# Patient Record
Sex: FEMALE | Race: WHITE | Employment: PART TIME | URBAN - METROPOLITAN AREA
[De-identification: names, ages, dates, MRNs, and addresses within clinical notes are randomized per-mention and may not be internally consistent; named-entity substitution may affect disease eponyms.]

---

## 2017-09-25 ENCOUNTER — OFFICE VISIT (OUTPATIENT)
Dept: SURGERY | Age: 27
End: 2017-09-25

## 2017-09-25 DIAGNOSIS — E66.01 OBESITY, MORBID, BMI 40.0-49.9 (HCC): Primary | ICD-10-CM

## 2017-09-27 VITALS — BODY MASS INDEX: 41.83 KG/M2 | HEIGHT: 68 IN | WEIGHT: 276 LBS

## 2017-09-27 NOTE — PROGRESS NOTES
Nicole Falk participated in an educational session on the importance of starting to make healthy choices prior to weight loss surgery. General healthy foods were reviewed. Diet history was reviewed. Patient set a dietary, exercise, and behavioral goal in order to start making healthy changes now.      Visit Vitals    Ht 5' 7.75\" (1.721 m)    Wt 125.2 kg (276 lb)    BMI 42.28 kg/m2     Kell Kaminski RD

## 2017-10-27 ENCOUNTER — CLINICAL SUPPORT (OUTPATIENT)
Dept: SURGERY | Age: 27
End: 2017-10-27

## 2017-10-27 VITALS — BODY MASS INDEX: 41.83 KG/M2 | WEIGHT: 276 LBS | HEIGHT: 68 IN

## 2017-10-27 DIAGNOSIS — E66.01 OBESITY, MORBID, BMI 40.0-49.9 (HCC): Primary | ICD-10-CM

## 2017-10-27 NOTE — PROGRESS NOTES
Medical Weight Loss Multi-Disciplinary Program    Name: Lamberto Price   : 1990    Session# 2  Date: 10/27/2017     Height: 5' 7.75\" (172.1 cm)    Weight: 125.2 kg (276 lb) lbs. Body mass index is 42.28 kg/(m^2). Pounds Lost: 0 Pounds Gained: 0    Dietary Instructions    Reviewed intake  Understanding label reading  Understanding low carbohydrates, low sugar, higher protein meals  Understanding proper portions  Dining outside home  Instruction given for personal dietary changes  Discussed perceived compliance  Comments: Pt given brief pre/post-op diet ed and diet hx reviewed. Physical Activity/Exercise    Discussed Perceived Compliance  Reasonable Goals Set  Motivation  Comments: Pt goes to the gym 4-5 days per week for 60-90 minutes; also walks daily     Behavior Modification    Positive attitude  Comments: Pt is working on the following goals:  Goals:   1. Continue current exercise routine; increasing as able  2. Work on eating three meals per day; no skipping meals - can use protein shake as a meal replacement or snack (so you can drink up to 2 per day)  3. Start a food journal either in a notebook or an marsha on your phone where you can star or highlight foods that give you trouble - also can track grams of protein and grams of carbs    Candidate for surgery (per RD): Pending     Dietitian: Marcie Sands is a 32 y.o. female who present for a pre-op evaluation. Visit Vitals    Ht 5' 7.75\" (1.721 m)    Wt 125.2 kg (276 lb)    BMI 42.28 kg/m2     Past Medical History:   Diagnosis Date    Asthma      delivery            Procedure:  laparoscopic sleeve gastrectomy     Reasons for Surgery:  BMI > 40 with one or more medically significant comorbidities    Summary:  Pt given brief pre/post-op diet ed and diet hx reviewed. Pt set several goals. See below. Current Vitamins: Vitamin D, Biotin     What have you done in the past to try to lose weight?  Pt has done Dem[employer], [employer], [employer], [employer]     Why didn't you lose weight or keep the weight off?: Pt was able to lose about 50 pounds about 1-2 years ago and was put on a lot of medication which caused rapid weight gain     Why do you think having weight loss surgery will make it possible for you to lose weight and keep it off? Pt feels it will be a tool/stepping stone and she is a diabetic and the medication is what makes it hard for her to lose weight and keep it off     Patient Education and Materials Provided:  Supplement Resource Guide, B Vitamin Information, MVI Recommendations, Calcium Citrate Information, Bariatric Supplement Companies, Protein Supplement Information, Fluid Requirements, No Caffeine or Carbonation, No Alcohol for One Year Post Op, 3 Balanced Meals a Day, Food Group Guide, Good Choices Dining Out, No Snacks, No Concentrated Sweets, Support System at Home, Exercising, Support Group Information and Addressed Current Habits / Changes to make    Nutritional Hx: What is the number of meals you eat per day? 1-2   Comment: typically skips breakfast or lunch     Do you eat between meals / snack? no  Typical snack: None    How fast do you eat your meals? Average depending on what the meal is     How many sodas/sugared beverages do you drink per day? None     How many caffeinated drinks do you have per day? None     How much water do you drink per day? 5+ 16 ounce bottles per day  (8oz glasses)    How often do you eat fast food?  2-3 times per month - has a hard time digesting food sometimes    How often do you consume alcohol? never;     Diet History:  Breakfast  What are you eating and how much? i   When? ii   Where? iii   Snacks  What are you eating and how much? i   When? ii   Where? iii   Hydration  What are you eating and how much? i   When? ii   Where? ii   Lunch  What are you eating and how much? i   When? ii   Where? iii   Snacks  What are you eating and how much? i   When? ii Where? iii   Hydration  What are you eating and how much? i   When? ii   Where? iii   Dinner  What are you eating and how much? i   When? ii   Where? iii   Snacks  What are you eating and how much? i   When? ii   Where? iii   Hydration  What are you eating and how much? i   When? ii   Where? iii     Exercise:  Do you currently have an exercise routine? yes  What kind of exercise do you do? goes to [employer] . For how long? 60-90 minutes For how often? 4-5 days per week    Goals:   1. Continue current exercise routine; increasing as able  2. Work on eating three meals per day; no skipping meals - can use protein shake as a meal replacement or snack (so you can drink up to 2 per day)  3.  Start a food journal either in a notebook or an marsha on your phone where you can star or highlight foods that give you trouble - also can track grams of protein and grams of carbs

## 2017-11-20 ENCOUNTER — OFFICE VISIT (OUTPATIENT)
Dept: SURGERY | Age: 27
End: 2017-11-20

## 2017-11-20 ENCOUNTER — CLINICAL SUPPORT (OUTPATIENT)
Dept: SURGERY | Age: 27
End: 2017-11-20

## 2017-11-20 VITALS — BODY MASS INDEX: 41.83 KG/M2 | WEIGHT: 276 LBS | HEIGHT: 68 IN

## 2017-11-20 DIAGNOSIS — E66.01 MORBID OBESITY WITH BMI OF 40.0-44.9, ADULT (HCC): ICD-10-CM

## 2017-11-20 DIAGNOSIS — E11.9 TYPE 2 DIABETES MELLITUS WITHOUT COMPLICATION, WITHOUT LONG-TERM CURRENT USE OF INSULIN (HCC): ICD-10-CM

## 2017-11-20 DIAGNOSIS — E66.01 MORBID OBESITY WITH BMI OF 40.0-44.9, ADULT (HCC): Primary | ICD-10-CM

## 2017-11-20 DIAGNOSIS — E11.43 DM GASTROPARESIS (HCC): ICD-10-CM

## 2017-11-20 DIAGNOSIS — K31.84 DM GASTROPARESIS (HCC): ICD-10-CM

## 2017-11-20 DIAGNOSIS — J45.901 MILD ASTHMA WITH ACUTE EXACERBATION, UNSPECIFIED WHETHER PERSISTENT: ICD-10-CM

## 2017-11-20 DIAGNOSIS — E66.01 MORBID OBESITY (HCC): Primary | ICD-10-CM

## 2017-11-20 DIAGNOSIS — E78.5 HYPERLIPIDEMIA, UNSPECIFIED HYPERLIPIDEMIA TYPE: ICD-10-CM

## 2017-11-20 RX ORDER — ERGOCALCIFEROL 1.25 MG/1
CAPSULE ORAL
Refills: 12 | COMMUNITY
Start: 2017-11-06 | End: 2018-03-21

## 2017-11-20 RX ORDER — ALBUTEROL SULFATE 90 UG/1
2 AEROSOL, METERED RESPIRATORY (INHALATION)
COMMUNITY

## 2017-11-20 RX ORDER — ATORVASTATIN CALCIUM 40 MG/1
40 TABLET, FILM COATED ORAL
COMMUNITY
End: 2021-01-13 | Stop reason: SDUPTHER

## 2017-11-20 RX ORDER — METFORMIN HYDROCHLORIDE 500 MG/1
TABLET, EXTENDED RELEASE ORAL
Refills: 1 | COMMUNITY
Start: 2017-10-22 | End: 2018-03-21

## 2017-11-20 RX ORDER — DULAGLUTIDE 1.5 MG/.5ML
1.5 INJECTION, SOLUTION SUBCUTANEOUS
Refills: 3 | COMMUNITY
Start: 2017-10-26 | End: 2018-03-21

## 2017-11-20 RX ORDER — OMEPRAZOLE 40 MG/1
CAPSULE, DELAYED RELEASE ORAL
Refills: 2 | COMMUNITY
Start: 2017-10-22 | End: 2021-01-13 | Stop reason: SDUPTHER

## 2017-11-20 RX ORDER — BUTALBITAL, ACETAMINOPHEN AND CAFFEINE 50; 325; 40 MG/1; MG/1; MG/1
1 TABLET ORAL
Refills: 3 | COMMUNITY
Start: 2017-10-26 | End: 2021-01-13 | Stop reason: SDUPTHER

## 2017-11-20 RX ORDER — GLIPIZIDE 5 MG/1
TABLET, FILM COATED, EXTENDED RELEASE ORAL
Refills: 1 | COMMUNITY
Start: 2017-10-22 | End: 2018-03-21

## 2017-11-20 RX ORDER — AMITRIPTYLINE HYDROCHLORIDE 10 MG/1
50 TABLET, FILM COATED ORAL
Refills: 3 | COMMUNITY
Start: 2017-10-28 | End: 2018-07-31

## 2017-11-20 NOTE — PROGRESS NOTES
Medical Weight Loss Multi-Disciplinary Program    Name: Francisco Nava   : 1990    Session# 3  Date: 2017     Height: 5' 7.75\" (172.1 cm)    Weight: 125.2 kg (276 lb) lbs. Body mass index is 42.28 kg/(m^2). Same weight     Dietary Instructions    Reviewed intake  Understanding low carbohydrates, low sugar, higher protein meals  Understanding proper portions  Instruction given for personal dietary changes  Discussed perceived compliance  Comments: Diet hx reviewed and personal dietary changes discussed. Pt kept daily diet log. She eats primarily carbohydrate foods as these are all the stay down. Many liquids also stay down. She is being tested for gastroparesis. Physical Activity/Exercise    Discussed Perceived Compliance  Reasonable Goals Set  Motivation  Comments: Did some walking this month. Has a hard time being active on sick days. Goal to get back to the gym- on good days. Behavior Modification    Achieving/Rewarding goals met  Positive attitude  Discussed perceived compliance  Comments: Pt is doing some exercise and plans to get back to routine. She has tracked her food this month using a log, though she has not yet tried the protein drink. Goals:  1. Try a protein drink for breakfast- use Top 10 list from last time. Try protein drink for breakfast within 2 hours of waking. Don't skip breakfast.  2. Eat 3 meals as tolerated. 3. Choose lactose-free protein drinks: HUMZA Medina at [facility] or [facility]. To help minimize potential stomach pain. 4. Get back to the gym- on good days.      Candidate for surgery (per RD): pending    Dietitian: Raymundo Negrete RD

## 2017-11-20 NOTE — MR AVS SNAPSHOT
Visit Information Date & Time Provider Department Dept. Phone Encounter #  
 11/20/2017  4:30 PM [id number] [facility] Tali Salinas [phone] [id number] Your Appointments 2/8/2018 11:10 AM  
[facility] [facility]) [address] [place] [address] Jose Navarro [address] [place] [id number] Upcoming Health Maintenance Date Due Pneumococcal 19-64 Medium Risk (1 of 1 - PPSV23) 3/7/2007 DTaP/Tdap/Td series (1 - Tdap) 3/7/2009 PAP AKA CERVICAL CYTOLOGY 3/7/2009 Influenza Age 5 to Adult 8/1/2017 Allergies as of 11/20/2017  Review Complete On: 11/20/2017 By: Vincent Jacobsen MD  
  
 Severity Noted Reaction Type Reactions Latex  05/24/2012    Rash Mushroom High 11/20/2017   Intolerance Anaphylaxis Doxycycline  05/02/2013    Swelling Penicillins  05/24/2012    Rash  
 Sulfa (Sulfonamide Antibiotics)  05/24/2012    Swelling Current Immunizations  Never Reviewed No immunizations on file. Not reviewed this visit Vitals Height(growth percentile) Weight(growth percentile) BMI OB Status Smoking Status 5' 7.75\" (1.721 m) 276 lb (125.2 kg) 42.28 kg/m2 IUD Never Smoker BMI and BSA Data Body Mass Index Body Surface Area  
 42.28 kg/m 2 2.45 m 2 Your Updated Medication List  
  
   
This list is accurate as of: 11/20/17  5:01 PM.  Always use your most recent med list.  
  
  
  
  
 ADDERALL XR 10 mg XR capsule Generic drug:  amphetamine-dextroamphetamine XR Take 10 mg by mouth every morning. albuterol 90 mcg/actuation inhaler Commonly known as:  PROVENTIL HFA, VENTOLIN HFA, PROAIR HFA Take  by inhalation. amitriptyline 10 mg tablet Commonly known as:  ELAVIL 50 mg nightly. atorvastatin 40 mg tablet Commonly known as:  LIPITOR Take  by mouth daily. butalbital-acetaminophen-caffeine -40 mg per tablet Commonly known as:  FIORICET, ESGIC  
  
 ergocalciferol 50,000 unit capsule Commonly known as:  ERGOCALCIFEROL TK 1 C PO Q 7 DAYS  
  
 glipiZIDE SR 5 mg CR tablet Commonly known as:  GLUCOTROL XL  
TK 1 T PO QD  
  
 metFORMIN  mg tablet Commonly known as:  GLUCOPHAGE XR  
TK 1 T PO QD  
  
 MIRENA 20 mcg/24 hr (5 years) Iud  
Generic drug:  levonorgestrel 1 Device by IntraUTERine route once. omeprazole 40 mg capsule Commonly known as:  PRILOSEC TK 1 C PO 30 MIN B LOIDA  
  
 TRULICITY 1.5 CZ/3.6 mL sub-q pen Generic drug:  dulaglutide Patient Instructions Goals: 1. Try a protein drink for breakfast- use Top 10 list from last time. Try protein drink for breakfast within 2 hours of waking. 2. Eat 3 meals as tolerated. 3. Choose lactose-free protein drinks: HUMZA Martinez at [facility] or [facility]. To help minimize potential stomach pain. 4. Get back to the gym- on good days. 5. Try lactose-free [employer] milk to mix Unjury or just for use. 6. Get tested soon for gastroparesis. Introducing [facility]! Carlton Coyle introduces [employer] patient portal. Now you can access parts of your medical record, email your doctor's office, and request medication refills online. 1. In your internet browser, go to [email or web address] 2. Click on the First Time User? Click Here link in the Sign In box. You will see the New Member Sign Up page. 3. Enter your [employer] Access Code exactly as it appears below. You will not need to use this code after youve completed the sign-up process. If you do not sign up before the expiration date, you must request a new code. · [employer] Access Code: [email or web address] Expires: 2/18/2018  4:34 PM 
 
4.  Enter the last four digits of your Social Security Number (xxxx) and Date of Birth (mm/dd/yyyy) as indicated and click Submit. You will be taken to the next sign-up page. 5. Create a [employer] ID. This will be your [employer] login ID and cannot be changed, so think of one that is secure and easy to remember. 6. Create a [employer] password. You can change your password at any time. 7. Enter your Password Reset Question and Answer. This can be used at a later time if you forget your password. 8. Enter your e-mail address. You will receive e-mail notification when new information is available in [address] 9. Click Sign Up. You can now view and download portions of your medical record. 10. Click the Download Summary menu link to download a portable copy of your medical information. If you have questions, please visit the Frequently Asked Questions section of the [employer] website. Remember, [employer] is NOT to be used for urgent needs. For medical emergencies, dial 911. Now available from your iPhone and Android! Please provide this summary of care documentation to your next provider. Your primary care clinician is listed as Phys Other. If you have any questions after today's visit, please call [phone].

## 2017-11-20 NOTE — PATIENT INSTRUCTIONS
Goals: 1. Try a protein drink for breakfast- use Top 10 list from last time. Try protein drink for breakfast within 2 hours of waking. 2. Eat 3 meals as tolerated. 3. Choose lactose-free protein drinks: HUMZA Smith at [facility] or [facility]. To help minimize potential stomach pain. 4. Get back to the gym- on good days. 5. Try lactose-free Fairlife milk to mix Unjury or just for use. 6. Get tested soon for gastroparesis.

## 2017-11-22 VITALS
WEIGHT: 276 LBS | RESPIRATION RATE: 16 BRPM | HEIGHT: 68 IN | DIASTOLIC BLOOD PRESSURE: 70 MMHG | HEART RATE: 106 BPM | SYSTOLIC BLOOD PRESSURE: 118 MMHG | BODY MASS INDEX: 41.83 KG/M2 | OXYGEN SATURATION: 100 %

## 2017-11-29 ENCOUNTER — HOSPITAL ENCOUNTER (OUTPATIENT)
Dept: NUCLEAR MEDICINE | Age: 27
Discharge: HOME | End: 2017-11-29
Attending: SPECIALIST
Payer: MEDICAID

## 2017-11-29 DIAGNOSIS — E11.43 DM GASTROPARESIS (HCC): ICD-10-CM

## 2017-11-29 DIAGNOSIS — K31.84 DM GASTROPARESIS (HCC): ICD-10-CM

## 2017-11-29 PROCEDURE — 78264 GASTRIC EMPTYING IMG STUDY: CPT

## 2017-12-05 NOTE — PROGRESS NOTES
Bariatric Surgery Consultation    Subjective: The patient is a 32 y.o. obese female with a Body mass index is 42.28 kg/(m^2). Shiela Esqueda The patient is at her heaviest weight for the past 6 years. she has been overweight since age 14.   she has been considering surgery since last year. she desires surgery at this time because of multiple health concerns and their lifestyle issues which are hindered by their weight. she has been referred by his family physician for evaluation and treatment of their obesity via surgical intervention. Ayad Bone has tried multiple diets in her lifetime most recently tried behavior modification, unsupervised diets, Weight Watchers and Atkins    Bariatric comorbidities present are   Patient Active Problem List   Diagnosis Code    Acute bronchitis J20.9    Asthma with exacerbation J45. 0    Pleurisy R09.1    Morbid obesity (HCC) E66.01    Morbid obesity with BMI of 40.0-44.9, adult (HCC) E66.01, Z68.41    Diabetes ([facility] [address] E11.9    Hyperlipemia E78.5    Gastroparesis K31.84       The patient is considering laparoscopic sleeve gastrectomy for surgical weight loss due to their ineffective progress with medical forms of weight loss and the urging of their physician who cares for their primary medical issues. The patient  now presents  for consideration for weight loss surgery understanding the benefits of this over a medical approach of weight loss as was discussed in our presentation on weight loss surgery. They have discussed their plans both with their family and primary care physician who is in support of their pursuit of such. The patient has not had health issues as of late and denies and gastrointestinal disturbances other than what is outlined below in their review of symptoms. All of their prior evaluations available by both their PCP's and specialists physicians have been reviewed today either in the Care Everywhere portal or scanned under the media tab.     I have spent a large portion of my initial consultation today reviewing the patients current dietary habits which have contributed to their health issues and obesity. I have suggested to them personally a dietary regimen that they can initiate now to help with their status as it pertains to their weight. They understand that the most important aspect of their journey through their weight loss endeavor will be their adherence to a new lifestyle of healthy eating behavior. They also understand that an adherence to an exercise program will not only help with weight loss but is ultimately important in weight maintenance. The patients goal weight is 175lb. These goals are consistent with expected outcomes of their desired operation. her Medical goals are resolution of these health issues. Patient Active Problem List    Diagnosis Date Noted    Morbid obesity ([facility] [address]     Morbid obesity with BMI of 40.0-44.9, adult ([facility] [address]     Diabetes ([facility] [address]     Hyperlipemia     Gastroparesis     Acute bronchitis 05/02/2013    Asthma with exacerbation 05/02/2013    Pleurisy 05/02/2013     Past Surgical History:   Procedure Laterality Date    HX GYN        Social History   Substance Use Topics    Smoking status: Never Smoker    Smokeless tobacco: Never Used    Alcohol use No      History reviewed. No pertinent family history. Current Outpatient Prescriptions   Medication Sig Dispense Refill    TRULICITY 1.5 GX/4.0 mL sub-q pen   3    ergocalciferol (ERGOCALCIFEROL) 50,000 unit capsule TK 1 C PO Q 7 DAYS  12    glipiZIDE SR (GLUCOTROL XL) 5 mg CR tablet TK 1 T PO QD  1    metFORMIN ER (GLUCOPHAGE XR) 500 mg tablet TK 1 T PO QD  1    omeprazole (PRILOSEC) 40 mg capsule TK 1 C PO 30 MIN B LOIDA  2    amitriptyline (ELAVIL) 10 mg tablet 50 mg nightly.   3    butalbital-acetaminophen-caffeine (FIORICET, ESGIC) -40 mg per tablet   3    albuterol (PROVENTIL HFA, VENTOLIN HFA, PROAIR HFA) 90 mcg/actuation inhaler Take  by inhalation.  atorvastatin (LIPITOR) 40 mg tablet Take  by mouth daily.  levonorgestrel (MIRENA) 20 mcg/24 hr (5 years) IUD 1 Device by IntraUTERine route once.  amphetamine-dextroamphetamine XR (ADDERALL XR) 10 mg XR capsule Take 10 mg by mouth every morning.        Allergies   Allergen Reactions    Latex Rash    Mushroom Anaphylaxis    Doxycycline Swelling    Lactose Nausea and Vomiting     intolerance    Penicillins Rash    Sulfa (Sulfonamide Antibiotics) Swelling          Review of Systems:       General - No history or complaints of unexpected fever, chills, or weight loss  Head/Neck - No history or complaints of headache, diplopia, dysphagia, hearing loss  Cardiac - No history or complaints of chest pain, palpitations, murmur, or shortness of breath  Pulmonary - No history or complaints of shortness of breath, productive cough, hemoptysis  Gastrointestinal - minimal reflux,no  abdominal pain, obstipation/constipation or blood per rectum  Genitourinary - No history or complaints of hematuria/dysuria, stress urinary incontinence symptoms, or renal lithiasis  Musculoskeletal - minimal joint pain ,  no muscular weakness  Hematologic - No history or complaints of bleeding disorders,  No blood transfusions  Neurologic - No history or complaints of  migraine headaches, seizure activity, syncopal episodes, TIA or stroke  Integumentary - No history or complaints of rashes, abnormal nevi, skin cancer  Gynecological - IUD in place               Objective:     Visit Vitals    /70 (BP 1 Location: Left arm, BP Patient Position: Sitting)    Pulse (!) 106    Resp 16    Ht 5' 7.75\" (1.721 m)    Wt 125.2 kg (276 lb)    SpO2 100%    BMI 42.28 kg/m2       Physical Examination: General appearance - alert, well appearing, and in no distress and oriented to person, place, and time  Mental status - alert, oriented to person, place, and time, normal mood, behavior, speech, dress, motor activity, and thought processes  Eyes - pupils equal and reactive, extraocular eye movements intact, sclera anicteric, left eye normal, right eye normal  Ears - bilateral TM's and external ear canals normal, right ear normal, left ear normal  Nose - normal and patent, no erythema, discharge or polyps and normal nontender sinuses  Mouth - mucous membranes moist, pharynx normal without lesions  Neck - supple, no significant adenopathy  Lymphatics - no palpable lymphadenopathy, no hepatosplenomegaly  Chest - clear to auscultation, no wheezes, rales or rhonchi, symmetric air entry  Heart - normal rate, regular rhythm, normal S1, S2, no murmurs, rubs, clicks or gallops  Abdomen - soft, nontender, nondistended, no masses or organomegaly  Back exam - full range of motion, no tenderness, palpable spasm or pain on motion  Neurological - alert, oriented, normal speech, no focal findings or movement disorder noted  Musculoskeletal - no joint tenderness, deformity or swelling  Extremities - peripheral pulses normal, no pedal edema, no clubbing or cyanosis    Labs:       No results found for this or any previous visit (from the past 1440 hour(s)). Assessment:     Morbid obesity with comorbidity    Plan:     laparoscopic sleeve gastrectomy    This is a 32 y.o. female with a BMI of Body mass index is 42.28 kg/(m^2). and the weight-related co-morbidties as noted below. Marissa Feng meets the NIH criteria for bariatric surgery based upon the BMI of Body mass index is 42.28 kg/(m^2). and multiple weight-related co-morbidties. Marissa Feng has elected laparoscopic sleeve gastrectomy as her intervention of choice for treatment of morbid obestiy through surgical means secondary to its safety profile, rapid return to work  and decreases in operative risks over gastric bypass.     In the office today, following Jonelle's history and physical examination, a 30 minute discussion regarding the anatomic alterations for the laparoscopic sleeve gastrectomy was undertaken. The dietary expectations and the patient and physician dependent factors for success were thoroughly discussed, to include the need for interval follow-up and long-term dietary changes associated with success. The possible complications of the sleeve gastrectomy  were also discussed, to include;death, DVT/PE, staple line leak, bleeding, stricture formation, infection, nutritional deficiencies and sleeve dilation. Specific weight related outcomes for success were also discussed with an emphasis on careful and close follow-up with the first year and eating behavior modification as the baseline and cyclical hunger return. The patient expressed an understanding of the above factors, and her questions were answered in their entirety. In addition, the patient attended a 1.5 hour power point seminar regarding obesity, surgical weight loss including, adjustable gastric band, gastric bypass, and sleeve gastrectomy. This discussion contrasted the different surgical techniques, mechanisms of actions and expected outcomes, and surgical and medical risks associated with each procedure. During this seminar, there was a long question and answer session where each questions was answered until there were no additional questions. Today, the patient had all of her questions answered and desires to proceed with  bariatric surgery initially choosing sleeve gastrectomy as her surgical option. Secondary Diagnoses:       Dietary Intervention  - The patient is currently scheduled to see or has been followed by a bariatric nutritionist for an attempt at preoperative weight loss as has been dictated by their insurance carrier. They will be assessed at various times during their follow up to evaluate their progress depending on the length of time that is required once again by their carrier.   I have explained the importance of preoperative weight loss and the benefits regarding lower surgical risk and also assisting the patient in reaching their weight loss goal.  Finally they understand there is a physiologic benefit from the standpoint of hepatic volume reduction and reduction of central visceral adiposity preoperatively. I have reiterated the importance of a low carbohydrate and high protein regimen to achieve their stated goal. I have reviewed their current eating behavior prior to this encounter and explained to them in an exhaustive fashion the appropriate diet that they should adhere to. They have been encouraged to loose weight pre operatively and understand it is our prerogative to cancel surgery or postpone their procedure in the event of significant weight gain. The patients weight loss goal pre operatively is 5-10 pounds. Adult Onset Diabetes - The patient has paul given a very low carbohydrate diet preoperatively along with instructions to monitor their blood sugars on a regular daily basis. When  their surgery is performed  we will be monitoring the patient with sliding scale insulin and accuchecks.  Based on those values we will determine whether the patient needs a reduction of those medications postoperatively or total removal of those medications on discharge.  We will have the patient continue accuchecks postoperatively while at home also and report to me or their family physician for appropriate adjustments as needed.  The patient also understands that in the event of uncontrolled blood sugar preoperatively that we may choose to postpone their surgery. Hyperlipidemia - The patient understands that studies show that almost all patient will realize an improvement in their lipid profile with weight loss that occurs with these procedures. They however also understand that hyperlipidemia is a multifactorial disease particularly as it pertains to their genetic background and that there is no guarantee toward cure  of this issue.  We will resume their medications both pre operatively and immediately postoperatively as this tends to decrease any post operative cardiac events.  The patient will follow up with their family physician in the postoperative period with plans to repeat their lipid panel 2-3 month postoperative for potential adjustment or removal of these medications. Restrictive Airway Disease - We will continue all of their pulmonary medications in the form of oral pills and inhalers in both the perioperative and postoperative period. They understand that their symptoms should improve with weight loss. Any further testing related to this will be turned over to their family physician or pulmonologist. The patient  understands that if they require oral or IV steroids in the future that they will notify us. This is particularly important for gastric bypass patients at all times and both sleeve  gastrectomy and gastric bypass patients in the 1 month pre op and 1 month post operative period. They understand that inhaled steroids are exempt from this.           Signed By: Arabella Covington MD     December 5, 2017

## 2017-12-05 NOTE — PATIENT INSTRUCTIONS
New patient Instructions      1. Ensure all pre-operative insurances requirements are complete (ie; dietary visits, psychology consults, primary care documentation, etc)    2. Adhere to pre-operative weight loss / weight maintenance plan discussed in the office today. 3. Contact the office with any questions on pre-operative clearance issues (ie; cardiology work-up, pulmonary work-up, upper GI study, etc). 4. If a barium upper GI study has been ordered for your evaluation, make sure you are on liquids only the morning of the procedure.

## 2017-12-06 ENCOUNTER — APPOINTMENT (OUTPATIENT)
Dept: GENERAL RADIOLOGY | Age: 27
End: 2017-12-06
Attending: SPECIALIST
Payer: MEDICAID

## 2017-12-06 ENCOUNTER — HOSPITAL ENCOUNTER (OUTPATIENT)
Age: 27
Setting detail: OUTPATIENT SURGERY
Discharge: HOME | End: 2017-12-06
Attending: SPECIALIST | Admitting: SPECIALIST
Payer: MEDICAID

## 2017-12-06 VITALS
TEMPERATURE: 98.6 F | DIASTOLIC BLOOD PRESSURE: 79 MMHG | WEIGHT: 273.7 LBS | OXYGEN SATURATION: 100 % | HEIGHT: 68 IN | RESPIRATION RATE: 20 BRPM | BODY MASS INDEX: 41.48 KG/M2 | SYSTOLIC BLOOD PRESSURE: 125 MMHG | HEART RATE: 103 BPM

## 2017-12-06 DIAGNOSIS — E66.01 MORBID OBESITY (HCC): ICD-10-CM

## 2017-12-06 PROCEDURE — 76040000019: Performed by: SPECIALIST

## 2017-12-06 PROCEDURE — 74011000255 HC RX REV CODE- 255: Performed by: SPECIALIST

## 2017-12-06 PROCEDURE — 74240 X-RAY XM UPR GI TRC 1CNTRST: CPT

## 2017-12-06 NOTE — IP AVS SNAPSHOT
Carolyneliel Salmon 
 
 
 [address] [place] [id number] 
[phone] Patient: Joel Cagle MRN: [id number] DXY:[id number] About your hospitalization You were admitted on:  December 6, 2017 You last received care in the:  [facility] ENDOSCOPY You were discharged on:  December 6, 2017 Why you were hospitalized Your primary diagnosis was:  Not on File Things You Need To Do (next 8 weeks) Monday Dec 11, 2017 NUTRITION COUNSELING with ROSIE NUTRI VISIT PEN at 12:30 PM  
[facility]: [facility]) New Patient with Karis Chery NP at  1:00 PM  
Where: [facility] ([facility]) Discharge Orders None A check rafita indicates which time of day the medication should be taken. My Medications ASK your physician about these medications Instructions Each Dose to Equal  
 Morning Noon Evening Bedtime ADDERALL XR 10 mg XR capsule Generic drug:  amphetamine-dextroamphetamine XR Your last dose was: Your next dose is: Take 10 mg by mouth every morning. 10 mg  
    
   
   
   
  
 albuterol 90 mcg/actuation inhaler Commonly known as:  PROVENTIL HFA, VENTOLIN HFA, PROAIR HFA Your last dose was: Your next dose is: Take  by inhalation. amitriptyline 10 mg tablet Commonly known as:  ELAVIL Your last dose was: Your next dose is:    
   
   
 50 mg nightly. 50 mg  
    
   
   
   
  
 atorvastatin 40 mg tablet Commonly known as:  LIPITOR Your last dose was: Your next dose is: Take  by mouth daily. butalbital-acetaminophen-caffeine -40 mg per tablet Commonly known as:  Lawerence Lied Your last dose was: Your next dose is: ergocalciferol 50,000 unit capsule Commonly known as:  ERGOCALCIFEROL Your last dose was: Your next dose is:    
   
   
 TK 1 C PO Q 7 DAYS  
     
   
   
   
  
 glipiZIDE SR 5 mg CR tablet Commonly known as:  GLUCOTROL XL Your last dose was: Your next dose is:    
   
   
 TK 1 T PO QD  
     
   
   
   
  
 metFORMIN  mg tablet Commonly known as:  GLUCOPHAGE XR Your last dose was: Your next dose is:    
   
   
 TK 1 T PO QD  
     
   
   
   
  
 MIRENA 20 mcg/24 hr (5 years) Iud  
Generic drug:  levonorgestrel Your last dose was: Your next dose is:    
   
   
 1 Device by IntraUTERine route once. 1 Device  
    
   
   
   
  
 omeprazole 40 mg capsule Commonly known as:  PRILOSEC Your last dose was: Your next dose is:    
   
   
 TK 1 C PO 30 MIN B LOIDA  
     
   
   
   
  
 TRULICITY 1.5 TT/9.9 mL sub-q pen Generic drug:  dulaglutide Your last dose was: Your next dose is:    
   
   
      
   
   
   
  
  
  
  
Discharge Instructions None Introducing [facility]! Debora Azul introduces [employer] patient portal. Now you can access parts of your medical record, email your doctor's office, and request medication refills online. 1. In your internet browser, go to [email or web address] 2. Click on the First Time User? Click Here link in the Sign In box. You will see the New Member Sign Up page. 3. Enter your [employer] Access Code exactly as it appears below. You will not need to use this code after youve completed the sign-up process. If you do not sign up before the expiration date, you must request a new code. · [employer] Access Code: [email or web address] Expires: 2/18/2018  4:34 PM 
 
4. Enter the last four digits of your Social Security Number (xxxx) and Date of Birth (mm/dd/yyyy) as indicated and click Submit. You will be taken to the next sign-up page. 5. Create a [employer] ID. This will be your [employer] login ID and cannot be changed, so think of one that is secure and easy to remember. 6. Create a [employer] password. You can change your password at any time. 7. Enter your Password Reset Question and Answer. This can be used at a later time if you forget your password. 8. Enter your e-mail address. You will receive e-mail notification when new information is available in [address] 9. Click Sign Up. You can now view and download portions of your medical record. 10. Click the Download Summary menu link to download a portable copy of your medical information. If you have questions, please visit the Frequently Asked Questions section of the [employer] website. Remember, [employer] is NOT to be used for urgent needs. For medical emergencies, dial 911. Now available from your iPhone and Android! Unresulted Labs-Please follow up with your PCP about these lab tests Order Current Status XR GASTROGRAFFIN UPPER GI In process Providers Seen During Your Hospitalization Provider Specialty Primary office phone Almedia Gosselin, MD General Surgery [phone] Your Primary Care Physician (PCP) Primary Care Physician Office Phone Office Fax OTHER, PHYS ** None ** ** None ** You are allergic to the following Allergen Reactions Latex Rash Mushroom Anaphylaxis Doxycycline Swelling Erythromycin Rash Lactose Nausea and Vomiting  
 intolerance Penicillins Rash  
    
 Sulfa (Sulfonamide Antibiotics) Swelling Recent Documentation Height Weight BMI OB Status Smoking Status 1.721 m 124.1 kg 41.92 kg/m2 IUD Never Smoker Emergency Contacts Name Discharge Info Relation Home Work Mobile [address] CAREGIVER [3] Parent [1] [phone] Patient Belongings The following personal items are in your possession at time of discharge: Please provide this summary of care documentation to your next provider. Signatures-by signing, you are acknowledging that this After Visit Summary has been reviewed with you and you have received a copy. Patient Signature:  ____________________________________________________________ Date:  ____________________________________________________________  
  
[facility] Provider Signature:  ____________________________________________________________ Date:  ____________________________________________________________

## 2017-12-06 NOTE — PROCEDURES
Brijesh Mayo   : 1990  Medical Record [id number]            PREPROCEDURE DIAGNOSIS: This patient is preoperative for laparoscopic sleeve gastrectomyprocedure with a history of  reflux disease. POSTPROCEDURE DIAGNOSIS: This patient is preoperative for laparoscopic sleeve gastrectomyprocedure with a history of  reflux disease. PROCEDURES PERFORMED: Upper GI study with barium. ESTIMATED BLOOD LOSS: None. SPECIMENS: None. STATEMENT OF MEDICAL NECESSITY: The patient is a patient with a  longstanding history of obesity. They are now considering the laparoscopic sleeve gastrectomyprocedure as a means of surgical weight control and due to their history of reflux disease and are being assessed preoperatively for such. DESCRIPTION OF PROCEDURE: The patient was brought to the fluoroscopy unit and  was given thin barium. On swallowing of barium, they were noted to have  normal peristalsis of their esophagus. They had prompt filling of distal  esophagus with tapering into the gastroesophageal junction. There was no evidence of a hiatal hernia present. Contrast then filled the gastric cardia, fundus,body and pre pyloric region with no abnormalities noted. Contrast then exited the pylorus in normal fashion. No obstruction was noted. There was no evidence of reflux noted.     (normal anatomy)    Lorna Correia MD

## 2017-12-06 NOTE — IP AVS SNAPSHOT
Summary of Care Report The Summary of Care report has been created to help improve care coordination. Users with access to [employer] or [address] (Web-based application) may access additional patient information including the Discharge Summary. If you are not currently a [address] user and need more information, please call the number listed below in the [place] [address] section and ask to be connected with Medical Records. Facility Information Name Address Phone [address] [address] [phone] Patient Information Patient Name Sex MARAH Barrios ([id number]) Female 1990 Discharge Information Admitting Provider Service Area Unit Vincent Jacobsen MD /  [facility] [phone] Discharge Provider Discharge Date/Time Discharge Disposition Destination (none) (none) (none) (none) Patient Language Language ENGLISH [13] Hospital Problems as of 2017  Reviewed: 2017  8:11 AM by Vincent Jacobsen MD  
 None Non-Hospital Problems as of 2017  Reviewed: 2017  8:11 AM by Vincent Jacobsen MD  
  
  
  
 Class Noted - Resolved Last Modified Active Problems Acute bronchitis  2013 - Present 2013 Entered by Gerald Ashton Asthma with exacerbation  2013 - Present 2013 Entered by Gerald Ashton Pleurisy  2013 - Present 2013 Entered by Gerald Ashton   Morbid obesity ([address]  Unknown - Present 2017 by Vincent Jacobsen MD  
  Entered by Vincent Jacobsen MD  
  Morbid obesity with BMI of 40.0-44.9, adult [facility])  Unknown - Present 2017 by Vincent Jacobsen MD  
  Entered by Vincent Jacobsen MD  
  Diabetes [facility])  Unknown - Present 2017 by Vincent Jacobsen MD  
 Entered by Scott Astudillo MD  
  Hyperlipemia  Unknown - Present 11/20/2017 by Scott Astudillo MD  
  Entered by Scott Astudillo MD  
  Gastroparesis  Unknown - Present 11/20/2017 by Scott Astudillo MD  
  Entered by Scott Astudillo MD  
  Asthma  Unknown - Present 12/5/2017 by Scott Astudillo MD  
  Entered by Scott Astudillo MD  
  
You are allergic to the following Allergen Reactions Latex Rash Mushroom Anaphylaxis Doxycycline Swelling Erythromycin Rash Lactose Nausea and Vomiting  
 intolerance Penicillins Rash  
    
 Sulfa (Sulfonamide Antibiotics) Swelling Current Discharge Medication List  
  
ASK your doctor about these medications Dose & Instructions Dispensing Information Comments ADDERALL XR 10 mg XR capsule Generic drug:  amphetamine-dextroamphetamine XR Dose:  10 mg Take 10 mg by mouth every morning. Refills:  0  
   
 albuterol 90 mcg/actuation inhaler Commonly known as:  PROVENTIL HFA, VENTOLIN HFA, PROAIR HFA Take  by inhalation. Refills:  0  
   
 amitriptyline 10 mg tablet Commonly known as:  ELAVIL Dose:  50 mg  
50 mg nightly. Refills:  3  
   
 atorvastatin 40 mg tablet Commonly known as:  LIPITOR Take  by mouth daily. Refills:  0  
   
 butalbital-acetaminophen-caffeine -40 mg per tablet Commonly known as:  Awa De Anda Refills:  3  
   
 ergocalciferol 50,000 unit capsule Commonly known as:  ERGOCALCIFEROL TK 1 C PO Q 7 DAYS Refills:  12  
   
 glipiZIDE SR 5 mg CR tablet Commonly known as:  GLUCOTROL XL  
 TK 1 T PO QD Refills:  1  
   
 metFORMIN  mg tablet Commonly known as:  GLUCOPHAGE XR  
 TK 1 T PO QD Refills:  1 MIRENA 20 mcg/24 hr (5 years) Iud  
Generic drug:  levonorgestrel Dose:  1 Device 1 Device by IntraUTERine route once. Refills:  0  
   
 omeprazole 40 mg capsule Commonly known as:  PRILOSEC TK 1 C PO 30 MIN B LOIDA Refills:  2 TRULICITY 1.5 EV/4.1 mL sub-q pen Generic drug:  dulaglutide Refills:  3 Surgery Information ID Date/Time Status Primary Surgeon All Procedures Location [id number] 12/6/2017 1815 Unposted Jyoti Solis MD UGI ONLY WILL DO IN FILL CLINIC  THE [facility] ENDOSCOPY Follow-up Information None Discharge Instructions None Chart Review Routing History No Routing History on File

## 2017-12-11 ENCOUNTER — OFFICE VISIT (OUTPATIENT)
Dept: SURGERY | Age: 27
End: 2017-12-11

## 2017-12-11 ENCOUNTER — HOSPITAL ENCOUNTER (OUTPATIENT)
Dept: LAB | Age: 27
Discharge: HOME | End: 2017-12-11

## 2017-12-11 ENCOUNTER — CLINICAL SUPPORT (OUTPATIENT)
Dept: SURGERY | Age: 27
End: 2017-12-11

## 2017-12-11 VITALS — WEIGHT: 274 LBS | HEIGHT: 68 IN | BODY MASS INDEX: 41.52 KG/M2

## 2017-12-11 VITALS
WEIGHT: 274 LBS | BODY MASS INDEX: 41.52 KG/M2 | SYSTOLIC BLOOD PRESSURE: 126 MMHG | HEIGHT: 68 IN | DIASTOLIC BLOOD PRESSURE: 76 MMHG | HEART RATE: 103 BPM | OXYGEN SATURATION: 100 %

## 2017-12-11 DIAGNOSIS — K30 FUNCTIONAL DYSPEPSIA: ICD-10-CM

## 2017-12-11 DIAGNOSIS — E66.01 MORBID OBESITY WITH BMI OF 40.0-44.9, ADULT (HCC): ICD-10-CM

## 2017-12-11 DIAGNOSIS — J45.909 UNCOMPLICATED ASTHMA, UNSPECIFIED ASTHMA SEVERITY, UNSPECIFIED WHETHER PERSISTENT: ICD-10-CM

## 2017-12-11 DIAGNOSIS — E11.9 TYPE 2 DIABETES MELLITUS WITHOUT COMPLICATION, WITHOUT LONG-TERM CURRENT USE OF INSULIN (HCC): ICD-10-CM

## 2017-12-11 DIAGNOSIS — E66.01 MORBID OBESITY (HCC): ICD-10-CM

## 2017-12-11 DIAGNOSIS — E66.01 MORBID OBESITY WITH BMI OF 40.0-44.9, ADULT (HCC): Primary | ICD-10-CM

## 2017-12-11 DIAGNOSIS — E66.01 MORBID OBESITY (HCC): Primary | ICD-10-CM

## 2017-12-11 DIAGNOSIS — E78.5 HYPERLIPIDEMIA, UNSPECIFIED HYPERLIPIDEMIA TYPE: ICD-10-CM

## 2017-12-11 LAB — SENTARA SPECIMEN COL,SENBCF: NORMAL

## 2017-12-11 PROCEDURE — 99001 SPECIMEN HANDLING PT-LAB: CPT | Performed by: NURSE PRACTITIONER

## 2017-12-11 NOTE — MR AVS SNAPSHOT
Visit Information Date & Time Provider Department Dept. Phone Encounter #  
 12/11/2017 12:30 PM [facility] [phone] [id number] Your Appointments 12/11/2017  1:00 PM  
New Patient with Sulma Davenport, NP [facility] ([facility]) Appt Note: f/u 4 of 6 and 1 of 3 physician  
 [facility] [address] [facility] [address]  
[phone]  
  
   
 [address] [place] [address]  
  
    
 1/16/2018 12:00 PM  
EDUCATION CLASS with TSS NUTRI VISIT PEN [facility] ([facility]) Appt Note: f/u 5 of 6, and 2 of 3  
 [id number] [address] [facility] [address] [facility] [address]  
[phone]  
  
   
 [address] [address]  
  
    
 1/16/2018  1:00 PM  
New Patient with Sulma Davenport, NP [facility] ([facility]) Appt Note: f/u 5 of 6, and 2 of 3  
 [id number] [address] [facility] [address] [facility] [address]  
[phone]  
  
    
 2/8/2018 11:10 AM  
[facility] with [facility] ([address] [facility]) [address] [place] [address] [address] [address] Upcoming Health Maintenance Date Due HEMOGLOBIN A1C Q6M 1990 LIPID PANEL Q1 1990 FOOT EXAM Q1 3/7/1998 MICROALBUMIN Q1 3/7/1998 EYE EXAM RETINAL OR DILATED Q1 3/7/1998 Pneumococcal 19-64 Medium Risk (1 of 1 - PPSV23) 3/7/2007 DTaP/Tdap/Td series (1 - Tdap) 3/7/2009 PAP AKA CERVICAL CYTOLOGY 3/7/2009 Influenza Age 5 to Adult 8/1/2017 Allergies as of 12/11/2017  Review Complete On: 12/6/2017 By: Kaye Gonzalez Severity Noted Reaction Type Reactions Latex  05/24/2012    Rash Mushroom High 11/20/2017   Intolerance Anaphylaxis Doxycycline  05/02/2013    Swelling Erythromycin  12/06/2017    Rash Lactose  11/20/2017    Nausea and Vomiting  
 intolerance Penicillins  05/24/2012    Rash  
 Sulfa (Sulfonamide Antibiotics)  05/24/2012    Swelling Current Immunizations  Never Reviewed No immunizations on file. Not reviewed this visit Vitals Height(growth percentile) Weight(growth percentile) BMI OB Status Smoking Status 5' 7.75\" (1.721 m) 274 lb (124.3 kg) 41.97 kg/m2 IUD Never Smoker BMI and BSA Data Body Mass Index Body Surface Area 41.97 kg/m 2 2.44 m 2 Your Updated Medication List  
  
   
This list is accurate as of: 12/11/17 12:52 PM.  Always use your most recent med list.  
  
  
  
  
 ADDERALL XR 10 mg XR capsule Generic drug:  amphetamine-dextroamphetamine XR Take 10 mg by mouth every morning. albuterol 90 mcg/actuation inhaler Commonly known as:  PROVENTIL HFA, VENTOLIN HFA, PROAIR HFA Take  by inhalation. amitriptyline 10 mg tablet Commonly known as:  ELAVIL 50 mg nightly. atorvastatin 40 mg tablet Commonly known as:  LIPITOR Take  by mouth daily. butalbital-acetaminophen-caffeine -40 mg per tablet Commonly known as:  FIORICET, ESGIC  
  
 ergocalciferol 50,000 unit capsule Commonly known as:  ERGOCALCIFEROL TK 1 C PO Q 7 DAYS  
  
 glipiZIDE SR 5 mg CR tablet Commonly known as:  GLUCOTROL XL  
TK 1 T PO QD  
  
 metFORMIN  mg tablet Commonly known as:  GLUCOPHAGE XR  
TK 1 T PO QD  
  
 MIRENA 20 mcg/24 hr (5 years) Iud  
Generic drug:  levonorgestrel 1 Device by IntraUTERine route once. omeprazole 40 mg capsule Commonly known as:  PRILOSEC TK 1 C PO 30 MIN B LOIDA  
  
 TRULICITY 1.5 TS/4.1 mL sub-q pen Generic drug:  dulaglutide Patient Instructions 1. Work to get back to a good sleep schedule- 7-9 hours of sleep per day. 2. Try protein drinks from the Top 10 list- lactose free are [employer], Unjury, Nectar. 3. Continue to work to find what do not tolerate well. 4. Cut out gluten this month and see if helps. Also cut out fried foods. Introducing [facility]! Romayne Duster introduces [employer] patient portal. Now you can access parts of your medical record, email your doctor's office, and request medication refills online. 1. In your internet browser, go to [email or web address] 2. Click on the First Time User? Click Here link in the Sign In box. You will see the New Member Sign Up page. 3. Enter your [employer] Access Code exactly as it appears below. You will not need to use this code after youve completed the sign-up process. If you do not sign up before the expiration date, you must request a new code. · [employer] Access Code: [email or web address] Expires: 2/18/2018  4:34 PM 
 
4. Enter the last four digits of your Social Security Number (xxxx) and Date of Birth (mm/dd/yyyy) as indicated and click Submit. You will be taken to the next sign-up page. 5. Create a [employer] ID. This will be your [employer] login ID and cannot be changed, so think of one that is secure and easy to remember. 6. Create a [employer] password. You can change your password at any time. 7. Enter your Password Reset Question and Answer. This can be used at a later time if you forget your password. 8. Enter your e-mail address. You will receive e-mail notification when new information is available in [address] 9. Click Sign Up. You can now view and download portions of your medical record. 10. Click the Download Summary menu link to download a portable copy of your medical information. If you have questions, please visit the Frequently Asked Questions section of the [employer] website. Remember, [employer] is NOT to be used for urgent needs. For medical emergencies, dial 911. Now available from your iPhone and Android! Please provide this summary of care documentation to your next provider. Your primary care clinician is listed as Phys Other. If you have any questions after today's visit, please call [phone].

## 2017-12-11 NOTE — PATIENT INSTRUCTIONS
Body Mass Index: Care Instructions  Your Care Instructions    Body mass index (BMI) can help you see if your weight is raising your risk for health problems. It uses a formula to compare how much you weigh with how tall you are. · A BMI lower than 18.5 is considered underweight. · A BMI between 18.5 and 24.9 is considered healthy. · A BMI between 25 and 29.9 is considered overweight. A BMI of 30 or higher is considered obese. If your BMI is in the normal range, it means that you have a lower risk for weight-related health problems. If your BMI is in the overweight or obese range, you may be at increased risk for weight-related health problems, such as high blood pressure, heart disease, stroke, arthritis or joint pain, and diabetes. If your BMI is in the underweight range, you may be at increased risk for health problems such as fatigue, lower protection (immunity) against illness, muscle loss, bone loss, hair loss, and hormone problems. BMI is just one measure of your risk for weight-related health problems. You may be at higher risk for health problems if you are not active, you eat an unhealthy diet, or you drink too much alcohol or use tobacco products. Follow-up care is a key part of your treatment and safety. Be sure to make and go to all appointments, and call your doctor if you are having problems. It's also a good idea to know your test results and keep a list of the medicines you take. How can you care for yourself at home? · Practice healthy eating habits. This includes eating plenty of fruits, vegetables, whole grains, lean protein, and low-fat dairy. · If your doctor recommends it, get more exercise. Walking is a good choice. Bit by bit, increase the amount you walk every day. Try for at least 30 minutes on most days of the week. · Do not smoke. Smoking can increase your risk for health problems. If you need help quitting, talk to your doctor about stop-smoking programs and medicines. These can increase your chances of quitting for good. · Limit alcohol to 2 drinks a day for men and 1 drink a day for women. Too much alcohol can cause health problems. If you have a BMI higher than 25  · Your doctor may do other tests to check your risk for weight-related health problems. This may include measuring the distance around your waist. A waist measurement of more than 40 inches in men or 35 inches in women can increase the risk of weight-related health problems. · Talk with your doctor about steps you can take to stay healthy or improve your health. You may need to make lifestyle changes to lose weight and stay healthy, such as changing your diet and getting regular exercise. If you have a BMI lower than 18.5  · Your doctor may do other tests to check your risk for health problems. · Talk with your doctor about steps you can take to stay healthy or improve your health. You may need to make lifestyle changes to gain or maintain weight and stay healthy, such as getting more healthy foods in your diet and doing exercises to build muscle. Where can you learn more? Go to http://reinier-ritika.info/. Enter S176 in the search box to learn more about \"Body Mass Index: Care Instructions. \"  Current as of: October 13, 2016  Content Version: 11.4  © 8181-0239 Healthwise, Inc[employer]. Care instructions adapted under license by [employer] (which disclaims liability or warranty for this information). If you have questions about a medical condition or this instruction, always ask your healthcare professional. [address] any warranty or liability for your use of this information.

## 2017-12-11 NOTE — PATIENT INSTRUCTIONS
1. Work to get back to a good sleep schedule- 7-9 hours of sleep per day. 2. Try protein drinks from the Top 10 list- lactose free are [employer], Unjury, Nectar. 3. Continue to work to find what do not tolerate well. 4. Cut out gluten this month and see if helps. Also cut out fried foods.

## 2017-12-11 NOTE — PROGRESS NOTES
Bariatric Surgery Consultation    Subjective:     Sheyla Miller is a 32 y.o. obese female with a Body mass index is 41.97 kg/(m^2). .  she desires surgery at this time because of health issues and quality of life issues. Sheyla Miller has been seen by a bariatric nutritionist and has been placed on an appropriate low carbohydrate diet. The patient desires laparoscopic sleeve gastrectomy for surgical weight loss, however she is here today to review their workup to date. Sheyla Miller is here also today to check progress with weight loss / evaluate nutritional status and review all subspecialty clearances in hopes of proceeding to the operating room. Patient Active Problem List    Diagnosis Date Noted    Asthma     Morbid [facility] ([facility]     Morbid obesity with BMI of 40.0-44.9, adult ([facility]     Diabetes ([facility]     Hyperlipemia     Gastroparesis     Acute bronchitis 05/02/2013    Asthma with exacerbation 05/02/2013    Pleurisy 05/02/2013      Past Surgical History:   Procedure Laterality Date    HX GYN        Social History   Substance Use Topics    Smoking status: Never Smoker    Smokeless tobacco: Never Used    Alcohol use No      No family history on file. Current Outpatient Prescriptions   Medication Sig Dispense Refill    TRULICITY 1.5 GD/1.0 mL sub-q pen   3    ergocalciferol (ERGOCALCIFEROL) 50,000 unit capsule TK 1 C PO Q 7 DAYS  12    glipiZIDE SR (GLUCOTROL XL) 5 mg CR tablet TK 1 T PO QD  1    metFORMIN ER (GLUCOPHAGE XR) 500 mg tablet TK 1 T PO QD  1    omeprazole (PRILOSEC) 40 mg capsule TK 1 C PO 30 MIN B LOIDA  2    amitriptyline (ELAVIL) 10 mg tablet 50 mg nightly. 3    butalbital-acetaminophen-caffeine (FIORICET, ESGIC) -40 mg per tablet   3    albuterol (PROVENTIL HFA, VENTOLIN HFA, PROAIR HFA) 90 mcg/actuation inhaler Take  by inhalation.  atorvastatin (LIPITOR) 40 mg tablet Take  by mouth daily.       levonorgestrel (MIRENA) 20 mcg/24 hr (5 years) IUD 1 Device by IntraUTERine route once.  amphetamine-dextroamphetamine XR (ADDERALL XR) 10 mg XR capsule Take 10 mg by mouth every morning.        Allergies   Allergen Reactions    Latex Rash    Mushroom Anaphylaxis    Doxycycline Swelling    Erythromycin Rash    Lactose Nausea and Vomiting     intolerance    Penicillins Rash    Sulfa (Sulfonamide Antibiotics) Swelling          Review of Systems:            General - No history or complaints of unexpected fever, chills, or weight loss  Head/Neck - No history or complaints of headache, diplopia, dysphagia, hearing loss  Cardiac - No history or complaints of chest pain, palpitations, murmur, or shortness of breath  Pulmonary - No history or complaints of shortness of breath, productive cough, hemoptysis  Gastrointestinal - No history or complaints of reflux,  abdominal pain, obstipation/constipation, blood per rectum  Genitourinary - No history or complaints of hematuria/dysuria, stress urinary incontinence symptoms, or renal lithiasis  Musculoskeletal - No history or complaints of joint pain or muscular weakness  Hematologic - No history or complaints of bleeding disorders, blood transfusions, sickle cell anemia  Neurologic - No history or complaints of  migraine headaches, seizure activity, syncopal episodes, TIA or stroke  Integumentary - No history or complaints of rashes, abnormal nevi, skin cancer  Gynecological - no dysuria or abnormal bleeding           Objective:     Visit Vitals    /76 (BP 1 Location: Right arm, BP Patient Position: Sitting)    Pulse (!) 103    Ht 5' 7.75\" (1.721 m)    Wt 124.3 kg (274 lb)    SpO2 100%    BMI 41.97 kg/m2       Physical Examination: General appearance - alert, well appearing, and in no distress  Mental status - alert, oriented to person, place, and time  Eyes - pupils equal and reactive, extraocular eye movements intact  Neck - supple, no significant adenopathy  Lymphatics - no palpable lymphadenopathy, no hepatosplenomegaly  Chest - clear to auscultation, no wheezes, rales or rhonchi, symmetric air entry  Heart - normal rate, regular rhythm, normal S1, S2, no murmurs, rubs, clicks or gallops  Abdomen - soft, nontender, nondistended, no masses or organomegaly  Back exam - full range of motion, no tenderness, palpable spasm or pain on motion  Neurological - alert, oriented, normal speech, no focal findings or movement disorder noted  Musculoskeletal - no joint tenderness, deformity or swelling  Extremities - peripheral pulses normal, no pedal edema, no clubbing or cyanosis    Labs:     No results found for this or any previous visit (from the past 2016 hour(s)). Assessment:     Morbid obesity with associated comorbidity & need for additional insurance criteria    Plan:     Continuation of Pre-Operative evaluation / clearance. India Streeter has returned to the office today to discuss her status as a surgical candidate.  her progress has been noted and reviewed. We will continue the pre-operative process and work towards goals as outlined. she has 8 more pounds to lose before proceeding to the OR. (2 pounds lost since last visit)  she has 2 more nutritional visits to complete before proceeding to the OR  she has an no additional clearance to review before proceeding to the OR. (waiting for written psychological evaluation to be received). India Streeter understand the rationales for all the above. It has been discussed that given her morbidly obese condition that the best surgical option for this patient would be the laparoscopic sleeve gastrectomy. India Streeter agrees with the surgical choice and has been educated in it's; risks, benefits, and alternatives. We will continue with the pre-operative evaluation as needed to check progress.     Secondary Diagnoses:     Dietary Intervention  - The patient is currently scheduled to see or has been followed by a bariatric nutritionist for an attempt at preoperative weight loss as has been dictated by their insurance carrier. They will be assessed at various times during their follow up to evaluate their progress depending on the length of time that is required once again by their carrier. I have explained the importance of preoperative weight loss and the benefits regarding lower surgical risk and also assisting the patient in reaching their weight loss goal.  Finally they understand there is a physiologic benefit from the standpoint of hepatic volume reduction and reduction of central visceral adiposity preoperatively. I have reiterated the importance of a low carbohydrate and high protein regimen to achieve their stated goal. I have reviewed their current eating behavior prior to this encounter and explained to them in an exhaustive fashion the appropriate diet that they should adhere to. They have been encouraged to loose weight pre operatively and understand it is our prerogative to cancel surgery or postpone their procedure in the event of significant weight gain. The patients weight loss goal pre operatively is 5-10 pounds. Adult Onset Diabetes - The patient has apul given a very low carbohydrate diet preoperatively along with instructions to monitor their blood sugars on a regular daily basis. When  their surgery is performed  we will be monitoring the patient with sliding scale insulin and accuchecks.  Based on those values we will determine whether the patient needs a reduction of those medications postoperatively or total removal of those medications on discharge.  We will have the patient continue accuchecks postoperatively while at home also and report to me or their family physician for appropriate adjustments as needed.  The patient also understands that in the event of uncontrolled blood sugar preoperatively that we may choose to postpone their surgery.     Restrictive Airway Disease - We will continue all of their pulmonary medications in the form of oral pills and inhalers in both the perioperative and postoperative period. They understand that their symptoms should improve with weight loss. Any further testing related to this will be turned over to their family physician or pulmonologist. The patient understands that if they require oral or IV steroids in the future that they will notify us. This is particularly important for gastric bypass patients at all times and both sleeve gastrectomy and gastric bypass patients in the 1 month pre op and 1 month post operative period. They understand that inhaled steroids are exempt from this. Hyperlipidemia - The patient understands that studies show that almost all patient will realize an improvement in their lipid profile with weight loss that occurs with these procedures. They however also understand that hyperlipidemia is a multifactorial disease particularly as it pertains to their genetic background and that there is no guarantee toward cure  of this issue. We will resume their medications both pre operatively and immediately postoperatively as this tends to decrease any post operative cardiac events.  The patient will follow up with their family physician in the postoperative period with plans to repeat their lipid panel 2-3 month postoperative for potential adjustment or removal of these medications.         Signed By: Jennifer Grijalva NP     December 11, 2017

## 2017-12-11 NOTE — PROGRESS NOTES
Medical Weight Loss Multi-Disciplinary Program    Name: Romy Avalos   : 1990    Session# 4  Date: 2017     Height: 5' 7.75\" (172.1 cm)    Weight: 124.3 kg (274 lb) lbs. Body mass index is 41.97 kg/(m^2). Pounds Lost: 2   Dietary Instructions    Reviewed intake  Understanding low carbohydrates, low sugar, higher protein meals  Understanding proper portions  Instruction given for personal dietary changes  Discussed perceived compliance   Comments: Diet hx reviewed and personal dietary changes discussed. B- skipped, L- caesar salad and chicken nuggets from [employer], water, S- soft pretzel, water, D-chicken wings and salad and cals[place], water. Physical Activity/Exercise    Discussed Perceived Compliance  Reasonable Goals Set    Comments: Lu and yoga at home- every night for yoga to help with sleep, Lu is 3-4 times per week- game for at least 45 minutes. Goal to get to gym 4-5 days per week- in the middle of the night for now with current schedule, for 2 hours. Does treadmill for an hour and then weight machines. Behavior Modification    Achieving/Rewarding goals met  Positive attitude  Discussed perceived compliance  Comments: Pt is exercising and plans to increase. Tried a protein drink- not sure what one- did not like it. Was tested for gastroparesis but does not have results yet. She reports some nausea and vomiting still, but not as often since she has been avoiding offending foods like bread and fried foods. She is focusing on the following this month:     Goals:  1. Work to get back to a good sleep schedule- 7-9 hours of sleep per day. 2. Try protein drinks from the Top 10 list- lactose free are [employer], [employer], Nectar. 3. Continue to work to find what do not tolerate well. 4. Cut out gluten this month and see if helps. Also cut out fried foods.      Candidate for surgery (per RD): pending    Dietitian: Shahnaz Dale RD

## 2018-01-16 ENCOUNTER — OFFICE VISIT (OUTPATIENT)
Dept: SURGERY | Age: 28
End: 2018-01-16

## 2018-01-16 VITALS
WEIGHT: 272 LBS | BODY MASS INDEX: 41.22 KG/M2 | HEIGHT: 68 IN | SYSTOLIC BLOOD PRESSURE: 124 MMHG | HEART RATE: 99 BPM | DIASTOLIC BLOOD PRESSURE: 58 MMHG | OXYGEN SATURATION: 100 %

## 2018-01-16 VITALS — WEIGHT: 274 LBS | BODY MASS INDEX: 41.52 KG/M2 | HEIGHT: 68 IN

## 2018-01-16 DIAGNOSIS — E66.01 MORBID OBESITY (HCC): Primary | ICD-10-CM

## 2018-01-16 DIAGNOSIS — E66.01 MORBID OBESITY WITH BMI OF 40.0-44.9, ADULT (HCC): ICD-10-CM

## 2018-01-16 DIAGNOSIS — E11.9 TYPE 2 DIABETES MELLITUS WITHOUT COMPLICATION, WITHOUT LONG-TERM CURRENT USE OF INSULIN (HCC): ICD-10-CM

## 2018-01-16 DIAGNOSIS — E66.01 MORBID OBESITY WITH BMI OF 40.0-44.9, ADULT (HCC): Primary | ICD-10-CM

## 2018-01-16 DIAGNOSIS — E78.5 HYPERLIPIDEMIA, UNSPECIFIED HYPERLIPIDEMIA TYPE: ICD-10-CM

## 2018-01-16 DIAGNOSIS — J45.909 UNCOMPLICATED ASTHMA, UNSPECIFIED ASTHMA SEVERITY, UNSPECIFIED WHETHER PERSISTENT: ICD-10-CM

## 2018-01-16 RX ORDER — GLUCOSAMINE/CHONDR SU A SOD 750-600 MG
2500 TABLET ORAL DAILY
COMMUNITY
End: 2018-03-21

## 2018-01-16 NOTE — PATIENT INSTRUCTIONS
Walking for Exercise: Care Instructions  Your Care Instructions    Walking is one of the easiest ways to get the exercise you need for good health. A brisk, 30-minute walk each day can help you feel better and have more energy. It can help you lower your risk of disease. Walking can help you keep your bones strong and your heart healthy. Check with your doctor before you start a walking plan if you have heart problems, other health issues, or you have not been active in a long time. Follow your doctor's instructions for safe levels of exercise. Follow-up care is a key part of your treatment and safety. Be sure to make and go to all appointments, and call your doctor if you are having problems. It's also a good idea to know your test results and keep a list of the medicines you take. How can you care for yourself at home? Getting started  · Start slowly and set a short-term goal. For example, walk for 5 or 10 minutes every day. · Bit by bit, increase the amount you walk every day. Try for at least 30 minutes on most days of the week. You also may want to swim, bike, or do other activities. · If finding enough time is a problem, it is fine to be active in blocks of 10 minutes or more throughout your day and week. · To get the heart-healthy benefits of walking, you need to walk briskly enough to increase your heart rate and breathing, but not so fast that you cannot talk comfortably. · Wear comfortable shoes that fit well and provide good support for your feet and ankles. Staying with your plan  · After you've made walking a habit, set a longer-term goal. You may want to set a goal of walking briskly for longer or walking farther. Experts say to do 2½ hours of moderate activity a week. A faster heartbeat is what defines moderate-level activity. · To stay motivated, walk with friends, coworkers, or pets. · Use a phone marsha or pedometer to track your steps each day. Set a goal to increase your steps.  Once you get there, set a higher goal. Aim for 10,000 steps a day. · If the weather keeps you from walking outside, go for walks at the mall with a friend. Local schools and churches may have indoor gyms where you can walk. Fitting a walk into your workday  · Park several blocks away from work, or get off the bus a few stops early. · Use the stairs instead of the elevator, at least for a few floors. · Suggest holding meetings with colleagues during a walk inside or outside the building. · Use the restroom that is the farthest from your desk or workstation. · Use your morning and afternoon breaks to take quick 15-minute walks. Staying safe  · Know your surroundings. Walk in a well-lighted, safe place. If it is dark, walk with a partner. Wear light-colored clothing. If you can, buy a vest or jacket that reflects light. · Carry a cell phone for emergencies. · Drink plenty of water. Take a water bottle with you when you walk. This is very important if it is hot out. · Be careful not to slip on wet or icy ground. You can buy \"grippers\" for your shoes to help keep you from slipping. · Pay attention to your walking surface. Use sidewalks and paths. · If you have breathing problems like asthma or COPD, ask your doctor when it is safe for you to walk outdoors. Cold, dry air, smog, pollen, or other things in the air could cause breathing problems. Where can you learn more? Go to http://reinier-ritika.info/. Enter R159 in the search box to learn more about \"Walking for Exercise: Care Instructions. \"  Current as of: March 13, 2017  Content Version: 11.4  © 4400-7333 [employer]. Care instructions adapted under license by [employer] (which disclaims liability or warranty for this information).  If you have questions about a medical condition or this instruction, always ask your healthcare professional. [address]  any warranty or liability for your use of this information. Body Mass Index: Care Instructions  Your Care Instructions    Body mass index (BMI) can help you see if your weight is raising your risk for health problems. It uses a formula to compare how much you weigh with how tall you are. · A BMI lower than 18.5 is considered underweight. · A BMI between 18.5 and 24.9 is considered healthy. · A BMI between 25 and 29.9 is considered overweight. A BMI of 30 or higher is considered obese. If your BMI is in the normal range, it means that you have a lower risk for weight-related health problems. If your BMI is in the overweight or obese range, you may be at increased risk for weight-related health problems, such as high blood pressure, heart disease, stroke, arthritis or joint pain, and diabetes. If your BMI is in the underweight range, you may be at increased risk for health problems such as fatigue, lower protection (immunity) against illness, muscle loss, bone loss, hair loss, and hormone problems. BMI is just one measure of your risk for weight-related health problems. You may be at higher risk for health problems if you are not active, you eat an unhealthy diet, or you drink too much alcohol or use tobacco products. Follow-up care is a key part of your treatment and safety. Be sure to make and go to all appointments, and call your doctor if you are having problems. It's also a good idea to know your test results and keep a list of the medicines you take. How can you care for yourself at home? · Practice healthy eating habits. This includes eating plenty of fruits, vegetables, whole grains, lean protein, and low-fat dairy. · If your doctor recommends it, get more exercise. Walking is a good choice. Bit by bit, increase the amount you walk every day. Try for at least 30 minutes on most days of the week. · Do not smoke. Smoking can increase your risk for health problems.  If you need help quitting, talk to your doctor about stop-smoking programs and medicines. These can increase your chances of quitting for good. · Limit alcohol to 2 drinks a day for men and 1 drink a day for women. Too much alcohol can cause health problems. If you have a BMI higher than 25  · Your doctor may do other tests to check your risk for weight-related health problems. This may include measuring the distance around your waist. A waist measurement of more than 40 inches in men or 35 inches in women can increase the risk of weight-related health problems. · Talk with your doctor about steps you can take to stay healthy or improve your health. You may need to make lifestyle changes to lose weight and stay healthy, such as changing your diet and getting regular exercise. If you have a BMI lower than 18.5  · Your doctor may do other tests to check your risk for health problems. · Talk with your doctor about steps you can take to stay healthy or improve your health. You may need to make lifestyle changes to gain or maintain weight and stay healthy, such as getting more healthy foods in your diet and doing exercises to build muscle. Where can you learn more? Go to http://reinier-ritika.info/. Enter S176 in the search box to learn more about \"Body Mass Index: Care Instructions. \"  Current as of: October 13, 2016  Content Version: 11.4  © 1390-7173 [employer]. Care instructions adapted under license by [employer] (which disclaims liability or warranty for this information). If you have questions about a medical condition or this instruction, always ask your healthcare professional. [address] any warranty or liability for your use of this information.

## 2018-01-16 NOTE — PROGRESS NOTES
Medical Weight Loss Multi-Disciplinary Program    Name: Madelyn Thorpe   : 1990    Session# 5  Date: 2018     Height: 5' 7.75\" (172.1 cm)    Weight: 124.3 kg (274 lb) lbs. Body mass index is 41.97 kg/(m^2). Same weight    Dietary Instructions    Reviewed intake  Understanding low carbohydrates, low sugar, higher protein meals  Understanding proper portions  Instruction given for personal dietary changes  Discussed perceived compliance  Comments: Diet hx reviewed and personal dietary changes discussed. Physical Activity/Exercise    Discussed Perceived Compliance  Reasonable Goals Set  Motivation  Comments: Pt has done well doing cardiovascular or wt lifting exercise for 45 minutes, 4 days per week. She also does yoga nightly with her son. She plans to increase as tolerated. Behavior Modification    Achieving/Rewarding goals met  Positive attitude  Discussed perceived compliance  Comments: Pt has done well exercising and plans to increase. She has increased her protein intake, has purchased Howard samples to try, and has been sleeping better. She is trying protein shakes to find one she likes and plans to try celery with PB2. Goal to have protein and every meal and snack to help with hunger control. Can also incorporate more vegetables.      Candidate for surgery (per RD): pending    Dietitian: Oscar Minor RD

## 2018-01-16 NOTE — PROGRESS NOTES
Pt had negative H. Pylori breathtek sent from [facility] lab. Awaiting CBC and TSH.

## 2018-01-16 NOTE — PROGRESS NOTES
Bariatric Surgery Consultation    Subjective:     Waqar Blandon is a 32 y.o. obese female with a Body mass index is 41.66 kg/(m^2). .  she desires surgery at this time because of health issues and quality of life issues. Waqar Blandon has been seen by a bariatric nutritionist and has been placed on an appropriate low carbohydrate diet. The patient desires laparoscopic sleeve gastrectomy for surgical weight loss, however she is here today to review their workup to date. Waqar Blandon is here also today to check progress with weight loss / evaluate nutritional status and review all subspecialty clearances in hopes of proceeding to the operating room. Had flu before holidays that turned into respiratory infection that required steroid and antibiotic. All symptoms resolved. Patient Active Problem List    Diagnosis Date Noted    Asthma     [facility] [address]     Morbid obesity with BMI of 40.0-44.9, adult ([facility] [address]     Diabetes ([facility] [address]     Hyperlipemia     Gastroparesis     Acute bronchitis 05/02/2013    Asthma with exacerbation 05/02/2013    Pleurisy 05/02/2013      Past Surgical History:   Procedure Laterality Date    HX GYN        Social History   Substance Use Topics    Smoking status: Never Smoker    Smokeless tobacco: Never Used    Alcohol use No      No family history on file. Current Outpatient Prescriptions   Medication Sig Dispense Refill    Biotin 2,500 mcg cap Take  by mouth.  TRULICITY 1.5 KM/0.1 mL sub-q pen   3    ergocalciferol (ERGOCALCIFEROL) 50,000 unit capsule TK 1 C PO Q 7 DAYS  12    glipiZIDE SR (GLUCOTROL XL) 5 mg CR tablet TK 1 T PO QD  1    metFORMIN ER (GLUCOPHAGE XR) 500 mg tablet TK 1 T PO QD  1    omeprazole (PRILOSEC) 40 mg capsule TK 1 C PO 30 MIN B LOIDA  2    amitriptyline (ELAVIL) 10 mg tablet 50 mg nightly.   3    butalbital-acetaminophen-caffeine (FIORICET, ESGIC) -40 mg per tablet   3    albuterol (PROVENTIL HFA, VENTOLIN HFA, PROAIR HFA) 90 mcg/actuation inhaler Take  by inhalation.  atorvastatin (LIPITOR) 40 mg tablet Take  by mouth daily.  levonorgestrel (MIRENA) 20 mcg/24 hr (5 years) IUD 1 Device by IntraUTERine route once.        Allergies   Allergen Reactions    Latex Rash    Mushroom Anaphylaxis    Doxycycline Swelling    Erythromycin Rash    Lactose Nausea and Vomiting     intolerance    Penicillins Rash    Sulfa (Sulfonamide Antibiotics) Swelling          Review of Systems:            General - No history or complaints of unexpected fever, chills, or weight loss  Head/Neck - No history or complaints of headache, diplopia, dysphagia, hearing loss  Cardiac - No history or complaints of chest pain, palpitations, murmur, or shortness of breath  Pulmonary - No history or complaints of shortness of breath, productive cough, hemoptysis  Gastrointestinal - No history or complaints of reflux,  abdominal pain, obstipation/constipation, blood per rectum  Genitourinary - No history or complaints of hematuria/dysuria, stress urinary incontinence symptoms, or renal lithiasis  Musculoskeletal - No history or complaints of joint pain or muscular weakness  Hematologic - No history or complaints of bleeding disorders, blood transfusions, sickle cell anemia  Neurologic - No history or complaints of  migraine headaches, seizure activity, syncopal episodes, TIA or stroke  Integumentary - No history or complaints of rashes, abnormal nevi, skin cancer  Gynecological - No dysuria or abnormal bleeding           Objective:     Visit Vitals    /58 (BP 1 Location: Right arm, BP Patient Position: Sitting)    Pulse 99    Ht 5' 7.75\" (1.721 m)    Wt 123.4 kg (272 lb)    SpO2 100%    BMI 41.66 kg/m2       Physical Examination: General appearance - alert, well appearing, and in no distress  Mental status - alert, oriented to person, place, and time  Eyes - left eye normal, right eye normal  Neck - supple, no significant adenopathy  Lymphatics - no palpable lymphadenopathy, no hepatosplenomegaly  Chest - clear to auscultation, no wheezes, rales or rhonchi, symmetric air entry  Heart - normal rate, regular rhythm, normal S1, S2, no murmurs, rubs, clicks or gallops  Abdomen - soft, nontender, nondistended, no masses or organomegaly  Back exam - full range of motion, no tenderness, palpable spasm or pain on motion  Neurological - alert, oriented, normal speech, no focal findings or movement disorder noted  Musculoskeletal - no joint tenderness, deformity or swelling  Extremities - peripheral pulses normal, no pedal edema, no clubbing or cyanosis  Skin - normal coloration and turgor, no rashes, no suspicious skin lesions noted    Labs:     Recent Results (from the past 2016 hour(s))   [facility]. Collection Time: 12/11/17  1:24 PM   Result Value Ref Range    [facility] Specimens collected/[facility] to [facility]             Assessment:     Morbid obesity with associated comorbidity & additional clearance crtieria    Plan:     Continuation of Pre-Operative evaluation / clearance. Jose Cruz Sarabia has returned to the office today to discuss her status as a surgical candidate.  her progress has been noted and reviewed. We will continue the pre-operative process and work towards goals as outlined. she has 6 more pounds to lose before proceeding to the OR. (2 pounds lost since last visit)  she has 1 more nutritional visits to complete before proceeding to the OR  she has no additional outstanding clearance to review before proceeding to the OR. UGI 12/6 completed with normal anatomy. Psych eval received and cleared as long as she continues to receive counseling during process. Jose Cruz Sarabai understand the rationales for all the above. It has been discussed that given her morbidly obese condition that the best surgical option for this patient would be the laparoscopic sleeve gastrectomy.   Jose Cruz Sarabia agrees with the surgical choice and has been educated in it's; risks, benefits, and alternatives. We will continue with the pre-operative evaluation as needed to check progress. Secondary Diagnoses:     Dietary Intervention  - The patient is currently scheduled to see or has been followed by a bariatric nutritionist for an attempt at preoperative weight loss as has been dictated by their insurance carrier. They will be assessed at various times during their follow up to evaluate their progress depending on the length of time that is required once again by their carrier. I have explained the importance of preoperative weight loss and the benefits regarding lower surgical risk and also assisting the patient in reaching their weight loss goal.  Finally they understand there is a physiologic benefit from the standpoint of hepatic volume reduction and reduction of central visceral adiposity preoperatively. I have reiterated the importance of a low carbohydrate and high protein regimen to achieve their stated goal. I have reviewed their current eating behavior prior to this encounter and explained to them in an exhaustive fashion the appropriate diet that they should adhere to. They have been encouraged to loose weight pre operatively and understand it is our prerogative to cancel surgery or postpone their procedure in the event of significant weight gain. The patients weight loss goal pre operatively is 5-10 pounds.     Adult Onset Diabetes - The patient has paul given a very low carbohydrate diet preoperatively along with instructions to monitor their blood sugars on a regular daily basis.  When  their surgery is performed  we will be monitoring the patient with sliding scale insulin and accuchecks.  Based on those values we will determine whether the patient needs a reduction of those medications postoperatively or total removal of those medications on discharge.  We will have the patient continue accuchecks postoperatively while at home also and report to me or their family physician for appropriate adjustments as needed.  The patient also understands that in the event of uncontrolled blood sugar preoperatively that we may choose to postpone their surgery.     Restrictive Airway Disease - We will continue all of their pulmonary medications in the form of oral pills and inhalers in both the perioperative and postoperative period. They understand that their symptoms should improve with weight loss. Any further testing related to this will be turned over to their family physician or pulmonologist. The patient understands that if they require oral or IV steroids in the future that they will notify us. This is particularly important for gastric bypass patients at all times and both sleeve gastrectomy and gastric bypass patients in the 1 month pre op and 1 month post operative period. They understand that inhaled steroids are exempt from this.     Hyperlipidemia - The patient understands that studies show that almost all patient will realize an improvement in their lipid profile with weight loss that occurs with these procedures. They however also understand that hyperlipidemia is a multifactorial disease particularly as it pertains to their genetic background and that there is no guarantee toward cure  of this issue. We will resume their medications both pre operatively and immediately postoperatively as this tends to decrease any post operative cardiac events.  The patient will follow up with their family physician in the postoperative period with plans to repeat their lipid panel 2-3 month postoperative for potential adjustment or removal of these medications.     Signed By: Cinda Koyanagi, NP     January 16, 2018

## 2018-02-08 PROBLEM — Z97.5 IUD (INTRAUTERINE DEVICE) IN PLACE: Status: ACTIVE | Noted: 2018-02-08

## 2018-02-08 PROBLEM — Z80.41 FH: OVARIAN CANCER: Status: ACTIVE | Noted: 2018-02-08

## 2018-02-09 ENCOUNTER — CLINICAL SUPPORT (OUTPATIENT)
Dept: SURGERY | Age: 28
End: 2018-02-09

## 2018-02-09 ENCOUNTER — OFFICE VISIT (OUTPATIENT)
Dept: SURGERY | Age: 28
End: 2018-02-09

## 2018-02-09 ENCOUNTER — HOSPITAL ENCOUNTER (OUTPATIENT)
Dept: LAB | Age: 28
Discharge: HOME | End: 2018-02-09

## 2018-02-09 VITALS
HEART RATE: 101 BPM | HEIGHT: 67 IN | WEIGHT: 274 LBS | DIASTOLIC BLOOD PRESSURE: 73 MMHG | OXYGEN SATURATION: 93 % | SYSTOLIC BLOOD PRESSURE: 129 MMHG | BODY MASS INDEX: 43 KG/M2

## 2018-02-09 VITALS — BODY MASS INDEX: 41.52 KG/M2 | WEIGHT: 274 LBS | HEIGHT: 68 IN

## 2018-02-09 DIAGNOSIS — E66.01 MORBID OBESITY (HCC): Primary | ICD-10-CM

## 2018-02-09 DIAGNOSIS — E66.01 MORBID OBESITY WITH BMI OF 40.0-44.9, ADULT (HCC): ICD-10-CM

## 2018-02-09 DIAGNOSIS — E66.01 MORBID OBESITY WITH BMI OF 40.0-44.9, ADULT (HCC): Primary | ICD-10-CM

## 2018-02-09 DIAGNOSIS — E66.01 MORBID OBESITY (HCC): ICD-10-CM

## 2018-02-09 RX ORDER — BISMUTH SUBSALICYLATE 262 MG
1 TABLET,CHEWABLE ORAL DAILY
COMMUNITY
End: 2018-04-03 | Stop reason: ALTCHOICE

## 2018-02-09 NOTE — PROGRESS NOTES
Sleeve Gastrectomy - History and Physical    Subjective: The patient is a 32 y.o. obese female with a Body mass index is 42.91 kg/(m^2). .   she presents now to review their work up to date to see if they are a candidate for surgery and whether or not to proceed with the previously requested procedure. Bariatric comorbidities continue to include:   Patient Active Problem List   Diagnosis Code    Acute bronchitis J20.9    Asthma with exacerbation J45. Viru 65    Pleurisy R09.1    Morbid obesity ([facility]) E66.01    Morbid obesity with BMI of 40.0-44.9, adult (HCC) E66.01, Z68.41    Diabetes ([facility] E11.9    Hyperlipemia E78.5    Gastroparesis K31.84    Asthma J45.909    IUD (intrauterine device) in place Z80.11    FH: ovarian cancer Z80.41       They have been generally well prior to this visit and have had no recent significant illnesses. The patient has had no gastrointestinal issues that would preclude them from proceeding with the surgery they have chosen. Miloleonardo Boss has recently tried a preoperative weight loss program  in addition to seeing a bariatric nutritionist preoperatively. We have discussed on at least one other occasion about the various types of surgical weight loss procedures and they have considered these options after our initial consultation. We have once again discussed these procedures in detail and they have now decided on a surgical procedure. They present today to discuss this and confirm that their evaluation pre operatively is acceptable to continue with surgery. The patient desires laparoscopic sleeve gastrectomy for surgical weight loss. The patients goal weight is 170lb. These goals are consistent with expected outcomes of their desired operation. her Medical goals are resolution of these health issues.     Patient Active Problem List    Diagnosis Date Noted    IUD (intrauterine device) in place 02/08/2018    FH: ovarian cancer 02/08/2018    Asthma     Morbid obesity ([facility]     Morbid obesity with BMI of 40.0-44.9, [facility]     Diabetes ([facility]     Hyperlipemia     Gastroparesis     Acute bronchitis 2013    Asthma with exacerbation 2013    Pleurisy 2013     Past Surgical History:   Procedure Laterality Date    HX  SECTION      HX GYN        Social History   Substance Use Topics    Smoking status: Never Smoker    Smokeless tobacco: Never Used    Alcohol use No      Family History   Problem Relation Age of Onset    Migraines Mother     Other Father      graves ds,     Sickle Cell Trait Brother     Other Brother     Asthma Sister     Other Sister      autism    Other Daughter      cp, epilepsy    Asthma Daughter       Current Outpatient Prescriptions   Medication Sig Dispense Refill    multivitamin (ONE A DAY) tablet Take 1 Tab by mouth daily.  B.infantis-B.ani-B.long-B.bifi (PROBIOTIC 4X) 10-15 mg TbEC Take  by mouth.  Biotin 2,500 mcg cap Take  by mouth.  TRULICITY 1.5 CN/4.3 mL sub-q pen   3    ergocalciferol (ERGOCALCIFEROL) 50,000 unit capsule TK 1 C PO Q 7 DAYS  12    glipiZIDE SR (GLUCOTROL XL) 5 mg CR tablet TK 1 T PO QD  1    metFORMIN ER (GLUCOPHAGE XR) 500 mg tablet TK 1 T PO QD  1    omeprazole (PRILOSEC) 40 mg capsule TK 1 C PO 30 MIN B LOIDA  2    amitriptyline (ELAVIL) 10 mg tablet 50 mg nightly. 3    butalbital-acetaminophen-caffeine (FIORICET, ESGIC) -40 mg per tablet   3    albuterol (PROVENTIL HFA, VENTOLIN HFA, PROAIR HFA) 90 mcg/actuation inhaler Take  by inhalation.  atorvastatin (LIPITOR) 40 mg tablet Take  by mouth daily.  levonorgestrel (MIRENA) 20 mcg/24 hr (5 years) IUD 1 Device by IntraUTERine route once.        Allergies   Allergen Reactions    Latex Rash    Mushroom Anaphylaxis    Doxycycline Swelling    Erythromycin Rash    Lactose Nausea and Vomiting     intolerance    Penicillins Rash    Sulfa (Sulfonamide Antibiotics) Swelling          Review of Systems: General - No history or complaints of unexpected fever, chills, or weight loss  Head/Neck - No history or complaints of headache, diplopia, dysphagia, hearing loss  Cardiac - No history or complaints of chest pain, palpitations, murmur, or shortness of breath  Pulmonary - No history or complaints of shortness of breath, productive cough, hemoptysis  Gastrointestinal - history of reflux and constipation no abdominal pain or blood per rectum  Genitourinary - History of stress urinary incontinence. No history or complaints of hematuria/dysuria, or renal lithiasis  Musculoskeletal -  joint pain in their knees and hips from playing sports when young,  no muscular weakness  Hematologic - No history or complaints of bleeding disorders,  No blood transfusions  Neurologic - History of migraines headaches.   No history or complaints of seizure activity, syncopal episodes, TIA or stroke  Integumentary - No history or complaints of rashes, abnormal nevi, skin cancer  Gynecological - history of ovarian cyst.  No history of dysmenorrhea               Objective:     Visit Vitals    /73 (BP 1 Location: Right arm, BP Patient Position: Sitting)    Pulse (!) 101    Ht 5' 7\" (1.702 m)    Wt 124.3 kg (274 lb)    SpO2 93%    BMI 42.91 kg/m2       Physical Examination: General appearance - alert, well appearing, and in no distress  Mental status - alert, oriented to person, place, and time  Mouth - mucous membranes moist, pharynx normal without lesions, Mallampati I  Neck - supple, no significant adenopathy  Lymphatics - no palpable lymphadenopathy, no hepatosplenomegaly  Chest - clear to auscultation, no wheezes, rales or rhonchi, symmetric air entry  Heart - normal rate, regular rhythm, normal S1, S2, no murmurs, rubs, clicks or gallops  Abdomen - soft, nontender, nondistended, no masses or organomegaly  Neurological - alert, oriented, normal speech, no focal findings or movement disorder noted  Musculoskeletal - no joint tenderness, deformity or swelling  Extremities - peripheral pulses normal, no pedal edema, no clubbing or cyanosis  Skin - normal coloration and turgor, no rashes, no suspicious skin lesions noted    Labs :     Lab Results   Component Value Date/Time    WBC 9.7 05/24/2012 12:05 PM    HGB 14.0 05/24/2012 12:05 PM    HCT 39.8 05/24/2012 12:05 PM    PLATELET 967 04/35/0955 12:05 PM    MCV 89.4 05/24/2012 12:05 PM     Lab Results   Component Value Date/Time    Sodium 137 05/24/2012 12:05 PM    Potassium 3.7 05/24/2012 12:05 PM    Chloride 102 05/24/2012 12:05 PM    CO2 25 05/24/2012 12:05 PM    Anion gap 10 05/24/2012 12:05 PM    Glucose 110 (H) 05/24/2012 12:05 PM    BUN 10 05/24/2012 12:05 PM    Creatinine 0.9 05/24/2012 12:05 PM    BUN/Creatinine ratio 11 (L) 05/24/2012 12:05 PM    GFR est AA >60 05/24/2012 12:05 PM    GFR est non-AA >60 05/24/2012 12:05 PM    Calcium 8.8 05/24/2012 12:05 PM    Bilirubin, total 0.3 05/24/2012 12:05 PM    AST (SGOT) 15 05/24/2012 12:05 PM    Alk. phosphatase 125 05/24/2012 12:05 PM    Protein, total 8.0 05/24/2012 12:05 PM    Albumin 3.5 05/24/2012 12:05 PM    Globulin 4.5 (H) 05/24/2012 12:05 PM    A-G Ratio 0.8 05/24/2012 12:05 PM    ALT (SGPT) 36 05/24/2012 12:05 PM     No results found for: IRON, FE, TIBC, IBCT, PSAT, FERR  No results found for: FOL, RBCF  No results found for: VITD3, Davee Beery, VD3RIA            Cardiac / Pulmonary Evaluation:     n/a      UGI Results:     Normal anatomy      Assessment:     Morbid obesity with comorbidity    Plan:     laparoscopic sleeve gastrectomy    This is a 32 y.o. female with a BMI of Body mass index is 42.91 kg/(m^2). and the weight-related co-morbidties as noted above. Jamil Pringle meets the NIH criteria for bariatric surgery based upon the BMI of Body mass index is 42.91 kg/(m^2). and multiple weight-related co-morbidties.  Jamil Pringle has elected laparoscopic sleeve gastrectomy as her intervention of choice for treatment of morbid obestiy through surgical means secondary to its safety profile, rapid return to work  and decreases in operative risks over gastric bypass. In the office today, following Jonelle's history and physical examination, a 40 minute discussion regarding the anatomic alterations for the laparoscopic sleeve gastrectomy was undertaken. The dietary expectations and the patient  dependent factors for success were thoroughly discussed, to include the need for interval follow-up and long-term dietary changes associated with success. The possible complications of the sleeve gastrectomy  were also discussed, to include;death, DVT/PE, staple line leak, bleeding, stricture formation, infection, nutritional deficiencies and sleeve dilation. Specific weight related outcomes for success were also discussed with an emphasis on careful and close follow-up with the first year and eating behavior modification as the baseline and cyclical hunger return. The patient expressed an understanding of the above factors, and her questions were answered in their entirety. In addition, the patient attended a 1.5 hour power point seminar regarding obesity, surgical weight loss including, adjustable gastric band, gastric bypass, and sleeve gastrectomy. This discussion contrasted the different surgical techniques, mechanisms of actions and expected outcomes, and surgical and medical risks associated with each procedure. During this seminar, there was a long question and answer session where each questions was answered until there were no additional questions. Today, the patient had all of her questions answered and the decision was made today that the patient's preoperative evaluation is acceptable for them  to proceed with bariatric surgery  choosing the sleeve gastrectomy as her surgical option. Patient must continue consistent care with Psychologist both before and after surgery.   Pt states that she goes to therapy on a weekly basis and that she will continue. Patient needs to lose 4 pounds before surgery. Lab slip given today for TSH, CBC and BMP.      Secondary Diagnoses:         Signed By: BHUMIKA Ruiz     February 9, 2018

## 2018-02-09 NOTE — PROGRESS NOTES
Medical Weight Loss Multi-Disciplinary Program    Name: Shana Briggs   : 1990    Session# 6  Date: 2018     Height: 5' 7.75\" (172.1 cm)    Weight: 124.3 kg (274 lb) lbs. Body mass index is 41.97 kg/(m^2). Pounds Gained: 2    Dietary Instructions    Reviewed intake  Instruction given for personal dietary changes  Discussed perceived compliance  Comments: Reviewed patient's past monthly diet hx. Patient has been working on increasing her protein - purchased the [employer]uplanet [employer] protein shakes and has been using those as a meal replacement or snack (whichever works for her schedule). Patient has also been working incorporating more vegetables into her daily diet. Physical Activity/Exercise    Reviewed Activity Log  Discussed Perceived Compliance  Reasonable Goals Set  Motivation  Comments: patient has been doing cardio and weights at least 4 days a week and yoga nightly with her son     Behavior Modification    Reviewed behavior modification log  Identify obstacles to trigger change  Achieving/Rewarding goals met  Positive attitude  Discussed perceived compliance  Comments:     Goals:  1. Continue current exercise routine, increasing as able  2. Continue increased protein (having a source at all meals and snacks)  3.  Continue using protein shake as meal replacement or snack     Candidate for surgery (per RD): Yes    Dietitian: Lenny Ramirez

## 2018-03-01 ENCOUNTER — HOSPITAL ENCOUNTER (OUTPATIENT)
Dept: PHYSICAL THERAPY | Age: 28
Discharge: HOME | End: 2018-03-01
Payer: MEDICAID

## 2018-03-01 PROCEDURE — 97162 PT EVAL MOD COMPLEX 30 MIN: CPT

## 2018-03-01 PROCEDURE — 97530 THERAPEUTIC ACTIVITIES: CPT

## 2018-03-01 NOTE — PROGRESS NOTES
[facility] at [address] [place] [address]  Phone: [phone]   Fax: [phone]      Plan of Care/ Statement of Necessity for Physical Therapy Services    Patient name: Rishi Leong Start of Care: 3/1/2018   Referral source: Lawrence Marino : 1990    Medical Diagnosis: Stress incontinence [N39.3]   Onset Date:Summer 2017    Treatment Diagnosis: Mixed urinary incontinence   Prior Hospitalization: see medical history Provider#: [id number]   Medications: Verified on Patient summary List    Comorbidities: DM, latex allergy, asthma, c-sections 06' and 10'   Prior Level of Function: patient was independent with all ADLs and activities         The Plan of Care and following information is based on the information from the initial evaluation. Assessment/ key information: Patient is 31 yo female who presents with symptoms of mixed urinary incontinence.  Symptoms occur with urgency and sress activities such as laughing, sneezing, and coughing. Pt reports void frequency of every hour during the daytime, and  3-4 Xnocturnally.   Reports  using pad protection at 3-4 panty liners per day. Daily leakage noted.   Patient reports constipation which is a chronic condition. She reports intervals can be as long as a few weeks unless she relies on her miralax and probiotics. Patient reports consistency ranges from type II to type VI on [place] Stool Scale. Pain is not present. Unable to perform internal assessment secondary to patient currently having active yeast infection. Pt may benefit from pelvic floor physical therapy to address pelvic floor dysfunction with symptoms of mixed urinary incontinence.     Evaluation Complexity History HIGH Complexity :3+ comorbidities / personal factors will impact the outcome/ POC ; Examination MEDIUM Complexity : 3 Standardized tests and measures addressing body structure, function, activity limitation and / or participation in recreation ;Presentation MEDIUM Complexity : Evolving with changing characteristics  ; Clinical Decision Making MEDIUM Complexity : FOTO score of 26-74  Overall Complexity Rating: MEDIUM    Problem List: Pelvic pain/dysfunction, Decreased pelvic floor mm awareness, Decreased pelvic floor mm strength, Use of accessory muscles, Improper voiding habits, Hypertonus of pelvic floor, Urinary urgency and Othercolorectal issues    Treatment Plan may include any combination of the following:   Therapeutic exercise, Urge suppression techniques, Neuromuscular re-education, Manual therapy, Physical agent/modality, Patient education and Othertherapeutic activities, SEMG/biofeedback, and electrical stimulation   Patient / Family readiness to learn indicated by: asking questions, trying to perform skills and interest    Persons(s) to be included in education: patient (P)    Barriers to Learning/Limitations: None    Patient Goal (s): want to stop leaking    Patient Self Reported Health Status: good    Rehabilitation Potential: good    STG: to be met in 4 weeks   1. Patient will demonstrate accurate performance of home exercise program/pelvic floor contractions as adjunct to physical therapy clinic visits to promote healthy lifestyle and increased quality of life. Status @ Eval: initiate 2nd visit   2. Patient will Complete Bladder Diary for use in patient education and goal setting as indicated. Status @ Eval: provided to review at next visit    3. Patient will have decreased leakage episodes from daily to </= 3-5/week for increased quality of life.    Status @ Eval: daily leakage      LTG: to be met in 8 weeks   1. Patient will demonstrate independence in home exercise program for maintenance of pelvic floor program and increased quality of life.    Status @ Eval: initiate 2nd visit   2. Patient will have increased pelvic floor muscle motor performance by 1/2 to 1 grade for ability to have more normal function/increased quality of life.    Status @ Eval: unable to assess secondary to yeast infection   3. Patient will be able to laugh/cough/sneeze/exercise with no urinary leakage episodes for increased patient comfort/quality of life.    Status @ Eval: occurs with stress activities 100% of time  4. Patient will have FOTO score PFDI Urinary improved by 7 points indicating improvement in function. Status @ Eval: 10   5. Patient will have overall decreased leakage episodes from urgency from daily to <=2X/week to decrease dependency of pads. Status @ Eval: daily leakage with 3-4 pads/day  6. Patient will report decreased pads from 3-4/day to <=1pad/day to increase quality of life. Status @ Eval: 3-4 pads/day       Frequency / Duration: Patient to be seen 1 times per week for 8 weeks. Patient/ Caregiver education and instruction: Diagnosis, prognosis, Other POC and bladder diary    [x]  Plan of care has been reviewed with AME Sarabia 3/1/2018 11:44 AM    ________________________________________________________________________    I certify that the above Therapy Services are being furnished while the patient is under my care. I agree with the treatment plan and certify that this therapy is necessary.     Physician's Signature:___________________  Date:____________Time: _________    Please sign and return to [facility] at [address] [place] [address]     Phone: [phone]   Fax: [phone]

## 2018-03-01 NOTE — PROGRESS NOTES
PF EVALUATION/TREATMENT NOTE     Patient Name: Kenyetta Luna  Date:3/1/2018  : 1990  [x]  Patient  Verified  Payor: Linda Oh / Plan: [id number] / Product Type: Managed Care Medicaid /    In time:11:30  Out time:12:20  Total Treatment Time (min): 50  Visit #: 1 of     Treatment Area: [x] Pelvic Floor     [] Other:    SUBJECTIVE  Any medication changes, allergies to medications, adverse drug reactions, diagnosis change, or new procedure performed?: [x] No    [] Yes (see summary sheet for update)  SEE POC    Pain Level : 0/10  OBJECTIVE     Pelvic Floor Dysfunction Evaluation-Will assess at second visit as patient currently has yeast infection and contraindicated     Musculoskeletal Screen:N/A    Skin Integrity:  [] Healthy [] Red  [] Labia Atrophy [] Fragile    Sensation: [] Intact [] Diminished:    Muscle Bulk: [] Symmetrical  [] Well-developed [] Atrophied:  []L   []R   []B    Prolapse: [] Cystocele:   [] Rectocele:    PERF Score (Performance/Endurance/Repetitions/Flicks)   P:  E: R:  F:  Total:    Accessory Muscle Use:     Patient has failed previous pelvic floor muscle training? [] Yes    [] No      40 min [x]Eval                  []Re-Eval     10 min Therapeutic Activity:  []  See flow sheet :    []  Increase Tissue extensibility        []  Assess fiber intake    []  Assess voiding habits  []  Assess bowel habits  []  Other:bladder diary    Rationale: increase awareness   to improve the patients ability to have decreased leakage     Other Objective/Functional Measures:     Pain Level (0-10 scale) post treatment: 0/10    ASSESSMENT:     Patient is 33 yo female who presents with symptoms of mixed urinary incontinence.  Symptoms occur with urgency and sress activities such as laughing, sneezing, and coughing. Pt reports void frequency of every hour during the daytime, and  3-4 Xnocturnally.   Reports  using pad protection at 3-4 panty liners per day.  Daily leakage noted.   Patient reports constipation which is a chronic condition. She reports intervals can be as long as a few weeks unless she relies on her miralax and probiotics. Patient reports consistency ranges from type II to type VI on [place] Stool Scale. Pain is not present. Unable to perform internal assessment secondary to patient currently having active yeast infection. Pt may benefit from pelvic floor physical therapy to address pelvic floor dysfunction with symptoms of mixed urinary incontinence.  XSee in of Care  []  See progress note/recertification  []  See Discharge Summary         Progress towards goals / Updated goals:  STG: to be met in 4 weeks   1. Patient will demonstrate accurate performance of home exercise program/pelvic floor contractions as adjunct to physical therapy clinic visits to promote healthy lifestyle and increased quality of life. Status @ Eval: initiate 2nd visit   2. Patient will Complete Bladder Diary for use in patient education and goal setting as indicated. Status @ Eval: provided to review at next visit    3. Patient will have decreased leakage episodes from daily to </= 3-5/week for increased quality of life.    Status @ Eval: daily leakage        LTG: to be met in 8 weeks   1. Patient will demonstrate independence in home exercise program for maintenance of pelvic floor program and increased quality of life.    Status @ Eval: initiate 2nd visit   2. Patient will have increased pelvic floor muscle motor performance by 1/2 to 1 grade for ability to have more normal function/increased quality of life.    Status @ Eval: unable to assess secondary to yeast infection   3. Patient will be able to laugh/cough/sneeze/exercise with no urinary leakage episodes for increased patient comfort/quality of life.    Status @ Eval: occurs with stress activities 100% of time  4. Patient will have FOTO score PFDI Urinary improved by 7 points indicating improvement in function. Status @ Eval: 10   5.  Patient will have overall decreased leakage episodes from urgency from daily to <=2X/week to decrease dependency of pads. Status @ Eval: daily leakage with 3-4 pads/day  6. Patient will report decreased pads from 3-4/day to <=1pad/day to increase quality of life.   Status @ Eval: 3-4 pads/day       PLAN  []  Upgrade activities as tolerated     [x]  Continue plan of care  []  Update interventions per flow sheet       []  Discharge due to:_  []  Other:_      Jose L Sarabia 3/1/2018  11:43 AM

## 2018-03-02 ENCOUNTER — HOSPITAL ENCOUNTER (OUTPATIENT)
Dept: LAB | Age: 28
Discharge: HOME | End: 2018-03-02

## 2018-03-02 LAB — SENTARA SPECIMEN COL,SENBCF: NORMAL

## 2018-03-02 PROCEDURE — 99001 SPECIMEN HANDLING PT-LAB: CPT | Performed by: PHYSICIAN ASSISTANT

## 2018-03-05 ENCOUNTER — DOCUMENTATION ONLY (OUTPATIENT)
Dept: SURGERY | Age: 28
End: 2018-03-05

## 2018-03-05 NOTE — PROGRESS NOTES
Pt called from office phone at 1300    Pt is 15 days pre-op sleeve    Recent glucose levels 300    Understands need for optimal glucose control leading into surgery    Will focus on lean protein and green vegetables over the next 15 days    Pt states that her diet had been less than optimal as a result of celebrating a birthday and her labs were drawn \"non-fasting\"

## 2018-03-06 DIAGNOSIS — Z01.812 BLOOD TESTS PRIOR TO TREATMENT OR PROCEDURE: ICD-10-CM

## 2018-03-06 DIAGNOSIS — E11.9 TYPE 2 DIABETES MELLITUS WITHOUT COMPLICATION, WITHOUT LONG-TERM CURRENT USE OF INSULIN (HCC): Primary | ICD-10-CM

## 2018-03-06 DIAGNOSIS — E66.01 MORBID OBESITY WITH BMI OF 40.0-44.9, ADULT (HCC): ICD-10-CM

## 2018-03-09 RX ORDER — OXYCODONE AND ACETAMINOPHEN 5; 325 MG/1; MG/1
1 TABLET ORAL
Qty: 30 TAB | Refills: 0 | Status: SHIPPED | OUTPATIENT
Start: 2018-03-09 | End: 2018-04-03 | Stop reason: ALTCHOICE

## 2018-03-12 ENCOUNTER — OFFICE VISIT (OUTPATIENT)
Dept: SURGERY | Age: 28
End: 2018-03-12

## 2018-03-12 ENCOUNTER — HOSPITAL ENCOUNTER (OUTPATIENT)
Dept: PREADMISSION TESTING | Age: 28
Discharge: HOME | End: 2018-03-12
Payer: MEDICAID

## 2018-03-12 VITALS
BODY MASS INDEX: 41.52 KG/M2 | DIASTOLIC BLOOD PRESSURE: 75 MMHG | HEIGHT: 68 IN | SYSTOLIC BLOOD PRESSURE: 134 MMHG | RESPIRATION RATE: 16 BRPM | HEART RATE: 104 BPM | WEIGHT: 274 LBS | OXYGEN SATURATION: 100 %

## 2018-03-12 DIAGNOSIS — Z01.818 PRE-OP TESTING: ICD-10-CM

## 2018-03-12 DIAGNOSIS — E78.5 HYPERLIPIDEMIA, UNSPECIFIED HYPERLIPIDEMIA TYPE: ICD-10-CM

## 2018-03-12 DIAGNOSIS — E11.43 DM GASTROPARESIS (HCC): ICD-10-CM

## 2018-03-12 DIAGNOSIS — G89.18 POST-OP PAIN: Primary | ICD-10-CM

## 2018-03-12 DIAGNOSIS — E66.01 MORBID OBESITY WITH BMI OF 40.0-44.9, ADULT (HCC): Primary | ICD-10-CM

## 2018-03-12 DIAGNOSIS — J45.901 MILD ASTHMA WITH ACUTE EXACERBATION, UNSPECIFIED WHETHER PERSISTENT: ICD-10-CM

## 2018-03-12 DIAGNOSIS — E66.01 MORBID OBESITY (HCC): ICD-10-CM

## 2018-03-12 DIAGNOSIS — K31.84 DM GASTROPARESIS (HCC): ICD-10-CM

## 2018-03-12 DIAGNOSIS — E11.9 TYPE 2 DIABETES MELLITUS WITHOUT COMPLICATION, WITHOUT LONG-TERM CURRENT USE OF INSULIN (HCC): ICD-10-CM

## 2018-03-12 DIAGNOSIS — J45.909 UNCOMPLICATED ASTHMA, UNSPECIFIED ASTHMA SEVERITY, UNSPECIFIED WHETHER PERSISTENT: ICD-10-CM

## 2018-03-12 DIAGNOSIS — K30 FUNCTIONAL DYSPEPSIA: ICD-10-CM

## 2018-03-12 LAB
ABO + RH BLD: NORMAL
ALBUMIN SERPL-MCNC: 3.9 G/DL (ref 3.4–5)
ALBUMIN/GLOB SERPL: 0.9 {RATIO} (ref 0.8–1.7)
ALP SERPL-CCNC: 103 U/L (ref 45–117)
ALT SERPL-CCNC: 31 U/L (ref 13–56)
ANION GAP SERPL CALC-SCNC: 12 MMOL/L (ref 3–18)
AST SERPL-CCNC: 19 U/L (ref 15–37)
ATRIAL RATE: 93 BPM
BILIRUB SERPL-MCNC: 0.3 MG/DL (ref 0.2–1)
BLOOD GROUP ANTIBODIES SERPL: NORMAL
BUN SERPL-MCNC: 10 MG/DL (ref 7–18)
BUN/CREAT SERPL: 13 (ref 12–20)
CALCIUM SERPL-MCNC: 9.8 MG/DL (ref 8.5–10.1)
CALCULATED P AXIS, ECG09: 60 DEGREES
CALCULATED R AXIS, ECG10: 56 DEGREES
CALCULATED T AXIS, ECG11: 16 DEGREES
CHLORIDE SERPL-SCNC: 102 MMOL/L (ref 100–108)
CO2 SERPL-SCNC: 25 MMOL/L (ref 21–32)
CREAT SERPL-MCNC: 0.78 MG/DL (ref 0.6–1.3)
DIAGNOSIS, 93000: NORMAL
GLOBULIN SER CALC-MCNC: 4.3 G/DL (ref 2–4)
GLUCOSE SERPL-MCNC: 96 MG/DL (ref 74–99)
P-R INTERVAL, ECG05: 160 MS
POTASSIUM SERPL-SCNC: 3.8 MMOL/L (ref 3.5–5.5)
PROT SERPL-MCNC: 8.2 G/DL (ref 6.4–8.2)
Q-T INTERVAL, ECG07: 376 MS
QRS DURATION, ECG06: 96 MS
QTC CALCULATION (BEZET), ECG08: 467 MS
SODIUM SERPL-SCNC: 139 MMOL/L (ref 136–145)
SPECIMEN EXP DATE BLD: NORMAL
VENTRICULAR RATE, ECG03: 93 BPM

## 2018-03-12 PROCEDURE — 93005 ELECTROCARDIOGRAM TRACING: CPT

## 2018-03-12 PROCEDURE — 80053 COMPREHEN METABOLIC PANEL: CPT | Performed by: SPECIALIST

## 2018-03-12 PROCEDURE — 36415 COLL VENOUS BLD VENIPUNCTURE: CPT | Performed by: SPECIALIST

## 2018-03-12 PROCEDURE — 86900 BLOOD TYPING SEROLOGIC ABO: CPT | Performed by: SPECIALIST

## 2018-03-12 NOTE — PROGRESS NOTES
Appears to have a good understanding of the diet progression, food choices, and dietary/exercise habits for successful weight loss and nourishment after surgery. The class material included: post-op diet progression, including liquid, pureed, and low fat, low sugar food recommendations; proper food group choices, and encouraging dietary and exercise habits that lead to weight loss success.      Latosha Vizcaino RD

## 2018-03-12 NOTE — PROGRESS NOTES
Sleeve Gastrectomy - History and Physical    Subjective: The patient is a 25 y.o. obese female with a Body mass index is 41.97 kg/(m^2). .   she presents now to review their work up to date to see if they are a candidate for surgery and whether or not to proceed with the previously requested procedure. Bariatric comorbidities continue to include:   Patient Active Problem List   Diagnosis Code    Acute bronchitis J20.9    Asthma with exacerbation J45. 0    Pleurisy R09.1    Morbid obesity (HCC) E66.01    Morbid obesity with BMI of 40.0-44.9, adult (HCC) E66.01, Z68.41    Diabetes ([facility] E11.9    Hyperlipemia E78.5    Gastroparesis K31.84    Asthma J45.909    IUD (intrauterine device) in place Z80.11    FH: ovarian cancer Z80.41       They have been generally well prior to this visit and have had no recent significant illnesses. The patient has had no gastrointestinal issues that would preclude them from proceeding with the surgery they have chosen. Sarina Ho has recently tried a preoperative weight loss program  in addition to seeing a bariatric nutritionist preoperatively. We have discussed on at least one other occasion about the various types of surgical weight loss procedures and they have considered these options after our initial consultation. We have once again discussed these procedures in detail and they have now decided on a surgical procedure. They present today to discuss this and confirm that their evaluation pre operatively is acceptable to continue with surgery. The patient desires laparoscopic sleeve gastrectomy for surgical weight loss. The patients goal weight is 175 lb. (this represents a BMI of 27)    These goals are consistent with expected outcomes of their desired operation. her Medical goals are resolution of these health issues.     Patient Active Problem List    Diagnosis Date Noted    IUD (intrauterine device) in place 02/08/2018    FH: ovarian cancer 2018    Asthma     Morbid obesity ([facility]     Morbid obesity with BMI of 40.0-44.9, adult ([facility]     Diabetes ([facility]     Hyperlipemia     Gastroparesis     Acute bronchitis 2013    Asthma with exacerbation 2013    Pleurisy 2013     Past Surgical History:   Procedure Laterality Date    HX  SECTION   &       Social History   Substance Use Topics    Smoking status: Never Smoker    Smokeless tobacco: Never Used    Alcohol use No      Family History   Problem Relation Age of Onset    Migraines Mother     Other Father      graves ds,     Sickle Cell Trait Brother     Other Brother     Asthma Sister     Other Sister      autism    Other Daughter      cp, epilepsy    Asthma Daughter       Current Outpatient Prescriptions   Medication Sig Dispense Refill    multivitamin (ONE A DAY) tablet Take 1 Tab by mouth daily.  B.infantis-B.ani-B.long-B.bifi (PROBIOTIC 4X) 10-15 mg TbEC Take 1 Cap by mouth daily.  Biotin 2,500 mcg cap Take 2,500 Caps by mouth daily.  TRULICITY 1.5 AT/5.5 mL sub-q pen 1.5 mg by SubCUTAneous route Every Thursday. 3    ergocalciferol (ERGOCALCIFEROL) 50,000 unit capsule TK 1 C PO Q 7 DAYS  12    glipiZIDE SR (GLUCOTROL XL) 5 mg CR tablet TK 1 T PO QD  1    metFORMIN ER (GLUCOPHAGE XR) 500 mg tablet TK 1 T PO  with dinner daily  1    omeprazole (PRILOSEC) 40 mg capsule one capsule oral route daily  2    amitriptyline (ELAVIL) 10 mg tablet Take 50 mg by mouth nightly. 3    butalbital-acetaminophen-caffeine (FIORICET, ESGIC) -40 mg per tablet Take 1 Tab by mouth daily as needed. Max 2 per day  3    albuterol (PROVENTIL HFA, VENTOLIN HFA, PROAIR HFA) 90 mcg/actuation inhaler Take 2 Puffs by inhalation every four (4) hours as needed.  atorvastatin (LIPITOR) 40 mg tablet Take 40 mg by mouth nightly.  levonorgestrel (MIRENA) 20 mcg/24 hr (5 years) IUD 1 Device by IntraUTERine route once.       oxyCODONE-acetaminophen (PERCOCET) 5-325 mg per tablet Take 1 Tab by mouth every four (4) hours as needed for Pain. Max Daily Amount: 6 Tabs.  30 Tab 0     Allergies   Allergen Reactions    Latex Rash    Mushroom Anaphylaxis    Amoxicillin Rash    Doxycycline Swelling    Erythromycin Rash    Lactose Nausea and Vomiting     intolerance    Penicillins Rash    Rocephin [Ceftriaxone] Rash    Sulfa (Sulfonamide Antibiotics) Swelling        Review of Systems:     General - No history or complaints of unexpected fever, chills, or weight loss  Head/Neck - No history or complaints of headache, diplopia, dysphagia, hearing loss  Cardiac - No history or complaints of chest pain, palpitations, murmur, or shortness of breath  Pulmonary - No history or complaints of shortness of breath, productive cough, hemoptysis  Gastrointestinal - minimal reflux,no  abdominal pain, obstipation/constipation or blood per rectum  Genitourinary - No history or complaints of hematuria/dysuria, stress urinary incontinence symptoms, or renal lithiasis  Musculoskeletal - minimal joint pain ,  no muscular weakness  Hematologic - No history or complaints of bleeding disorders,  No blood transfusions  Neurologic - No history or complaints of  migraine headaches, seizure activity, syncopal episodes, TIA or stroke  Integumentary - No history or complaints of rashes, abnormal nevi, skin cancer  Gynecological - IUD in place    Objective:     Visit Vitals    /75 (BP 1 Location: Left arm, BP Patient Position: Sitting)    Pulse (!) 104    Resp 16    Ht 5' 7.75\" (1.721 m)    Wt 124.3 kg (274 lb)    SpO2 100%    BMI 41.97 kg/m2       Physical Examination: General appearance - alert, well appearing, and in no distress  Mental status - alert, oriented to person, place, and time  Eyes - pupils equal and reactive, extraocular eye movements intact  Ears - bilateral TM's and external ear canals normal  Nose - normal and patent, no erythema, discharge or polyps  Mouth - mucous membranes moist, pharynx normal without lesions  Neck - supple, no significant adenopathy  Lymphatics - no palpable lymphadenopathy, no hepatosplenomegaly  Chest - clear to auscultation, no wheezes, rales or rhonchi, symmetric air entry  Heart - normal rate, regular rhythm, normal S1, S2, no murmurs, rubs, clicks or gallops  Abdomen - soft, nontender, nondistended, no masses or organomegaly  Back exam - full range of motion, no tenderness, palpable spasm or pain on motion  Neurological - alert, oriented, normal speech, no focal findings or movement disorder noted  Musculoskeletal - no joint tenderness, deformity or swelling  Extremities - peripheral pulses normal, no pedal edema, no clubbing or cyanosis  Skin - normal coloration and turgor, no rashes, no suspicious skin lesions noted    Labs / Preoperative Evaluation:        Recent Results (from the past 1008 hour(s))   AMB POC URINALYSIS DIP STICK MANUAL W/O MICRO    Collection Time: 02/08/18 11:28 AM   Result Value Ref Range    Color (UA POC) Yellow     Clarity (UA POC) Clear     Glucose (UA POC) 2+ Negative    Bilirubin (UA POC) Trace Negative    Ketones (UA POC) Trace Negative    Specific gravity (UA POC) 1.030 1.001 - 1.035    Blood (UA POC) Negative Negative    pH (UA POC) 5.0 4.6 - 8.0    Protein (UA POC) Trace Negative    Urobilinogen (UA POC) normal 0.2 - 1    Nitrites (UA POC) Negative Negative    Leukocyte esterase (UA POC) Negative Negative   [facility]     Collection Time: 03/02/18 11:53 AM   Result Value Ref Range    [facility] SPECIMEN COL Specimens collected/sent to [facility]     EKG, 12 LEAD, INITIAL    Collection Time: 03/12/18 12:11 PM   Result Value Ref Range    Ventricular Rate 93 BPM    Atrial Rate 93 BPM    P-R Interval 160 ms    QRS Duration 96 ms    Q-T Interval 376 ms    QTC Calculation (Bezet) 467 ms    Calculated P Axis 60 degrees    Calculated R Axis 56 degrees    Calculated T Axis 16 degrees    Diagnosis       Normal sinus rhythm  Normal ECG  No previous ECGs available     METABOLIC PANEL, COMPREHENSIVE    Collection Time: 18 12:16 PM   Result Value Ref Range    Sodium 139 136 - 145 mmol/L    Potassium 3.8 3.5 - 5.5 mmol/L    Chloride 102 100 - 108 mmol/L    CO2 25 21 - 32 mmol/L    Anion gap 12 3.0 - 18 mmol/L    Glucose 96 74 - 99 mg/dL    BUN 10 7.0 - 18 MG/DL    Creatinine 0.78 0.6 - 1.3 MG/DL    BUN/Creatinine ratio 13 12 - 20      GFR est AA >60 >60 ml/min/1.73m2    GFR est non-AA >60 >60 ml/min/1.73m2    Calcium 9.8 8.5 - 10.1 MG/DL    Bilirubin, total 0.3 0.2 - 1.0 MG/DL    ALT (SGPT) 31 13 - 56 U/L    AST (SGOT) 19 15 - 37 U/L    Alk. phosphatase 103 45 - 117 U/L    Protein, total 8.2 6.4 - 8.2 g/dL    Albumin 3.9 3.4 - 5.0 g/dL    Globulin 4.3 (H) 2.0 - 4.0 g/dL    A-G Ratio 0.9 0.8 - 1.7     TYPE & SCREEN    Collection Time: 18 12:16 PM   Result Value Ref Range    Crossmatch Expiration 2018     ABO/Rh(D) Verna More POSITIVE     Antibody screen NEG          Procedures  Date of Service: 17 [address] [place] [address] BHUMIKA Velázquez   Physician Assistant   Cosigned by: Nely Simon MD at 17 [id number]      []Hide copied text  []Anatolyver for attribution information  Steven Sims                  : 1990  Medical Record [id number]                 PREPROCEDURE DIAGNOSIS: This patient is preoperative for laparoscopic sleeve gastrectomyprocedure with a history of  reflux disease.     POSTPROCEDURE DIAGNOSIS: This patient is preoperative for laparoscopic sleeve gastrectomyprocedure with a history of  reflux disease.        PROCEDURES PERFORMED: Upper GI study with barium.     ESTIMATED BLOOD LOSS: None.     SPECIMENS: None.     STATEMENT OF MEDICAL NECESSITY: The patient is a patient with a  longstanding history of obesity.  They are now considering the laparoscopic sleeve gastrectomyprocedure as a means of surgical weight control and due to their history of reflux disease and are being assessed preoperatively for such.     DESCRIPTION OF PROCEDURE: The patient was brought to the fluoroscopy unit and  was given thin barium. On swallowing of barium, they were noted to have  normal peristalsis of their esophagus. They had prompt filling of distal  esophagus with tapering into the gastroesophageal junction. There was no evidence of a hiatal hernia present. Contrast then filled the gastric cardia, fundus,body and pre pyloric region with no abnormalities noted. Contrast then exited the pylorus in normal fashion. No obstruction was noted. There was no evidence of reflux noted.     (normal anatomy)     Jenni Pate MD      Electronically signed by BHUMIKA Ashley at 12/06/17 1551       Results   NM GASTRIC EMPTY STDY (Accession [id number]) (Order [id number])   Allergies      High: Mushroom   Unspecified: Latex; Amoxicillin;  Doxycycline;  Erythromycin; Lactose;  Penicillins;  Rocephin [Ceftriaxone];  Sulfa (Sulfonamide Antibiotics)   Result Information   Status Provider Status      Final result (Exam End: 11/29/2017 11:59 AM) Reviewed    Study Result   Exam: Nuclear medicine gastric emptying study     History: Diabetes with suspected gastroparesis     Comparison: None.     Technique: 1.1mCi of technetium 99 sulfur colloid were given with an egg  sandwich sandwich. Static images were performed over the abdomen for 4 hours     Findings:      At 60 minutes there is 28% gastric emptying. At 120 minutes there is 77% gastric  emptying and at 244 minutes there is 92% gastric emptying.     IMPRESSION  Impression:   1. Normal solid phase gastric emptying examination. Assessment:     Morbid obesity with comorbidity    Plan:     laparoscopic sleeve gastrectomy    This is a 25 y.o. female with a BMI of Body mass index is 41.97 kg/(m^2). and the weight-related co-morbidties as noted above. Nathan Evans meets the NIH criteria for bariatric surgery based upon the BMI of Body mass index is 41.97 kg/(m^2).  and multiple weight-related co-morbidties. Augustin Jesus has elected laparoscopic sleeve gastrectomy as her intervention of choice for treatment of morbid obestiy through surgical means secondary to its safety profile, rapid return to work  and decreases in operative risks over gastric bypass. In the office today, following Jonelle's history and physical examination, a 40 minute discussion regarding the anatomic alterations for the laparoscopic sleeve gastrectomy was undertaken. The dietary expectations and the patient  dependent factors for success were thoroughly discussed, to include the need for interval follow-up and long-term dietary changes associated with success. The possible complications of the sleeve gastrectomy  were also discussed, to include;death, DVT/PE, staple line leak, bleeding, stricture formation, infection, nutritional deficiencies and sleeve dilation. Specific weight related outcomes for success were also discussed with an emphasis on careful and close follow-up with the first year and eating behavior modification as the baseline and cyclical hunger return. The patient expressed an understanding of the above factors, and her questions were answered in their entirety. In addition, the patient attended a 1.5 hour power point seminar regarding obesity, surgical weight loss including, adjustable gastric band, gastric bypass, and sleeve gastrectomy. This discussion contrasted the different surgical techniques, mechanisms of actions and expected outcomes, and surgical and medical risks associated with each procedure. During this seminar, there was a long question and answer session where each questions was answered until there were no additional questions. Today, the patient had all of her questions answered and the decision was made today that the patient's preoperative evaluation is acceptable for them  to proceed with bariatric surgery  choosing the sleeve gastrectomy as her surgical option.     The patient understands the plan of action    Since the patient's original consult 3.5 months ago they have been seen by their OB/Gyn for her routine pap smear. There has been no change to their overall medical or surgical history and they have been on no steroids in any form. There was no change in her weight from her initial consult    Secondary Diagnoses:     DVT / Pulmonary Embolus Risk - The patient is at a higher risk for post operative DVT / pulmonary embolus secondary to their morbid obese status, relative sedentary lifestyle, and impending general anesthetic. We will plan to use anticoagulation therapy pre and post operative as well as  pneumatic compression devices and encourage ambulation once on the hospital nursing floor. The need for possible at home anticoagulation therapy has also been discussed and any decision on this matter will be made during post operative evaluations. The patient understands that their efforts at ambulation are of vital importance to reduce the risk of this complication thus placing significant burden on them as to the prevention of such issues. Signs and symptoms of DVT / PE have been discussed with the patient and they have been instructed to call the office if any these occur in the \"at home\" post op phase. Adult Onset Diabetes - The patient has paul given a very low carbohydrate diet preoperatively along with instructions to monitor their blood sugars on a regular daily basis.  When  their surgery is performed  we will be monitoring the patient with sliding scale insulin and accuchecks.  Based on those values we will determine whether the patient needs a reduction of those medications postoperatively or total removal of those medications on discharge.  We will have the patient continue accuchecks postoperatively while at home also and report to me or their family physician for appropriate adjustments as needed.  The patient also understands that in the event of uncontrolled blood sugar preoperatively that we may choose to postpone their surgery.     Hyperlipidemia - The patient understands that studies show that almost all patient will realize an improvement in their lipid profile with weight loss that occurs with these procedures. They however also understand that hyperlipidemia is a multifactorial disease particularly as it pertains to their genetic background and that there is no guarantee toward cure  of this issue. We will resume their medications both pre operatively and immediately postoperatively as this tends to decrease any post operative cardiac events.  The patient will follow up with their family physician in the postoperative period with plans to repeat their lipid panel 2-3 month postoperative for potential adjustment or removal of these medications.     Restrictive Airway Disease - We will continue all of their pulmonary medications in the form of oral pills and inhalers in both the perioperative and postoperative period. They understand that their symptoms should improve with weight loss. Any further testing related to this will be turned over to their family physician or pulmonologist. The patient  understands that if they require oral or IV steroids in the future that they will notify us. This is particularly important for gastric bypass patients at all times and both sleeve  gastrectomy and gastric bypass patients in the 1 month pre op and 1 month post operative period.  They understand that inhaled steroids are exempt from this.     Signed By: Ry Heard MD     March 12, 2018

## 2018-03-12 NOTE — PATIENT INSTRUCTIONS
Body Mass Index: Care Instructions  Your Care Instructions    Body mass index (BMI) can help you see if your weight is raising your risk for health problems. It uses a formula to compare how much you weigh with how tall you are. · A BMI lower than 18.5 is considered underweight. · A BMI between 18.5 and 24.9 is considered healthy. · A BMI between 25 and 29.9 is considered overweight. A BMI of 30 or higher is considered obese. If your BMI is in the normal range, it means that you have a lower risk for weight-related health problems. If your BMI is in the overweight or obese range, you may be at increased risk for weight-related health problems, such as high blood pressure, heart disease, stroke, arthritis or joint pain, and diabetes. If your BMI is in the underweight range, you may be at increased risk for health problems such as fatigue, lower protection (immunity) against illness, muscle loss, bone loss, hair loss, and hormone problems. BMI is just one measure of your risk for weight-related health problems. You may be at higher risk for health problems if you are not active, you eat an unhealthy diet, or you drink too much alcohol or use tobacco products. Follow-up care is a key part of your treatment and safety. Be sure to make and go to all appointments, and call your doctor if you are having problems. It's also a good idea to know your test results and keep a list of the medicines you take. How can you care for yourself at home? · Practice healthy eating habits. This includes eating plenty of fruits, vegetables, whole grains, lean protein, and low-fat dairy. · If your doctor recommends it, get more exercise. Walking is a good choice. Bit by bit, increase the amount you walk every day. Try for at least 30 minutes on most days of the week. · Do not smoke. Smoking can increase your risk for health problems. If you need help quitting, talk to your doctor about stop-smoking programs and medicines. These can increase your chances of quitting for good. · Limit alcohol to 2 drinks a day for men and 1 drink a day for women. Too much alcohol can cause health problems. If you have a BMI higher than 25  · Your doctor may do other tests to check your risk for weight-related health problems. This may include measuring the distance around your waist. A waist measurement of more than 40 inches in men or 35 inches in women can increase the risk of weight-related health problems. · Talk with your doctor about steps you can take to stay healthy or improve your health. You may need to make lifestyle changes to lose weight and stay healthy, such as changing your diet and getting regular exercise. If you have a BMI lower than 18.5  · Your doctor may do other tests to check your risk for health problems. · Talk with your doctor about steps you can take to stay healthy or improve your health. You may need to make lifestyle changes to gain or maintain weight and stay healthy, such as getting more healthy foods in your diet and doing exercises to build muscle. Where can you learn more? Go to http://reinier-ritika.info/. Enter S176 in the search box to learn more about \"Body Mass Index: Care Instructions. \"  Current as of: October 13, 2016  Content Version: 11.4  © 6230-9343 [employer]. Care instructions adapted under license by [employer] (which disclaims liability or warranty for this information). If you have questions about a medical condition or this instruction, always ask your healthcare professional. [place] [address] any warranty or liability for your use of this information. Preparation for Surgery  Refer to your book for specific instructions    1. Stop taking all aspirin products, ibuprofen products, non-steroidal medications, blood thinners,  and herbal supplements as outlined in your book. 2. Absolutely no smoking.      3. If diabetic, monitor blood sugars regularly and alert the office of blood sugars over 200.    4. Have a supply of protein product and liquid diet items for your first two weeks as outlined in your book. 5. The day before your surgery is scheduled:  6.    Gastric Bypass and Sleeve:  Clear liquids and Protein Shakes     Gastric Band:   Eat lightly. No snacking.  Drink lots of water         6. Get prepared to meet a new you!

## 2018-03-14 ENCOUNTER — APPOINTMENT (OUTPATIENT)
Dept: PHYSICAL THERAPY | Age: 28
End: 2018-03-14
Payer: MEDICAID

## 2018-03-14 ENCOUNTER — HOSPITAL ENCOUNTER (OUTPATIENT)
Dept: PREADMISSION TESTING | Age: 28
Discharge: HOME | End: 2018-03-14
Payer: MEDICAID

## 2018-03-14 LAB
BASOPHILS # BLD: 0 K/UL (ref 0–0.1)
BASOPHILS NFR BLD: 0 % (ref 0–3)
DIFFERENTIAL METHOD BLD: ABNORMAL
EOSINOPHIL # BLD: 0 K/UL (ref 0–0.4)
EOSINOPHIL NFR BLD: 0 % (ref 0–5)
ERYTHROCYTE [DISTWIDTH] IN BLOOD BY AUTOMATED COUNT: 13.1 % (ref 11.6–14.5)
EST. AVERAGE GLUCOSE BLD GHB EST-MCNC: 180 MG/DL
HBA1C MFR BLD: 7.9 % (ref 4.5–5.6)
HCT VFR BLD AUTO: 42.3 % (ref 35–45)
HGB BLD-MCNC: 14.3 G/DL (ref 12–16)
LYMPHOCYTES # BLD: 4.3 K/UL (ref 0.8–3.5)
LYMPHOCYTES NFR BLD: 40 % (ref 20–51)
MCH RBC QN AUTO: 29.9 PG (ref 24–34)
MCHC RBC AUTO-ENTMCNC: 33.8 G/DL (ref 31–37)
MCV RBC AUTO: 88.3 FL (ref 74–97)
MONOCYTES # BLD: 0.5 K/UL (ref 0–1)
MONOCYTES NFR BLD: 5 % (ref 2–9)
NEUTS SEG # BLD: 6 K/UL (ref 1.8–8)
NEUTS SEG NFR BLD: 55 % (ref 42–75)
PLATELET # BLD AUTO: 360 K/UL (ref 135–420)
PMV BLD AUTO: 9.5 FL (ref 9.2–11.8)
RBC # BLD AUTO: 4.79 M/UL (ref 4.2–5.3)
RBC MORPH BLD: ABNORMAL
WBC # BLD AUTO: 10.8 K/UL (ref 4.6–13.2)

## 2018-03-14 PROCEDURE — 83036 HEMOGLOBIN GLYCOSYLATED A1C: CPT | Performed by: SPECIALIST

## 2018-03-14 PROCEDURE — 85025 COMPLETE CBC W/AUTO DIFF WBC: CPT | Performed by: SPECIALIST

## 2018-03-14 PROCEDURE — 36415 COLL VENOUS BLD VENIPUNCTURE: CPT | Performed by: SPECIALIST

## 2018-03-19 ENCOUNTER — HOSPITAL ENCOUNTER (OUTPATIENT)
Dept: PHYSICAL THERAPY | Age: 28
End: 2018-03-19
Payer: MEDICAID

## 2018-03-20 ENCOUNTER — ANESTHESIA EVENT (OUTPATIENT)
Dept: SURGERY | Age: 28
DRG: 403 | End: 2018-03-20
Payer: MEDICAID

## 2018-03-20 ENCOUNTER — HOSPITAL ENCOUNTER (INPATIENT)
Age: 28
LOS: 1 days | Discharge: HOME | DRG: 403 | End: 2018-03-21
Attending: SPECIALIST | Admitting: SPECIALIST
Payer: MEDICAID

## 2018-03-20 ENCOUNTER — ANESTHESIA (OUTPATIENT)
Dept: SURGERY | Age: 28
DRG: 403 | End: 2018-03-20
Payer: MEDICAID

## 2018-03-20 LAB
GLUCOSE BLD STRIP.AUTO-MCNC: 141 MG/DL (ref 70–110)
GLUCOSE BLD STRIP.AUTO-MCNC: 161 MG/DL (ref 70–110)
GLUCOSE BLD STRIP.AUTO-MCNC: 173 MG/DL (ref 70–110)
GLUCOSE BLD STRIP.AUTO-MCNC: 193 MG/DL (ref 70–110)
HCG SERPL QL: NEGATIVE

## 2018-03-20 PROCEDURE — 88342 IMHCHEM/IMCYTCHM 1ST ANTB: CPT | Performed by: SPECIALIST

## 2018-03-20 PROCEDURE — 77030020255 HC SOL INJ LR 1000ML BG: Performed by: SPECIALIST

## 2018-03-20 PROCEDURE — 74011250637 HC RX REV CODE- 250/637: Performed by: SPECIALIST

## 2018-03-20 PROCEDURE — 74011000250 HC RX REV CODE- 250

## 2018-03-20 PROCEDURE — 88307 TISSUE EXAM BY PATHOLOGIST: CPT | Performed by: SPECIALIST

## 2018-03-20 PROCEDURE — 74011250636 HC RX REV CODE- 250/636

## 2018-03-20 PROCEDURE — 77030034154 HC SHR COAG HARM ACE J&J -F: Performed by: SPECIALIST

## 2018-03-20 PROCEDURE — 74011636637 HC RX REV CODE- 636/637: Performed by: ANESTHESIOLOGY

## 2018-03-20 PROCEDURE — 77030034029 HC SLV GASTRCTMY CAL SYS DISP BOEH -C: Performed by: SPECIALIST

## 2018-03-20 PROCEDURE — 74011000250 HC RX REV CODE- 250: Performed by: SPECIALIST

## 2018-03-20 PROCEDURE — 74011250636 HC RX REV CODE- 250/636: Performed by: SPECIALIST

## 2018-03-20 PROCEDURE — 77030036598 HC CARTDRG STPL RELD ECHELON FLX J&J -D: Performed by: SPECIALIST

## 2018-03-20 PROCEDURE — 77030002966 HC SUT PDS J&J -A: Performed by: SPECIALIST

## 2018-03-20 PROCEDURE — 76210000017 HC OR PH I REC 1.5 TO 2 HR: Performed by: SPECIALIST

## 2018-03-20 PROCEDURE — 0DJ08ZZ INSPECTION OF UPPER INTESTINAL TRACT, VIA NATURAL OR ARTIFICIAL OPENING ENDOSCOPIC: ICD-10-PCS | Performed by: SPECIALIST

## 2018-03-20 PROCEDURE — 77030003580 HC NDL INSUF VERES J&J -B: Performed by: SPECIALIST

## 2018-03-20 PROCEDURE — 77030022585 HC SEAL FBRN EVICEL J&J -F: Performed by: SPECIALIST

## 2018-03-20 PROCEDURE — 88305 TISSUE EXAM BY PATHOLOGIST: CPT | Performed by: SPECIALIST

## 2018-03-20 PROCEDURE — 77030032490 HC SLV COMPR SCD KNE COVD -B: Performed by: SPECIALIST

## 2018-03-20 PROCEDURE — 0FB24ZX EXCISION OF LEFT LOBE LIVER, PERCUTANEOUS ENDOSCOPIC APPROACH, DIAGNOSTIC: ICD-10-PCS | Performed by: SPECIALIST

## 2018-03-20 PROCEDURE — 77030013567 HC DRN WND RESERV BARD -A: Performed by: SPECIALIST

## 2018-03-20 PROCEDURE — 76010000149 HC OR TIME 1 TO 1.5 HR: Performed by: SPECIALIST

## 2018-03-20 PROCEDURE — 77030008477 HC STYL SATN SLP COVD -A: Performed by: ANESTHESIOLOGY

## 2018-03-20 PROCEDURE — 82962 GLUCOSE BLOOD TEST: CPT

## 2018-03-20 PROCEDURE — 74011636637 HC RX REV CODE- 636/637: Performed by: SPECIALIST

## 2018-03-20 PROCEDURE — 74011250636 HC RX REV CODE- 250/636: Performed by: ANESTHESIOLOGY

## 2018-03-20 PROCEDURE — 65270000029 HC RM PRIVATE

## 2018-03-20 PROCEDURE — 77030027876 HC STPLR ENDOSC FLX PWR J&J -G1: Performed by: SPECIALIST

## 2018-03-20 PROCEDURE — 0DB64Z3 EXCISION OF STOMACH, PERCUTANEOUS ENDOSCOPIC APPROACH, VERTICAL: ICD-10-PCS | Performed by: SPECIALIST

## 2018-03-20 PROCEDURE — 77030002912 HC SUT ETHBND J&J -A: Performed by: SPECIALIST

## 2018-03-20 PROCEDURE — 76060000033 HC ANESTHESIA 1 TO 1.5 HR: Performed by: SPECIALIST

## 2018-03-20 PROCEDURE — 77030008683 HC TU ET CUF COVD -A: Performed by: ANESTHESIOLOGY

## 2018-03-20 PROCEDURE — 88313 SPECIAL STAINS GROUP 2: CPT | Performed by: SPECIALIST

## 2018-03-20 PROCEDURE — 77030006643: Performed by: ANESTHESIOLOGY

## 2018-03-20 PROCEDURE — 77030008603 HC TRCR ENDOSC EPATH J&J -C: Performed by: SPECIALIST

## 2018-03-20 PROCEDURE — 77030002916 HC SUT ETHLN J&J -A: Performed by: SPECIALIST

## 2018-03-20 PROCEDURE — 77030020782 HC GWN BAIR PAWS FLX 3M -B: Performed by: SPECIALIST

## 2018-03-20 PROCEDURE — 77030018836 HC SOL IRR NACL ICUM -A: Performed by: SPECIALIST

## 2018-03-20 PROCEDURE — 77030012407 HC DRN WND BARD -B: Performed by: SPECIALIST

## 2018-03-20 PROCEDURE — 77030002933 HC SUT MCRYL J&J -A: Performed by: SPECIALIST

## 2018-03-20 PROCEDURE — 84703 CHORIONIC GONADOTROPIN ASSAY: CPT | Performed by: SPECIALIST

## 2018-03-20 PROCEDURE — 77030010515 HC APPL ENDOCLP LIG J&J -B: Performed by: SPECIALIST

## 2018-03-20 PROCEDURE — 77030033200 HC PRT CLSR CRTR THOMP COOP -C: Performed by: SPECIALIST

## 2018-03-20 PROCEDURE — 77030009426 HC FCPS BIOP ENDOSC BSC -B: Performed by: SPECIALIST

## 2018-03-20 PROCEDURE — 36415 COLL VENOUS BLD VENIPUNCTURE: CPT | Performed by: SPECIALIST

## 2018-03-20 RX ORDER — ACETAMINOPHEN 10 MG/ML
1000 INJECTION, SOLUTION INTRAVENOUS ONCE
Status: COMPLETED | OUTPATIENT
Start: 2018-03-20 | End: 2018-03-20

## 2018-03-20 RX ORDER — ONDANSETRON 2 MG/ML
INJECTION INTRAMUSCULAR; INTRAVENOUS AS NEEDED
Status: DISCONTINUED | OUTPATIENT
Start: 2018-03-20 | End: 2018-03-20 | Stop reason: HOSPADM

## 2018-03-20 RX ORDER — ONDANSETRON 2 MG/ML
4 INJECTION INTRAMUSCULAR; INTRAVENOUS ONCE
Status: DISCONTINUED | OUTPATIENT
Start: 2018-03-20 | End: 2018-03-20 | Stop reason: HOSPADM

## 2018-03-20 RX ORDER — CIPROFLOXACIN 2 MG/ML
400 INJECTION, SOLUTION INTRAVENOUS ONCE
Status: COMPLETED | OUTPATIENT
Start: 2018-03-20 | End: 2018-03-20

## 2018-03-20 RX ORDER — FENTANYL CITRATE 50 UG/ML
INJECTION, SOLUTION INTRAMUSCULAR; INTRAVENOUS AS NEEDED
Status: DISCONTINUED | OUTPATIENT
Start: 2018-03-20 | End: 2018-03-20 | Stop reason: HOSPADM

## 2018-03-20 RX ORDER — NALOXONE HYDROCHLORIDE 0.4 MG/ML
0.1 INJECTION, SOLUTION INTRAMUSCULAR; INTRAVENOUS; SUBCUTANEOUS AS NEEDED
Status: DISCONTINUED | OUTPATIENT
Start: 2018-03-20 | End: 2018-03-20 | Stop reason: HOSPADM

## 2018-03-20 RX ORDER — INSULIN LISPRO 100 [IU]/ML
INJECTION, SOLUTION INTRAVENOUS; SUBCUTANEOUS ONCE
Status: COMPLETED | OUTPATIENT
Start: 2018-03-20 | End: 2018-03-20

## 2018-03-20 RX ORDER — DIPHENHYDRAMINE HYDROCHLORIDE 50 MG/ML
12.5 INJECTION, SOLUTION INTRAMUSCULAR; INTRAVENOUS
Status: DISCONTINUED | OUTPATIENT
Start: 2018-03-20 | End: 2018-03-20 | Stop reason: HOSPADM

## 2018-03-20 RX ORDER — DEXTROSE 50 % IN WATER (D50W) INTRAVENOUS SYRINGE
25-50 AS NEEDED
Status: DISCONTINUED | OUTPATIENT
Start: 2018-03-20 | End: 2018-03-21 | Stop reason: HOSPADM

## 2018-03-20 RX ORDER — PROPOFOL 10 MG/ML
INJECTION, EMULSION INTRAVENOUS AS NEEDED
Status: DISCONTINUED | OUTPATIENT
Start: 2018-03-20 | End: 2018-03-20 | Stop reason: HOSPADM

## 2018-03-20 RX ORDER — ENOXAPARIN SODIUM 100 MG/ML
40 INJECTION SUBCUTANEOUS
Status: COMPLETED | OUTPATIENT
Start: 2018-03-20 | End: 2018-03-20

## 2018-03-20 RX ORDER — HYDROMORPHONE HYDROCHLORIDE 2 MG/ML
0.5 INJECTION, SOLUTION INTRAMUSCULAR; INTRAVENOUS; SUBCUTANEOUS
Status: DISCONTINUED | OUTPATIENT
Start: 2018-03-20 | End: 2018-03-20 | Stop reason: HOSPADM

## 2018-03-20 RX ORDER — ESMOLOL HYDROCHLORIDE 10 MG/ML
INJECTION INTRAVENOUS AS NEEDED
Status: DISCONTINUED | OUTPATIENT
Start: 2018-03-20 | End: 2018-03-20 | Stop reason: HOSPADM

## 2018-03-20 RX ORDER — LABETALOL HCL 20 MG/4 ML
SYRINGE (ML) INTRAVENOUS AS NEEDED
Status: DISCONTINUED | OUTPATIENT
Start: 2018-03-20 | End: 2018-03-20 | Stop reason: HOSPADM

## 2018-03-20 RX ORDER — SODIUM CHLORIDE 0.9 % (FLUSH) 0.9 %
5-10 SYRINGE (ML) INJECTION AS NEEDED
Status: DISCONTINUED | OUTPATIENT
Start: 2018-03-20 | End: 2018-03-20 | Stop reason: HOSPADM

## 2018-03-20 RX ORDER — ALBUTEROL SULFATE 0.83 MG/ML
2.5 SOLUTION RESPIRATORY (INHALATION) AS NEEDED
Status: DISCONTINUED | OUTPATIENT
Start: 2018-03-20 | End: 2018-03-20 | Stop reason: HOSPADM

## 2018-03-20 RX ORDER — ROCURONIUM BROMIDE 10 MG/ML
INJECTION, SOLUTION INTRAVENOUS AS NEEDED
Status: DISCONTINUED | OUTPATIENT
Start: 2018-03-20 | End: 2018-03-20 | Stop reason: HOSPADM

## 2018-03-20 RX ORDER — FAMOTIDINE 20 MG/50ML
20 INJECTION, SOLUTION INTRAVENOUS
Status: DISCONTINUED | OUTPATIENT
Start: 2018-03-20 | End: 2018-03-20 | Stop reason: ALTCHOICE

## 2018-03-20 RX ORDER — SODIUM CHLORIDE, SODIUM LACTATE, POTASSIUM CHLORIDE, CALCIUM CHLORIDE 600; 310; 30; 20 MG/100ML; MG/100ML; MG/100ML; MG/100ML
125 INJECTION, SOLUTION INTRAVENOUS CONTINUOUS
Status: DISCONTINUED | OUTPATIENT
Start: 2018-03-20 | End: 2018-03-20

## 2018-03-20 RX ORDER — HYDROMORPHONE HYDROCHLORIDE 2 MG/ML
1 INJECTION, SOLUTION INTRAMUSCULAR; INTRAVENOUS; SUBCUTANEOUS
Status: DISCONTINUED | OUTPATIENT
Start: 2018-03-20 | End: 2018-03-21

## 2018-03-20 RX ORDER — GLYCOPYRROLATE 0.2 MG/ML
INJECTION INTRAMUSCULAR; INTRAVENOUS AS NEEDED
Status: DISCONTINUED | OUTPATIENT
Start: 2018-03-20 | End: 2018-03-20 | Stop reason: HOSPADM

## 2018-03-20 RX ORDER — MIDAZOLAM HYDROCHLORIDE 1 MG/ML
INJECTION, SOLUTION INTRAMUSCULAR; INTRAVENOUS AS NEEDED
Status: DISCONTINUED | OUTPATIENT
Start: 2018-03-20 | End: 2018-03-20 | Stop reason: HOSPADM

## 2018-03-20 RX ORDER — ALBUTEROL SULFATE 90 UG/1
2 AEROSOL, METERED RESPIRATORY (INHALATION)
Status: DISCONTINUED | OUTPATIENT
Start: 2018-03-20 | End: 2018-03-21 | Stop reason: HOSPADM

## 2018-03-20 RX ORDER — INSULIN LISPRO 100 [IU]/ML
INJECTION, SOLUTION INTRAVENOUS; SUBCUTANEOUS EVERY 6 HOURS
Status: DISCONTINUED | OUTPATIENT
Start: 2018-03-20 | End: 2018-03-21 | Stop reason: HOSPADM

## 2018-03-20 RX ORDER — HYDROMORPHONE HYDROCHLORIDE 2 MG/ML
0.5 INJECTION, SOLUTION INTRAMUSCULAR; INTRAVENOUS; SUBCUTANEOUS
Status: DISCONTINUED | OUTPATIENT
Start: 2018-03-20 | End: 2018-03-21

## 2018-03-20 RX ORDER — ONDANSETRON 2 MG/ML
4 INJECTION INTRAMUSCULAR; INTRAVENOUS
Status: DISCONTINUED | OUTPATIENT
Start: 2018-03-20 | End: 2018-03-21 | Stop reason: HOSPADM

## 2018-03-20 RX ORDER — DEXTROSE 50 % IN WATER (D50W) INTRAVENOUS SYRINGE
25-50 AS NEEDED
Status: DISCONTINUED | OUTPATIENT
Start: 2018-03-20 | End: 2018-03-20 | Stop reason: HOSPADM

## 2018-03-20 RX ORDER — ENOXAPARIN SODIUM 100 MG/ML
40 INJECTION SUBCUTANEOUS EVERY 12 HOURS
Status: DISCONTINUED | OUTPATIENT
Start: 2018-03-20 | End: 2018-03-21 | Stop reason: HOSPADM

## 2018-03-20 RX ORDER — HYDROCODONE BITARTRATE AND ACETAMINOPHEN 5; 325 MG/1; MG/1
1 TABLET ORAL AS NEEDED
Status: DISCONTINUED | OUTPATIENT
Start: 2018-03-20 | End: 2018-03-20 | Stop reason: HOSPADM

## 2018-03-20 RX ORDER — LIDOCAINE HYDROCHLORIDE 20 MG/ML
INJECTION, SOLUTION EPIDURAL; INFILTRATION; INTRACAUDAL; PERINEURAL AS NEEDED
Status: DISCONTINUED | OUTPATIENT
Start: 2018-03-20 | End: 2018-03-20 | Stop reason: HOSPADM

## 2018-03-20 RX ORDER — SODIUM CHLORIDE, SODIUM LACTATE, POTASSIUM CHLORIDE, CALCIUM CHLORIDE 600; 310; 30; 20 MG/100ML; MG/100ML; MG/100ML; MG/100ML
150 INJECTION, SOLUTION INTRAVENOUS CONTINUOUS
Status: DISCONTINUED | OUTPATIENT
Start: 2018-03-20 | End: 2018-03-21 | Stop reason: HOSPADM

## 2018-03-20 RX ORDER — NYSTATIN 100000 [USP'U]/ML
500000 SUSPENSION ORAL
Status: COMPLETED | OUTPATIENT
Start: 2018-03-20 | End: 2018-03-20

## 2018-03-20 RX ORDER — DIPHENHYDRAMINE HYDROCHLORIDE 50 MG/ML
25 INJECTION, SOLUTION INTRAMUSCULAR; INTRAVENOUS
Status: DISCONTINUED | OUTPATIENT
Start: 2018-03-20 | End: 2018-03-21 | Stop reason: HOSPADM

## 2018-03-20 RX ORDER — NEOSTIGMINE METHYLSULFATE 1 MG/ML
INJECTION INTRAVENOUS AS NEEDED
Status: DISCONTINUED | OUTPATIENT
Start: 2018-03-20 | End: 2018-03-20 | Stop reason: HOSPADM

## 2018-03-20 RX ORDER — CIPROFLOXACIN 2 MG/ML
400 INJECTION, SOLUTION INTRAVENOUS EVERY 12 HOURS
Status: COMPLETED | OUTPATIENT
Start: 2018-03-20 | End: 2018-03-21

## 2018-03-20 RX ORDER — SODIUM CHLORIDE, SODIUM LACTATE, POTASSIUM CHLORIDE, CALCIUM CHLORIDE 600; 310; 30; 20 MG/100ML; MG/100ML; MG/100ML; MG/100ML
150 INJECTION, SOLUTION INTRAVENOUS CONTINUOUS
Status: DISCONTINUED | OUTPATIENT
Start: 2018-03-20 | End: 2018-03-20 | Stop reason: HOSPADM

## 2018-03-20 RX ORDER — ACETAMINOPHEN 10 MG/ML
1000 INJECTION, SOLUTION INTRAVENOUS EVERY 6 HOURS
Status: COMPLETED | OUTPATIENT
Start: 2018-03-20 | End: 2018-03-21

## 2018-03-20 RX ORDER — KETOROLAC TROMETHAMINE 30 MG/ML
30 INJECTION, SOLUTION INTRAMUSCULAR; INTRAVENOUS EVERY 6 HOURS
Status: DISCONTINUED | OUTPATIENT
Start: 2018-03-20 | End: 2018-03-21 | Stop reason: HOSPADM

## 2018-03-20 RX ORDER — MAGNESIUM SULFATE 100 %
4 CRYSTALS MISCELLANEOUS AS NEEDED
Status: DISCONTINUED | OUTPATIENT
Start: 2018-03-20 | End: 2018-03-20 | Stop reason: HOSPADM

## 2018-03-20 RX ORDER — BUPIVACAINE HYDROCHLORIDE 5 MG/ML
INJECTION, SOLUTION EPIDURAL; INTRACAUDAL AS NEEDED
Status: DISCONTINUED | OUTPATIENT
Start: 2018-03-20 | End: 2018-03-20 | Stop reason: HOSPADM

## 2018-03-20 RX ADMIN — ACETAMINOPHEN 1000 MG: 10 INJECTION, SOLUTION INTRAVENOUS at 16:22

## 2018-03-20 RX ADMIN — Medication 5 MG: at 10:42

## 2018-03-20 RX ADMIN — FENTANYL CITRATE 50 MCG: 50 INJECTION, SOLUTION INTRAMUSCULAR; INTRAVENOUS at 11:15

## 2018-03-20 RX ADMIN — HYDROMORPHONE HYDROCHLORIDE 0.5 MG: 2 INJECTION, SOLUTION INTRAMUSCULAR; INTRAVENOUS; SUBCUTANEOUS at 11:56

## 2018-03-20 RX ADMIN — CIPROFLOXACIN 400 MG: 2 INJECTION, SOLUTION INTRAVENOUS at 21:26

## 2018-03-20 RX ADMIN — ESMOLOL HYDROCHLORIDE 10 MG: 10 INJECTION INTRAVENOUS at 10:33

## 2018-03-20 RX ADMIN — FAMOTIDINE 20 MG: 10 INJECTION INTRAVENOUS at 07:58

## 2018-03-20 RX ADMIN — CIPROFLOXACIN 400 MG: 2 INJECTION INTRAVENOUS at 10:07

## 2018-03-20 RX ADMIN — LIDOCAINE HYDROCHLORIDE 80 MG: 20 INJECTION, SOLUTION EPIDURAL; INFILTRATION; INTRACAUDAL; PERINEURAL at 10:02

## 2018-03-20 RX ADMIN — FENTANYL CITRATE 50 MCG: 50 INJECTION, SOLUTION INTRAMUSCULAR; INTRAVENOUS at 10:22

## 2018-03-20 RX ADMIN — ACETAMINOPHEN 1000 MG: 10 INJECTION, SOLUTION INTRAVENOUS at 09:55

## 2018-03-20 RX ADMIN — GLYCOPYRROLATE 0.4 MG: 0.2 INJECTION INTRAMUSCULAR; INTRAVENOUS at 11:00

## 2018-03-20 RX ADMIN — NYSTATIN 500000 UNITS: 100000 SUSPENSION ORAL at 08:00

## 2018-03-20 RX ADMIN — SODIUM CHLORIDE, SODIUM LACTATE, POTASSIUM CHLORIDE, AND CALCIUM CHLORIDE: 600; 310; 30; 20 INJECTION, SOLUTION INTRAVENOUS at 10:35

## 2018-03-20 RX ADMIN — ENOXAPARIN SODIUM 40 MG: 40 INJECTION SUBCUTANEOUS at 21:26

## 2018-03-20 RX ADMIN — SODIUM CHLORIDE, SODIUM LACTATE, POTASSIUM CHLORIDE, AND CALCIUM CHLORIDE 125 ML/HR: 600; 310; 30; 20 INJECTION, SOLUTION INTRAVENOUS at 11:34

## 2018-03-20 RX ADMIN — GLYCOPYRROLATE 0.2 MG: 0.2 INJECTION INTRAMUSCULAR; INTRAVENOUS at 09:55

## 2018-03-20 RX ADMIN — HYDROMORPHONE HYDROCHLORIDE 0.5 MG: 2 INJECTION, SOLUTION INTRAMUSCULAR; INTRAVENOUS; SUBCUTANEOUS at 12:08

## 2018-03-20 RX ADMIN — KETOROLAC TROMETHAMINE 30 MG: 30 INJECTION, SOLUTION INTRAMUSCULAR at 14:10

## 2018-03-20 RX ADMIN — HYDROMORPHONE HYDROCHLORIDE 0.5 MG: 2 INJECTION, SOLUTION INTRAMUSCULAR; INTRAVENOUS; SUBCUTANEOUS at 14:08

## 2018-03-20 RX ADMIN — FENTANYL CITRATE 100 MCG: 50 INJECTION, SOLUTION INTRAMUSCULAR; INTRAVENOUS at 10:02

## 2018-03-20 RX ADMIN — INSULIN LISPRO 3 UNITS: 100 INJECTION, SOLUTION INTRAVENOUS; SUBCUTANEOUS at 19:07

## 2018-03-20 RX ADMIN — ENOXAPARIN SODIUM 40 MG: 40 INJECTION SUBCUTANEOUS at 07:59

## 2018-03-20 RX ADMIN — ACETAMINOPHEN 1000 MG: 10 INJECTION, SOLUTION INTRAVENOUS at 21:26

## 2018-03-20 RX ADMIN — SODIUM CHLORIDE, SODIUM LACTATE, POTASSIUM CHLORIDE, AND CALCIUM CHLORIDE 150 ML/HR: 600; 310; 30; 20 INJECTION, SOLUTION INTRAVENOUS at 16:25

## 2018-03-20 RX ADMIN — ROCURONIUM BROMIDE 50 MG: 10 INJECTION, SOLUTION INTRAVENOUS at 10:02

## 2018-03-20 RX ADMIN — MIDAZOLAM HYDROCHLORIDE 2 MG: 1 INJECTION, SOLUTION INTRAMUSCULAR; INTRAVENOUS at 09:55

## 2018-03-20 RX ADMIN — NEOSTIGMINE METHYLSULFATE 3 MG: 1 INJECTION INTRAVENOUS at 11:00

## 2018-03-20 RX ADMIN — PROPOFOL 200 MG: 10 INJECTION, EMULSION INTRAVENOUS at 10:02

## 2018-03-20 RX ADMIN — SODIUM CHLORIDE, SODIUM LACTATE, POTASSIUM CHLORIDE, AND CALCIUM CHLORIDE 125 ML/HR: 600; 310; 30; 20 INJECTION, SOLUTION INTRAVENOUS at 07:58

## 2018-03-20 RX ADMIN — KETOROLAC TROMETHAMINE 30 MG: 30 INJECTION, SOLUTION INTRAMUSCULAR at 21:27

## 2018-03-20 RX ADMIN — ONDANSETRON 4 MG: 2 INJECTION INTRAMUSCULAR; INTRAVENOUS at 14:09

## 2018-03-20 RX ADMIN — INSULIN LISPRO 3 UNITS: 100 INJECTION, SOLUTION INTRAVENOUS; SUBCUTANEOUS at 11:56

## 2018-03-20 RX ADMIN — FENTANYL CITRATE 25 MCG: 50 INJECTION, SOLUTION INTRAMUSCULAR; INTRAVENOUS at 10:50

## 2018-03-20 RX ADMIN — ONDANSETRON 4 MG: 2 INJECTION INTRAMUSCULAR; INTRAVENOUS at 10:17

## 2018-03-20 RX ADMIN — FENTANYL CITRATE 25 MCG: 50 INJECTION, SOLUTION INTRAMUSCULAR; INTRAVENOUS at 10:33

## 2018-03-20 RX ADMIN — HYDROMORPHONE HYDROCHLORIDE 1 MG: 2 INJECTION, SOLUTION INTRAMUSCULAR; INTRAVENOUS; SUBCUTANEOUS at 19:08

## 2018-03-20 NOTE — PERIOP NOTES
Verbal hand off at the bedside with Spike Nazario RN provided opportunity for questions. Dual skin assessment performed. Family in the waiting room.

## 2018-03-20 NOTE — OP NOTES
OPERATIVE REPORT         Patient:Jonelle Francisco   : 1990  Medical Record [id number]    Pre-operative Diagnosis:  DIABETES, MORBID OBESITY, BMI 42, FATTY LIVER  Post-operative Diagnosis: DIABETES, MORBID OBESITY, BMI 42, FATTY LIVER  Procedure: Procedure(s):  LAPAROSCOPIC GASTRIC SLEEVE WITH  LIVER BIOPSY , AND FATTY LIVER AND INTRAOPERATIVE ENDOSCOPY  Location: [facility]  Surgeon: J Luis Carrillo MD  Assistant:  [facility] - performed retraction of various structures,  assisted in creation of the gastric sleeve, fired stapling devices, obtained hemostasis along staple lines via hemoclips, applied Eviseal,  retrieved all specimens from the abdominal cavity, closed fascial defect, and sutured incisions      Anesthesia: General       Specimens: 1. Gastric Sleeve Resection                       2. Liver Wedge Biopsy    EBL: less than 2cc  Additional Findings: none             STATEMENT OF MEDICAL NECESSITY: The patient is a 25y.o.-year-old female who has   had a history of obesity. she has failed conservative weight loss measures,   such began to consider weight loss surgical options. she chose the   sleeve gastrectomy as a means of surgical weight control. she has undergone   nutritional and psychological teaching at this time period and does wish to proceed   with sleeve gastrectomy. OPERATIVE PROCEDURE: The patient was brought to the operating room, placed   on the table in supine position at which time general  anesthesia was administered   without any difficulty. The abdomen was then prepped and draped in the   usual sterile fashion. Using a 15 blade, a 1 cm incision was made just to the   left of the umbilicus. The veress needle approach was used to gain access to   the peritoneal cavity which was then insufflated.  The Visi-Port was then placed   at that site,then 4 additional trocars were placed in the usual U-shaped   configuration with a subxiphoid incision being made to accommodate the   [facility] retractor. On entering the abdomen, the patient was noted to have a   Moderately severe fatty liver with possible evidence of early steatohepatitis. I elevated   the liver and noted the patient had no diaphragmatic hernia present. I began the   operation by choosing an area 2-3 cm proximal to the pylorus and within the gastroepiploic   vessels I began to divide off these vessels individually. I moved cephalad toward short   gastric vessels, which were very difficult to take down due to the proximity to the splenic   hilum. I was able to do so, clearing the entire left crural area. I then placed a Visigi tube,   impacting at the distal antrum. I then began the resection with the powered [place]   stapler using the green loads for the first firing tangential along the   antral region. The second and subsequent firings were used with blue  reloads  reaching just past the incisura region. The remainder of the 4 vertical   firings completed the resection at the left crural region. I then tested   the pouch via the Visigi tube using dilute methylene blue,it was noted to be completely   Watertight. I then left the operative field and proceeded to the the head of the bed and   performed an intraoperative EGD. The scope was passed successfully into the gastric   sleeve to the level of the pylorus. Biopsies were taken of this region and submitted to   test both for H Pylori and for pathologic diagnosis. There was no bleeding noted and   no leak appreciated with air insufflation. I then returned to the surgical field. I then obtained hemostasis along the staple line using   Hemoclips and sutures where needed. I then used 3 separate 2-0 Ethibond sutures to   secure the lateral aspect of the newly created sleeve stomach to the resected edge   of the gastrocolic omentum in an effort to maintain the continuity of the sleeve and prevent   twisting.  I then used Eviseal along the entire staple line to obtain hemostasis and then place Surgicel   Snow over the staple line also. With all this having been completed, I then removed the   liver retractor. I performed a liver wedge biopsy of the left lobe of the liver due to its   abnormality and submitted to pathology for permanent section. I then placed a GREGORIO drain   in the left upper quadrant region along the staple line. I removed the specimen from   the operative field via the LLQ incision. I closed the left lower quadrant trocar site using   a transabdominal #0 PDS suture along the fascia, and all skin incisions were then   closed using 4-0 subcuticular Monocryl. Steri-Strips and sterile dressings were applied. The patient tolerated the procedure well.        Mckayla Mason M.D.

## 2018-03-20 NOTE — PROGRESS NOTES
TRANSFER - IN REPORT:    Verbal report received from BERTHA Huang RN on Sarina Dallam  being received from PACU(unit) for routine post - op      Report consisted of patients Situation, Background, Assessment and   Recommendations(SBAR). Information from the following report(s) SBAR, Kardex, Procedure Summary, Intake/Output, MAR, Recent Results and Cardiac Rhythm SINUS TACH was reviewed with the receiving nurse. Opportunity for questions and clarification was provided. Assessment  Will be completed upon patients arrival to unit and care assumed.

## 2018-03-20 NOTE — IP AVS SNAPSHOT
[address] [place] [id number] 
[phone] Patient: Crystal Jacobson MRN: [id number] RBF:[birth year] A check rafita indicates which time of day the medication should be taken. My Medications CONTINUE taking these medications Instructions Each Dose to Equal  
 Morning Noon Evening Bedtime  
 albuterol 90 mcg/actuation inhaler Commonly known as:  PROVENTIL HFA, VENTOLIN HFA, PROAIR HFA Your last dose was: Your next dose is: Take 2 Puffs by inhalation every four (4) hours as needed. 2 Puff  
    
   
   
   
  
 amitriptyline 10 mg tablet Commonly known as:  ELAVIL Your last dose was: Your next dose is: Take 50 mg by mouth nightly. 50 mg  
    
   
   
   
  
 atorvastatin 40 mg tablet Commonly known as:  LIPITOR Your last dose was: Your next dose is: Take 40 mg by mouth nightly. 40 mg  
    
   
   
   
  
 butalbital-acetaminophen-caffeine -40 mg per tablet Commonly known as:  Armaan Miller Your last dose was: Your next dose is: Take 1 Tab by mouth daily as needed. Max 2 per day 1 Tab MIRENA 20 mcg/24 hr (5 years) Iud  
Generic drug:  levonorgestrel Your last dose was: Your next dose is:    
   
   
 1 Device by IntraUTERine route once. 1 Device  
    
   
   
   
  
 multivitamin tablet Commonly known as:  ONE A DAY Your last dose was: Your next dose is: Take 1 Tab by mouth daily. 1 Tab  
    
   
   
   
  
 omeprazole 40 mg capsule Commonly known as:  PRILOSEC Your last dose was: Your next dose is:    
   
   
 one capsule oral route daily  
     
   
   
   
  
 oxyCODONE-acetaminophen 5-325 mg per tablet Commonly known as:  PERCOCET Your last dose was: Your next dose is: Take 1 Tab by mouth every four (4) hours as needed for Pain. Max Daily Amount: 6 Tabs. 1 Tab STOP taking these medications Biotin 2,500 mcg Cap  
   
  
 ergocalciferol 50,000 unit capsule Commonly known as:  ERGOCALCIFEROL  
   
  
 glipiZIDE SR 5 mg CR tablet Commonly known as:  GLUCOTROL XL  
   
  
 metFORMIN  mg tablet Commonly known as:  GLUCOPHAGE XR  
   
  
 PROBIOTIC 4X 10-15 mg Tbec Generic drug:  B.infantis-B.ani-B.long-B.bifi TRULICITY 1.5 QM/1.0 mL sub-q pen Generic drug:  dulaglutide ASK your doctor about these medications Instructions Each Dose to Equal  
 Morning Noon Evening Bedtime  
 ondansetron 4 mg disintegrating tablet Commonly known as:  ZOFRAN ODT Your last dose was: Your next dose is: Take 1 Tab by mouth every eight (8) hours as needed for Nausea. 4 mg Where to Get Your Medications These medications were sent to [facility] - [id number] [address] [place] [id number] Phone:  [phone]  
  ondansetron 4 mg disintegrating tablet

## 2018-03-20 NOTE — PROGRESS NOTES
PM check -      Pt awake and alert C/O expected discomfort     Mild nausea     Visit Vitals    /48    Pulse (!) 110    Temp 97.6 °F (36.4 °C)    Resp 16    Ht 5' 8\" (1.727 m)    Wt 121.7 kg (268 lb 4 oz)    SpO2 94%    Breastfeeding No    BMI 40.79 kg/m2       GREGORIO with serosang output       Plan:  -Continue medications  -Encourage ambulation  -SCD use when in bed  -IS 10 times an hour  -NPO  -UGI and start diet in AM

## 2018-03-20 NOTE — PROGRESS NOTES
Chart reviewed. Pt admitted for an elective surgical procedure. Pt is independent. Please encourage ambulation. No plan of care needs identified. Anticipate pt will be medically stable for discharge within the next 24-48 hours. CM available to assist as needed. Discharge Reassessment Plan:  Low Risk    RRAT Score:  1 - 12     Low Risk Care Transition Interventions:  1. Discharge transition plan:  Physician follow up  2. Involved patient/caregiver in assessment, planning, education and implement of intervention. 3. CM daily patient care huddles/interdisciplinary rounds were completed. 4. PCP/Specialist appointment within 5 days made prior to discharge. Date/Time  5. Facilitated transportation and logistics for follow-up appointments. 6. Handoff to [address] [place] [address] Nurse Navigator or PCP practice. Care Management Interventions  PCP Verified by CM: Yes  Mode of Transport at Discharge:  Other (see comment) (spouse)  Transition of Care Consult (CM Consult): Discharge Planning  Health Maintenance Reviewed: Yes  Current Support Network: Lives with Spouse  Confirm Follow Up Transport: Self  Discharge Location  Discharge Placement: Home with family assistance

## 2018-03-20 NOTE — INTERVAL H&P NOTE
H&P Update:  Crystal Jacobson was seen and examined. History and physical has been reviewed. The patient has been examined.  There have been no significant clinical changes since the completion of the originally dated History and Physical.    Signed By: Karla Holland MD     March 20, 2018 7:19 AM

## 2018-03-20 NOTE — H&P (VIEW-ONLY)
Sleeve Gastrectomy - History and Physical    Subjective: The patient is a 25 y.o. obese female with a Body mass index is 41.97 kg/(m^2). .   she presents now to review their work up to date to see if they are a candidate for surgery and whether or not to proceed with the previously requested procedure. Bariatric comorbidities continue to include:   Patient Active Problem List   Diagnosis Code    Acute bronchitis J20.9    Asthma with exacerbation J45. 0    Pleurisy R09.1    Morbid obesity (HCC) E66.01    Morbid obesity with BMI of 40.0-44.9, adult (HCC) E66.01, Z68.41    Diabetes ([facility] E11.9    Hyperlipemia E78.5    Gastroparesis K31.84    Asthma J45.909    IUD (intrauterine device) in place Z80.11    FH: ovarian cancer Z80.41       They have been generally well prior to this visit and have had no recent significant illnesses. The patient has had no gastrointestinal issues that would preclude them from proceeding with the surgery they have chosen. Michelle Briones has recently tried a preoperative weight loss program  in addition to seeing a bariatric nutritionist preoperatively. We have discussed on at least one other occasion about the various types of surgical weight loss procedures and they have considered these options after our initial consultation. We have once again discussed these procedures in detail and they have now decided on a surgical procedure. They present today to discuss this and confirm that their evaluation pre operatively is acceptable to continue with surgery. The patient desires laparoscopic sleeve gastrectomy for surgical weight loss. The patients goal weight is 175 lb. (this represents a BMI of 27)    These goals are consistent with expected outcomes of their desired operation. her Medical goals are resolution of these health issues.     Patient Active Problem List    Diagnosis Date Noted    IUD (intrauterine device) in place 02/08/2018    FH: ovarian cancer 2018    Asthma     Morbid obesity ([facility]     Morbid obesity with BMI of 40.0-44.9, adult ([facility]     Diabetes ([facility]     Hyperlipemia     Gastroparesis     Acute bronchitis 2013    Asthma with exacerbation 2013    Pleurisy 2013     Past Surgical History:   Procedure Laterality Date    HX  SECTION   &       Social History   Substance Use Topics    Smoking status: Never Smoker    Smokeless tobacco: Never Used    Alcohol use No      Family History   Problem Relation Age of Onset    Migraines Mother     Other Father      graves ds,     Sickle Cell Trait Brother     Other Brother     Asthma Sister     Other Sister      autism    Other Daughter      cp, epilepsy    Asthma Daughter       Current Outpatient Prescriptions   Medication Sig Dispense Refill    multivitamin (ONE A DAY) tablet Take 1 Tab by mouth daily.  B.infantis-B.ani-B.long-B.bifi (PROBIOTIC 4X) 10-15 mg TbEC Take 1 Cap by mouth daily.  Biotin 2,500 mcg cap Take 2,500 Caps by mouth daily.  TRULICITY 1.5 OF/6.8 mL sub-q pen 1.5 mg by SubCUTAneous route Every Thursday. 3    ergocalciferol (ERGOCALCIFEROL) 50,000 unit capsule TK 1 C PO Q 7 DAYS  12    glipiZIDE SR (GLUCOTROL XL) 5 mg CR tablet TK 1 T PO QD  1    metFORMIN ER (GLUCOPHAGE XR) 500 mg tablet TK 1 T PO  with dinner daily  1    omeprazole (PRILOSEC) 40 mg capsule one capsule oral route daily  2    amitriptyline (ELAVIL) 10 mg tablet Take 50 mg by mouth nightly. 3    butalbital-acetaminophen-caffeine (FIORICET, ESGIC) -40 mg per tablet Take 1 Tab by mouth daily as needed. Max 2 per day  3    albuterol (PROVENTIL HFA, VENTOLIN HFA, PROAIR HFA) 90 mcg/actuation inhaler Take 2 Puffs by inhalation every four (4) hours as needed.  atorvastatin (LIPITOR) 40 mg tablet Take 40 mg by mouth nightly.  levonorgestrel (MIRENA) 20 mcg/24 hr (5 years) IUD 1 Device by IntraUTERine route once.       oxyCODONE-acetaminophen (PERCOCET) 5-325 mg per tablet Take 1 Tab by mouth every four (4) hours as needed for Pain. Max Daily Amount: 6 Tabs.  30 Tab 0     Allergies   Allergen Reactions    Latex Rash    Mushroom Anaphylaxis    Amoxicillin Rash    Doxycycline Swelling    Erythromycin Rash    Lactose Nausea and Vomiting     intolerance    Penicillins Rash    Rocephin [Ceftriaxone] Rash    Sulfa (Sulfonamide Antibiotics) Swelling        Review of Systems:     General - No history or complaints of unexpected fever, chills, or weight loss  Head/Neck - No history or complaints of headache, diplopia, dysphagia, hearing loss  Cardiac - No history or complaints of chest pain, palpitations, murmur, or shortness of breath  Pulmonary - No history or complaints of shortness of breath, productive cough, hemoptysis  Gastrointestinal - minimal reflux,no  abdominal pain, obstipation/constipation or blood per rectum  Genitourinary - No history or complaints of hematuria/dysuria, stress urinary incontinence symptoms, or renal lithiasis  Musculoskeletal - minimal joint pain ,  no muscular weakness  Hematologic - No history or complaints of bleeding disorders,  No blood transfusions  Neurologic - No history or complaints of  migraine headaches, seizure activity, syncopal episodes, TIA or stroke  Integumentary - No history or complaints of rashes, abnormal nevi, skin cancer  Gynecological - IUD in place    Objective:     Visit Vitals    /75 (BP 1 Location: Left arm, BP Patient Position: Sitting)    Pulse (!) 104    Resp 16    Ht 5' 7.75\" (1.721 m)    Wt 124.3 kg (274 lb)    SpO2 100%    BMI 41.97 kg/m2       Physical Examination: General appearance - alert, well appearing, and in no distress  Mental status - alert, oriented to person, place, and time  Eyes - pupils equal and reactive, extraocular eye movements intact  Ears - bilateral TM's and external ear canals normal  Nose - normal and patent, no erythema, discharge or polyps  Mouth - mucous membranes moist, pharynx normal without lesions  Neck - supple, no significant adenopathy  Lymphatics - no palpable lymphadenopathy, no hepatosplenomegaly  Chest - clear to auscultation, no wheezes, rales or rhonchi, symmetric air entry  Heart - normal rate, regular rhythm, normal S1, S2, no murmurs, rubs, clicks or gallops  Abdomen - soft, nontender, nondistended, no masses or organomegaly  Back exam - full range of motion, no tenderness, palpable spasm or pain on motion  Neurological - alert, oriented, normal speech, no focal findings or movement disorder noted  Musculoskeletal - no joint tenderness, deformity or swelling  Extremities - peripheral pulses normal, no pedal edema, no clubbing or cyanosis  Skin - normal coloration and turgor, no rashes, no suspicious skin lesions noted    Labs / Preoperative Evaluation:        Recent Results (from the past 1008 hour(s))   AMB POC URINALYSIS DIP STICK MANUAL W/O MICRO    Collection Time: 02/08/18 11:28 AM   Result Value Ref Range    Color (UA POC) Yellow     Clarity (UA POC) Clear     Glucose (UA POC) 2+ Negative    Bilirubin (UA POC) Trace Negative    Ketones (UA POC) Trace Negative    Specific gravity (UA POC) 1.030 1.001 - 1.035    Blood (UA POC) Negative Negative    pH (UA POC) 5.0 4.6 - 8.0    Protein (UA POC) Trace Negative    Urobilinogen (UA POC) normal 0.2 - 1    Nitrites (UA POC) Negative Negative    Leukocyte esterase (UA POC) Negative Negative   [facility]     Collection Time: 03/02/18 11:53 AM   Result Value Ref Range    [facility] SPECIMEN COL Specimens collected/sent to [facility]     EKG, 12 LEAD, INITIAL    Collection Time: 03/12/18 12:11 PM   Result Value Ref Range    Ventricular Rate 93 BPM    Atrial Rate 93 BPM    P-R Interval 160 ms    QRS Duration 96 ms    Q-T Interval 376 ms    QTC Calculation (Bezet) 467 ms    Calculated P Axis 60 degrees    Calculated R Axis 56 degrees    Calculated T Axis 16 degrees    Diagnosis       Normal sinus rhythm  Normal ECG  No previous ECGs available     METABOLIC PANEL, COMPREHENSIVE    Collection Time: 18 12:16 PM   Result Value Ref Range    Sodium 139 136 - 145 mmol/L    Potassium 3.8 3.5 - 5.5 mmol/L    Chloride 102 100 - 108 mmol/L    CO2 25 21 - 32 mmol/L    Anion gap 12 3.0 - 18 mmol/L    Glucose 96 74 - 99 mg/dL    BUN 10 7.0 - 18 MG/DL    Creatinine 0.78 0.6 - 1.3 MG/DL    BUN/Creatinine ratio 13 12 - 20      GFR est AA >60 >60 ml/min/1.73m2    GFR est non-AA >60 >60 ml/min/1.73m2    Calcium 9.8 8.5 - 10.1 MG/DL    Bilirubin, total 0.3 0.2 - 1.0 MG/DL    ALT (SGPT) 31 13 - 56 U/L    AST (SGOT) 19 15 - 37 U/L    Alk. phosphatase 103 45 - 117 U/L    Protein, total 8.2 6.4 - 8.2 g/dL    Albumin 3.9 3.4 - 5.0 g/dL    Globulin 4.3 (H) 2.0 - 4.0 g/dL    A-G Ratio 0.9 0.8 - 1.7     TYPE & SCREEN    Collection Time: 18 12:16 PM   Result Value Ref Range    Crossmatch Expiration 2018     ABO/Rh(D) Yue Miller POSITIVE     Antibody screen NEG          Procedures  Date of Service: 17 [address] [place] [address] BHUMIKA Kang   Physician Assistant   Cosigned by: Winyd Trinidad MD at 17 [id number]      []Hide copied text  []Joaquin for attribution information  Bailey Mi                  : 1990  Medical Record [id number]                 PREPROCEDURE DIAGNOSIS: This patient is preoperative for laparoscopic sleeve gastrectomyprocedure with a history of  reflux disease.     POSTPROCEDURE DIAGNOSIS: This patient is preoperative for laparoscopic sleeve gastrectomyprocedure with a history of  reflux disease.        PROCEDURES PERFORMED: Upper GI study with barium.     ESTIMATED BLOOD LOSS: None.     SPECIMENS: None.     STATEMENT OF MEDICAL NECESSITY: The patient is a patient with a  longstanding history of obesity.  They are now considering the laparoscopic sleeve gastrectomyprocedure as a means of surgical weight control and due to their history of reflux disease and are being assessed preoperatively for such.     DESCRIPTION OF PROCEDURE: The patient was brought to the fluoroscopy unit and  was given thin barium. On swallowing of barium, they were noted to have  normal peristalsis of their esophagus. They had prompt filling of distal  esophagus with tapering into the gastroesophageal junction. There was no evidence of a hiatal hernia present. Contrast then filled the gastric cardia, fundus,body and pre pyloric region with no abnormalities noted. Contrast then exited the pylorus in normal fashion. No obstruction was noted. There was no evidence of reflux noted.     (normal anatomy)     Venecia Oconnor MD      Electronically signed by BHUMIKA Zapata at 12/06/17 1551       Results   NM GASTRIC EMPTY STDY (Accession [id number]) (Order [id number])   Allergies      High: Mushroom   Unspecified: Latex; Amoxicillin;  Doxycycline;  Erythromycin; Lactose;  Penicillins;  Rocephin [Ceftriaxone];  Sulfa (Sulfonamide Antibiotics)   Result Information   Status Provider Status      Final result (Exam End: 11/29/2017 11:59 AM) Reviewed    Study Result   Exam: Nuclear medicine gastric emptying study     History: Diabetes with suspected gastroparesis     Comparison: None.     Technique: 1.1mCi of technetium 99 sulfur colloid were given with an egg  sandwich sandwich. Static images were performed over the abdomen for 4 hours     Findings:      At 60 minutes there is 28% gastric emptying. At 120 minutes there is 77% gastric  emptying and at 244 minutes there is 92% gastric emptying.     IMPRESSION  Impression:   1. Normal solid phase gastric emptying examination. Assessment:     Morbid obesity with comorbidity    Plan:     laparoscopic sleeve gastrectomy    This is a 25 y.o. female with a BMI of Body mass index is 41.97 kg/(m^2). and the weight-related co-morbidties as noted above. Guero Brown meets the NIH criteria for bariatric surgery based upon the BMI of Body mass index is 41.97 kg/(m^2).  and multiple weight-related co-morbidties. Mary Gordon has elected laparoscopic sleeve gastrectomy as her intervention of choice for treatment of morbid obestiy through surgical means secondary to its safety profile, rapid return to work  and decreases in operative risks over gastric bypass. In the office today, following Jonelle's history and physical examination, a 40 minute discussion regarding the anatomic alterations for the laparoscopic sleeve gastrectomy was undertaken. The dietary expectations and the patient  dependent factors for success were thoroughly discussed, to include the need for interval follow-up and long-term dietary changes associated with success. The possible complications of the sleeve gastrectomy  were also discussed, to include;death, DVT/PE, staple line leak, bleeding, stricture formation, infection, nutritional deficiencies and sleeve dilation. Specific weight related outcomes for success were also discussed with an emphasis on careful and close follow-up with the first year and eating behavior modification as the baseline and cyclical hunger return. The patient expressed an understanding of the above factors, and her questions were answered in their entirety. In addition, the patient attended a 1.5 hour power point seminar regarding obesity, surgical weight loss including, adjustable gastric band, gastric bypass, and sleeve gastrectomy. This discussion contrasted the different surgical techniques, mechanisms of actions and expected outcomes, and surgical and medical risks associated with each procedure. During this seminar, there was a long question and answer session where each questions was answered until there were no additional questions. Today, the patient had all of her questions answered and the decision was made today that the patient's preoperative evaluation is acceptable for them  to proceed with bariatric surgery  choosing the sleeve gastrectomy as her surgical option.     The patient understands the plan of action    Since the patient's original consult 3.5 months ago they have been seen by their OB/Gyn for her routine pap smear. There has been no change to their overall medical or surgical history and they have been on no steroids in any form. There was no change in her weight from her initial consult    Secondary Diagnoses:     DVT / Pulmonary Embolus Risk - The patient is at a higher risk for post operative DVT / pulmonary embolus secondary to their morbid obese status, relative sedentary lifestyle, and impending general anesthetic. We will plan to use anticoagulation therapy pre and post operative as well as  pneumatic compression devices and encourage ambulation once on the hospital nursing floor. The need for possible at home anticoagulation therapy has also been discussed and any decision on this matter will be made during post operative evaluations. The patient understands that their efforts at ambulation are of vital importance to reduce the risk of this complication thus placing significant burden on them as to the prevention of such issues. Signs and symptoms of DVT / PE have been discussed with the patient and they have been instructed to call the office if any these occur in the \"at home\" post op phase. Adult Onset Diabetes - The patient has paul given a very low carbohydrate diet preoperatively along with instructions to monitor their blood sugars on a regular daily basis.  When  their surgery is performed  we will be monitoring the patient with sliding scale insulin and accuchecks.  Based on those values we will determine whether the patient needs a reduction of those medications postoperatively or total removal of those medications on discharge.  We will have the patient continue accuchecks postoperatively while at home also and report to me or their family physician for appropriate adjustments as needed.  The patient also understands that in the event of uncontrolled blood sugar preoperatively that we may choose to postpone their surgery.     Hyperlipidemia - The patient understands that studies show that almost all patient will realize an improvement in their lipid profile with weight loss that occurs with these procedures. They however also understand that hyperlipidemia is a multifactorial disease particularly as it pertains to their genetic background and that there is no guarantee toward cure  of this issue. We will resume their medications both pre operatively and immediately postoperatively as this tends to decrease any post operative cardiac events.  The patient will follow up with their family physician in the postoperative period with plans to repeat their lipid panel 2-3 month postoperative for potential adjustment or removal of these medications.     Restrictive Airway Disease - We will continue all of their pulmonary medications in the form of oral pills and inhalers in both the perioperative and postoperative period. They understand that their symptoms should improve with weight loss. Any further testing related to this will be turned over to their family physician or pulmonologist. The patient  understands that if they require oral or IV steroids in the future that they will notify us. This is particularly important for gastric bypass patients at all times and both sleeve  gastrectomy and gastric bypass patients in the 1 month pre op and 1 month post operative period.  They understand that inhaled steroids are exempt from this.     Signed By: Ben Chadwick MD     March 12, 2018

## 2018-03-20 NOTE — ANESTHESIA PREPROCEDURE EVALUATION
Anesthetic History   No history of anesthetic complications            Review of Systems / Medical History  Patient summary reviewed, nursing notes reviewed and pertinent labs reviewed    Pulmonary            Asthma        Neuro/Psych   Within defined limits           Cardiovascular                  Exercise tolerance: >4 METS     GI/Hepatic/Renal     GERD      PUD     Endo/Other    Diabetes    Morbid obesity     Other Findings              Physical Exam    Airway  Mallampati: III  TM Distance: 4 - 6 cm  Neck ROM: normal range of motion   Mouth opening: Normal     Cardiovascular  Regular rate and rhythm,  S1 and S2 normal,  no murmur, click, rub, or gallop             Dental  No notable dental hx       Pulmonary  Breath sounds clear to auscultation               Abdominal  GI exam deferred       Other Findings            Anesthetic Plan    ASA: 3  Anesthesia type: general          Induction: Intravenous  Anesthetic plan and risks discussed with: Patient

## 2018-03-20 NOTE — IP AVS SNAPSHOT
[facility] 
 
 
 [facility] [address] 
[phone] Patient: Bruce Carlin MRN: [id number] OUR:[birth year] About your hospitalization You were admitted on:  March 20, 2018 You last received care in the:  [address] [place] [address] You were discharged on:  March 21, 2018 Why you were hospitalized Your primary diagnosis was: Morbid Obesity With Bmi Of 40.0-44.9, Adult (Hcc) Follow-up Information Follow up With Details Comments Contact Info Windy Trinidad MD On 4/3/2018 Follow up appointment scheduled for April 3, 2018 at 11:00 a.m. [address] [place] [address] 
[phone] Estephania Goddard MD   [place] [address] 
[phone] Your Scheduled Appointments Tuesday April 03, 2018 11:00 AM EDT  
POST OP with ORIANA [facility] ([address] [place] [address])  
 [address] [place] [address]  
[phone] Tuesday April 17, 2018  2:00 PM EDT Office Visit with BHUMIKA Alejo [facility] ([address] [place] [address])  
 [address] [place] [address]  
[phone] Tuesday April 17, 2018  2:30 PM EDT Office Visit with ROSIE NUTRI VISIT PEN Leland Cardozo [facility] ([address] [place] [address])  
 [address] [place] [address]  
[phone] Discharge Orders None A check rafita indicates which time of day the medication should be taken. My Medications CONTINUE taking these medications Instructions Each Dose to Equal  
 Morning Noon Evening Bedtime  
 albuterol 90 mcg/actuation inhaler Commonly known as:  PROVENTIL HFA, VENTOLIN HFA, PROAIR HFA Your last dose was: Your next dose is: Take 2 Puffs by inhalation every four (4) hours as needed. 2 Puff  
    
   
   
   
  
 amitriptyline 10 mg tablet Commonly known as:  ELAVIL Your last dose was: Your next dose is: Take 50 mg by mouth nightly. 50 mg  
    
   
   
   
  
 atorvastatin 40 mg tablet Commonly known as:  LIPITOR Your last dose was: Your next dose is: Take 40 mg by mouth nightly. 40 mg  
    
   
   
   
  
 butalbital-acetaminophen-caffeine -40 mg per tablet Commonly known as:  Harper Muster Your last dose was: Your next dose is: Take 1 Tab by mouth daily as needed. Max 2 per day 1 Tab MIRENA 20 mcg/24 hr (5 years) Iud  
Generic drug:  levonorgestrel Your last dose was: Your next dose is:    
   
   
 1 Device by IntraUTERine route once. 1 Device  
    
   
   
   
  
 multivitamin tablet Commonly known as:  ONE A DAY Your last dose was: Your next dose is: Take 1 Tab by mouth daily. 1 Tab  
    
   
   
   
  
 omeprazole 40 mg capsule Commonly known as:  PRILOSEC Your last dose was: Your next dose is:    
   
   
 one capsule oral route daily  
     
   
   
   
  
 oxyCODONE-acetaminophen 5-325 mg per tablet Commonly known as:  PERCOCET Your last dose was: Your next dose is: Take 1 Tab by mouth every four (4) hours as needed for Pain. Max Daily Amount: 6 Tabs. 1 Tab STOP taking these medications Biotin 2,500 mcg Cap  
   
  
 ergocalciferol 50,000 unit capsule Commonly known as:  ERGOCALCIFEROL  
   
  
 glipiZIDE SR 5 mg CR tablet Commonly known as:  GLUCOTROL XL  
   
  
 metFORMIN  mg tablet Commonly known as:  GLUCOPHAGE XR  
   
  
 PROBIOTIC 4X 10-15 mg Tbec Generic drug:  B.infantis-B.ani-B.long-B.bifi TRULICITY 1.5 GB/4.1 mL sub-q pen Generic drug:  dulaglutide ASK your doctor about these medications Instructions Each Dose to Equal  
 Morning Noon Evening Bedtime  
 ondansetron 4 mg disintegrating tablet Commonly known as:  ZOFRAN ODT Your last dose was: Your next dose is: Take 1 Tab by mouth every eight (8) hours as needed for Nausea. 4 mg Where to Get Your Medications These medications were sent to [facility], [place] - [address] [place] [address] [place] [address] & [address] [place] [address], [address] [place] [id number] Phone:  [phone]  
  ondansetron 4 mg disintegrating tablet Discharge Instructions [facility] TorreyCharley Harper M.D., F.A.C.S. 
[facility], [address] [place] [address] Office: [phone]    Fax:  [phone] Discharge Instruction for Sleeve Gastrectomy Patients Diet: 
? Continue with the liquid diet until you are seen in the office. Make sure you sip fluids all day. Goal for total fluid intake is a minimum of 64 ounces per day. ? Aim for  Grams of protein every day. Occasionally protein shakes with whey protein will cause diarrhea after surgery due to newly developed lactose intolerance. If you experience this, there are other protein drinks on the market that do not use milk proteins such as whey. Activity: 
? Get up and walk at least once an hour during normal waking hours. ? Walk a minimum of 30 minutes every day for exercise. Rest when you are tired. ? Use your Incentive Spirometry (plastic breathing machine) 10 times every hour for two weeks. Take a slow steady breath in until you can not inhale anymore. ? You may shower. No baths, hot tubs or swimming. ? You may climb stairs. Take your time. ? No lifting more than 10-15 lbs.  
? If you feel discomfort during an activity, rest. 
 ? Do not drive for 1 week or until you are off of all narcotics. Wound Care: ? Clean incisions with soap and water when in the shower. Pat dry. ? Leave steri-strips on until they fall off. ? A small amount of drainage may be present from the incisions. Contact the office if you notice an increase in drainage, an odor, increased redness, or fever > 100.5. Medications: 
? You should have received a prescription for pain medication and Prilosec prior to surgery, if not, notify Dr. Jo Riley team. 
? For upset stomach you may take over the counter medications such as Maalox, Mylanta, or Pepto Bismol. ? Gas X works very well for bloating when eating or drinking. ? You may take Tylenol. Do not exceed 4,000mg of Tylenol in one day. Percocet has Tylenol in it, you will need to consider this when taking Tylenol and Percocet together. ? Non-aspirin based arthritis medications may be resumed. ? Take a childrens or adult chewable multivitamin. ? Milk of Magnesia will help with constipation. Daily use of Miralax or Benefiber may be needed if you develop chronic constipation. ? It is fine to take your usual home medications. Blood pressure medications should be continued after surgery, unless it is a diuretic. Diabetic medications can frequently be reduced very quickly after surgery. Diabetics should continue to monitor blood sugars frequently after surgery and contact the prescribing physician for any questions. Follow-Up Appointment: 
? If you do not already have a follow-up appointment scheduled, contact the office in the next few days to obtain one. It is usually scheduled for 10-14 days after your surgery date. Office phone number:  [phone].  
? Call our office if you have questions, concerns or any worsening of condition. If you are not able to reach us, go to your primary care provider or the Emergency Department. [employer] Announcement We are excited to announce that we are making your provider's discharge notes available to you in [employer]. You will see these notes when they are completed and signed by the physician that discharged you from your recent hospital stay. If you have any questions or concerns about any information you see in [employer], please call the Health Information Department where you were seen or reach out to your Primary Care Provider for more information about your plan of care. Introducing [facility]! Leonardo Bhatt introduces [employer] patient portal. Now you can access parts of your medical record, email your doctor's office, and request medication refills online. 1. In your internet browser, go to [email or web address] Team [email or web address] 2. Click on the First Time User? Click Here link in the Sign In box. You will see the New Member Sign Up page. 3. Enter your [employer] Access Code exactly as it appears below. You will not need to use this code after youve completed the sign-up process. If you do not sign up before the expiration date, you must request a new code. · [employer] Access Code: [email or web address] Expires: 5/22/2018  5:37 PM 
 
4. Enter the last four digits of your Social Security Number (xxxx) and Date of Birth (mm/dd/yyyy) as indicated and click Submit. You will be taken to the next sign-up page. 5. Create a [employer]ura ID. This will be your [employer]ura login ID and cannot be changed, so think of one that is secure and easy to remember. 6. Create a [employer] password. You can change your password at any time. 7. Enter your Password Reset Question and Answer. This can be used at a later time if you forget your password. 8. Enter your e-mail address. You will receive e-mail notification when new information is available in [address] 9. Click Sign Up. You can now view and download portions of your medical record.  
10. Click the Download Summary menu link to download a portable copy of your medical information. If you have questions, please visit the Frequently Asked Questions section of the MyChart website. Remember, [employer] is NOT to be used for urgent needs. For medical emergencies, dial 911. Now available from your iPhone and Android! Providers Seen During Your Hospitalization Provider Specialty Primary office phone Maximus Kline MD General Surgery [phone] Your Primary Care Physician (PCP) Primary Care Physician Office Phone Office Fax Tonia Randall [phone] You are allergic to the following Allergen Reactions Latex Rash Mushroom Anaphylaxis Amoxicillin Rash Doxycycline Swelling Erythromycin Rash Lactose Nausea and Vomiting  
 intolerance Penicillins Rash Rocephin (Ceftriaxone) Rash  
    
 Sulfa (Sulfonamide Antibiotics) Swelling Recent Documentation Height Weight Breastfeeding? BMI OB Status Smoking Status 1.727 m 121.7 kg No 40.79 kg/m2 IUD Never Smoker Emergency Contacts Name Discharge Info Relation Home Work Mobile Alberto Portillo DISCHARGE CAREGIVER [3] Spouse [3]   [phone] Patient Belongings The following personal items are in your possession at time of discharge: 
  Dental Appliances: None  Visual Aid: None      Home Medications: None   Jewelry: Other (comment) (retainer for piercing with [employer])  Clothing: None    Other Valuables: None Please provide this summary of care documentation to your next provider. Signatures-by signing, you are acknowledging that this After Visit Summary has been reviewed with you and you have received a copy. Patient Signature:  ____________________________________________________________ Date:  ____________________________________________________________  
  
Glenys Lakhani  Provider Signature: ____________________________________________________________ Date:  ____________________________________________________________

## 2018-03-20 NOTE — NURSE NAVIGATOR
Doing well. Occasional nausea. Pain = 7. Tolerating ice chips. Dressings dry and intact. Adequate urinary output. GREGORIO output minimal. Some tachycardia noted, all other VSS. Low grade tachy seems to be baseline according to preadmit VS.  Encouraged to cough, deep breathe, and use spirometer every hour while awake. Encouraged to be out of bed ambulating this evening. Discussed diet and activity progression tomorrow after UGI.

## 2018-03-20 NOTE — PERIOP NOTES
TRANSFER - OUT REPORT:    Verbal report given to Nash Frazier RN(name) on Eric Suarez  being transferred to (unit) for routine post - op       Report consisted of patients Situation, Background, Assessment and   Recommendations(SBAR). Information from the following report(s) SBAR, Kardex, OR Summary, Procedure Summary, Intake/Output and MAR was reviewed with the receiving nurse. Lines:   Peripheral IV 03/20/18 Left Forearm (Active)   Site Assessment Clean, dry, & intact 3/20/2018 11:20 AM   Phlebitis Assessment 0 3/20/2018 11:20 AM   Infiltration Assessment 0 3/20/2018 11:20 AM   Dressing Status Clean, dry, & intact 3/20/2018 11:20 AM   Dressing Type Transparent;Tape 3/20/2018 11:20 AM   Hub Color/Line Status Green; Infusing 3/20/2018 11:20 AM        Opportunity for questions and clarification was provided.       Patient transported with:   Registered Nurse  Tech

## 2018-03-21 ENCOUNTER — APPOINTMENT (OUTPATIENT)
Dept: GENERAL RADIOLOGY | Age: 28
DRG: 403 | End: 2018-03-21
Attending: SPECIALIST
Payer: MEDICAID

## 2018-03-21 ENCOUNTER — NURSE NAVIGATOR (OUTPATIENT)
Dept: SURGERY | Age: 28
End: 2018-03-21

## 2018-03-21 VITALS
RESPIRATION RATE: 18 BRPM | SYSTOLIC BLOOD PRESSURE: 112 MMHG | TEMPERATURE: 99.2 F | HEART RATE: 116 BPM | BODY MASS INDEX: 40.66 KG/M2 | DIASTOLIC BLOOD PRESSURE: 63 MMHG | HEIGHT: 68 IN | OXYGEN SATURATION: 95 % | WEIGHT: 268.25 LBS

## 2018-03-21 LAB
GLUCOSE BLD STRIP.AUTO-MCNC: 119 MG/DL (ref 70–110)
GLUCOSE BLD STRIP.AUTO-MCNC: 142 MG/DL (ref 70–110)

## 2018-03-21 PROCEDURE — 82962 GLUCOSE BLOOD TEST: CPT

## 2018-03-21 PROCEDURE — 74011250637 HC RX REV CODE- 250/637: Performed by: PHYSICIAN ASSISTANT

## 2018-03-21 PROCEDURE — 74011250636 HC RX REV CODE- 250/636: Performed by: SPECIALIST

## 2018-03-21 PROCEDURE — 74240 X-RAY XM UPR GI TRC 1CNTRST: CPT

## 2018-03-21 PROCEDURE — 74011636320 HC RX REV CODE- 636/320: Performed by: SPECIALIST

## 2018-03-21 PROCEDURE — 74011636637 HC RX REV CODE- 636/637: Performed by: SPECIALIST

## 2018-03-21 RX ORDER — ONDANSETRON 4 MG/1
4 TABLET, ORALLY DISINTEGRATING ORAL
Qty: 15 TAB | Refills: 0 | Status: SHIPPED | OUTPATIENT
Start: 2018-03-21 | End: 2018-04-03 | Stop reason: ALTCHOICE

## 2018-03-21 RX ORDER — OXYCODONE AND ACETAMINOPHEN 5; 325 MG/1; MG/1
1-2 TABLET ORAL
Status: DISCONTINUED | OUTPATIENT
Start: 2018-03-21 | End: 2018-03-21 | Stop reason: HOSPADM

## 2018-03-21 RX ADMIN — ONDANSETRON 4 MG: 2 INJECTION INTRAMUSCULAR; INTRAVENOUS at 09:30

## 2018-03-21 RX ADMIN — KETOROLAC TROMETHAMINE 30 MG: 30 INJECTION, SOLUTION INTRAMUSCULAR at 01:40

## 2018-03-21 RX ADMIN — ACETAMINOPHEN 1000 MG: 10 INJECTION, SOLUTION INTRAVENOUS at 03:05

## 2018-03-21 RX ADMIN — ENOXAPARIN SODIUM 40 MG: 40 INJECTION SUBCUTANEOUS at 09:18

## 2018-03-21 RX ADMIN — KETOROLAC TROMETHAMINE 30 MG: 30 INJECTION, SOLUTION INTRAMUSCULAR at 09:19

## 2018-03-21 RX ADMIN — IOHEXOL 50 ML: 240 INJECTION, SOLUTION INTRATHECAL; INTRAVASCULAR; INTRAVENOUS; ORAL at 07:54

## 2018-03-21 RX ADMIN — OXYCODONE HYDROCHLORIDE AND ACETAMINOPHEN 1 TABLET: 5; 325 TABLET ORAL at 11:35

## 2018-03-21 RX ADMIN — INSULIN LISPRO 3 UNITS: 100 INJECTION, SOLUTION INTRAVENOUS; SUBCUTANEOUS at 00:14

## 2018-03-21 NOTE — ROUTINE PROCESS
Dual AVS reviewed with Zayra Ross. All medications reviewed individually with patient. Opportunities for questions and concerns provided. Patient discharged via (mode of transport ie. Car, ambulance or air transport) car  Patient's arm band appropriately discarded.

## 2018-03-21 NOTE — ROUTINE PROCESS
Bedside shift change report given to Clarke Singh (oncoming nurse) by Roxann Freeman RN (offgoing nurse). Report included the following information SBAR, Kardex, MAR, Recent Results and Alarm Parameters .

## 2018-03-21 NOTE — PROGRESS NOTES
Shift Summary :  No acute distress. Tachycardia continues at will with no symptoms noted. Otherwise routine post op sleeve procedure for nursing. Surgical sites unremarkable. Family at bedside.

## 2018-03-21 NOTE — PROGRESS NOTES
Bariatric Surgery                POD #1    Visit Vitals    /64 (BP 1 Location: Right arm, BP Patient Position: At rest)    Pulse (!) 109    Temp 98.3 °F (36.8 °C)    Resp 18    Ht 5' 8\" (1.727 m)    Wt 121.7 kg (268 lb 4 oz)    SpO2 95%    Breastfeeding No    BMI 40.79 kg/m2     Patient has minimal complaints of pain, minimal nausea noted     Exam:  Appears well in no distress  Lungs- clear bilaterally  Abd - soft, incisions look good without erythema           GREGORIO with minimal serosanguinous output  Extremities- no new edema or swelling    UGI - no obstrustion or leak    Data Review:    Labs: Results:       Chemistry No results for input(s): GLU, NA, K, CL, CO2, BUN, CREA, CA, AGAP, BUCR, TBIL, GPT, AP, TP, ALB, GLOB, AGRAT in the last 72 hours. CBC w/Diff No results for input(s): WBC, RBC, HGB, HCT, PLT, GRANS, LYMPH, EOS, RETIC, HGBEXT, HCTEXT, PLTEXT in the last 72 hours. Coagulation No results for input(s): PTP, INR, APTT in the last 72 hours. No lab exists for component: INREXT    Liver Enzymes No results for input(s): TP, ALB, TBIL, AP, SGOT, GPT in the last 72 hours. No lab exists for component: DBIL       Assessment/Plan: S/P  laparoscopic sleeve gastrectomy - doing well without any issues    1. Start bariatric diet and protein shakes  2. D/C IV pain meds and start PO pain meds  3. D/C GREGORIO drain  4. Likely PM D/C if continues to be okay and tolerates PO

## 2018-03-21 NOTE — PROGRESS NOTES
SHIFT SUMMARY  Pt has been stable. Sinus tach noted without s/s. Pt has ambulated and voided adequate amt. Bedside shift change report given to DEVIKA Lind RN  (oncoming nurse) by Rufina Heard RN   (offgoing nurse). Report included the following information SBAR, Kardex, Intake/Output, MAR and Recent Results.

## 2018-03-21 NOTE — DISCHARGE SUMMARY
Discharge Summary    Patient: Mazin Mcguire               Sex: female          DOA: 3/20/2018         YOB: 1990      Age:  25 y.o.        LOS:  LOS: 1 day                Admit Date: 3/20/2018    Discharge Date: 3/21/2018    Admission Diagnoses: DIABETES, MORBID OBESITY, BMI 42; Morbid obesity with BMI of*    Discharge Diagnoses:    Problem List as of 3/21/2018  Date Reviewed: 3/20/2018          Codes Class Noted - Resolved    IUD (intrauterine device) in place ICD-10-CM: Z97.5  ICD-9-CM: V45.51  2/8/2018 - Present    Overview Signed 2/8/2018 11:21 AM by Mellissa vieira 02/15/17             FH: ovarian cancer ICD-10-CM: Z80.41  ICD-9-CM: V16.41  2/8/2018 - Present        Asthma ICD-10-CM: J45.909  ICD-9-CM: 493.90  Unknown - Present        Morbid obesity ([facility] ICD-10-CM: E66.01  ICD-9-CM: 278.01  Unknown - Present        * (Principal)Morbid obesity with BMI of 40.0-44.9, adult ([facility] ICD-10-CM: E66.01, Z68.41  ICD-9-CM: 278.01, V85.41  Unknown - Present        Diabetes ([facility] ICD-10-CM: E11.9  ICD-9-CM: 250.00  Unknown - Present        Hyperlipemia ICD-10-CM: E78.5  ICD-9-CM: 272.4  Unknown - Present        Gastroparesis ICD-10-CM: K31.84  ICD-9-CM: 536.3  Unknown - Present        Acute bronchitis ICD-10-CM: J20.9  ICD-9-CM: 466.0  5/2/2013 - Present        Asthma with exacerbation ICD-10-CM: J45.901  ICD-9-CM: 493.92  5/2/2013 - Present        Pleurisy ICD-10-CM: R09.1  ICD-9-CM: 511.0  5/2/2013 - Present              Discharge Condition: Good    Hospital Course: The patient underwent  laparoscopic sleeve gastrectomy  on 3/20/2018. The patient tolerated the procedure well. Vital signs remained stable and the patient was transferred to  3rd floor surgical unit without complications. The patient remained stable throughout the first night post operatively with stable vital signs and adequate urine output and pain control. Pain was controlled with Dilaudid IV and IV Tylenol . The patient on the first morning post operative was transferred to the radiology suite where they underwent a gastrograffin UGI study which showed no evidence of a leak or stricture. The drain was discontinued on POD # 1 and the patient was started on a bariatric liquid diet with protein shakes. The patient progressed throughout the day and was ambulating well and tolerating their diet. They were therefore discharged home with instructions to notify me with any issues that may arise. Significant Diagnostic Studies:   No results for input(s): HGB, HGBEXT in the last 72 hours. No results for input(s): HCT, HCTEXT in the last 72 hours. Current Discharge Medication List      CONTINUE these medications which have NOT CHANGED    Details   multivitamin (ONE A DAY) tablet Take 1 Tab by mouth daily. Associated Diagnoses: Morbid obesity ([address] Morbid obesity with BMI of 40.0-44.9, adult (HCC)      omeprazole (PRILOSEC) 40 mg capsule one capsule oral route daily  Refills: 2      amitriptyline (ELAVIL) 10 mg tablet Take 50 mg by mouth nightly. Refills: 3      butalbital-acetaminophen-caffeine (FIORICET, ESGIC) -40 mg per tablet Take 1 Tab by mouth daily as needed. Max 2 per day  Refills: 3      albuterol (PROVENTIL HFA, VENTOLIN HFA, PROAIR HFA) 90 mcg/actuation inhaler Take 2 Puffs by inhalation every four (4) hours as needed. atorvastatin (LIPITOR) 40 mg tablet Take 40 mg by mouth nightly. oxyCODONE-acetaminophen (PERCOCET) 5-325 mg per tablet Take 1 Tab by mouth every four (4) hours as needed for Pain. Max Daily Amount: 6 Tabs. Qty: 30 Tab, Refills: 0    Associated Diagnoses: Post-op pain      levonorgestrel (MIRENA) 20 mcg/24 hr (5 years) IUD 1 Device by IntraUTERine route once.          STOP taking these medications       B.infantis-B.ani-B.long-B.bifi (PROBIOTIC 4X) 10-15 mg TbEC Comments:   Reason for Stopping:         Biotin 2,500 mcg cap Comments:   Reason for Stopping:         glipiZIDE SR (GLUCOTROL XL) 5 mg CR tablet Comments:   Reason for Stopping:         metFORMIN ER (GLUCOPHAGE XR) 500 mg tablet Comments:   Reason for Stopping:         TRULICITY 1.5 DO/3.6 mL sub-q pen Comments:   Reason for Stopping:         ergocalciferol (ERGOCALCIFEROL) 50,000 unit capsule Comments:   Reason for Stopping:               Activity: activity as tolerated with no heavy lifting of greater than 20 pounds. No anti- inflammatory medications. Use stool softeners at home as needed while taking pain medications since they are constipating. Diet: Bariatric liquid diet    Wound Care: Keep wound clean and dry, Reinforce dressing PRN and ice to area for comfort. Do not get wound wet for 2 days.     Follow-up: 14 days with Dr Brendan Betancourt M.D

## 2018-03-21 NOTE — DIABETES MGMT
NUTRITION ASSESSMENT / GLYCEMIC CONTROL PLAN OF CARE     Karen Ellis           [id number] [address] y.o. Patient Active Problem List   Diagnosis Code    Acute bronchitis J20.9    Asthma with exacerbation J45. [address]    Pleurisy R09.1    Morbid obesity (HCC) E66.01    Morbid obesity with BMI of 40.0-44.9, adult (HCC) E66.01, Z68.41    Diabetes ([address] E11.9    Hyperlipemia E78.5    Gastroparesis K31.84    Asthma J45.909    IUD (intrauterine device) in place Z80.11    FH: ovarian cancer Z80.41            INTERVENTIONS/PLAN:   - BG out of target, known h/o DM2, s/p bariatric surgery, pt to follow-up with OP ENDO prior to resuming any home DM meds  - POCT + Humalog corrective coverage orders in place, recommend continue  - DM Education provided per [facility] RD and RN (see additional note)  - encouraged OP DM Self Management Class (schedule given)    ASSESSMENT:   Nutritional Status: morbid obesity, h/o excessive energy intake, BG out of target r/t DM2, gap in understanding between self-care and impact on health         Lab Results   Component Value Date/Time    GLUCPOC 142 (H) 03/21/2018 11:16 AM    GLUCPOC 119 (H) 03/21/2018 06:21 AM    GLUCPOC 161 (H) 03/20/2018 11:44 PM             Within target range (non-ICU: <140; ICU<180): [] Yes   [x]  No    Current Insulin regimen:   - Humalog Very Insulin Resistant Corrective Coverage    Home medication/insulin regimen:   - Glipizide SR 5 mg every day  - Metformin  mg q dinner  - Trulicity 1x/week (unable to report dose)    HbA1c: equivalent  to ave BGlucose of 180 mg/dl for 2-3 months prior to admission    Lab Results   Component Value Date/Time    Hemoglobin A1c 7.9 (H) 03/14/2018 12:12 PM       Adequate glycemic control PTA:  [] Yes  [x] No       SUBJECTIVE/OBJECTIVE:   Information obtained from: pt, , chart; pt able to confirm home regimen (see above), reports some SMBG at home, encouraged to follow-up with OP ENDO prior to resuming any DM home meds, encouraged to keep log of daily SMBG at least 1x/day until follow-up with ENDO to help determine if any meds are needed, discussed natural fluctuations in BG as nutrition resumes and diet changes         Medications: [x]                Reviewed        Labs:   Lab Results   Component Value Date/Time    Sodium 139 03/12/2018 12:16 PM    Potassium 3.8 03/12/2018 12:16 PM    Chloride 102 03/12/2018 12:16 PM    CO2 25 03/12/2018 12:16 PM    Anion gap 12 03/12/2018 12:16 PM    Glucose 96 03/12/2018 12:16 PM    BUN 10 03/12/2018 12:16 PM    Creatinine 0.78 03/12/2018 12:16 PM    Calcium 9.8 03/12/2018 12:16 PM    Albumin 3.9 03/12/2018 12:16 PM       Anthropometrics: IBW : 72.1 kg (159 lb), % IBW (Calculated): 168.71 %, BMI (calculated): 40.8  Wt Readings from Last 1 Encounters:   03/21/18 121.7 kg (268 lb 4 oz)      Ht Readings from Last 1 Encounters:   03/21/18 5' 8\" (1.727 m)       Estimated Nutrition Needs: 1800 Kcals/day (25 kcal/kg of IBW), Protein (g): 108 g (1.5 g/kg) Fluid (ml): 1800 ml (1 ml/kcal) (*please defer to Bariatric Team for detailed nutrition recommendations, pt s/p bariatric surgery, being followed by OP Bariatric RD)  Based on:   []          Actual BW    [x]          IBW   []            Adjusted BW        Diet:   Active Orders   Diet    DIET BARIATRIC FULL LIQUID No Conc. Sweets       Intake:   No data found.       Nutrition Diagnoses:   - altered nutrition-related lab values r/t Dm AEB A1C of 7.9% and known h/o DM2  - morbid obesity r/t h/o excessive energy intake AEB BMI of 40.8%  - self-monitoring deficit r/t Dm AEB pt report of some SMBG   - lack of adherence to DM regimen and nutrition recommendations r/t DM AEB A1C of 7.9% and observed gap in understanding between self-care and impact on health       Nutrition Interventions:   - education, diet Bariatric Diet     Goal: .  - BG will be in target range of  mg/dl (non-ICU) by 3/23/18  - PO intake will be at least 50% of meals offered by 3/23/18  - pt will check BG at least 1x/day at home by 3/26/18  - pt will maintain current wt/prevent wt gain by 4/21/18  - pt will follow up with OP PCP for DM by 4/21/18       Nutrition Monitoring and Evaluation      [x]     Monitor po intake on meal rounds  [x]     Continue inpatient monitoring and intervention  [x]     Other: BG      Nutrition Risk:  []   High     []  Moderate    [x]  Minimal/Uncompromised    MASOUD Chung, MPH, RD, CDE  Glycemic Control Team  Pager [phone] (M-F 7:30a-4p)  *After hours on call Pager [phone]

## 2018-03-21 NOTE — PROGRESS NOTES
NUTRITION SCREENING    Recommendations: Adv diet per MD; Avoid prolonged NPO diet order as it does not meet estimated needs    RD ASSESSMENT/PLAN:     Diet:  NPO w/ Ice Chips Height: 5' 8\" (172.7 cm)     Food Allergies: Latex, Mushrooms, Lactose Intolerance  Weight: 121.7 kg (268 lb 4 oz)    PO Intake:  No data found. BMI: 40.8 kg/m^2 is  morbidly obese (Greater than or = to 40% BMI)      PMH: ovarian ca, asthma, morbid obesity, DM, hyperlipidemia, gastroparesis, acute bronchitis, pleurisy     Current Hospital Problems: Pt admitted for laparoscopic gastric sleeve. Nutrition intervention not currently indicated. Pt is not at nutritional risk at this time. Will rescreen per policy.      REASON FOR ASSESSMENT:   []  RN Referral:    [x] MST score >/=2  Malnutrition Screening Tool (MST):  Recently Lost Weight Without Trying: Yes  If Yes, How Much Weight Loss: Unsure  Eating Poorly Due to Decreased Appetite: No  MST Score: Raj Bernal 116, RD  Pager: [phone]

## 2018-03-21 NOTE — DISCHARGE INSTRUCTIONS
[facility]  Daniel Deluca. Sania Galeano M.D., F.A.C.S.  [facility] [address], [address] [facility]  Office: [phone]    Fax:  [phone]    Discharge Instruction for Sleeve Gastrectomy Patients    Diet:   Continue with the liquid diet until you are seen in the office. Make sure you sip fluids all day. Goal for total fluid intake is a minimum of 64 ounces per day.  Aim for  Grams of protein every day. Occasionally protein shakes with whey protein will cause diarrhea after surgery due to newly developed lactose intolerance. If you experience this, there are other protein drinks on the market that do not use milk proteins such as whey. Activity:   Get up and walk at least once an hour during normal waking hours.  Walk a minimum of 30 minutes every day for exercise. Rest when you are tired.  Use your Incentive Spirometry (plastic breathing machine) 10 times every hour for two weeks. Take a slow steady breath in until you can not inhale anymore.  You may shower. No baths, hot tubs or swimming.  You may climb stairs. Take your time.  No lifting more than 10-15 lbs.  If you feel discomfort during an activity, rest.   Do not drive for 1 week or until you are off of all narcotics. Wound Care:   Clean incisions with soap and water when in the shower. Pat dry.  Leave steri-strips on until they fall off.  A small amount of drainage may be present from the incisions. Contact the office if you notice an increase in drainage, an odor, increased redness, or fever > 100.5. Medications:   You should have received a prescription for pain medication and Prilosec prior to surgery, if not, notify Dr. Mylo Severe team.  [facility] For upset stomach you may take over the counter medications such as Maalox, Mylanta, or Pepto Bismol.  Gas X works very well for bloating when eating or drinking.  You may take Tylenol.   Do not exceed 4,000mg of Tylenol in one day.  Percocet has Tylenol in it, you will need to consider this when taking Tylenol and Percocet together.  Non-aspirin based arthritis medications may be resumed.  Take a childrens or adult chewable multivitamin.  Milk of Magnesia will help with constipation. Daily use of Miralax or Benefiber may be needed if you develop chronic constipation.  It is fine to take your usual home medications. Blood pressure medications should be continued after surgery, unless it is a diuretic. Diabetic medications can frequently be reduced very quickly after surgery. Diabetics should continue to monitor blood sugars frequently after surgery and contact the prescribing physician for any questions. Follow-Up Appointment:   If you do not already have a follow-up appointment scheduled, contact the office in the next few days to obtain one. It is usually scheduled for 10-14 days after your surgery date. Office phone number:  [phone].  Call our office if you have questions, concerns or any worsening of condition. If you are not able to reach us, go to your primary care provider or the Emergency Department.

## 2018-03-21 NOTE — ROUTINE PROCESS
Bedside and Verbal shift change report given to ANA Trotter RN  (oncoming nurse) by  MADHURI Brown RN  (offgoing nurse). Report included the following information SBAR, Kardex, OR Summary, Recent Results and Med Rec Status.

## 2018-03-21 NOTE — DISCHARGE SUMMARY
Discharge Summary    Patient: Karen Najera               Sex: female          DOA: 3/20/2018         YOB: 1990      Age:  25 y.o.        LOS:  LOS: 1 day                Admit Date: 3/20/2018    Discharge Date: 3/21/2018    Admission Diagnoses: DIABETES, MORBID OBESITY, BMI 42  Morbid obesity with BMI of 40.0-44.9, adult [facility])    Discharge Diagnoses:    Problem List as of 3/21/2018  Date Reviewed: 3/20/2018          Codes Class Noted - Resolved    IUD (intrauterine device) in place ICD-10-CM: Z97.5  ICD-9-CM: V45.51  2/8/2018 - Present    Overview Signed 2/8/2018 11:21 AM by Lyndsey Stallworth inserted 02/15/17             FH: ovarian cancer ICD-10-CM: Z80.41  ICD-9-CM: V16.41  2/8/2018 - Present        Asthma ICD-10-CM: J45.909  ICD-9-CM: 493.90  Unknown - Present        Morbid obesity ([facility] ICD-10-CM: E66.01  ICD-9-CM: 278.01  Unknown - Present        * (Principal)Morbid obesity with BMI of 40.0-44.9, adult ([facility] ICD-10-CM: E66.01, Z68.41  ICD-9-CM: 278.01, V85.41  Unknown - Present        Diabetes ([facility] ICD-10-CM: E11.9  ICD-9-CM: 250.00  Unknown - Present        Hyperlipemia ICD-10-CM: E78.5  ICD-9-CM: 272.4  Unknown - Present        Gastroparesis ICD-10-CM: K31.84  ICD-9-CM: 536.3  Unknown - Present        Acute bronchitis ICD-10-CM: J20.9  ICD-9-CM: 466.0  5/2/2013 - Present        Asthma with exacerbation ICD-10-CM: J45.901  ICD-9-CM: 493.92  5/2/2013 - Present        Pleurisy ICD-10-CM: R09.1  ICD-9-CM: 511.0  5/2/2013 - Present              Discharge Condition: Good    Hospital Course: The patient underwent  laparoscopic sleeve gastrectomy  on 3/20/2018. The patient tolerated the procedure well. Vital signs remained stable and the patient was transferred to  3rd floor surgical unit without complications. The patient remained stable throughout the first night post operatively with stable vital signs and adequate urine output and pain control.  Pain was controlled with Dilaudid IV and IV Tylenol . The patient on the first morning post operative was transferred to the radiology suite where they underwent a gastrograffin UGI study which showed no evidence of a leak or stricture. The drain was discontinued on POD # 1 and the patient was started on a bariatric liquid diet with protein shakes. The patient progressed throughout the day and was ambulating well and tolerating their diet. They were therefore discharged home with instructions to notify me with any issues that may arise. Significant Diagnostic Studies:   No results for input(s): HGB, HGBEXT in the last 72 hours. No results for input(s): HCT, HCTEXT in the last 72 hours. Current Discharge Medication List      CONTINUE these medications which have NOT CHANGED    Details   multivitamin (ONE A DAY) tablet Take 1 Tab by mouth daily. Associated Diagnoses: Morbid obesity ([address] Morbid obesity with BMI of 40.0-44.9, adult (HCC)      omeprazole (PRILOSEC) 40 mg capsule one capsule oral route daily  Refills: 2      amitriptyline (ELAVIL) 10 mg tablet Take 50 mg by mouth nightly. Refills: 3      butalbital-acetaminophen-caffeine (FIORICET, ESGIC) -40 mg per tablet Take 1 Tab by mouth daily as needed. Max 2 per day  Refills: 3      albuterol (PROVENTIL HFA, VENTOLIN HFA, PROAIR HFA) 90 mcg/actuation inhaler Take 2 Puffs by inhalation every four (4) hours as needed. atorvastatin (LIPITOR) 40 mg tablet Take 40 mg by mouth nightly. oxyCODONE-acetaminophen (PERCOCET) 5-325 mg per tablet Take 1 Tab by mouth every four (4) hours as needed for Pain. Max Daily Amount: 6 Tabs. Qty: 30 Tab, Refills: 0    Associated Diagnoses: Post-op pain      levonorgestrel (MIRENA) 20 mcg/24 hr (5 years) IUD 1 Device by IntraUTERine route once.          STOP taking these medications       B.infantis-B.ani-B.long-B.bifi (PROBIOTIC 4X) 10-15 mg TbEC Comments:   Reason for Stopping:         Biotin 2,500 mcg cap Comments:   Reason for Stopping: glipiZIDE SR (GLUCOTROL XL) 5 mg CR tablet Comments:   Reason for Stopping:         metFORMIN ER (GLUCOPHAGE XR) 500 mg tablet Comments:   Reason for Stopping:         TRULICITY 1.5 RR/4.6 mL sub-q pen Comments:   Reason for Stopping:         ergocalciferol (ERGOCALCIFEROL) 50,000 unit capsule Comments:   Reason for Stopping:               Activity: activity as tolerated with no heavy lifting of greater than 20 pounds. No anti- inflammatory medications. Use stool softeners at home as needed while taking pain medications since they are constipating. Diet: Bariatric liquid diet    Wound Care: Keep wound clean and dry, Reinforce dressing PRN and ice to area for comfort. Do not get wound wet for 2 days.     Follow-up: 14 days with Dr Elly Ferraro M.D

## 2018-03-21 NOTE — PROGRESS NOTES
Bariatric Surgery                POD #1    Visit Vitals    /63 (BP 1 Location: Right arm, BP Patient Position: At rest)    Pulse (!) 116    Temp 99.2 °F (37.3 °C)    Resp 18    Ht 5' 8\" (1.727 m)    Wt 121.7 kg (268 lb 4 oz)    SpO2 95%    Breastfeeding No    BMI 40.79 kg/m2     Patient has minimal complaints of pain, minimal nausea noted     Exam:  Appears well in no distress  Lungs- clear bilaterally  Abd - soft, incisions look good without erythema           GREGORIO with minimal serosanguinous output  Extremities- no new edema or swelling    UGI - no obstrustion or leak    Data Review:    Labs: Results:       Chemistry No results for input(s): GLU, NA, K, CL, CO2, BUN, CREA, CA, AGAP, BUCR, TBIL, GPT, AP, TP, ALB, GLOB, AGRAT in the last 72 hours. CBC w/Diff No results for input(s): WBC, RBC, HGB, HCT, PLT, GRANS, LYMPH, EOS, RETIC, HGBEXT, HCTEXT, PLTEXT in the last 72 hours. Coagulation No results for input(s): PTP, INR, APTT in the last 72 hours. No lab exists for component: INREXT    Liver Enzymes No results for input(s): TP, ALB, TBIL, AP, SGOT, GPT in the last 72 hours. No lab exists for component: DBIL       Assessment/Plan: S/P  laparoscopic sleeve gastrectomy - doing well without any issues    1. Start bariatric diet and protein shakes  2. D/C IV pain meds and start PO pain meds  3. D/C GREGORIO drain  4. Likley PM D/C if  Cont ok and tolerate PO

## 2018-03-21 NOTE — NURSE NAVIGATOR
Doing well. UGI results confirmed. Tolerating liquid diet and protein shakes. Slight occasional nausea. Pain - 5.  GREGORIO output WNL. Adequate urine output. Encouraged to continue to cough, deep breathe and use spirometer every hour while awake. Encouraged to be out of bed ambulating no less than 3 times today. Dressings removed. Incisions dry and intact. Discussed diet and activities after discharge.

## 2018-03-22 ENCOUNTER — APPOINTMENT (OUTPATIENT)
Dept: PHYSICAL THERAPY | Age: 28
End: 2018-03-22
Payer: MEDICAID

## 2018-03-22 NOTE — ANESTHESIA POSTPROCEDURE EVALUATION
Post-Anesthesia Evaluation & Assessment    Visit Vitals    /63 (BP 1 Location: Right arm, BP Patient Position: At rest)    Pulse (!) 116    Temp 37.3 °C (99.2 °F)    Resp 18    Ht 5' 8\" (1.727 m)    Wt 121.7 kg (268 lb 4 oz)    SpO2 95%    Breastfeeding No    BMI 40.79 kg/m2       Nausea/Vomiting: no nausea    Post-operative hydration adequate. Pain score (VAS): 2    Mental status & Level of consciousness: alert and oriented x 3    Neurological status: moves all extremities, sensation grossly intact    Pulmonary status: airway patent, no supplemental oxygen required    Complications related to anesthesia: none    Patient has met all discharge requirements. Additional comments:        Clarisa Ambrose MD   Post-Anesthesia Evaluation & Assessment    Visit Vitals    /63 (BP 1 Location: Right arm, BP Patient Position: At rest)    Pulse (!) 116    Temp 37.3 °C (99.2 °F)    Resp 18    Ht 5' 8\" (1.727 m)    Wt 121.7 kg (268 lb 4 oz)    SpO2 95%    Breastfeeding No    BMI 40.79 kg/m2       Nausea/Vomiting: no nausea    Post-operative hydration adequate. Pain score (VAS): 2    Mental status & Level of consciousness: alert and oriented x 3    Neurological status: moves all extremities, sensation grossly intact    Pulmonary status: airway patent, no supplemental oxygen required    Complications related to anesthesia: none    Patient has met all discharge requirements.     Additional comments:        Clarisa Ambrose MD

## 2018-03-26 ENCOUNTER — APPOINTMENT (OUTPATIENT)
Dept: PHYSICAL THERAPY | Age: 28
End: 2018-03-26
Payer: MEDICAID

## 2018-03-27 ENCOUNTER — NURSE NAVIGATOR (OUTPATIENT)
Dept: SURGERY | Age: 28
End: 2018-03-27

## 2018-03-27 NOTE — PROGRESS NOTES
Spoke with Ortega Nolasco today regarding post-operative progression. States incisions are healing well, no drainage or redness. Tolerating liquid diet well and getting in about 64oz / day. Struggling withprotein shakes, suggested Premier clear. Pain = soreness. Denies  nausea. Reminded of 2 week follow up appointment in office. Instructed to call office if any further questions or concerns.

## 2018-03-29 ENCOUNTER — APPOINTMENT (OUTPATIENT)
Dept: PHYSICAL THERAPY | Age: 28
End: 2018-03-29
Payer: MEDICAID

## 2018-04-02 ENCOUNTER — APPOINTMENT (OUTPATIENT)
Dept: PHYSICAL THERAPY | Age: 28
End: 2018-04-02

## 2018-04-03 ENCOUNTER — OFFICE VISIT (OUTPATIENT)
Dept: SURGERY | Age: 28
End: 2018-04-03

## 2018-04-03 VITALS
HEIGHT: 68 IN | DIASTOLIC BLOOD PRESSURE: 67 MMHG | WEIGHT: 250.1 LBS | SYSTOLIC BLOOD PRESSURE: 105 MMHG | HEART RATE: 100 BPM | BODY MASS INDEX: 37.9 KG/M2 | OXYGEN SATURATION: 100 %

## 2018-04-03 DIAGNOSIS — K90.9 INTESTINAL MALABSORPTION, UNSPECIFIED TYPE: Primary | ICD-10-CM

## 2018-04-03 DIAGNOSIS — Z98.84 S/P LAPAROSCOPIC SLEEVE GASTRECTOMY: ICD-10-CM

## 2018-04-03 NOTE — PROGRESS NOTES
Subjective:      Dilia Hall is a 25 y.o. female is now 2 weeks status post laparoscopic sleeve gastrectomy. Doing well overall. She has lost a total of 24 pounds since surgery. Body mass index is 38.03 kg/(m^2). Currently on a liquid diet without difficulty. Taking in 64+oz water daily. Sources of protein include protein shakes. 30 min of activity 5 days a week, including walking. Bowel movements are regular. The patient is not having any pain. . The patient is compliant with multivitamins. Surgery related complications: NA.  Liver bx report reviewed with patient. Stomach bx report reviewed with patient. Weight Loss Metrics 4/3/2018 3/21/2018 3/20/2018 3/12/2018 3/8/2018 2/9/2018 2/9/2018   Today's Wt 250 lb 1.6 oz 268 lb 4 oz - 274 lb 270 lb 274 lb 274 lb   BMI 38.03 kg/m2 - 40.79 kg/m2 41.97 kg/m2 41.36 kg/m2 42.91 kg/m2 41.97 kg/m2          Comorbidities:    Hypertension: not applicable  Diabetes: improved, no medications  Obstructive Sleep Apnea: not applicable  Hyperlipidemia: unchanged  Stress Urinary Incontinence: not applicable  Gastroesophageal Reflux: not applicable  Weight related arthropathy:not applicable     Patient Active Problem List   Diagnosis Code    Acute bronchitis J20.9    Asthma with exacerbation J45. 0    Pleurisy R09.1    Morbid obesity ([facility]) E66.01    Morbid obesity with BMI of 40.0-44.9, adult ([facility]) E66.01, Z68.41    Diabetes ([facility] E11.9    Hyperlipemia E78.5    Gastroparesis K31.84    Asthma J45.909    IUD (intrauterine device) in place Z80.11    FH: ovarian cancer Z80.41        Past Medical History:   Diagnosis Date    Asthma     Diabetes ([address]     dx 2 years ago    Gastroparesis     suspected    GERD (gastroesophageal reflux disease)     Hyperlipemia     Morbid obesity ([address]     Morbid obesity with BMI of 40.0-44.9, adult ([address]     Ovarian cyst     Psychiatric disorder     anxiety, PTSD    PUD (peptic ulcer disease)        Past Surgical History: Procedure Laterality Date    HX  SECTION   &        Current Outpatient Prescriptions   Medication Sig Dispense Refill    multivitamins chew Take  by mouth.  omeprazole (PRILOSEC) 40 mg capsule one capsule oral route daily  2    amitriptyline (ELAVIL) 10 mg tablet Take 50 mg by mouth nightly. 3    albuterol (PROVENTIL HFA, VENTOLIN HFA, PROAIR HFA) 90 mcg/actuation inhaler Take 2 Puffs by inhalation every four (4) hours as needed.  atorvastatin (LIPITOR) 40 mg tablet Take 40 mg by mouth nightly.  levonorgestrel (MIRENA) 20 mcg/24 hr (5 years) IUD 1 Device by IntraUTERine route once.  ondansetron (ZOFRAN ODT) 4 mg disintegrating tablet Take 1 Tab by mouth every eight (8) hours as needed for Nausea. 15 Tab 0    oxyCODONE-acetaminophen (PERCOCET) 5-325 mg per tablet Take 1 Tab by mouth every four (4) hours as needed for Pain. Max Daily Amount: 6 Tabs. 30 Tab 0    multivitamin (ONE A DAY) tablet Take 1 Tab by mouth daily.  butalbital-acetaminophen-caffeine (FIORICET, ESGIC) -40 mg per tablet Take 1 Tab by mouth daily as needed.  Max 2 per day  3       Allergies   Allergen Reactions    Latex Rash    Mushroom Anaphylaxis    Amoxicillin Rash    Doxycycline Swelling    Erythromycin Rash    Lactose Nausea and Vomiting     intolerance    Penicillins Rash    Rocephin [Ceftriaxone] Rash    Sulfa (Sulfonamide Antibiotics) Swelling       Review of Symptoms:       General - No history or complaints of unexpected fever or chills  Head/Neck - No history or complaints of headache or dizziness  Cardiac - No history or complaints of chest pain, palpitations, or shortness of breath  Pulmonary - No history or complaints of shortness of breath or productive cough  Gastrointestinal - as noted above  Genitourinary - No history or complaints of hematuria/dysuria or renal lithiasis  Musculoskeletal - No history or complaints of joint  muscular weakness  Hematologic - No history of any bleeding episodes  Neurologic - No history or complaints of  migraine headaches or neurologic symptoms        Objective:     Visit Vitals    /67 (BP 1 Location: Left arm, BP Patient Position: Sitting)    Pulse 100    Ht 5' 8\" (1.727 m)    Wt 113.4 kg (250 lb 1.6 oz)    SpO2 100%    BMI 38.03 kg/m2       General:  alert, cooperative, no distress, appears stated age   Chest: lungs clear to auscultation, breath sounds equal and symmetric, no rhonchi, rales or wheezes, no accessory muscle use   Cor:   Regular rate and rhythm, S1S2 present or without murmur or extra heart sounds   Abdomen: soft, bowel sounds active, non-tender, no masses or organomegaly   Incisions:   healing well, no drainage, no erythema, no hernia, no seroma, no swelling, no dehiscence, incision well approximated       Assessment:   History of Morbid obesity, status post laparoscopic sleeve gastrectomy. Doing well postoperatively. Plan:     1. Increase activity to the goal of 30 minutes daily  2. Advance diet to soft solid phase. Reminded to measure portions, continue high protein, low carbohydrate diet. Reminded to eat regularly, to eat slowly & not to drink with meals. Refer to the handbook given in class. 3. Continue multivitamin   4. Continue current medications and follow up with PCP for management of regimen. 5. Encouraged to attend support group   6. I have discussed this plan with patient and they verbalized understanding  7. Follow up in 2 weeks or sooner if patient has questions, concerns or worsening of condition, if unable to reach our office, patient should report to the ED. 8. Ms. Michelle Zaman has a reminder for a \"due or due soon\" health maintenance. I have asked that she contact her primary care provider for a follow-up on this health maintenance.

## 2018-04-03 NOTE — MR AVS SNAPSHOT
Beverly Chapman 
 
 
 [facility] [address] [facility] 
[phone] Patient: Davi Singh MRN: [id number]:[id number] Visit Information Date & Time Provider Department Dept. Phone Encounter #  
 4/3/2018 11:00 AM ORIANA [facility] [phone] Your Appointments 4/17/2018  2:00 PM  
Office Visit with BHUMIKA K[facility] ([facility]) Appt Note: 1 mo po  
 [address]  
  
   
 [address] [place] [address] 4/17/2018  2:30 PM  
Office Visit with TSS NUTRI VISIT PEN Jovan Restrepo [facility] ([facility]) Appt Note: 1 mo po  
 [address] [facility] [address] Upcoming Health Maintenance Date Due  
 LIPID PANEL Q1 1988 FOOT EXAM Q1 3/7/1998 MICROALBUMIN Q1 3/7/1998 EYE EXAM RETINAL OR DILATED Q1 3/7/1998 Pneumococcal 19-64 Medium Risk (1 of 1 - PPSV23) 3/7/2007 DTaP/Tdap/Td series (1 - Tdap) 3/7/2009 Influenza Age 5 to Adult 8/1/2017 HEMOGLOBIN A1C Q6M 9/14/2018 PAP AKA CERVICAL CYTOLOGY 2/5/2020 Allergies as of 4/3/2018  Review Complete On: 4/3/2018 By: Tami Melgar NP Severity Noted Reaction Type Reactions Latex  05/24/2012    Rash Mushroom High 11/20/2017   Intolerance Anaphylaxis Amoxicillin  03/01/2018    Rash Doxycycline  05/02/2013    Swelling Erythromycin  12/06/2017    Rash Lactose  11/20/2017    Nausea and Vomiting  
 intolerance Penicillins  05/24/2012    Rash Rocephin [Ceftriaxone]  03/01/2018    Rash  
 Sulfa (Sulfonamide Antibiotics)  05/24/2012    Swelling Current Immunizations  Never Reviewed No immunizations on file. Not reviewed this visit Vitals BP Pulse Height(growth percentile) Weight(growth percentile) SpO2 BMI  
 105/67 (BP 1 Location: Left arm, BP Patient Position: Sitting) 100 5' 8\" (1.727 m) 250 lb 1.6 oz (113.4 kg) 100% 38.03 kg/m2 OB Status Smoking Status IUD Never Smoker BMI and BSA Data Body Mass Index Body Surface Area 38.03 kg/m 2 2.33 m 2 Preferred Pharmacy Pharmacy Name Phone [facility] STORE [facility], [address] - [address] [address] [place] [address] BLVD AT [address] OF [address] [place] [address] & [address] [phone] Your Updated Medication List  
  
   
This list is accurate as of 4/3/18 11:25 AM.  Always use your most recent med list.  
  
  
  
  
 albuterol 90 mcg/actuation inhaler Commonly known as:  PROVENTIL HFA, VENTOLIN HFA, PROAIR HFA Take 2 Puffs by inhalation every four (4) hours as needed. amitriptyline 10 mg tablet Commonly known as:  ELAVIL Take 50 mg by mouth nightly. atorvastatin 40 mg tablet Commonly known as:  LIPITOR Take 40 mg by mouth nightly. butalbital-acetaminophen-caffeine -40 mg per tablet Commonly known as:  Gwynda Duet Take 1 Tab by mouth daily as needed. Max 2 per day MIRENA 20 mcg/24 hr (5 years) Iud  
Generic drug:  levonorgestrel 1 Device by IntraUTERine route once. multivitamins Chew Take  by mouth. omeprazole 40 mg capsule Commonly known as:  PRILOSEC  
one capsule oral route daily Patient Instructions Continue focus on hydration. May advance to soft diet. Please review material in your binder. Remember - your motto is \"soft foods with protein\". Eat regularly. Continue to use protein shakes. Remember to eat slowly & not to drink fluids with your meals. Increase activity as able - cardio (walking) only. Continue to take multi-vitamins. Continue regular follow-up with your PCP. Return to office in 2 weeks for your next appointment. Call the office if you have any questions or concerns. Walking for Exercise: Care Instructions Your Care Instructions Walking is one of the easiest ways to get the exercise you need for good health. A brisk, 30-minute walk each day can help you feel better and have more energy. It can help you lower your risk of disease. Walking can help you keep your bones strong and your heart healthy. Check with your doctor before you start a walking plan if you have heart problems, other health issues, or you have not been active in a long time. Follow your doctor's instructions for safe levels of exercise. Follow-up care is a key part of your treatment and safety. Be sure to make and go to all appointments, and call your doctor if you are having problems. It's also a good idea to know your test results and keep a list of the medicines you take. How can you care for yourself at home? Getting started · Start slowly and set a short-term goal. For example, walk for 5 or 10 minutes every day. · Bit by bit, increase the amount you walk every day. Try for at least 30 minutes on most days of the week. You also may want to swim, bike, or do other activities. · If finding enough time is a problem, it is fine to be active in blocks of 10 minutes or more throughout your day and week. · To get the heart-healthy benefits of walking, you need to walk briskly enough to increase your heart rate and breathing, but not so fast that you cannot talk comfortably. · Wear comfortable shoes that fit well and provide good support for your feet and ankles. Staying with your plan · After you've made walking a habit, set a longer-term goal. You may want to set a goal of walking briskly for longer or walking farther. Experts say to do 2½ hours of moderate activity a week. A faster heartbeat is what defines moderate-level activity. · To stay motivated, walk with friends, coworkers, or pets. · Use a phone marsha or pedometer to track your steps each day. Set a goal to increase your steps. Once you get there, set a higher goal. Aim for 10,000 steps a day. · If the weather keeps you from walking outside, go for walks at the mall with a friend. Local schools and churches may have indoor gyms where you can walk. Fitting a walk into your workday · Park several blocks away from work, or get off the bus a few stops early. · Use the stairs instead of the elevator, at least for a few floors. · Suggest holding meetings with colleagues during a walk inside or outside the building. · Use the restroom that is the farthest from your desk or workstation. · Use your morning and afternoon breaks to take quick 15-minute walks. Staying safe · Know your surroundings. Walk in a well-lighted, safe place. If it is dark, walk with a partner. Wear light-colored clothing. If you can, buy a vest or jacket that reflects light. · Carry a cell phone for emergencies. · Drink plenty of water. Take a water bottle with you when you walk. This is very important if it is hot out. · Be careful not to slip on wet or icy ground. You can buy \"grippers\" for your shoes to help keep you from slipping. · Pay attention to your walking surface. Use sidewalks and paths. · If you have breathing problems like asthma or COPD, ask your doctor when it is safe for you to walk outdoors. Cold, dry air, smog, pollen, or other things in the air could cause breathing problems. Where can you learn more? Go to http://reinier-ritika.info/. Enter R159 in the search box to learn more about \"Walking for Exercise: Care Instructions. \" Current as of: March 13, 2017 Content Version: 11.4 © 2708-8033 [employer]. Care instructions adapted under license by [employer] (which disclaims liability or warranty for this information).  If you have questions about a medical condition or this instruction, always ask your healthcare professional. [place] [address] any warranty or liability for your use of this information. Introducing [facility] & HEALTH SERVICES! [employer] introduces [employer] patient portal. Now you can access parts of your medical record, email your doctor's office, and request medication refills online. 1. In your internet browser, go to [email or web address] 2. Click on the First Time User? Click Here link in the Sign In box. You will see the New Member Sign Up page. 3. Enter your [employer] Access Code exactly as it appears below. You will not need to use this code after youve completed the sign-up process. If you do not sign up before the expiration date, you must request a new code. · [employer] Access Code: [email or web address] Expires: 5/22/2018  5:37 PM 
 
4. Enter the last four digits of your Social Security Number (xxxx) and Date of Birth (mm/dd/yyyy) as indicated and click Submit. You will be taken to the next sign-up page. 5. Create a [employer] ID. This will be your [employer] login ID and cannot be changed, so think of one that is secure and easy to remember. 6. Create a [employer] password. You can change your password at any time. 7. Enter your Password Reset Question and Answer. This can be used at a later time if you forget your password. 8. Enter your e-mail address. You will receive e-mail notification when new information is available in [address] 9. Click Sign Up. You can now view and download portions of your medical record. 10. Click the Download Summary menu link to download a portable copy of your medical information. If you have questions, please visit the Frequently Asked Questions section of the [employer] website. Remember, [employer] is NOT to be used for urgent needs. For medical emergencies, dial 911. Now available from your iPhone and Android! Please provide this summary of care documentation to your next provider. Your primary care clinician is listed as Trey Winters. If you have any questions after today's visit, please call [phone].

## 2018-04-03 NOTE — PATIENT INSTRUCTIONS
Continue focus on hydration. May advance to soft diet. Please review material in your binder. Remember - your motto is \"soft foods with protein\". Eat regularly. Continue to use protein shakes. Remember to eat slowly & not to drink fluids with your meals. Increase activity as able - cardio (walking) only. Continue to take multi-vitamins. Continue regular follow-up with your PCP. Return to office in 2 weeks for your next appointment. Call the office if you have any questions or concerns. Walking for Exercise: Care Instructions  Your Care Instructions    Walking is one of the easiest ways to get the exercise you need for good health. A brisk, 30-minute walk each day can help you feel better and have more energy. It can help you lower your risk of disease. Walking can help you keep your bones strong and your heart healthy. Check with your doctor before you start a walking plan if you have heart problems, other health issues, or you have not been active in a long time. Follow your doctor's instructions for safe levels of exercise. Follow-up care is a key part of your treatment and safety. Be sure to make and go to all appointments, and call your doctor if you are having problems. It's also a good idea to know your test results and keep a list of the medicines you take. How can you care for yourself at home? Getting started  · Start slowly and set a short-term goal. For example, walk for 5 or 10 minutes every day. · Bit by bit, increase the amount you walk every day. Try for at least 30 minutes on most days of the week. You also may want to swim, bike, or do other activities. · If finding enough time is a problem, it is fine to be active in blocks of 10 minutes or more throughout your day and week. · To get the heart-healthy benefits of walking, you need to walk briskly enough to increase your heart rate and breathing, but not so fast that you cannot talk comfortably.   · Wear comfortable shoes that fit well and provide good support for your feet and ankles. Staying with your plan  · After you've made walking a habit, set a longer-term goal. You may want to set a goal of walking briskly for longer or walking farther. Experts say to do 2½ hours of moderate activity a week. A faster heartbeat is what defines moderate-level activity. · To stay motivated, walk with friends, coworkers, or pets. · Use a phone marsha or pedometer to track your steps each day. Set a goal to increase your steps. Once you get there, set a higher goal. Aim for 10,000 steps a day. · If the weather keeps you from walking outside, go for walks at the mall with a friend. Local schools and churches may have indoor gyms where you can walk. Fitting a walk into your workday  · Park several blocks away from work, or get off the bus a few stops early. · Use the stairs instead of the elevator, at least for a few floors. · Suggest holding meetings with colleagues during a walk inside or outside the building. · Use the restroom that is the farthest from your desk or workstation. · Use your morning and afternoon breaks to take quick 15-minute walks. Staying safe  · Know your surroundings. Walk in a well-lighted, safe place. If it is dark, walk with a partner. Wear light-colored clothing. If you can, buy a vest or jacket that reflects light. · Carry a cell phone for emergencies. · Drink plenty of water. Take a water bottle with you when you walk. This is very important if it is hot out. · Be careful not to slip on wet or icy ground. You can buy \"grippers\" for your shoes to help keep you from slipping. · Pay attention to your walking surface. Use sidewalks and paths. · If you have breathing problems like asthma or COPD, ask your doctor when it is safe for you to walk outdoors. Cold, dry air, smog, pollen, or other things in the air could cause breathing problems. Where can you learn more?   Go to http://reinier-ritika.info/. Enter R159 in the search box to learn more about \"Walking for Exercise: Care Instructions. \"  Current as of: March 13, 2017  Content Version: 11.4  © 9925-9322 Healthwise, [employer]. Care instructions adapted under license by [employer] (which disclaims liability or warranty for this information). If you have questions about a medical condition or this instruction, always ask your healthcare professional. [place] [address] any warranty or liability for your use of this information.

## 2018-04-05 ENCOUNTER — APPOINTMENT (OUTPATIENT)
Dept: PHYSICAL THERAPY | Age: 28
End: 2018-04-05

## 2018-04-09 ENCOUNTER — APPOINTMENT (OUTPATIENT)
Dept: PHYSICAL THERAPY | Age: 28
End: 2018-04-09

## 2018-04-12 ENCOUNTER — APPOINTMENT (OUTPATIENT)
Dept: PHYSICAL THERAPY | Age: 28
End: 2018-04-12

## 2018-04-16 ENCOUNTER — APPOINTMENT (OUTPATIENT)
Dept: PHYSICAL THERAPY | Age: 28
End: 2018-04-16

## 2018-04-17 ENCOUNTER — OFFICE VISIT (OUTPATIENT)
Dept: SURGERY | Age: 28
End: 2018-04-17

## 2018-04-17 VITALS
HEIGHT: 68 IN | HEART RATE: 104 BPM | SYSTOLIC BLOOD PRESSURE: 106 MMHG | WEIGHT: 247.1 LBS | BODY MASS INDEX: 37.45 KG/M2 | OXYGEN SATURATION: 97 % | DIASTOLIC BLOOD PRESSURE: 63 MMHG

## 2018-04-17 DIAGNOSIS — E66.01 OBESITY, MORBID, BMI 40.0-49.9 (HCC): Primary | ICD-10-CM

## 2018-04-17 DIAGNOSIS — K75.9 HEPATITIS: ICD-10-CM

## 2018-04-17 DIAGNOSIS — Z98.84 S/P LAPAROSCOPIC SLEEVE GASTRECTOMY: ICD-10-CM

## 2018-04-17 DIAGNOSIS — K90.9 INTESTINAL MALABSORPTION, UNSPECIFIED TYPE: Primary | ICD-10-CM

## 2018-04-17 NOTE — PROGRESS NOTES
Subjective:      Erasto Myers is a 25 y.o. female is now 1 months status post laparoscopic sleeve gastrectomy. Doing well overall. She has lost a total of 27 pounds since surgery. Body mass index is 37.57 kg/(m^2). Has lost 21% of EBW. Currently on a soft food diet without difficulty, reports no issues and denies vomiting and abdominal pain. Taking in 64-90oz water daily. Sources of protein include nectar protein, eggs, shrimp, crab legs, ground chicken, chili. 45-60 min of activity 7 days a week, including walking. Bowel movements are regular. The patient is not having any pain. . The patient is compliant with multivitamins. Weight Loss Metrics 4/17/2018 4/3/2018 3/21/2018 3/20/2018 3/12/2018 3/8/2018 2/9/2018   Today's Wt 247 lb 1.6 oz 250 lb 1.6 oz 268 lb 4 oz - 274 lb 270 lb 274 lb   BMI 37.57 kg/m2 38.03 kg/m2 - 40.79 kg/m2 41.97 kg/m2 41.36 kg/m2 42.91 kg/m2          Comorbidities:    Hypertension: not applicable  Diabetes: improved, hasn't been checking blood sugar levels, off meds  Obstructive Sleep Apnea: not applicable  Hyperlipidemia: unchanged  Stress Urinary Incontinence: no applicable  Gastroesophageal Reflux: not applicable  Weight related arthropathy:not applicable     Patient Active Problem List   Diagnosis Code    Acute bronchitis J20.9    Asthma with exacerbation J45. 0    Pleurisy R09.1    Morbid obesity ([facility]) E66.01    Morbid obesity with BMI of 40.0-44.9, adult ([facility]) E66.01, Z68.41    Diabetes ([facility] E11.9    Hyperlipemia E78.5    Gastroparesis K31.84    Asthma J45.909    IUD (intrauterine device) in place Z80.11    FH: ovarian cancer Z80.41        Past Medical History:   Diagnosis Date    Asthma     Diabetes ([address]     dx 2 years ago    Gastroparesis     suspected    GERD (gastroesophageal reflux disease)     Hyperlipemia     Morbid obesity ([facility]     Morbid obesity with BMI of 40.0-44.9, adult ([facility]     Ovarian cyst     Psychiatric disorder     anxiety, PTSD    PUD (peptic ulcer disease)        Past Surgical History:   Procedure Laterality Date    HX  SECTION   &        Current Outpatient Prescriptions   Medication Sig Dispense Refill    B.infantis-B.ani-B.long-B.bifi (PROBIOTIC 4X) 10-15 mg TbEC Take  by mouth.  multivitamins chew Take  by mouth.  omeprazole (PRILOSEC) 40 mg capsule one capsule oral route daily  2    amitriptyline (ELAVIL) 10 mg tablet Take 50 mg by mouth nightly. 3    butalbital-acetaminophen-caffeine (FIORICET, ESGIC) -40 mg per tablet Take 1 Tab by mouth daily as needed. Max 2 per day  3    albuterol (PROVENTIL HFA, VENTOLIN HFA, PROAIR HFA) 90 mcg/actuation inhaler Take 2 Puffs by inhalation every four (4) hours as needed.  atorvastatin (LIPITOR) 40 mg tablet Take 40 mg by mouth nightly.  levonorgestrel (MIRENA) 20 mcg/24 hr (5 years) IUD 1 Device by IntraUTERine route once.          Allergies   Allergen Reactions    Latex Rash    Mushroom Anaphylaxis    Amoxicillin Rash    Doxycycline Swelling    Erythromycin Rash    Lactose Nausea and Vomiting     intolerance    Penicillins Rash    Rocephin [Ceftriaxone] Rash    Sulfa (Sulfonamide Antibiotics) Swelling         Objective:     Visit Vitals    /63 (BP 1 Location: Left arm, BP Patient Position: Sitting)    Pulse (!) 104    Ht 5' 8\" (1.727 m)    Wt 112.1 kg (247 lb 1.6 oz)    SpO2 97%    BMI 37.57 kg/m2       General:  alert, cooperative, no distress, appears stated age   Chest: lungs clear to auscultation, no rhonchi, rales or wheezes, no accessory muscle use   Cor:   Regular rate and rhythm or without murmur or extra heart sounds   Abdomen: soft, bowel sounds active, non-tender, no masses or organomegaly   Incisions:   healing well, no drainage, no erythema, no hernia, no seroma, no swelling, no dehiscence, incision well approximated     Pathology:   Liver biopsy   FOCAL, MILD INTERFACE HEPATITIS     Assessment:   History of Morbid obesity, status post laparoscopic sleeve gastrectomy. Doing well postoperatively. 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 Referral to Dr. Dusty Rojas   2. Can stop probiotic    3. Advance diet to solid phase. Reminded to measure portions, continue high protein, low carbohydrate diet. Reminded to eat regularly, to eat slowly & not to drink with meals. Refer to the handbook given in class. 4. Patient has appt with dietician directly after this visit. 5. Continue multivitamin and add the additional vitamin supplementation (Ca, B complex, B12, D, all listed in handbook)  6. Continue current medications and follow up with PCP for management of regimen. 7. Continue cardio exercise and add resistance exercises. Discussed with patient. Minimum of 30 minutes of exercise daily. 8. Encouraged to attend support group   9. I have discussed this plan with patient and they verbalized understanding  10. Follow up in 1 months or sooner if patient has questions, concerns or worsening of condition, if unable to reach our office, patient should report to the ED. 6. Ms. Kathleen Peralta has a reminder for a \"due or due soon\" health maintenance. I have asked that she contact her primary care provider for a follow-up on this health maintenance.

## 2018-04-17 NOTE — PROGRESS NOTES
Pt given one on one diet education. Appears to have a good understanding of the diet progression, food choices, and dietary/exercise habits for successful weight loss and nourishment one month after surgery. The  material included: post-op diet progression and portion sizes (including low fat, low sugar food recommendations and emphasis on protein foods and protein supplements), good beverage choices, reading a food label, vitamins/minerals required after weight loss surgery, and encouraging dietary and exercise habits that lead to weight loss success. Pt also received a restaurant card, which tells restaurants that the patient had a procedure that decreases the size of their stomach so the restaurant may let them order off the children's menu, the senior's menu, or a smaller portion for a reduced rate.      Kell Kaminski RD

## 2018-04-17 NOTE — MR AVS SNAPSHOT
[address] [facility] 150 
[phone] Patient: Alvarez Montesinos MRN: [id number] PBI:[id number] Visit Information Date & Time Provider Department Dept. Phone Encounter #  
 4/17/2018  2:00 PM Steffi Gillespie, 82 [facility] [place] [id number] Follow-up Instructions Return in about 1 month (around 5/17/2018). Follow-up and Disposition History Your Appointments 5/18/2018 11:00 AM  
Office Visit with BHUMIKA Sweeney [facility] ([address]) Appt Note: F/U  
 [id number] [facility] [address] [place] [address]  
  
   
 [address] [place] [address] Upcoming Health Maintenance Date Due  
 LIPID PANEL Q1 1990 FOOT EXAM Q1 3/7/1998 MICROALBUMIN Q1 3/7/1998 EYE EXAM RETINAL OR DILATED Q1 3/7/1998 Pneumococcal 19-64 Medium Risk (1 of 1 - PPSV23) 3/7/2007 DTaP/Tdap/Td series (1 - Tdap) 3/7/2009 Influenza Age 5 to Adult 8/1/2017 HEMOGLOBIN A1C Q6M 9/14/2018 PAP AKA CERVICAL CYTOLOGY 2/5/2020 Allergies as of 4/17/2018  Review Complete On: 4/17/2018 By: BHUMIKA Sweeney Severity Noted Reaction Type Reactions Latex  05/24/2012    Rash Mushroom High 11/20/2017   Intolerance Anaphylaxis Amoxicillin  03/01/2018    Rash Doxycycline  05/02/2013    Swelling Erythromycin  12/06/2017    Rash Lactose  11/20/2017    Nausea and Vomiting  
 intolerance Penicillins  05/24/2012    Rash Rocephin [Ceftriaxone]  03/01/2018    Rash  
 Sulfa (Sulfonamide Antibiotics)  05/24/2012    Swelling Current Immunizations  Never Reviewed No immunizations on file. Not reviewed this visit You Were Diagnosed With   
  
 Codes Comments Intestinal malabsorption, unspecified type    -  Primary ICD-10-CM: K90.9 ICD-9-CM: 579.9 S/P laparoscopic sleeve gastrectomy     ICD-10-CM: A53.34 ICD-9-CM: V45.86 Hepatitis     ICD-10-CM: K75.9 ICD-9-CM: 573.3 Vitals BP Pulse Height(growth percentile) Weight(growth percentile) SpO2 BMI  
 106/63 (BP 1 Location: Left arm, BP Patient Position: Sitting) (!) 104 5' 8\" (1.727 m) 247 lb 1.6 oz (112.1 kg) 97% 37.57 kg/m2 OB Status Smoking Status IUD Never Smoker BMI and BSA Data Body Mass Index Body Surface Area  
 37.57 kg/m 2 2.32 m 2 Preferred Pharmacy Pharmacy Name Phone 555 [facility] [id number] [place] [address] AT THE [facility] OF [address] [place] [address] & [address] [address] [phone] Your Updated Medication List  
  
   
This list is accurate as of 4/17/18  3:10 PM.  Always use your most recent med list.  
  
  
  
  
 albuterol 90 mcg/actuation inhaler Commonly known as:  PROVENTIL HFA, VENTOLIN HFA, PROAIR HFA Take 2 Puffs by inhalation every four (4) hours as needed. amitriptyline 10 mg tablet Commonly known as:  ELAVIL Take 50 mg by mouth nightly. atorvastatin 40 mg tablet Commonly known as:  LIPITOR Take 40 mg by mouth nightly. butalbital-acetaminophen-caffeine -40 mg per tablet Commonly known as:  Vernette Agent Take 1 Tab by mouth daily as needed. Max 2 per day MIRENA 20 mcg/24 hr (5 years) Iud  
Generic drug:  levonorgestrel 1 Device by IntraUTERine route once. multivitamins Chew Take  by mouth. omeprazole 40 mg capsule Commonly known as:  PRILOSEC  
one capsule oral route daily PROBIOTIC 4X 10-15 mg Tbec Generic drug:  B.infantis-B.ani-B.long-B.bifi Take  by mouth. We Performed the Following REFERRAL TO LIVER HEPATOLOGY [NXH638 Custom] Comments:  
 Please evaluate patient for FOCAL, MILD INTERFACE HEPATITIS . Follow-up Instructions Return in about 1 month (around 5/17/2018). Referral Information Referral ID Referred By Referred To  
  
 [id number] [address] [facility] [address], [address] [facility] 43   
   [address] [place] [address], [place] [address] [address], [address] [place] Phone: [phone] Fax: [phone] Visits Status Start Date End Date 1 New Request 4/17/18 4/17/19 If your referral has a status of pending review or denied, additional information will be sent to support the outcome of this decision. Patient Instructions Patient Instructions 1. Remember hydration goals - minimum of 64 ounces of liquids per day (dehydration is the number one reason for hospital readmission). 2. Continue to monitor carbohydrate and protein intake you need a minimum of  Grams of protein daily- remember to keep your total carbohydrates to 50 grams or less per day for best results. 3. Continue to work towards exercise goals - 60-90 minutes, 5 times a week minimum of deliberate, aerobic exercise is the ultimate goal with strength training 2 times each week. Refer to [employer] for  information. 4. Remember to take vitamins as directed in your handbook. 5. Attend support group the 2nd Thursday of each month. 6. Constipation: Milk of Magnesia is for immediate relief. Miralax is to be used every day if constipation is a chronic problem. 7. Diarrhea: patients will occasionally develop lactose intolerance after surgery. Check to see if your protein shake has whey in it. If it does try one that does not have whey and stop all yogurts, cheeses and milks to see if the diarrhea goes away. 8. Call us at [phone] or email us through [employer]" with questions,     concerns or worsening of condition, we have someone on call 24 hours a day. If you are unable to reach our office, you are to go to your Primary Care Physician or the Emergency Department. Supplement Resource Guide Importance of Protein:  
Maintains lean body mass, produces antibodies to fight off infections, heals wounds, minimizes hair loss, helps to give you energy, helps with satiety, and keeping you full between meals. Importance of Calcium: 
Needed for healthy bones and teeth, normal blood clotting, and nervous system functioning, higher risk of osteoporosis and bone disease with non-compliance. Importance of Multivitamins: Many functions. Supply you with extra nutrients that you may be missing from food. May lead to iron deficiency anemia, weakness, fatigue, and many other symptoms with non-compliance. Importance of B Vitamins: 
Important for red blood cell formation, metabolism, energy, and helps to maintain a healthy nervous system. Protein Supplement Find one you like now. Use immediately after surgery. Look for: 
35-50g protein each day from your protein supplement once you reach the progression diet. 0-3 g fat per serving 0-3 g sugar per serving Protein drinks should be split in separate dosages. Recommend: Lifelong 1 year + Calcium Supplement:  
 
Start taking within a month after surgery. Look for: Calcium Citrate Plus D (1500 mg per day) Recommend: Citracal 
 
 . Avoid chocolate chewable calcium. Can use chewable bariatric or GNC brand or similar chewable. The body cannot absorb more than 500-600 mg of calcium at a time. Take for Life Multi-vitamin Supplement:   
 
Start immediately after surgery: any complete chewable, such as: [place]s Complete chewables. Avoid [place] sours or gummies. They lack iron and other important nutrients and also have added sugar. Continue with chewable vitamin or change to adult complete multivitamin one month after surgery. Menstruating women can take a prenatal vitamin. Make sure has at least 18 mg iron and 052-395 mcg folic acid Vitamin B12, B Complex Vitamin, and Biotin Start taking within a month after surgery. Vitamin B12:  1000 mcg of Vitamin B12 three times weekly Must take sublingually (meaning you take it under your tongue) or in a liquid drop form for easy absorption. B Complex Vitamin: Take a pill or liquid drop form once daily. Biotin: This vitamin can help prevent hair loss. Recommend 5mg  
(5000 mcg) a day Biotin is Optional  
 
 
 
 
 
 
  
Introducing [facility]! Silvia Posadas introduces [employer] patient portal. Now you can access parts of your medical record, email your doctor's office, and request medication refills online. 1. In your internet browser, go to [email or web address] 2. Click on the First Time User? Click Here link in the Sign In box. You will see the New Member Sign Up page. 3. Enter your E[employer] Access Code exactly as it appears below. You will not need to use this code after youve completed the sign-up process. If you do not sign up before the expiration date, you must request a new code. · [employer] Access Code: [email or web address] Expires: 5/22/2018  5:37 PM 
 
4. Enter the last four digits of your Social Security Number (xxxx) and Date of Birth (mm/dd/yyyy) as indicated and click Submit. You will be taken to the next sign-up page. 5. Create a Etsy ID. This will be your Etsy login ID and cannot be changed, so think of one that is secure and easy to remember. 6. Create a E[employer] password. You can change your password at any time. 7. Enter your Password Reset Question and Answer. This can be used at a later time if you forget your password. 8. Enter your e-mail address. You will receive e-mail notification when new information is available in [address] 9. Click Sign Up. You can now view and download portions of your medical record. 10. Click the Download Summary menu link to download a portable copy of your medical information. If you have questions, please visit the Frequently Asked Questions section of the [employer] website. Remember, [employer] is NOT to be used for urgent needs. For medical emergencies, dial 911. Now available from your iPhone and Android! Please provide this summary of care documentation to your next provider. Your primary care clinician is listed as Desire Rivera. If you have any questions after today's visit, please call [phone].

## 2018-04-17 NOTE — PATIENT INSTRUCTIONS
Patient Instructions      1. Remember hydration goals - minimum of 64 ounces of liquids per day (dehydration is the number one reason for hospital readmission). 2. Continue to monitor carbohydrate and protein intake you need a minimum of  Grams of protein daily- remember to keep your total carbohydrates to 50 grams or less per day for best results. 3. Continue to work towards exercise goals - 60-90 minutes, 5 times a week minimum of deliberate, aerobic exercise is the ultimate goal with strength training 2 times each week. Refer to [facility] for  information. 4. Remember to take vitamins as directed in your handbook. 5. Attend support group the 2nd Thursday of each month. 6. Constipation: Milk of Magnesia is for immediate relief. Miralax is to be used every day if constipation is a chronic problem. 7. Diarrhea: patients will occasionally develop lactose intolerance after surgery. Check to see if your protein shake has whey in it. If it does try one that does not have whey and stop all yogurts, cheeses and milks to see if the diarrhea goes away. 8. Call us at [phone] or email us through [facility]" with questions,     concerns or worsening of condition, we have someone on call 24 hours a day. If you are unable to reach our office, you are to go to your Primary Care Physician or the Emergency Department. Supplement Resource Guide    Importance of Protein:   Maintains lean body mass, produces antibodies to fight off infections, heals wounds, minimizes hair loss, helps to give you energy, helps with satiety, and keeping you full between meals. Importance of Calcium:  Needed for healthy bones and teeth, normal blood clotting, and nervous system functioning, higher risk of osteoporosis and bone disease with non-compliance. Importance of Multivitamins: Many functions.   Supply you with extra nutrients that you may be missing from food. May lead to iron deficiency anemia, weakness, fatigue, and many other symptoms with non-compliance. Importance of B Vitamins:  Important for red blood cell formation, metabolism, energy, and helps to maintain a healthy nervous system. Protein Supplement  Find one you like now. Use immediately after surgery. Look for:  35-50g protein each day from your protein supplement once you reach the progression diet. 0-3 g fat per serving  0-3 g sugar per serving    Protein drinks should be split in separate dosages. Recommend: Lifelong  1 year + Calcium Supplement:     Start taking within a month after surgery. Look for: Calcium Citrate Plus D (1500 mg per day)  Recommend: Citracal     .            Avoid chocolate chewable calcium. Can use chewable bariatric or GNC brand or similar chewable. The body cannot absorb more than 500-600 mg of calcium at a time. Take for Life Multi-vitamin Supplement:      Start immediately after surgery: any complete chewable, such as: [place]s Complete chewables. Avoid [place] sours or gummies. They lack iron and other important nutrients and also have added sugar. Continue with chewable vitamin or change to adult complete multivitamin one month after surgery. Menstruating women can take a prenatal vitamin. Make sure has at least 18 mg iron and 486-488 mcg folic acid   Vitamin Q93, B Complex Vitamin, and Biotin  Start taking within a month after surgery. Vitamin B12:  1000 mcg of Vitamin B12 three times weekly    Must take sublingually (meaning you take it under your tongue) or in a liquid drop form for easy absorption. B Complex Vitamin: Take a pill or liquid drop form once daily. Biotin: This vitamin can help prevent hair loss.     Recommend 5mg   (5000 mcg) a day  Biotin is Optional

## 2018-04-19 ENCOUNTER — APPOINTMENT (OUTPATIENT)
Dept: PHYSICAL THERAPY | Age: 28
End: 2018-04-19

## 2018-04-23 ENCOUNTER — APPOINTMENT (OUTPATIENT)
Dept: PHYSICAL THERAPY | Age: 28
End: 2018-04-23

## 2018-04-25 NOTE — PROGRESS NOTES
[facility] at [address] [place] [address]  Phone: [phone]   Fax: [phone]    Discharge Summary    Patient name: Waqar Blandon     Start of Care: 3/2/2018  Referral source: [facility]    : 1990  Medical/Treatment Diagnosis: Stress incontinence [N39.3]  Onset Date:Summer 2017  Prior Hospitalization: see medical history   Provider#: [id number]  Comorbidities: DM, latex allergy, asthma, c-sections 06' and 10'   Prior Level of Function: patient was independent with all ADLs and activities  Medications: Verified on Patient Summary List    Visits from Start of Care: 1    Missed Visits: 0  Reporting Period : 3/1/2018 to 2018    STG: to be met in 4 weeks   1. Patient will demonstrate accurate performance of home exercise program/pelvic floor contractions as adjunct to physical therapy clinic visits to promote healthy lifestyle and increased quality of life. Status @ Eval: initiate 2nd visit   Discharge: NOT MET  2. Patient will Complete Bladder Diary for use in patient education and goal setting as indicated. Status @ Eval: provided to review at next visit    3. Patient will have decreased leakage episodes from daily to </= 3-5/week for increased quality of life.    Status @ Eval: daily leakage  Discharge: NOT MET      LTG: to be met in 8 weeks   1. Patient will demonstrate independence in home exercise program for maintenance of pelvic floor program and increased quality of life.    Status @ Eval: initiate 2nd visit   Discharge: NOT MET  2. Patient will have increased pelvic floor muscle motor performance by 1/2 to 1 grade for ability to have more normal function/increased quality of life.    Status @ Eval: unable to assess secondary to yeast infection   Discharge: NOT MET  3.  Patient will be able to laugh/cough/sneeze/exercise with no urinary leakage episodes for increased patient comfort/quality of life.    Status @ Eval: occurs with stress activities 100% of time  Discharge: NOT MET  4. Patient will have FOTO score PFDI Urinary improved by 7 points indicating improvement in function. Status @ Eval: 10   Discharge: NOT MET  5. Patient will have overall decreased leakage episodes from urgency from daily to <=2X/week to decrease dependency of pads. Status @ Eval: daily leakage with 3-4 pads/day  6. Patient will report decreased pads from 3-4/day to <=1pad/day to increase quality of life. Status @ Eval: 3-4 pads/day  Discharge: NOT MET           Assessment/ Summary of Care: Patient requested to be put on hold after evaluation for upcoming scheduled surgery. Patient would require new script to return to therapy services.      RECOMMENDATIONS:  [x]Discontinue therapy: []Patient has reached or is progressing toward set goals      [x]Patient is non-compliant or has abdicated      []Due to lack of appreciable progress towards set goals    Taylor Dwyer 4/25/2018 5:46 PM

## 2018-04-26 ENCOUNTER — APPOINTMENT (OUTPATIENT)
Dept: PHYSICAL THERAPY | Age: 28
End: 2018-04-26

## 2018-04-30 ENCOUNTER — APPOINTMENT (OUTPATIENT)
Dept: PHYSICAL THERAPY | Age: 28
End: 2018-04-30

## 2018-05-18 ENCOUNTER — OFFICE VISIT (OUTPATIENT)
Dept: SURGERY | Age: 28
End: 2018-05-18

## 2018-05-18 VITALS
WEIGHT: 237.8 LBS | SYSTOLIC BLOOD PRESSURE: 109 MMHG | BODY MASS INDEX: 36.04 KG/M2 | TEMPERATURE: 97.9 F | HEIGHT: 68 IN | HEART RATE: 103 BPM | OXYGEN SATURATION: 97 % | RESPIRATION RATE: 16 BRPM | DIASTOLIC BLOOD PRESSURE: 72 MMHG

## 2018-05-18 DIAGNOSIS — Z98.84 S/P LAPAROSCOPIC SLEEVE GASTRECTOMY: ICD-10-CM

## 2018-05-18 DIAGNOSIS — K90.9 INTESTINAL MALABSORPTION, UNSPECIFIED TYPE: Primary | ICD-10-CM

## 2018-05-18 RX ORDER — GLUCOSAMINE/CHONDR SU A SOD 750-600 MG
TABLET ORAL
COMMUNITY
End: 2022-05-18

## 2018-05-18 NOTE — MR AVS SNAPSHOT
[address] [facility] 
 
 
 [address] Puentes 
[phone] Patient: Sydney Barnes MRN: [id number] LDO:2/4/7046 Visit Information Date & Time Provider Department Dept. Phone Encounter #  
 5/18/2018 11:00 AM Zak Gant, [address] [facility] [phone] [id number] Follow-up Instructions Return in about 2 months (around 7/18/2018). Your Appointments 7/18/2018  9:30 AM  
Office Visit with BHUMIKA Mari [facility] ([facility]) Appt Note: 4 mo  
 [address] [place] [address] [address] [facility] [address] [facility] [address]  
[phone]  
  
   
 [address] [place] [address]  
  
    
 7/30/2018 11:30 AM  
New Patient with Ravi Carreon MD  
[id number] [facility] ([facility]) Appt Note: NP hepatitis referred by Dr. Mirlande Alex. ...mre  
 [address] [facility], [address] [facility] [address] [facility] [address]  
  
   
 [address] [facility], [address] [place] [address] Upcoming Health Maintenance Date Due  
 LIPID PANEL Q1 1988 FOOT EXAM Q1 3/7/1998 MICROALBUMIN Q1 3/7/1998 EYE EXAM RETINAL OR DILATED Q1 3/7/1998 Pneumococcal 19-64 Medium Risk (1 of 1 - PPSV23) 3/7/2007 DTaP/Tdap/Td series (1 - Tdap) 3/7/2009 Influenza Age 5 to Adult 8/1/2018 HEMOGLOBIN A1C Q6M 9/14/2018 PAP AKA CERVICAL CYTOLOGY 2/5/2020 Allergies as of 5/18/2018  Review Complete On: 5/18/2018 By: Justo Xie LPN Severity Noted Reaction Type Reactions Latex  05/24/2012    Rash Mushroom High 11/20/2017   Intolerance Anaphylaxis Amoxicillin  03/01/2018    Rash Doxycycline  05/02/2013    Swelling Erythromycin  12/06/2017    Rash Lactose  11/20/2017    Nausea and Vomiting  
 intolerance Penicillins  05/24/2012    Rash Rocephin [Ceftriaxone]  03/01/2018    Rash Sulfa (Sulfonamide Antibiotics)  05/24/2012    Swelling Current Immunizations  Never Reviewed No immunizations on file. Not reviewed this visit You Were Diagnosed With   
  
 Codes Comments Intestinal malabsorption, unspecified type    -  Primary ICD-10-CM: K90.9 ICD-9-CM: 579.9 S/P laparoscopic sleeve gastrectomy     ICD-10-CM: D33.01 ICD-9-CM: V45.86 Vitals BP Pulse Temp Resp Height(growth percentile) Weight(growth percentile) 109/72 (BP 1 Location: Left arm, BP Patient Position: Sitting) (!) 103 97.9 °F (36.6 °C) 16 5' 8\" (1.727 m) 237 lb 12.8 oz (107.9 kg) SpO2 BMI OB Status Smoking Status 97% 36.16 kg/m2 IUD Never Smoker Vitals History BMI and BSA Data Body Mass Index Body Surface Area  
 36.16 kg/m 2 2.28 m 2 Preferred Pharmacy Pharmacy Name Phone 555 [id number] [place] [address] AT [address] [place] [address] OF [address] [place] [address] & [address] [place] [address] [phone] Your Updated Medication List  
  
   
This list is accurate as of 5/18/18 11:40 AM.  Always use your most recent med list.  
  
  
  
  
 albuterol 90 mcg/actuation inhaler Commonly known as:  PROVENTIL HFA, VENTOLIN HFA, PROAIR HFA Take 2 Puffs by inhalation every four (4) hours as needed. amitriptyline 10 mg tablet Commonly known as:  ELAVIL Take 50 mg by mouth nightly. atorvastatin 40 mg tablet Commonly known as:  LIPITOR Take 40 mg by mouth nightly. Biotin 2,500 mcg Cap Take  by mouth. butalbital-acetaminophen-caffeine -40 mg per tablet Commonly known as:  Rise Haley Take 1 Tab by mouth daily as needed. Max 2 per day CALCIUM 600 + D 600-125 mg-unit Tab Generic drug:  calcium-cholecalciferol (d3) Take  by mouth. Iron 160 mg (50 mg iron) Tber tablet Generic drug:  ferrous sulfate ER Take 1 Tab by mouth daily. MIRENA 20 mcg/24 hr (5 years) Iud  
Generic drug:  levonorgestrel 1 Device by IntraUTERine route once. multivitamins Chew Take  by mouth. omeprazole 40 mg capsule Commonly known as:  PRILOSEC  
one capsule oral route daily PROBIOTIC 4X 10-15 mg Tbec Generic drug:  B.infantis-B.ani-B.long-B.bifi Take  by mouth. Follow-up Instructions Return in about 2 months (around 7/18/2018). Patient Instructions Patient Instructions 1. Remember hydration goals - minimum of 64 ounces of liquids per day (dehydration is the number one reason for hospital readmission). 2. Continue to monitor carbohydrate and protein intake you need a minimum of  Grams of protein daily- remember to keep your total carbohydrates to 50 grams or less per day for best results. 3. Continue to work towards exercise goals - 60-90 minutes, 5 times a week minimum of deliberate, aerobic exercise is the ultimate goal with strength training 2 times each week. Refer to [employer] for  information. 4. Remember to take vitamins as directed in your handbook. 5. Attend support group the 2nd Thursday of each month. 6. Constipation: Milk of Magnesia is for immediate relief. Miralax is to be used every day if constipation is a chronic problem. 7. Diarrhea: patients will occasionally develop lactose intolerance after surgery. Check to see if your protein shake has whey in it. If it does try one that does not have whey and stop all yogurts, cheeses and milks to see if the diarrhea goes away. 8. Call us at [phone] or email us through [place]" with questions,     concerns or worsening of condition, we have someone on call 24 hours a day. If you are unable to reach our office, you are to go to your Primary Care Physician or the Emergency Department. Supplement Resource Guide Importance of Protein: Maintains lean body mass, produces antibodies to fight off infections, heals wounds, minimizes hair loss, helps to give you energy, helps with satiety, and keeping you full between meals. Importance of Calcium: 
Needed for healthy bones and teeth, normal blood clotting, and nervous system functioning, higher risk of osteoporosis and bone disease with non-compliance. Importance of Multivitamins: Many functions. Supply you with extra nutrients that you may be missing from food. May lead to iron deficiency anemia, weakness, fatigue, and many other symptoms with non-compliance. Importance of B Vitamins: 
Important for red blood cell formation, metabolism, energy, and helps to maintain a healthy nervous system. Protein Supplement Find one you like now. Use immediately after surgery. Look for: 
35-50g protein each day from your protein supplement once you reach the progression diet. 0-3 g fat per serving 0-3 g sugar per serving Protein drinks should be split in separate dosages. Recommend: Lifelong 1 year + Calcium Supplement:  
 
Start taking within a month after surgery. Look for: Calcium Citrate Plus D (1500 mg per day) Recommend: Citracal 
 
 . Avoid chocolate chewable calcium. Can use chewable bariatric or [employer] brand or similar chewable. The body cannot absorb more than 500-600 mg of calcium at a time. Take for Life Multi-vitamin Supplement:   
 
Start immediately after surgery: any complete chewable, such as: Mocas Complete chewables. Avoid [place]oca sours or gummies. They lack iron and other important nutrients and also have added sugar. Continue with chewable vitamin or change to adult complete multivitamin one month after surgery. Menstruating women can take a prenatal vitamin. Make sure has at least 18 mg iron and 254-604 mcg folic acid Vitamin B12, B Complex Vitamin, and Biotin Start taking within a month after surgery. Vitamin B12:  1000 mcg of Vitamin B12 three times weekly Must take sublingually (meaning you take it under your tongue) or in a liquid drop form for easy absorption. B Complex Vitamin: Take a pill or liquid drop form once daily. Biotin: This vitamin can help prevent hair loss. Recommend 5mg  
(5000 mcg) a day Biotin is Optional  
 
 
 
 
 
 
  
Introducing [facility]! Marcie Landon introduces [employer] patient portal. Now you can access parts of your medical record, email your doctor's office, and request medication refills online. 1. In your internet browser, go to [email or web address] 2. Click on the First Time User? Click Here link in the Sign In box. You will see the New Member Sign Up page. 3. Enter your [employer] Access Code exactly as it appears below. You will not need to use this code after youve completed the sign-up process. If you do not sign up before the expiration date, you must request a new code. · [employer] Access Code: [email or web address] Expires: 5/22/2018  5:37 PM 
 
4. Enter the last four digits of your Social Security Number (xxxx) and Date of Birth (mm/dd/yyyy) as indicated and click Submit. You will be taken to the next sign-up page. 5. Create a [employer] ID. This will be your [employer]ter login ID and cannot be changed, so think of one that is secure and easy to remember. 6. Create a [employer] password. You can change your password at any time. 7. Enter your Password Reset Question and Answer. This can be used at a later time if you forget your password. 8. Enter your e-mail address. You will receive e-mail notification when new information is available in [address] 9. Click Sign Up. You can now view and download portions of your medical record. 10. Click the Download Summary menu link to download a portable copy of your medical information.  
 
If you have questions, please visit the Frequently Asked Questions section of the [facility]. Remember, [employer] is NOT to be used for urgent needs. For medical emergencies, dial 911. Now available from your iPhone and Android! Please provide this summary of care documentation to your next provider. Your primary care clinician is listed as Cholosijamey Roca. If you have any questions after today's visit, please call [phone].

## 2018-05-18 NOTE — PATIENT INSTRUCTIONS
Patient Instructions      1. Remember hydration goals - minimum of 64 ounces of liquids per day (dehydration is the number one reason for hospital readmission). 2. Continue to monitor carbohydrate and protein intake you need a minimum of  Grams of protein daily- remember to keep your total carbohydrates to 50 grams or less per day for best results. 3. Continue to work towards exercise goals - 60-90 minutes, 5 times a week minimum of deliberate, aerobic exercise is the ultimate goal with strength training 2 times each week. Refer to [employer] for  information. 4. Remember to take vitamins as directed in your handbook. 5. Attend support group the 2nd Thursday of each month. 6. Constipation: Milk of Magnesia is for immediate relief. Miralax is to be used every day if constipation is a chronic problem. 7. Diarrhea: patients will occasionally develop lactose intolerance after surgery. Check to see if your protein shake has whey in it. If it does try one that does not have whey and stop all yogurts, cheeses and milks to see if the diarrhea goes away. 8. Call us at [phone] or email us through [place]" with questions,     concerns or worsening of condition, we have someone on call 24 hours a day. If you are unable to reach our office, you are to go to your Primary Care Physician or the Emergency Department. Supplement Resource Guide    Importance of Protein:   Maintains lean body mass, produces antibodies to fight off infections, heals wounds, minimizes hair loss, helps to give you energy, helps with satiety, and keeping you full between meals. Importance of Calcium:  Needed for healthy bones and teeth, normal blood clotting, and nervous system functioning, higher risk of osteoporosis and bone disease with non-compliance. Importance of Multivitamins: Many functions.   Supply you with extra nutrients that you may be missing from food. May lead to iron deficiency anemia, weakness, fatigue, and many other symptoms with non-compliance. Importance of B Vitamins:  Important for red blood cell formation, metabolism, energy, and helps to maintain a healthy nervous system. Protein Supplement  Find one you like now. Use immediately after surgery. Look for:  35-50g protein each day from your protein supplement once you reach the progression diet. 0-3 g fat per serving  0-3 g sugar per serving    Protein drinks should be split in separate dosages. Recommend: Lifelong  1 year + Calcium Supplement:     Start taking within a month after surgery. Look for: Calcium Citrate Plus D (1500 mg per day)  Recommend: Citracal     .            Avoid chocolate chewable calcium. Can use chewable bariatric or [employer]NC brand or similar chewable. The body cannot absorb more than 500-600 mg of calcium at a time. Take for Life Multi-vitamin Supplement:      Start immediately after surgery: any complete chewable, such as: [place]s Complete chewables. Avoid [place] sours or gummies. They lack iron and other important nutrients and also have added sugar. Continue with chewable vitamin or change to adult complete multivitamin one month after surgery. Menstruating women can take a prenatal vitamin. Make sure has at least 18 mg iron and 363-445 mcg folic acid   Vitamin J85, B Complex Vitamin, and Biotin  Start taking within a month after surgery. Vitamin B12:  1000 mcg of Vitamin B12 three times weekly    Must take sublingually (meaning you take it under your tongue) or in a liquid drop form for easy absorption. B Complex Vitamin: Take a pill or liquid drop form once daily. Biotin: This vitamin can help prevent hair loss.     Recommend 5mg   (5000 mcg) a day  Biotin is Optional

## 2018-05-18 NOTE — LETTER
Trevor Villavicencio Please take a moment to review your medication list.  The nurse will be reviewing your list with you before you see the provider and will need to know of any changes to the list, such as: 
- change in dosage 
- any new medications that are not listed 
- medications that you are no longer taking Current Outpatient Prescriptions on File Prior to Visit Medication Sig Dispense Refill  B.infantis-B.ani-B.long-B.bifi (PROBIOTIC 4X) 10-15 mg TbEC Take  by mouth.  multivitamins chew Take  by mouth.  omeprazole (PRILOSEC) 40 mg capsule one capsule oral route daily  2  
 amitriptyline (ELAVIL) 10 mg tablet Take 50 mg by mouth nightly. 3  
 butalbital-acetaminophen-caffeine (FIORICET, ESGIC) -40 mg per tablet Take 1 Tab by mouth daily as needed. Max 2 per day  3  
 albuterol (PROVENTIL HFA, VENTOLIN HFA, PROAIR HFA) 90 mcg/actuation inhaler Take 2 Puffs by inhalation every four (4) hours as needed.  atorvastatin (LIPITOR) 40 mg tablet Take 40 mg by mouth nightly.  levonorgestrel (MIRENA) 20 mcg/24 hr (5 years) IUD 1 Device by IntraUTERine route once. No current facility-administered medications on file prior to visit.    
 
***

## 2018-05-18 NOTE — PROGRESS NOTES
Subjective:      Enrique Meeks is a 25 y.o. female is now 2 months status post laparoscopic sleeve gastrectomy. Doing well overall. She has lost a total of 36 pounds since surgery. Body mass index is 36.16 kg/(m^2). Has lost 27% of EBW. Currently on a solid food diet without difficulty, reports no issues and denies vomiting and abdominal pain. Taking in 70-80oz water daily. Sources of protein include shrimp, fish, cheese, eggs. Eating 5-7 meals each day. 45-60 min of activity 7 days a week, including anirudh, beach body, body pump. Bowel movements are regular. The patient is not having any pain. . The patient is compliant with multivitamins, calcium, Vit D, B complex and B12 supplements. Weight Loss Metrics 5/18/2018 4/17/2018 4/3/2018 3/21/2018 3/20/2018 3/12/2018 3/8/2018   Today's Wt 237 lb 12.8 oz 247 lb 1.6 oz 250 lb 1.6 oz 268 lb 4 oz - 274 lb 270 lb   BMI 36.16 kg/m2 37.57 kg/m2 38.03 kg/m2 - 40.79 kg/m2 41.97 kg/m2 41.36 kg/m2          Comorbidities:    Hypertension: not applicable  Diabetes: improved, off meds  Obstructive Sleep Apnea: not applicable  Hyperlipidemia: unchanged  Stress Urinary Incontinence: not applicable  Gastroesophageal Reflux: not applicable  Weight related arthropathy:not applicable     Patient Active Problem List   Diagnosis Code    Acute bronchitis J20.9    Asthma with exacerbation J45. 0    Pleurisy R09.1    Morbid obesity ([facility]) E66.01    Morbid obesity with BMI of 40.0-44.9, adult ([facility]) E66.01, Z68.41    Diabetes ([facility] [address] E11.9    Hyperlipemia E78.5    Gastroparesis K31.84    Asthma J45.909    IUD (intrauterine device) in place Z80.11    FH: ovarian cancer Z80.41    Intestinal malabsorption K90.9    S/P laparoscopic sleeve gastrectomy Z98.84        Past Medical History:   Diagnosis Date    Asthma     Diabetes ([address]     dx 2 years ago    Gastroparesis     suspected    GERD (gastroesophageal reflux disease)     Hyperlipemia     Morbid obesity ([address]     Morbid obesity with BMI of 40.0-44.9, adult ([facility] [address]     Ovarian cyst     Psychiatric disorder     anxiety, PTSD    PUD (peptic ulcer disease)        Past Surgical History:   Procedure Laterality Date    HX  SECTION   &        Current Outpatient Prescriptions   Medication Sig Dispense Refill    ferrous sulfate ER (IRON) 160 mg (50 mg iron) TbER tablet Take 1 Tab by mouth daily.  calcium-cholecalciferol, d3, (CALCIUM 600 + D) 600-125 mg-unit tab Take  by mouth.  Biotin 2,500 mcg cap Take  by mouth.  B.infantis-B.ani-B.long-B.bifi (PROBIOTIC 4X) 10-15 mg TbEC Take  by mouth.  multivitamins chew Take  by mouth.  omeprazole (PRILOSEC) 40 mg capsule one capsule oral route daily  2    amitriptyline (ELAVIL) 10 mg tablet Take 50 mg by mouth nightly. 3    butalbital-acetaminophen-caffeine (FIORICET, ESGIC) -40 mg per tablet Take 1 Tab by mouth daily as needed. Max 2 per day  3    albuterol (PROVENTIL HFA, VENTOLIN HFA, PROAIR HFA) 90 mcg/actuation inhaler Take 2 Puffs by inhalation every four (4) hours as needed.  atorvastatin (LIPITOR) 40 mg tablet Take 40 mg by mouth nightly.  levonorgestrel (MIRENA) 20 mcg/24 hr (5 years) IUD 1 Device by IntraUTERine route once.          Allergies   Allergen Reactions    Latex Rash    Mushroom Anaphylaxis    Amoxicillin Rash    Doxycycline Swelling    Erythromycin Rash    Lactose Nausea and Vomiting     intolerance    Penicillins Rash    Rocephin [Ceftriaxone] Rash    Sulfa (Sulfonamide Antibiotics) Swelling         Objective:     Visit Vitals    /72 (BP 1 Location: Left arm, BP Patient Position: Sitting)    Pulse (!) 103    Temp 97.9 °F (36.6 °C)    Ht 5' 8\" (1.727 m)    Wt 107.9 kg (237 lb 12.8 oz)    SpO2 97%    BMI 36.16 kg/m2       General:  alert, cooperative, no distress, appears stated age   Chest: lungs clear to auscultation, no rhonchi, rales or wheezes, no accessory muscle use   Cor:   Regular rate and rhythm or without murmur or extra heart sounds   Abdomen: soft, bowel sounds active, non-tender, no masses or organomegaly   Incisions:   healing well, no drainage, no erythema, no hernia, no seroma, no swelling, no dehiscence, incision well approximated       Assessment:   History of Morbid obesity, status post laparoscopic sleeve gastrectomy. Doing well postoperatively. DM  Asthma  Seasonal allergies  Plan:       1. Reminded to measure portions, continue high protein, low carbohydrate diet. Reminded to eat regularly, to eat slowly & not to drink with meals. 2. Continue vitamin supplementation  3. Continue current medications and follow up with PCP for management of regimen. 4. Continue cardio exercise and resistance exercises. 60-90 min aerobic exercise 5 times a week and strength training 2 days each week. 5. Encouraged to attend support group   6. I have discussed this plan with patient and they verbalized understanding  7. Follow up in 2 months or sooner if patient has questions, concerns or worsening of condition, if unable to reach our office, patient should report to the ED. 8. Ms. Ana Maria Faria has a reminder for a \"due or due soon\" health maintenance. I have asked that she contact her primary care provider for a follow-up on this health maintenance.

## 2018-07-30 ENCOUNTER — HOSPITAL ENCOUNTER (OUTPATIENT)
Dept: LAB | Age: 28
Discharge: HOME | End: 2018-07-30

## 2018-07-30 ENCOUNTER — OFFICE VISIT (OUTPATIENT)
Dept: HEMATOLOGY | Age: 28
End: 2018-07-30

## 2018-07-30 VITALS
DIASTOLIC BLOOD PRESSURE: 82 MMHG | HEIGHT: 68 IN | TEMPERATURE: 96.5 F | WEIGHT: 236 LBS | HEART RATE: 88 BPM | SYSTOLIC BLOOD PRESSURE: 125 MMHG | OXYGEN SATURATION: 97 % | BODY MASS INDEX: 35.77 KG/M2

## 2018-07-30 DIAGNOSIS — K75.9 INFLAMMATORY LIVER DISEASE: Primary | ICD-10-CM

## 2018-07-30 LAB — SENTARA SPECIMEN COL,SENBCF: NORMAL

## 2018-07-30 PROCEDURE — 99001 SPECIMEN HANDLING PT-LAB: CPT | Performed by: INTERNAL MEDICINE

## 2018-07-30 NOTE — PROGRESS NOTES
70 Susan Dover MD, [address], [place], [place]       Abbie Rosario, NP Hazle Seip, PA-C Lori Parrot, [facility]   Eddi Gurrola, ORIANA [facility]    at [place] [address] [address], [address] Renae Barrow  22.    [phone]    FAX: [address] [place] [address], [address] [facility], [address]    [phone]    FAX: [phone]         Patient Care Team:  Aixa Hardy MD as PCP - General (Family Practice)      Problem List  Date Reviewed: 7/30/2018          Codes Class Noted    Intestinal malabsorption ICD-10-CM: K90.9  ICD-9-CM: 579.9  4/17/2018        S/P laparoscopic sleeve gastrectomy ICD-10-CM: E81.34  ICD-9-CM: V45.86  4/17/2018        IUD (intrauterine device) in place ICD-10-CM: Z97.5  ICD-9-CM: V45.51  2/8/2018    Overview Signed 2/8/2018 11:21 AM by Neena vieira 02/15/17             FH: ovarian cancer ICD-10-CM: Z80.41  ICD-9-CM: V16.41  2/8/2018        Asthma ICD-10-CM: J45.909  ICD-9-CM: 493.90  Unknown        Morbid obesity ([facility] [address] ICD-10-CM: E66.01  ICD-9-CM: 278.01  Unknown        Hyperlipemia ICD-10-CM: E78.5  ICD-9-CM: 272.4  Unknown        Gastroparesis ICD-10-CM: K31.84  ICD-9-CM: 536.3  Unknown              The clinicians listed above have asked me to see Shital Judd in consultation regarding a liver biopsy showing some focal mild inflammation. All medical records sent by the referring physicians were reviewed and pathology. The patient is a 25 y.o. Black female who was found to have mild focal inflammation on liver biopsy performed during gastric bypass surgery in 3/2018.       Serologic evaluation for markers of chronic liver disease were either not performed or available to me. Imaging of the liver was not performed. A liver biopsy was performed in 3/2018. This demonstrated mild focal portal inflammation and no fibrosis. The patient has lost 30 pounds since undergoing sleeve gastrectomy in 3/2018. The patient has no symptoms which can be attributed to the liver disorder. The patient has not experienced fatigue, pain in the right side over the liver,     The patient completes all daily activities without any functional limitations. All of the issues listed in the Assessment and Plan were discussed with the patient. All questions were answered. The patient expressed a clear understanding of the above. [address] [place] [address] in 4 weeks for elastography to review all data and determine the treatment plan. ASSESSMENT AND PLAN:  Active hepatitis with no fibrosis on liver biopsy. The most recent laboratory studies indicate the normal, ALP is normal, tests of hepatic synthetic and metabolic function are normal, and the platelet count is normal.    Serologic testing for causes of chronic liver disease were negative for HBV, HCV, JOHAN, ASMA. Given her age ceruloplasmin should be performed. This will be done at the next office visit. Will perform laboratory testing to monitor liver function and degree of liver injury. This included BMP, hepatic panel, CBC with platelet count,     Will perform imaging of the liver with ultrasound. The need to assess liver fibrosis was discussed. Sheer wave elastography can assess liver fibrosis and determine if a patient has advanced fibrosis or cirrhosis without the need for liver biopsy. Sheer wave elastography is now available at [facility] [employer]. This will be scheduled. Elastography can be repeated annually to assess for fibrosis progression and need for treatment of the liver disorder.     Treatment of other medical problems in patients with chronic liver disease  There are no contraindications for the patient to take any medications that are necessary for treatment of other medical issues. The patient can take statins for treatment of hypercholesterolemia. This will not impact the patient's current liver disease. The patient does not consume alcohol daily. Normal doses of acetaminophen can be used for pain as needed. Normal doses of acetaminophen as recommended on the label are not hepatotoxic, even in patients with cirrhosis. The patient does not have cirrhosis. Normal doses of NSAIDs can be used for pain as needed. Counseling for alcohol in patients with chronic liver disease  The patient was counseled regarding alcohol consumption and the effect of alcohol on chronic liver disease. The patient does not consume any significant amount of alcohol. Vaccinations   Vaccination for viral hepatitis A is recommended since the patient has no serologic evidence of previous exposure or vaccination with immunity. Vaccination for viral hepatitis B is not needed. The patient has serologic evidence of prior exposure or vaccination with immunity. Routine vaccinations against other bacterial and viral agents can be performed as indicated. Annual flu vaccination should be administered if indicated. Screening for Hepatocellular Carcinoma  HCC screening is not necessary if the patient has no evidence of cirrhosis. ALLERGIES  Allergies   Allergen Reactions    Latex Rash    Mushroom Anaphylaxis    Amoxicillin Rash    Doxycycline Swelling    Erythromycin Rash    Lactose Nausea and Vomiting     intolerance    Penicillins Rash    Rocephin [Ceftriaxone] Rash    Sulfa (Sulfonamide Antibiotics) Swelling       MEDICATIONS  Current Outpatient Prescriptions   Medication Sig    ferrous sulfate ER (IRON) 160 mg (50 mg iron) TbER tablet Take 1 Tab by mouth daily.  calcium-cholecalciferol, d3, (CALCIUM 600 + D) 600-125 mg-unit tab Take  by mouth.     Biotin 2,500 mcg cap Take  by mouth.    B.infantis-B.ani-B.long-B.bifi (PROBIOTIC 4X) 10-15 mg TbEC Take  by mouth.  multivitamins chew Take  by mouth.  omeprazole (PRILOSEC) 40 mg capsule one capsule oral route daily    amitriptyline (ELAVIL) 10 mg tablet Take 50 mg by mouth nightly.  butalbital-acetaminophen-caffeine (FIORICET, ESGIC) -40 mg per tablet Take 1 Tab by mouth daily as needed. Max 2 per day    albuterol (PROVENTIL HFA, VENTOLIN HFA, PROAIR HFA) 90 mcg/actuation inhaler Take 2 Puffs by inhalation every four (4) hours as needed.  atorvastatin (LIPITOR) 40 mg tablet Take 40 mg by mouth nightly.  levonorgestrel (MIRENA) 20 mcg/24 hr (5 years) IUD 1 Device by IntraUTERine route once. No current facility-administered medications for this visit. SYSTEM REVIEW NOT RELATED TO LIVER DISEASE OR REVIEWED ABOVE:  Constitution systems: Negative for fever, chills, weight gain, weight loss. Eyes: Negative for visual changes. ENT: Negative for sore throat, painful swallowing. Respiratory: Negative for cough, hemoptysis, SOB. Cardiology: Negative for chest pain, palpitations. GI:  Negative for constipation or diarrhea. : Negative for urinary frequency, dysuria, hematuria, nocturia. Skin: Negative for rash. Hematology: Negative for easy bruising, blood clots. Musculo-skelatal: Negative for back pain, muscle pain, weakness. Neurologic: Negative for headaches, dizziness, vertigo, memory problems not related to HE. Psychology: Negative for anxiety, depression. FAMILY HISTORY:  The father is alive and healthy. The mother is alive and healthy. There is no family history of liver disease. There is no family history of immune disorders including RA and thyroiditis. SOCIAL HISTORY:  The patient is . The patient has 2 children,   The patient has never used tobacco products. The patient has never consumed significant amounts of alcohol. The patient does not work. PHYSICAL EXAMINATION:  Visit Vitals    /82    Pulse 88    Temp 96.5 °F (35.8 °C) (Tympanic)    Ht 5' 8\" (1.727 m)    Wt 236 lb (107 kg)    SpO2 97%    BMI 35.88 kg/m2     General: No acute distress. Eyes: Sclera anicteric. ENT: No oral lesions. Thyroid normal.  Nodes: No adenopathy. Skin: No spider angiomata. No jaundice. No palmar erythema. Respiratory: Lungs clear to auscultation. Cardiovascular: Regular heart rate. No murmurs. No JVD. Abdomen: Soft non-tender. Liver size normal to percussion/palpation. Spleen not palpable. No obvious ascites. Extremities: No edema. No muscle wasting. No gross arthritic changes. Neurologic: Alert and oriented. Cranial nerves grossly intact. No asterixis. LABORATORY STUDIES:  [facility] [place] [address] Units 7/30/2018 3/14/2018   WBC 4.0 - 11.0 K/uL 9.3 10.8   ANC 1.8 - 7.7 K/uL 5.1 6.0   HGB 11.7 - 15.5 g/dL 14.5 14.3    - 440 K/uL 436 360   AST 10 - 37 U/L 14    ALT 5 - 40 U/L 13    Alk Phos 25 - 115 U/L 113    Bili, Total 0.2 - 1.2 mg/dL 0.3    Bili, Direct 0.0 - 0.3 mg/dL <0.2    Albumin 3.5 - 5.0 g/dL 4.3      Liver [place] of [address] [place] [address] Ref Rng & Units 3/12/2018   AST 10 - 37 U/L 19   ALT 5 - 40 U/L 31   Alk Phos 25 - 115 U/L 103   Bili, Total 0.2 - 1.2 mg/dL 0.3   Bili, Direct 0.0 - 0.3 mg/dL    Albumin 3.5 - 5.0 g/dL 3.9   BUN 7.0 - 18 MG/DL 10   Creat 0.6 - 1.3 MG/DL 0.78   Na 136 - 145 mmol/L 139   K 3.5 - 5.5 mmol/L 3.8   Cl 100 - 108 mmol/L 102   CO2 21 - 32 mmol/L 25   Glucose 74 - 99 mg/dL 96     SEROLOGIES:  Serologies Latest Ref Rng & Units 7/30/2018   Hep A Ab, Total Negative Negative   Hep B Surface Ag None Detec None Detected   Hep B Core Ab, Total None Detec None Detected   Hep B Surface Ab None Detec Detected (A)   ASMCA 0 - 19 Units 19     7/2018. JOHAN negative. LIVER HISTOLOGY:  3/2018. Mild hepatitis with mild PMN, mild steatosis involving 10-25% of liver and no fibrosis. Biopsy slides not reviewed by MLS.     ENDOSCOPIC PROCEDURES:  Not available or performed    RADIOLOGY:  Not available or performed    OTHER TESTING:  Not available or performed      Jean Claude Lai MD  [facility] [place] [address], [address] [place] [address] - [address] [phone]

## 2018-07-30 NOTE — MR AVS SNAPSHOT
[address] [place] [address] 
[phone] Patient: Saray Poon MRN: [id number] JUSTINA:[birth year] Visit Information Date & Time Provider Department Dept. Phone Encounter #  
 7/30/2018 11:30 AM [facility]  [address] [id number] Follow-up Instructions Return in about 4 weeks (around 8/27/2018) for NP. Your Appointments 7/31/2018 11:30 AM  
[facility] with [facility] Appt Note: 4 mo; pt r/s  
 [facility] [address] [facility] [address]  
  
   
 [address] [place] [address] Upcoming Health Maintenance Date Due  
 LIPID PANEL Q1 1988 FOOT EXAM Q1 3/7/1998 MICROALBUMIN Q1 3/7/1998 EYE EXAM RETINAL OR DILATED Q1 3/7/1998 Pneumococcal 19-64 Medium Risk (1 of 1 - PPSV23) 3/7/2007 DTaP/Tdap/Td series (1 - Tdap) 3/7/2009 Influenza Age 5 to Adult 8/1/2018 HEMOGLOBIN A1C Q6M 9/14/2018 PAP AKA CERVICAL CYTOLOGY 2/5/2020 Allergies as of 7/30/2018  Review Complete On: 7/30/2018 By: Karlene Woodruff Severity Noted Reaction Type Reactions Latex  05/24/2012    Rash Mushroom High 11/20/2017   Intolerance Anaphylaxis Amoxicillin  03/01/2018    Rash Doxycycline  05/02/2013    Swelling Erythromycin  12/06/2017    Rash Lactose  11/20/2017    Nausea and Vomiting  
 intolerance Penicillins  05/24/2012    Rash Rocephin [Ceftriaxone]  03/01/2018    Rash  
 Sulfa (Sulfonamide Antibiotics)  05/24/2012    Swelling Current Immunizations  Never Reviewed No immunizations on file. Not reviewed this visit You Were Diagnosed With   
  
 Codes Comments Inflammatory liver disease    -  Primary ICD-10-CM: K75.9 ICD-9-CM: 573.3 Vitals BP Pulse Temp Height(growth percentile) Weight(growth percentile) SpO2  
 125/82 88 96.5 °F (35.8 °C) (Tympanic) 5' 8\" (1.727 m) 236 lb (107 kg) 97% BMI OB Status Smoking Status 35.88 kg/m2 IUD Never Smoker Vitals History BMI and BSA Data Body Mass Index Body Surface Area  
 35.88 kg/m 2 2.27 m 2 Preferred Pharmacy Pharmacy Name Phone [address] [address] [address] [place] [address] BLVD AT THE [facility] OF [address] [place] [address] & [address] [phone] Your Updated Medication List  
  
   
This list is accurate as of 7/30/18 12:39 PM.  Always use your most recent med list.  
  
  
  
  
 albuterol 90 mcg/actuation inhaler Commonly known as:  PROVENTIL HFA, VENTOLIN HFA, PROAIR HFA Take 2 Puffs by inhalation every four (4) hours as needed. amitriptyline 10 mg tablet Commonly known as:  ELAVIL Take 50 mg by mouth nightly. atorvastatin 40 mg tablet Commonly known as:  LIPITOR Take 40 mg by mouth nightly. Biotin 2,500 mcg Cap Take  by mouth. butalbital-acetaminophen-caffeine -40 mg per tablet Commonly known as:  Awa De Anda Take 1 Tab by mouth daily as needed. Max 2 per day CALCIUM 600 + D 600-125 mg-unit Tab Generic drug:  calcium-cholecalciferol (d3) Take  by mouth. Iron 160 mg (50 mg iron) Tber tablet Generic drug:  ferrous sulfate ER Take 1 Tab by mouth daily. MIRENA 20 mcg/24 hr (5 years) Iud  
Generic drug:  levonorgestrel 1 Device by IntraUTERine route once. multivitamins Chew Take  by mouth. omeprazole 40 mg capsule Commonly known as:  PRILOSEC  
one capsule oral route daily PROBIOTIC 4X 10-15 mg Tbec Generic drug:  B.infantis-B.ani-B.long-B.bifi Take  by mouth. Follow-up Instructions Return in about 4 weeks (around 8/27/2018) for NP. To-Do List   
 07/30/2018 Lab:  ACTIN (SMOOTH MUSCLE) ANTIBODY   
  
 07/30/2018 Lab:  ANTINUCLEAR ANTIBODIES, IFA   
  
 07/30/2018 Lab:  CBC WITH AUTOMATED DIFF   
  
 07/30/2018 Lab:  HCV AB W/REFLEX VERIFICATION   
  
 07/30/2018 Lab:  HEP A AB, TOTAL   
  
 07/30/2018 Lab:  HEP B SURFACE AB   
  
 07/30/2018 Lab:  HEP B SURFACE AG   
  
 07/30/2018 Lab:  HEPATIC FUNCTION PANEL   
  
 07/30/2018 Lab:  HEPATITIS B CORE AB, TOTAL   
  
 07/30/2018 Lab:  METABOLIC PANEL, BASIC   
  
 07/30/2018 Imaging:  US ABD LTD W ELASTOGRAPHY Introducing [facility]! Amelia German introduces [employer] patient portal. Now you can access parts of your medical record, email your doctor's office, and request medication refills online. 1. In your internet browser, go to [email or web address] 2. Click on the First Time User? Click Here link in the Sign In box. You will see the New Member Sign Up page. 3. Enter your [employer] Access Code exactly as it appears below. You will not need to use this code after youve completed the sign-up process. If you do not sign up before the expiration date, you must request a new code. · [employer] Access Code: [email or web address] Expires: 10/28/2018 12:39 PM 
 
4. Enter the last four digits of your Social Security Number (xxxx) and Date of Birth (mm/dd/yyyy) as indicated and click Submit. You will be taken to the next sign-up page. 5. Create a [employer] ID. This will be your [employer] login ID and cannot be changed, so think of one that is secure and easy to remember. 6. Create a [employer] password. You can change your password at any time. 7. Enter your Password Reset Question and Answer. This can be used at a later time if you forget your password. 8. Enter your e-mail address. You will receive e-mail notification when new information is available in [address] 9. Click Sign Up. You can now view and download portions of your medical record.  
10. Click the Download Summary menu link to download a portable copy of your medical information. If you have questions, please visit the Frequently Asked Questions section of the [employer] website. Remember, [employer] is NOT to be used for urgent needs. For medical emergencies, dial 911. Now available from your iPhone and Android! Please provide this summary of care documentation to your next provider. Your primary care clinician is listed as Yelitza Georges. If you have any questions after today's visit, please call [phone].

## 2018-07-30 NOTE — LETTER
9/23/2018 7:15 PM 
 
RE:    Vanna Liu [address] [place] [address] Thank you for agreeing to see [facility] I am referring my patient to you for evaluation of ***. Please see her 
pertinent patient information below. {[id number]} I appreciate your assistance in Ms. Zacarias's care  and look forward to your findings and recommendations. Sincerely, Venecia Nieves MD

## 2018-07-31 ENCOUNTER — OFFICE VISIT (OUTPATIENT)
Dept: SURGERY | Age: 28
End: 2018-07-31

## 2018-07-31 VITALS
RESPIRATION RATE: 16 BRPM | SYSTOLIC BLOOD PRESSURE: 123 MMHG | OXYGEN SATURATION: 100 % | DIASTOLIC BLOOD PRESSURE: 79 MMHG | HEIGHT: 68 IN | HEART RATE: 97 BPM | BODY MASS INDEX: 35.55 KG/M2 | WEIGHT: 234.6 LBS

## 2018-07-31 DIAGNOSIS — Z98.84 S/P LAPAROSCOPIC SLEEVE GASTRECTOMY: ICD-10-CM

## 2018-07-31 DIAGNOSIS — K90.9 INTESTINAL MALABSORPTION, UNSPECIFIED TYPE: ICD-10-CM

## 2018-07-31 DIAGNOSIS — K90.9 INTESTINAL MALABSORPTION, UNSPECIFIED TYPE: Primary | ICD-10-CM

## 2018-07-31 PROBLEM — K21.9 GERD (GASTROESOPHAGEAL REFLUX DISEASE): Status: ACTIVE | Noted: 2018-07-31

## 2018-07-31 NOTE — MR AVS SNAPSHOT
[address] [facility] 
 
 
 One [facility] [address] [facility] 
[phone] Patient: Mihn Mitchell MRN: [id number] LFI:4/3/1910 Visit Information Date & Time Provider Department Dept. Phone Encounter #  
 7/31/2018 11:30 AM Joselin Alvarado, 82 [facility] [place]  Follow-up Instructions Return in about 2 months (around 9/30/2018). Your Appointments 8/27/2018  3:30 PM  
Follow Up with Haile Whittaker NP [address] [facility] ([address]) Appt Note: 4 week f/u  
 One [facility], [address] [facility] [address]  
  
   
 1100 [facility] 707 14[facility] [address]  
  
    
 9/27/2018  1:00 PM  
Office Visit with BHUMIKA Hernandez [facility] [place] ([address]) Appt Note: 6 mo  
 One [facility] [address] [facility] [address]  
  
   
 [address] [place] [address] Upcoming Health Maintenance Date Due  
 LIPID PANEL Q1 1988 FOOT EXAM Q1 3/7/1998 MICROALBUMIN Q1 3/7/1998 EYE EXAM RETINAL OR DILATED Q1 3/7/1998 Pneumococcal 19-64 Medium Risk (1 of 1 - PPSV23) 3/7/2007 DTaP/Tdap/Td series (1 - Tdap) 3/7/2009 Influenza Age 5 to Adult 8/1/2018 HEMOGLOBIN A1C Q6M 9/14/2018 PAP AKA CERVICAL CYTOLOGY 2/5/2020 Allergies as of 7/31/2018  Review Complete On: 7/31/2018 By: Lenka Silverio LPN Severity Noted Reaction Type Reactions Latex  05/24/2012    Rash Mushroom High 11/20/2017   Intolerance Anaphylaxis Amoxicillin  03/01/2018    Rash Doxycycline  05/02/2013    Swelling Erythromycin  12/06/2017    Rash Lactose  11/20/2017    Nausea and Vomiting  
 intolerance Penicillins  05/24/2012    Rash Rocephin [Ceftriaxone]  03/01/2018    Rash Sulfa (Sulfonamide Antibiotics)  05/24/2012    Swelling Current Immunizations  Never Reviewed No immunizations on file. Not reviewed this visit You Were Diagnosed With   
  
 Codes Comments Intestinal malabsorption, unspecified type    -  Primary ICD-10-CM: K90.9 ICD-9-CM: 579.9 S/P laparoscopic sleeve gastrectomy     ICD-10-CM: S39.16 ICD-9-CM: V45.86 Vitals BP Pulse Resp Height(growth percentile) Weight(growth percentile) SpO2  
 123/79 (BP 1 Location: Left arm, BP Patient Position: Sitting) 97 16 5' 8\" (1.727 m) 234 lb 9.6 oz (106.4 kg) 100% BMI OB Status Smoking Status 35.67 kg/m2 IUD Never Smoker Vitals History BMI and BSA Data Body Mass Index Body Surface Area  
 35.67 kg/m 2 2.26 m 2 Preferred Pharmacy Pharmacy Name Phone [facility] [address] [address] [place] [address] AT THE [facility] OF [address] [place] [address] & [facility] [phone] Your Updated Medication List  
  
   
This list is accurate as of 7/31/18 12:11 PM.  Always use your most recent med list.  
  
  
  
  
 albuterol 90 mcg/actuation inhaler Commonly known as:  PROVENTIL HFA, VENTOLIN HFA, PROAIR HFA Take 2 Puffs by inhalation every four (4) hours as needed. atorvastatin 40 mg tablet Commonly known as:  LIPITOR Take 40 mg by mouth nightly. Biotin 2,500 mcg Cap Take  by mouth. butalbital-acetaminophen-caffeine -40 mg per tablet Commonly known as:  Ardell Push Take 1 Tab by mouth daily as needed. Max 2 per day CALCIUM 600 + D 600-125 mg-unit Tab Generic drug:  calcium-cholecalciferol (d3) Take  by mouth. Iron 160 mg (50 mg iron) Tber tablet Generic drug:  ferrous sulfate ER Take 1 Tab by mouth daily. MIRENA 20 mcg/24 hr (5 years) Iud  
Generic drug:  levonorgestrel 1 Device by IntraUTERine route once. multivitamins Chew Take  by mouth. omeprazole 40 mg capsule Commonly known as:  PRILOSEC  
one capsule oral route daily PROBIOTIC 4X 10-15 mg Tbec Generic drug:  B.infantis-B.ani-B.long-B.bifi Take  by mouth. Follow-up Instructions Return in about 2 months (around 9/30/2018). To-Do List   
 07/31/2018 Lab:  FERRITIN   
  
 07/31/2018 Lab:  IRON   
  
 07/31/2018 Lab:  VITAMIN B1, WHOLE BLOOD   
  
 07/31/2018 Lab:  VITAMIN B12 & FOLATE   
  
 07/31/2018 Lab:  VITAMIN D, 25 HYDROXY Patient Instructions Patient Instructions 1. Remember hydration goals - minimum of 64 ounces of liquids per day (dehydration is the number one reason for hospital readmission). 2. Continue to monitor carbohydrate and protein intake you need a minimum of  Grams of protein daily- remember to keep your total carbohydrates to 50 grams or less per day for best results. 3. Continue to work towards exercise goals - 60-90 minutes, 5 times a week minimum of deliberate, aerobic exercise is the ultimate goal with strength training 2 times each week. Refer to [employer] for  information. 4. Remember to take vitamins as directed in your handbook. 5. Attend support group the 2nd Thursday of each month. 6. Constipation: Milk of Magnesia is for immediate relief. Miralax is to be used every day if constipation is a chronic problem. 7. Diarrhea: patients will occasionally develop lactose intolerance after surgery. Check to see if your protein shake has whey in it. If it does try one that does not have whey and stop all yogurts, cheeses and milks to see if the diarrhea goes away. 8. Call us at [phone] or email us through [place]" with questions,     concerns or worsening of condition, we have someone on call 24 hours a day. If you are unable to reach our office, you are to go to your Primary Care Physician or the Emergency Department. Supplement Resource Guide Importance of Protein:  
Maintains lean body mass, produces antibodies to fight off infections, heals wounds, minimizes hair loss, helps to give you energy, helps with satiety, and keeping you full between meals. Importance of Calcium: 
Needed for healthy bones and teeth, normal blood clotting, and nervous system functioning, higher risk of osteoporosis and bone disease with non-compliance. Importance of Multivitamins: Many functions. Supply you with extra nutrients that you may be missing from food. May lead to iron deficiency anemia, weakness, fatigue, and many other symptoms with non-compliance. Importance of B Vitamins: 
Important for red blood cell formation, metabolism, energy, and helps to maintain a healthy nervous system. Protein Supplement Find one you like now. Use immediately after surgery. Look for: 
35-50g protein each day from your protein supplement once you reach the progression diet. 0-3 g fat per serving 0-3 g sugar per serving Protein drinks should be split in separate dosages. Recommend: Lifelong 1 year + Calcium Supplement:  
 
Start taking within a month after surgery. Look for: Calcium Citrate Plus D (1500 mg per day) Recommend: Citracal 
 
 . Avoid chocolate chewable calcium. Can use chewable bariatric or [employer]NC brand or similar chewable. The body cannot absorb more than 500-600 mg of calcium at a time. Take for Life Multi-vitamin Supplement:   
 
Start immediately after surgery: any complete chewable, such as: [employer]s Complete chewables. Avoid [employer] sours or gummies. They lack iron and other important nutrients and also have added sugar. Continue with chewable vitamin or change to adult complete multivitamin one month after surgery. Menstruating women can take a prenatal vitamin. Make sure has at least 18 mg iron and 304-724 mcg folic acid Vitamin B12, B Complex Vitamin, and Biotin Start taking within a month after surgery. Vitamin B12:  1000 mcg of Vitamin B12 three times weekly Must take sublingually (meaning you take it under your tongue) or in a liquid drop form for easy absorption. B Complex Vitamin: Take a pill or liquid drop form once daily. Biotin: This vitamin can help prevent hair loss. Recommend 5mg  
(5000 mcg) a day Biotin is Optional  
 
 
 
 
 
 
  
Introducing [facility]! Trace Fernandez introduces SecureAuth patient portal. Now you can access parts of your medical record, email your doctor's office, and request medication refills online. 1. In your internet browser, go to [email or web address] 2. Click on the First Time User? Click Here link in the Sign In box. You will see the New Member Sign Up page. 3. Enter your SecureAuth Access Code exactly as it appears below. You will not need to use this code after youve completed the sign-up process. If you do not sign up before the expiration date, you must request a new code. · SecureAuth Access Code: [email or web address] Expires: 10/28/2018 12:39 PM 
 
4. Enter the last four digits of your Social Security Number (xxxx) and Date of Birth (mm/dd/yyyy) as indicated and click Submit. You will be taken to the next sign-up page. 5. Create a SecureAuth ID. This will be your SecureAuth login ID and cannot be changed, so think of one that is secure and easy to remember. 6. Create a SecureAuth password. You can change your password at any time. 7. Enter your Password Reset Question and Answer. This can be used at a later time if you forget your password. 8. Enter your e-mail address. You will receive e-mail notification when new information is available in [address] 9. Click Sign Up. You can now view and download portions of your medical record. 10. Click the Download Summary menu link to download a portable copy of your medical information. If you have questions, please visit the Frequently Asked Questions section of the [employer] website. Remember, [employer] is NOT to be used for urgent needs. For medical emergencies, dial 911. Now available from your iPhone and Android! Please provide this summary of care documentation to your next provider. Your primary care clinician is listed as Estela Hawk. If you have any questions after today's visit, please call [phone].

## 2018-07-31 NOTE — PROGRESS NOTES
Subjective:      Jesse Ramirez is a 25 y.o. female is now 4 months status post laparoscopic sleeve gastrectomy. Doing well overall. She has lost a total of 39 pounds since surgery. Body mass index is 35.67 kg/(m^2). Has lost 30% of EBW. Currently on a solid food diet without difficulty, reports no issues and denies vomiting and abdominal pain. Taking in an unknown amount of water daily. Sources of protein include eggs, steak and ground chicken.  min of activity 7 days a week, including anirudh and weight training. Patient states she has been under a tremendous amount of stress. She has two special needs children and her great aunt just had a stroke and she is her aunts primary caregiver. She states that her mom is moving here this coming weekend to help alleviate some of the responsibility. Bowel movements are constipated, has been using Miralax with relief. The patient is not having any pain. . The patient is compliant with multivitamins, calcium, Vit D and B12 supplements.      Weight Loss Metrics 7/31/2018 7/30/2018 5/18/2018 4/17/2018 4/3/2018 3/21/2018 3/20/2018   Today's Wt 234 lb 9.6 oz 236 lb 237 lb 12.8 oz 247 lb 1.6 oz 250 lb 1.6 oz 268 lb 4 oz -   BMI 35.67 kg/m2 35.88 kg/m2 36.16 kg/m2 37.57 kg/m2 38.03 kg/m2 - 40.79 kg/m2          Comorbidities:    Hypertension: not applicable  Diabetes: improved, off meds, has not been checking blood glucose levels  Obstructive Sleep Apnea: not applicable  Hyperlipidemia: unchanged, on statin  Stress Urinary Incontinence: resolved  Gastroesophageal Reflux: unchanged, on PPI  Weight related arthropathy:unchanged     Patient Active Problem List   Diagnosis Code    Morbid obesity ([facility] [address] E66.01    Hyperlipemia E78.5    Gastroparesis K31.84    Asthma J45.909    IUD (intrauterine device) in place Z80.11    FH: ovarian cancer Z80.41    Intestinal malabsorption K90.9    S/P laparoscopic sleeve gastrectomy Z98.84    GERD (gastroesophageal reflux disease) K21.9        Past Medical History:   Diagnosis Date    Asthma     Diabetes ([facility] [address]     dx 2 years ago    Gastroparesis     suspected    GERD (gastroesophageal reflux disease)     Hyperlipemia     Morbid obesity ([facility] [address]     Morbid obesity with BMI of 40.0-44.9, adult ([facility]     Ovarian cyst     Psychiatric disorder     anxiety, PTSD    PUD (peptic ulcer disease)        Past Surgical History:   Procedure Laterality Date    HX  SECTION   &        Current Outpatient Prescriptions   Medication Sig Dispense Refill    ferrous sulfate ER (IRON) 160 mg (50 mg iron) TbER tablet Take 1 Tab by mouth daily.  calcium-cholecalciferol, d3, (CALCIUM 600 + D) 600-125 mg-unit tab Take  by mouth.  Biotin 2,500 mcg cap Take  by mouth.  B.infantis-B.ani-B.long-B.bifi (PROBIOTIC 4X) 10-15 mg TbEC Take  by mouth.  multivitamins chew Take  by mouth.  omeprazole (PRILOSEC) 40 mg capsule one capsule oral route daily  2    butalbital-acetaminophen-caffeine (FIORICET, ESGIC) -40 mg per tablet Take 1 Tab by mouth daily as needed. Max 2 per day  3    albuterol (PROVENTIL HFA, VENTOLIN HFA, PROAIR HFA) 90 mcg/actuation inhaler Take 2 Puffs by inhalation every four (4) hours as needed.  atorvastatin (LIPITOR) 40 mg tablet Take 40 mg by mouth nightly.  levonorgestrel (MIRENA) 20 mcg/24 hr (5 years) IUD 1 Device by IntraUTERine route once.          Allergies   Allergen Reactions    Latex Rash    Mushroom Anaphylaxis    Amoxicillin Rash    Doxycycline Swelling    Erythromycin Rash    Lactose Nausea and Vomiting     intolerance    Penicillins Rash    Rocephin [Ceftriaxone] Rash    Sulfa (Sulfonamide Antibiotics) Swelling       Review of Systems:  General - No history or complaints of unexpected fever or chills  Head/Neck - No history or complaints of headache or dizziness  Cardiac - No history or complaints of chest pain, palpitations, or shortness of breath  Pulmonary - No history or complaints of shortness of breath or productive cough  Gastrointestinal - as noted above  Genitourinary - No history or complaints of hematuria/dysuria or renal lithiasis  Musculoskeletal - No history or complaints of joint  muscular weakness  Hematologic - No history of any bleeding episodes  Neurologic - No history or complaints of  migraine headaches or neurologic symptoms    Objective:     Visit Vitals    /79 (BP 1 Location: Left arm, BP Patient Position: Sitting)    Pulse 97    Resp 16    Ht 5' 8\" (1.727 m)    Wt 106.4 kg (234 lb 9.6 oz)    SpO2 100%    BMI 35.67 kg/m2       General:  alert, cooperative, no distress, appears stated age   Chest: lungs clear to auscultation, breath sounds equal and symmetric, no rhonchi, rales or wheezes, no accessory muscle use   Cor:   Regular rate and rhythm or without murmur or extra heart sounds   Abdomen: soft, bowel sounds active, non-tender, no masses or organomegaly   Incisions:   healing well, no drainage, no erythema, no hernia, no seroma, no swelling, no dehiscence, incision well approximated       Assessment:   History of Morbid obesity, status post laparoscopic sleeve gastrectomy. Doing well postoperatively. Hyperlipidemia - continue follow up care with PCP  DM - monitor blood glucose levels and follow up with PCP  GERD - PPI  Psychosocial stress - psychologist  Keep log of fluid intake to make sure she is drinking at least 64oz daily. Plan:     1. Follow up labs as ordered  2. Discussed patients weight loss goals and dietary choices in relation to goals. 3. Reminded to measure portions, continue high protein, low carbohydrate diet. Reminded to eat regularly, to eat slowly & not to drink with meals. 4. Continue vitamin supplementation  5. Continue current medications and follow up with PCP for management of regimen. 6. Continue cardio exercise and add resistance exercises.  60-90 minutes of aerobic activity 5 days a week and strength training 2 days each week. 7. Encouraged to attend support group   8. Patient to complete labs before next visit. Lab slip given today. 9. I have discussed this plan with patient and they verbalized understanding  10. Follow up in 2 months or sooner if patient has questions, concerns or worsening of condition, if unable to reach our office, patient should report to the ED. 6. Ms. Renea Asencio has a reminder for a \"due or due soon\" health maintenance. I have asked that she contact her primary care provider for a follow-up on this health maintenance.

## 2018-07-31 NOTE — PATIENT INSTRUCTIONS
Patient Instructions      1. Remember hydration goals - minimum of 64 ounces of liquids per day (dehydration is the number one reason for hospital readmission). 2. Continue to monitor carbohydrate and protein intake you need a minimum of  Grams of protein daily- remember to keep your total carbohydrates to 50 grams or less per day for best results. 3. Continue to work towards exercise goals - 60-90 minutes, 5 times a week minimum of deliberate, aerobic exercise is the ultimate goal with strength training 2 times each week. Refer to [employer] for  information. 4. Remember to take vitamins as directed in your handbook. 5. Attend support group the 2nd Thursday of each month. 6. Constipation: Milk of Magnesia is for immediate relief. Miralax is to be used every day if constipation is a chronic problem. 7. Diarrhea: patients will occasionally develop lactose intolerance after surgery. Check to see if your protein shake has whey in it. If it does try one that does not have whey and stop all yogurts, cheeses and milks to see if the diarrhea goes away. 8. Call us at [phone] or email us through [place]" with questions,     concerns or worsening of condition, we have someone on call 24 hours a day. If you are unable to reach our office, you are to go to your Primary Care Physician or the Emergency Department. Supplement Resource Guide    Importance of Protein:   Maintains lean body mass, produces antibodies to fight off infections, heals wounds, minimizes hair loss, helps to give you energy, helps with satiety, and keeping you full between meals. Importance of Calcium:  Needed for healthy bones and teeth, normal blood clotting, and nervous system functioning, higher risk of osteoporosis and bone disease with non-compliance. Importance of Multivitamins: Many functions.   Supply you with extra nutrients that you may be missing from food. May lead to iron deficiency anemia, weakness, fatigue, and many other symptoms with non-compliance. Importance of B Vitamins:  Important for red blood cell formation, metabolism, energy, and helps to maintain a healthy nervous system. Protein Supplement  Find one you like now. Use immediately after surgery. Look for:  35-50g protein each day from your protein supplement once you reach the progression diet. 0-3 g fat per serving  0-3 g sugar per serving    Protein drinks should be split in separate dosages. Recommend: Lifelong  1 year + Calcium Supplement:     Start taking within a month after surgery. Look for: Calcium Citrate Plus D (1500 mg per day)  Recommend: Citracal     .            Avoid chocolate chewable calcium. Can use chewable bariatric or [employer]NC brand or similar chewable. The body cannot absorb more than 500-600 mg of calcium at a time. Take for Life Multi-vitamin Supplement:      Start immediately after surgery: any complete chewable, such as: [place] [employer]s Complete chewables. Avoid [place] sours or gummies. They lack iron and other important nutrients and also have added sugar. Continue with chewable vitamin or change to adult complete multivitamin one month after surgery. Menstruating women can take a prenatal vitamin. Make sure has at least 18 mg iron and 161-778 mcg folic acid   Vitamin R83, B Complex Vitamin, and Biotin  Start taking within a month after surgery. Vitamin B12:  1000 mcg of Vitamin B12 three times weekly    Must take sublingually (meaning you take it under your tongue) or in a liquid drop form for easy absorption. B Complex Vitamin: Take a pill or liquid drop form once daily. Biotin: This vitamin can help prevent hair loss.     Recommend 5mg   (5000 mcg) a day  Biotin is Optional

## 2018-08-01 LAB
A-G RATIO,AGRAT: 1.2 RATIO (ref 1.1–2.6)
ABSOLUTE LYMPHOCYTE COUNT, 10803: 3.6 K/UL (ref 1–4.8)
ALBUMIN SERPL-MCNC: 4.3 G/DL (ref 3.5–5)
ALP SERPL-CCNC: 113 U/L (ref 25–115)
ALT SERPL-CCNC: 13 U/L (ref 5–40)
AST SERPL W P-5'-P-CCNC: 14 U/L (ref 10–37)
BASOPHILS # BLD: 0 K/UL (ref 0–0.2)
BASOPHILS NFR BLD: 0 % (ref 0–2)
BILIRUB SERPL-MCNC: 0.3 MG/DL (ref 0.2–1.2)
BILIRUBIN, DIRECT,CBIL: <0.2 MG/DL (ref 0–0.3)
EOSINOPHIL # BLD: 0.1 K/UL (ref 0–0.5)
EOSINOPHIL NFR BLD: 1 % (ref 0–6)
ERYTHROCYTE [DISTWIDTH] IN BLOOD BY AUTOMATED COUNT: 15.1 % (ref 10–15.5)
GLOBULIN,GLOB: 3.5 G/DL (ref 2–4)
GRANULOCYTES,GRANS: 55 % (ref 40–75)
HBV SURFACE AB SER RIA-ACNC: DETECTED
HCT VFR BLD AUTO: 45 % (ref 35.1–46.5)
HEP B CORE AB, TOTAL, 006718: NORMAL
HEP B SURFACE AG SCRN, 006510: NORMAL
HGB BLD-MCNC: 14.5 G/DL (ref 11.7–15.5)
LYMPHOCYTES, LYMLT: 38 % (ref 20–45)
MCH RBC QN AUTO: 33 PG (ref 26–34)
MCHC RBC AUTO-ENTMCNC: 32 G/DL (ref 31–36)
MCV RBC AUTO: 103 FL (ref 80–95)
MONOCYTES # BLD: 0.5 K/UL (ref 0.1–1)
MONOCYTES NFR BLD: 5 % (ref 3–12)
NEUTROPHILS # BLD AUTO: 5.1 K/UL (ref 1.8–7.7)
PLATELET # BLD AUTO: 436 K/UL (ref 140–440)
PMV BLD AUTO: 10.6 FL (ref 9–13)
PROT SERPL-MCNC: 7.8 G/DL (ref 6.4–8.3)
RBC # BLD AUTO: 4.39 M/UL (ref 3.8–5.2)
WBC # BLD AUTO: 9.3 K/UL (ref 4–11)

## 2018-08-02 LAB
ACTIN (SMOOTH MUSCLE) ANTIBODY, 006644: 19 UNITS
ANA HOMOGENEOUS, 8012: NEGATIVE TITER
ANA NUCLEOLAR, 8013: NEGATIVE TITER
ANA PERIPHERAL, 8014: NEGATIVE TITER
ANA SPECKLED, 8011: NEGATIVE TITER
CENTROMERE ANTIBODIES, 8254: NEGATIVE TITER
HEP A AB, TOTAL, 006726: NEGATIVE
MISCELLANEOUS TEST,99000: NORMAL
SENT TO, 434: NORMAL
TEST NAME, 435: NORMAL

## 2018-10-23 PROBLEM — R79.89 ELEVATED PROLACTIN LEVEL: Status: ACTIVE | Noted: 2018-10-23

## 2018-11-09 ENCOUNTER — HOSPITAL ENCOUNTER (OUTPATIENT)
Dept: MRI IMAGING | Age: 28
Discharge: HOME | End: 2018-11-09
Attending: OBSTETRICS & GYNECOLOGY
Payer: MEDICAID

## 2018-11-09 DIAGNOSIS — R79.89 ELEVATED PROLACTIN LEVEL: ICD-10-CM

## 2018-11-09 PROCEDURE — 74011250636 HC RX REV CODE- 250/636: Performed by: OBSTETRICS & GYNECOLOGY

## 2018-11-09 PROCEDURE — A9577 INJ MULTIHANCE: HCPCS | Performed by: OBSTETRICS & GYNECOLOGY

## 2018-11-09 PROCEDURE — 70553 MRI BRAIN STEM W/O & W/DYE: CPT

## 2018-11-09 RX ORDER — GADOTERATE MEGLUMINE 376.9 MG/ML
10 INJECTION INTRAVENOUS
Status: DISCONTINUED | OUTPATIENT
Start: 2018-11-09 | End: 2018-11-09

## 2018-11-09 RX ADMIN — GADOBENATE DIMEGLUMINE 10 ML: 529 INJECTION, SOLUTION INTRAVENOUS at 12:14

## 2019-02-19 ENCOUNTER — DOCUMENTATION ONLY (OUTPATIENT)
Dept: SURGERY | Age: 29
End: 2019-02-19

## 2019-02-19 ENCOUNTER — TELEPHONE (OUTPATIENT)
Dept: SURGERY | Age: 29
End: 2019-02-19

## 2019-02-19 NOTE — TELEPHONE ENCOUNTER
Called patient in response to voicemail left by her spouse. Pt seeking opinion about requiring Rx Cytotec following steriodal therapy. Patient advised no Cytotec needed due to Hx Sleeve gastrectomy. Pt. verbalized understanding.

## 2019-02-19 NOTE — PROGRESS NOTES
Per [facility] requirements;  E-mail and letter sent for follow up appointment. [facility] [place]  [facility]  [facility]      Dear Patient,    Your health is our main concern. It is important for your health to have follow-up lab work and to see you surgeon at 2 months, 4 months, 6 months, 9 months and annually after your weight loss surgery. Additionally, the Department of Bariatric Surgery at our hospital is a member of the [employer] [address] Surgical Quality Improvement Program ([facility] NSQIP). As a participant in this program, we gather information on the outcomes of our patients after surgery. Please call the office for a follow up appointment at [phone]. If you have moved out of the area or have changed surgeons please call us and let us know the name of your doctor. Your health and feedback are important to us. We greatly appreciate your response.        Thank you,  [facility]

## 2019-02-19 NOTE — LETTER
[facility] [place] [facility] 
 
 
Dear Patient, Your health is our main concern. It is important for your health to have follow-up lab work and to see you surgeon at 2 months, 4 months, 6 months, 9 months and annually after your weight loss surgery. Additionally, the Department of Bariatric Surgery at our hospital is a member of the [employer] [address] Surgical Quality Improvement Program ([facility] NSQIP). As a participant in this program, we gather information on the outcomes of our patients after surgery. Please call the office for a follow up appointment at [phone]. If you have moved out of the area or have changed surgeons please call us and let us know the name of your doctor. Your health and feedback are important to us. We greatly appreciate your response. Thank you, [facility] [place] Arna Sol

## 2019-08-01 ENCOUNTER — HOSPITAL ENCOUNTER (OUTPATIENT)
Dept: MRI IMAGING | Age: 29
Discharge: HOME | End: 2019-08-01
Attending: ORTHOPAEDIC SURGERY
Payer: MEDICAID

## 2019-08-01 DIAGNOSIS — M79.18 MYOFASCIAL PAIN SYNDROME: ICD-10-CM

## 2019-08-01 DIAGNOSIS — M54.12 CERVICAL RADICULOPATHY: ICD-10-CM

## 2019-08-01 PROCEDURE — 72141 MRI NECK SPINE W/O DYE: CPT

## 2019-12-18 NOTE — DIABETES MGMT
Diabetes Patient/Family Education Record    Factors That  May Influence Patients Ability  to Learn or  Comply with Recommendations   []   Language barrier    []   Cultural needs   []   Motivation    []   Cognitive limitation    []   Physical   []   Education    []   Physiological factors   []   Hearing/vision/speaking impairment   []   Hinduism beliefs    []   Financial factors   []  Other:   [x]  No factors identified at this time.      Person Instructed:   [x]   Patient   [x]   Family ()   []  Other     Preference for Learning:   [x]   Verbal   []   Written   []  Demonstration     Level of Comprehension & Competence:    [x]  Good                                      [] Fair                                     []  Poor                             []  Needs Reinforcement   [x]  Teachback completed    Education Component:   [x]  Medication management, including confirmation of home regimen; follow up with PCP to assess continued need for DM medications    [x]  Nutritional management: fluctuations in BG as intake changes   []  Exercise   []  Signs, symptoms, and treatment of hyperglycemia and hypoglycemia   [] Prevention, recognition and treatment of hyperglycemia and hypoglycemia   [x]  Importance of blood glucose monitoring; SMBG daily after d/c    []  Instruction on use of the blood glucose meter   [x]  Discuss the importance of HbA1C monitoring    []  Sick day guidelines   []  Proper use and disposal of lancets, needles, syringes or insulin pens (if appropriate)   []  Potential long-term complications (retinopathy, kidney disease, neuropathy, foot care)   [] Information about whom to contact in case of emergency or for more information    [x]  Goal:  Patient/family will demonstrate understanding of Diabetes Self Management Skills by: 4/30/18  Plan for post-discharge education or self-management support:    [] Outpatient class schedule provided            [] Patient Declined    [] Your health care Provider has recommended that you receive the new Shingle vaccine called Shingrix to prevent shingles for ages 50 and above. Many private insurance and Medicare Part D plans cover Shingrix. However, not all insurance carriers cover the entire cost of the Shingrix vaccine if the vaccine is administered at your primary care clinic. The clinic cannot determine your insurance benefits.  Please call your insurance carrier prior. The vaccine comes in two doses. Your second dose should be 2-6 months from your first dose.       Prior to receiving the vaccine, we recommend that you call your insurance carrier and ask them the following questions:            1. Is there a cost difference if I receive the vaccine at my doctor's office or a pharmacy?          2. Does my insurance cover the Shingrix Vaccine and administration of the vaccine?          3. What is my co-pay or deductible for the vaccine?        Please call to schedule a Nurse-Visit only at [phone]. Nurse Visit hours are available Monday, Wednesday, and Friday from 9:00 AM-11:00 AM and 1:00 PM-3:00 PM.        Scheduled for outpatient classes (date) _______           Cheyanne Camarena RN, MS  Glycemic Control Team  Pager [phone] (M-TH 8:30-5P)  *After Hours pager [phone]

## 2020-03-03 ENCOUNTER — DOCUMENTATION ONLY (OUTPATIENT)
Dept: SURGERY | Age: 30
End: 2020-03-03

## 2020-03-03 NOTE — PROGRESS NOTES
Per [facility] requirements;  E-mail and letter sent for follow up appointment. [facility] [place]  [facility]  [facility]      Dear Patient,    Your health is our main concern. It is important for your health to have follow-up lab work and to see you surgeon at 2 months, 4 months, 6 months, 9 months and annually after your weight loss surgery. Additionally, the Department of Bariatric Surgery at our hospital is a member of the [employer] [address] Surgical Quality Improvement Program ([facility] NSQIP). As a participant in this program, we gather information on the outcomes of our patients after surgery. Please call the office for a follow up appointment at [phone]. If you have moved out of the area or have changed surgeons please call us and let us know the name of your doctor. Your health and feedback are important to us. We greatly appreciate your response.        Thank you,  [facility]

## 2020-03-03 NOTE — LETTER
[facility] [place] [facility] 
 
 
Dear Patient, Your health is our main concern. It is important for your health to have follow-up lab work and to see you surgeon at 2 months, 4 months, 6 months, 9 months and annually after your weight loss surgery. Additionally, the Department of Bariatric Surgery at our hospital is a member of the [address] Surgical Quality Improvement Program ([facility] NSQIP). As a participant in this program, we gather information on the outcomes of our patients after surgery. Please call the office for a follow up appointment at [phone]. If you have moved out of the area or have changed surgeons please call us and let us know the name of your doctor. Your health and feedback are important to us. We greatly appreciate your response. Thank you, [facility] [place] Frandy amaya

## 2020-04-09 ENCOUNTER — VIRTUAL VISIT (OUTPATIENT)
Dept: SURGERY | Age: 30
End: 2020-04-09

## 2020-04-09 VITALS — WEIGHT: 217 LBS | BODY MASS INDEX: 32.89 KG/M2 | HEIGHT: 68 IN

## 2020-04-09 DIAGNOSIS — K90.9 INTESTINAL MALABSORPTION, UNSPECIFIED TYPE: Primary | ICD-10-CM

## 2020-04-09 NOTE — PROGRESS NOTES
Conversion Surgery Consultation        Sleeve Resection 3/20/2018 via this office        Pre-op wt - 274 lbs / EBW - 131 lbs    Subjective: The patient is a 26 y.o. obese female with a Body mass index is 35.38 kg/m². .  The patient had a sleeve resection procedure done approximatly 2 years ago by myself.  her starting weight prior to surgery was 274 lbs. the patient initially lost down to 206 pounds but has had a weight regain of about 11 pounds. Her peak excess body weight loss was 52% and now she is at 44% her last bariatric follow-up was in July 2018 with BHUMIKA Jensen . Diane Lara notes that she had no issues in the immediate post-op phase and had no hospital readmissions in the remote post-op phase. she currently is having the following issues related to her health: weight regain and poor weight loss initially. she is here today to discuss a possible conversion of her sleeve to a gastric bypass because of weight gain. .     All of their prior evaluations available by both their PCP's and specialists physicians have been reviewed today either in the Care Everywhere portal or scanned under the media tab. I have spent a large portion of my initial consultation today reviewing the patients current dietary habits which have contributed to their health issues, weight regain and  their current obesity. They understand that generally speaking,  weight regain is  a function of resuming less that ideal dietary habits instead of being a procedural issue. I have suggested to them personally a dietary regimen that they can initiate now to help with their status as it pertains to their weight. They understand that the most important aspect of their journey through their weight loss endeavor will be their adherence to a new lifestyle of healthy eating behavior.  They also understand that an adherence to an exercise program will not only help with weight loss but is ultimately important in weight maintenance. Patient Active Problem List    Diagnosis Date Noted    Elevated prolactin level ([facility] [address] 10/23/2018    GERD (gastroesophageal reflux disease) 2018    Intestinal malabsorption 2018    S/P laparoscopic sleeve gastrectomy 2018    IUD (intrauterine device) in place 2018    FH: ovarian cancer 2018    Asthma     Morbid obesity ([facility] [address]     Hyperlipemia     Gastroparesis       Past Surgical History:   Procedure Laterality Date    HX  SECTION   &     HX GASTRIC BYPASS  2018    Sleeve      Social History     Tobacco Use    Smoking status: Never Smoker    Smokeless tobacco: Never Used   Substance Use Topics    Alcohol use: No      Family History   Problem Relation Age of Onset   [facility] Migraines Mother     Seizures Mother     Other Father         graves ds,     Sickle Cell Trait Brother     Other Brother     Asthma Sister     Other Sister         autism    Other Daughter         cp, epilepsy    Asthma Daughter       Current Outpatient Medications   Medication Sig Dispense Refill    ALPRAZolam (XANAX) 0.5 mg tablet Take 0.5 mg by mouth.  DULoxetine (CYMBALTA) 30 mg capsule Take 30 mg by mouth.  cyclobenzaprine (FLEXERIL) 10 mg tablet       traMADol (ULTRAM) 50 mg tablet TAKE 1 TABLET EVERY 6 HOURS AS NEEDED  0    gabapentin (NEURONTIN) 100 mg capsule TAKE 1 CAPSULE BY MOUTH 3 TIMES A DAY  0    Blood-Glucose Meter monitoring kit 1 Units by Other route.  clindamycin (CLEOCIN) 300 mg capsule TK 1 C PO TID  0    EPINEPHrine (EPIPEN) 0.3 mg/0.3 mL injection 0.3 mg by IntraMUSCular route.  mupirocin calcium (BACTROBAN) 2 % topical cream APPLY ON THE SKIN TID  0    amitriptyline (ELAVIL) 50 mg tablet 50 mg.      glucose blood VI test strips (ASCENSIA AUTODISC VI, ONE TOUCH ULTRA TEST VI) strip 50 Each.       diclofenac (VOLTAREN) 1 % gel APPLY 2 GRAMS AA Q 8 H PRN P  0    ergocalciferol (ERGOCALCIFEROL) 50,000 unit capsule TAKE 1 CAPSULE BY MOUTH EVERY 7 DAYS      glipiZIDE SR (GLUCOTROL XL) 5 mg CR tablet TAKE 1 TABLET BY MOUTH EVERY DAY      Lancets misc 1 Each.  metFORMIN (GLUMETZA ER) 500 mg TG24 24 hour tablet TAKE 1 TABLET BY MOUTH EVERY DAY      atorvastatin (LIPITOR) 40 mg tablet 40 mg.      butalbital-acetaminophen-caffeine (FIORICET, ESGIC) -40 mg per tablet TAKE 1 TABLET BY MOUTH EVERY DAY AS NEEDED, MAY REPEAT IN 2 HOURS, MAX 2 PER DAY, 5 PER WEEK      omeprazole (PRILOSEC) 40 mg capsule TK ONE C PO  30 MINUTES BEFORE BREAKFAST. NEED APPOINTMENT      levonorgestrel (MIRENA) 20 mcg/24 hr (5 years) IUD 1 Device.  ferrous sulfate ER (IRON) 160 mg (50 mg iron) TbER tablet Take 1 Tab by mouth daily.  calcium-cholecalciferol, d3, (CALCIUM 600 + D) 600-125 mg-unit tab Take  by mouth.  Biotin 2,500 mcg cap Take  by mouth.  B.infantis-B.ani-B.long-B.bifi (PROBIOTIC 4X) 10-15 mg TbEC Take  by mouth.  multivitamins chew Take  by mouth.  omeprazole (PRILOSEC) 40 mg capsule one capsule oral route daily  2    butalbital-acetaminophen-caffeine (FIORICET, ESGIC) -40 mg per tablet Take 1 Tab by mouth daily as needed. Max 2 per day  3    albuterol (PROVENTIL HFA, VENTOLIN HFA, PROAIR HFA) 90 mcg/actuation inhaler Take 2 Puffs by inhalation every four (4) hours as needed.  atorvastatin (LIPITOR) 40 mg tablet Take 40 mg by mouth nightly.  levonorgestrel (MIRENA) 20 mcg/24 hr (5 years) IUD 1 Device by IntraUTERine route once.        Allergies   Allergen Reactions    Latex Rash, Itching and Swelling    Doxycycline Swelling     Throat swelling    Mushroom Anaphylaxis     Other reaction(s): anaphylaxis/angioedema    Amoxicillin Rash    Erythromycin Rash    Lactose Nausea and Vomiting     intolerance  Other reaction(s): gi distress  intolerance    Penicillins Rash and Itching     Other reaction(s): unknown    Rocephin [Ceftriaxone] Rash    Sulfa (Sulfonamide Antibiotics) Swelling, Itching and Unknown (comments)          Review of Systems:        General - No history or complaints of unexpected fever, chills, or weight loss  Head/Neck - No history or complaints of headache, diplopia, dysphagia, hearing loss  Cardiac - No history or complaints of chest pain, palpitations, murmur, or shortness of breath  Pulmonary - No history or complaints of shortness of breath, productive cough, hemoptysis  Gastrointestinal - No history or complaints of reflux,  abdominal pain, obstipation/constipation, blood per rectum  Genitourinary - No history or complaints of hematuria/dysuria, stress urinary incontinence symptoms, or renal lithiasis  Musculoskeletal - No history or complaints of joint pain or muscular weakness  Hematologic - No history or complaints of bleeding disorders, blood transfusions, sickle cell anemia  Neurologic - No history or complaints of  migraine headaches, seizure activity, syncopal episodes, TIA or stroke  Integumentary - No history or complaints of rashes, abnormal nevi, skin cancer  Gynecological - No history of heavy menses/abnormal menses  Objective:     Visit Vitals  Ht 5' 7.75\" (1.721 m)   BMI 35.38 kg/m²       Physical Examination: General appearance - alert, well appearing, and in no distress and oriented to person, place, and time  Mental status - alert, oriented to person, place, and time, normal mood, behavior, speech, dress, motor activity, and thought processes  Eyes - pupils equal and reactive, extraocular eye movements intact, sclera anicteric, left eye normal, right eye normal  Ears - right ear normal, left ear normal  Neck- good extension and flexion, no obvious swelling  Chest - good air movement  Heart - N/A  Abdomen - no obvious distension, scars as noted:   Neurological - alert, oriented, normal speech, no focal findings or movement disorder noted  Musculoskeletal - no swelling noted  Extremities - normal movement      Labs / Old Records:     Lab Results   Component Value Date/Time    WBC 9.3 07/30/2018 12:55 PM    HGB 14.5 07/30/2018 12:55 PM    HCT 45.0 07/30/2018 12:55 PM    PLATELET 859 88/62/1492 12:55 PM     (H) 07/30/2018 12:55 PM     Lab Results   Component Value Date/Time    Sodium 139 03/12/2018 12:16 PM    Potassium 3.8 03/12/2018 12:16 PM    Chloride 102 03/12/2018 12:16 PM    CO2 25 03/12/2018 12:16 PM    Anion gap 12 03/12/2018 12:16 PM    Glucose 96 03/12/2018 12:16 PM    BUN 10 03/12/2018 12:16 PM    Creatinine 0.78 03/12/2018 12:16 PM    BUN/Creatinine ratio 13 03/12/2018 12:16 PM    GFR est AA >60 03/12/2018 12:16 PM    GFR est non-AA >60 03/12/2018 12:16 PM    Calcium 9.8 03/12/2018 12:16 PM    Bilirubin, total 0.3 07/30/2018 12:55 PM    AST (SGOT) 14 07/30/2018 12:55 PM    Alk. phosphatase 113 07/30/2018 12:55 PM    Protein, total 7.8 07/30/2018 12:55 PM    Albumin 4.3 07/30/2018 12:55 PM    Globulin 3.5 07/30/2018 12:55 PM    A-G Ratio 1.2 07/30/2018 12:55 PM    ALT (SGPT) 13 07/30/2018 12:55 PM     No results found for: IRON, FE, TIBC, IBCT, PSAT, FERR  No results found for: FOL, RBCF  No results found for: VITD3, XQVID2, XQVID3, XQVID, VD3RIA          Old operative reports reviewed if available and are scanned under the media tab or reviewed under Care Everywhere        Assessment:     Morbid obesity status post sleeve resection procedure approximately 2 years ago via this office with complaint of weight regain and poor weight loss initially. Plan: 1. Weight regain-Today in our office I had a lengthy discussion with Edel Dover regarding the nature of their prior procedure. We discussed the anatomical changes to their anatomy and how this relates to  contributing weight regain. She feels like that her initial dietary effort was less than optimal due to her home situation but she feels like it is improving and she will make an effort to improve her diet. She will also increase her activity level considerably.       2.Nutrition-  I have discussed in detail the pitfalls in diet that have contributed to their weight regain and the importance of adhering to a lifelong regimen of dietary goals and proper eating habits. I have discussed the proper lifelong bariatric diet  in detail spending in excess of 20 minutes discussing this. We will schedule them for a dietary consultation with our nutritionist and urge them to continue on a regular follow-up schedule with her. 3.Maintenance vitamins- Today we have discussed the importance of vitamins as it pertains to their procedure and we will obtain appropriate lab to check all levels. They have been provided a handout regarding this today. Total time spent with the patient reviewing their complex history of bariatric surgery,diet, and plan is in excess of 60 minutes. Secondary Diagnoses:         Signed By: BHUMIKA Lieberman     April 9, 2020         This visit with Vikas Rodriguez was performed under virtual telemedicine guidelines during the coronavirus (CYLXD-71) public health emergency on 4/9/2020 in an interactive   fashion using [employer]. They understand that this telemedicine encounter is a billable service, with coverage determined by their insurance carrier. They are aware that   they may receive a bill and have provided verbal consent for this virtual visit. This visit was performed with the patient in their home environment and provider was   present at [facility]. I have spent over 30 minutes on this visit  both prior to the visits reviewing the patients chart and with the patient face to face. I have reviewed their medical history, performed a telemedicine physical examination, and discussed the plan of action to date.   They understand that they will be asked   to come to the office when our office is allowed normal patient interaction, as dictated by public health officials, for a face-to-face visit to rediscuss all of the things we  have talked about today.     Gregg Pierre M.D.  4/9/2020

## 2020-08-14 ENCOUNTER — HOSPITAL ENCOUNTER (EMERGENCY)
Age: 30
Discharge: HOME | End: 2020-08-14
Attending: EMERGENCY MEDICINE
Payer: MEDICAID

## 2020-08-14 ENCOUNTER — APPOINTMENT (OUTPATIENT)
Dept: GENERAL RADIOLOGY | Age: 30
End: 2020-08-14
Attending: EMERGENCY MEDICINE
Payer: MEDICAID

## 2020-08-14 VITALS
DIASTOLIC BLOOD PRESSURE: 71 MMHG | WEIGHT: 220 LBS | TEMPERATURE: 99.4 F | OXYGEN SATURATION: 100 % | BODY MASS INDEX: 33.34 KG/M2 | HEIGHT: 68 IN | SYSTOLIC BLOOD PRESSURE: 130 MMHG | RESPIRATION RATE: 18 BRPM | HEART RATE: 90 BPM

## 2020-08-14 DIAGNOSIS — Z20.822 SUSPECTED COVID-19 VIRUS INFECTION: Primary | ICD-10-CM

## 2020-08-14 LAB
ALBUMIN SERPL-MCNC: 3.7 G/DL (ref 3.4–5)
ALBUMIN/GLOB SERPL: 0.9 {RATIO} (ref 0.8–1.7)
ALP SERPL-CCNC: 108 U/L (ref 45–117)
ALT SERPL-CCNC: 17 U/L (ref 13–56)
AMORPH CRY URNS QL MICRO: ABNORMAL
ANION GAP SERPL CALC-SCNC: 4 MMOL/L (ref 3–18)
APPEARANCE UR: ABNORMAL
AST SERPL-CCNC: 7 U/L (ref 10–38)
B PERT DNA SPEC QL NAA+PROBE: NOT DETECTED
BACTERIA URNS QL MICRO: ABNORMAL /HPF
BASOPHILS # BLD: 0 K/UL (ref 0–0.1)
BASOPHILS NFR BLD: 0 % (ref 0–2)
BILIRUB SERPL-MCNC: 0.3 MG/DL (ref 0.2–1)
BILIRUB UR QL: NEGATIVE
BORDETELLA PARAPERTUSSIS PCR, BORPAR: NOT DETECTED
BUN SERPL-MCNC: 11 MG/DL (ref 7–18)
BUN/CREAT SERPL: 12 (ref 12–20)
C PNEUM DNA SPEC QL NAA+PROBE: NOT DETECTED
CALCIUM SERPL-MCNC: 9 MG/DL (ref 8.5–10.1)
CHLORIDE SERPL-SCNC: 105 MMOL/L (ref 100–111)
CO2 SERPL-SCNC: 28 MMOL/L (ref 21–32)
COLOR UR: YELLOW
CREAT SERPL-MCNC: 0.95 MG/DL (ref 0.6–1.3)
DIFFERENTIAL METHOD BLD: NORMAL
EOSINOPHIL # BLD: 0.1 K/UL (ref 0–0.4)
EOSINOPHIL NFR BLD: 1 % (ref 0–5)
EPITH CASTS URNS QL MICRO: ABNORMAL /LPF (ref 0–5)
ERYTHROCYTE [DISTWIDTH] IN BLOOD BY AUTOMATED COUNT: 12.8 % (ref 11.6–14.5)
FLUAV H1 2009 PAND RNA SPEC QL NAA+PROBE: NOT DETECTED
FLUAV H1 RNA SPEC QL NAA+PROBE: NOT DETECTED
FLUAV H3 RNA SPEC QL NAA+PROBE: NOT DETECTED
FLUAV SUBTYP SPEC NAA+PROBE: NOT DETECTED
FLUBV RNA SPEC QL NAA+PROBE: NOT DETECTED
GLOBULIN SER CALC-MCNC: 4.3 G/DL (ref 2–4)
GLUCOSE SERPL-MCNC: 196 MG/DL (ref 74–99)
GLUCOSE UR STRIP.AUTO-MCNC: 100 MG/DL
HADV DNA SPEC QL NAA+PROBE: NOT DETECTED
HCOV 229E RNA SPEC QL NAA+PROBE: NOT DETECTED
HCOV HKU1 RNA SPEC QL NAA+PROBE: NOT DETECTED
HCOV NL63 RNA SPEC QL NAA+PROBE: NOT DETECTED
HCOV OC43 RNA SPEC QL NAA+PROBE: NOT DETECTED
HCT VFR BLD AUTO: 40.7 % (ref 35–45)
HGB BLD-MCNC: 13.6 G/DL (ref 12–16)
HGB UR QL STRIP: NEGATIVE
HMPV RNA SPEC QL NAA+PROBE: NOT DETECTED
HPIV1 RNA SPEC QL NAA+PROBE: NOT DETECTED
HPIV2 RNA SPEC QL NAA+PROBE: NOT DETECTED
HPIV3 RNA SPEC QL NAA+PROBE: NOT DETECTED
HPIV4 RNA SPEC QL NAA+PROBE: NOT DETECTED
KETONES UR QL STRIP.AUTO: NEGATIVE MG/DL
LEUKOCYTE ESTERASE UR QL STRIP.AUTO: ABNORMAL
LYMPHOCYTES # BLD: 3.5 K/UL (ref 0.9–3.6)
LYMPHOCYTES NFR BLD: 36 % (ref 21–52)
M PNEUMO DNA SPEC QL NAA+PROBE: NOT DETECTED
MCH RBC QN AUTO: 30.2 PG (ref 24–34)
MCHC RBC AUTO-ENTMCNC: 33.4 G/DL (ref 31–37)
MCV RBC AUTO: 90.2 FL (ref 74–97)
MONOCYTES # BLD: 0.6 K/UL (ref 0.05–1.2)
MONOCYTES NFR BLD: 6 % (ref 3–10)
MUCOUS THREADS URNS QL MICRO: ABNORMAL /LPF
NEUTS SEG # BLD: 5.4 K/UL (ref 1.8–8)
NEUTS SEG NFR BLD: 57 % (ref 40–73)
NITRITE UR QL STRIP.AUTO: NEGATIVE
PH UR STRIP: 5.5 [PH] (ref 5–8)
PLATELET # BLD AUTO: 379 K/UL (ref 135–420)
PMV BLD AUTO: 9.2 FL (ref 9.2–11.8)
POTASSIUM SERPL-SCNC: 4 MMOL/L (ref 3.5–5.5)
PROT SERPL-MCNC: 8 G/DL (ref 6.4–8.2)
PROT UR STRIP-MCNC: NEGATIVE MG/DL
RBC # BLD AUTO: 4.51 M/UL (ref 4.2–5.3)
RBC #/AREA URNS HPF: ABNORMAL /HPF (ref 0–5)
RSV RNA SPEC QL NAA+PROBE: NOT DETECTED
RV+EV RNA SPEC QL NAA+PROBE: NOT DETECTED
SODIUM SERPL-SCNC: 137 MMOL/L (ref 136–145)
SP GR UR REFRACTOMETRY: >1.03 (ref 1–1.03)
UROBILINOGEN UR QL STRIP.AUTO: 2 EU/DL (ref 0.2–1)
WBC # BLD AUTO: 9.5 K/UL (ref 4.6–13.2)
WBC URNS QL MICRO: ABNORMAL /HPF (ref 0–5)

## 2020-08-14 PROCEDURE — 96360 HYDRATION IV INFUSION INIT: CPT

## 2020-08-14 PROCEDURE — 94760 N-INVAS EAR/PLS OXIMETRY 1: CPT

## 2020-08-14 PROCEDURE — 87635 SARS-COV-2 COVID-19 AMP PRB: CPT

## 2020-08-14 PROCEDURE — 81001 URINALYSIS AUTO W/SCOPE: CPT

## 2020-08-14 PROCEDURE — 0100U RESPIRATORY PANEL,PCR,NASOPHARYNGEAL: CPT

## 2020-08-14 PROCEDURE — 85025 COMPLETE CBC W/AUTO DIFF WBC: CPT

## 2020-08-14 PROCEDURE — 74011250636 HC RX REV CODE- 250/636: Performed by: EMERGENCY MEDICINE

## 2020-08-14 PROCEDURE — 71045 X-RAY EXAM CHEST 1 VIEW: CPT

## 2020-08-14 PROCEDURE — 80053 COMPREHEN METABOLIC PANEL: CPT

## 2020-08-14 PROCEDURE — 99283 EMERGENCY DEPT VISIT LOW MDM: CPT

## 2020-08-14 RX ADMIN — SODIUM CHLORIDE 1000 ML: 900 INJECTION, SOLUTION INTRAVENOUS at 20:47

## 2020-08-14 NOTE — ED PROVIDER NOTES
EMERGENCY DEPARTMENT HISTORY AND PHYSICAL EXAM    Date: 8/14/2020  Patient Name: Pippa Augustine    History of Presenting Illness     Chief Complaint   Patient presents with    Fever         History Provided By: Patient    7:44 PM  Pippa Augustine is a 26 y.o. female with PMHX of obesity, diabetes, seizure disorder, fibromyalgia who presents to the emergency department C/O 1 week of cough, myalgias, headache, shortness of breath, diarrhea, fever. No known sick contacts but she does work in retail store. She states she has been tested for corona virus earlier in the week and it was negative and was retested today with a rapid test at urgent care that was also negative and was instructed to come to the emergency department for further evaluation. She was told her pregnancy test, rapid strep, urinalysis were all normal.  She does not smoke. She is been taking Tylenol and ibuprofen without improvement in her symptoms. She traveled to [place] at the end of July. PCP: Earnestine Belle MD    Current Outpatient Medications   Medication Sig Dispense Refill    ALPRAZolam (XANAX) 0.5 mg tablet Take 0.5 mg by mouth.  DULoxetine (CYMBALTA) 30 mg capsule Take 30 mg by mouth.  cyclobenzaprine (FLEXERIL) 10 mg tablet       traMADol (ULTRAM) 50 mg tablet TAKE 1 TABLET EVERY 6 HOURS AS NEEDED  0    gabapentin (NEURONTIN) 100 mg capsule TAKE 1 CAPSULE BY MOUTH 3 TIMES A DAY  0    Blood-Glucose Meter monitoring kit 1 Units by Other route.  clindamycin (CLEOCIN) 300 mg capsule TK 1 C PO TID  0    EPINEPHrine (EPIPEN) 0.3 mg/0.3 mL injection 0.3 mg by IntraMUSCular route.  mupirocin calcium (BACTROBAN) 2 % topical cream APPLY ON THE SKIN TID  0    amitriptyline (ELAVIL) 50 mg tablet 50 mg.      glucose blood VI test strips (ASCENSIA AUTODISC VI, ONE TOUCH ULTRA TEST VI) strip 50 Each.       diclofenac (VOLTAREN) 1 % gel APPLY 2 GRAMS AA Q 8 H PRN P  0    ergocalciferol (ERGOCALCIFEROL) 50,000 unit capsule TAKE 1 CAPSULE BY MOUTH EVERY 7 DAYS      glipiZIDE SR (GLUCOTROL XL) 5 mg CR tablet TAKE 1 TABLET BY MOUTH EVERY DAY      Lancets misc 1 Each.  metFORMIN (GLUMETZA ER) 500 mg TG24 24 hour tablet TAKE 1 TABLET BY MOUTH EVERY DAY      atorvastatin (LIPITOR) 40 mg tablet 40 mg.      butalbital-acetaminophen-caffeine (FIORICET, ESGIC) -40 mg per tablet TAKE 1 TABLET BY MOUTH EVERY DAY AS NEEDED, MAY REPEAT IN 2 HOURS, MAX 2 PER DAY, 5 PER WEEK      omeprazole (PRILOSEC) 40 mg capsule TK ONE C PO  30 MINUTES BEFORE BREAKFAST. NEED APPOINTMENT      levonorgestrel (MIRENA) 20 mcg/24 hr (5 years) IUD 1 Device.  ferrous sulfate ER (IRON) 160 mg (50 mg iron) TbER tablet Take 1 Tab by mouth daily.  calcium-cholecalciferol, d3, (CALCIUM 600 + D) 600-125 mg-unit tab Take  by mouth.  Biotin 2,500 mcg cap Take  by mouth.  B.infantis-B.ani-B.long-B.bifi (PROBIOTIC 4X) 10-15 mg TbEC Take  by mouth.  multivitamins chew Take  by mouth.  omeprazole (PRILOSEC) 40 mg capsule one capsule oral route daily  2    butalbital-acetaminophen-caffeine (FIORICET, ESGIC) -40 mg per tablet Take 1 Tab by mouth daily as needed. Max 2 per day  3    albuterol (PROVENTIL HFA, VENTOLIN HFA, PROAIR HFA) 90 mcg/actuation inhaler Take 2 Puffs by inhalation every four (4) hours as needed.  atorvastatin (LIPITOR) 40 mg tablet Take 40 mg by mouth nightly.  levonorgestrel (MIRENA) 20 mcg/24 hr (5 years) IUD 1 Device by IntraUTERine route once.          Past History     Past Medical History:  Past Medical History:   Diagnosis Date    Asthma     Diabetes ([address]     dx 2 years ago    Gastroparesis     suspected    GERD (gastroesophageal reflux disease)     Hyperlipemia     Hypertension     Migraine     Morbid obesity with BMI of 40.0-44.9, adult (HCC)     Ovarian cyst     Psychiatric disorder     anxiety, PTSD    PUD (peptic ulcer disease)        Past Surgical History:  Past Surgical History:   Procedure Laterality Date    HX  SECTION   &     HX GASTRIC BYPASS  2018    Sleeve       Family History:  Family History   Problem Relation Age of Onset   [place] G[place] Migraines Mother     Seizures Mother     Other Father         graves ds,     Sickle Cell Trait Brother     Other Brother     Asthma Sister     Other Sister         autism    Other Daughter         cp, epilepsy    Asthma Daughter        Social History:  Social History     Tobacco Use    Smoking status: Never Smoker    Smokeless tobacco: Never Used   Substance Use Topics    Alcohol use: No    Drug use: No     Comment: H/O in the past       Allergies: Allergies   Allergen Reactions    Latex Rash, Itching and Swelling    Doxycycline Swelling     Throat swelling    Mushroom Anaphylaxis     Other reaction(s): anaphylaxis/angioedema    Amoxicillin Rash    Erythromycin Rash    Lactose Nausea and Vomiting     intolerance  Other reaction(s): gi distress  intolerance    Penicillins Rash and Itching     Other reaction(s): unknown    Rocephin [Ceftriaxone] Rash    Sulfa (Sulfonamide Antibiotics) Swelling, Itching and Unknown (comments)         Review of Systems   Review of Systems   Constitutional: Positive for fever. Respiratory: Positive for cough. Gastrointestinal: Positive for diarrhea. Musculoskeletal: Positive for back pain and myalgias. Neurological: Positive for headaches. All other systems reviewed and are negative.         Physical Exam     Vitals:    20 1939 20   BP: (!) 107/94  135/84   Pulse: 90     Resp: 18     Temp: 99.4 °F (37.4 °C)     SpO2: 100% 100%    Weight: 99.8 kg (220 lb)     Height: 5' 8\" (1.727 m)       Physical Exam    Nursing notes and vital signs reviewed    Constitutional: Non toxic appearing, obese, moderate distress  Head: Normocephalic, Atraumatic  Eyes: EOMI  Neck: Supple  Cardiovascular: Regular rate and rhythm, no murmurs, rubs, or gallops  Chest: Normal work of breathing and chest excursion bilaterally  Lungs: Clear to ausculation bilaterally  Abdomen: Soft, non tender, non distended  Back: No evidence of trauma or deformity  Extremities: No evidence of trauma or deformity  Skin: Warm and dry, normal cap refill  Neuro: Alert and appropriate  Psychiatric: Normal mood and affect      Diagnostic Study Results     Labs -     Recent Results (from the past 12 hour(s))   SARS-COV-2    Collection Time: 08/14/20  7:59 PM   Result Value Ref Range    SARS-CoV-2 PENDING     Specimen source Nasopharyngeal      Specimen type NP Swab     URINALYSIS W/ RFLX MICROSCOPIC    Collection Time: 08/14/20  8:00 PM   Result Value Ref Range    Color YELLOW      Appearance CLOUDY      Specific gravity >1.030 (H) 1.005 - 1.030    pH (UA) 5.5 5.0 - 8.0      Protein Negative NEG mg/dL    Glucose 100 (A) NEG mg/dL    Ketone Negative NEG mg/dL    Bilirubin Negative NEG      Blood Negative NEG      Urobilinogen 2.0 (H) 0.2 - 1.0 EU/dL    Nitrites Negative NEG      Leukocyte Esterase TRACE (A) NEG     URINE MICROSCOPIC ONLY    Collection Time: 08/14/20  8:00 PM   Result Value Ref Range    WBC 4 to 8 0 - 5 /hpf    RBC 0 to 1 0 - 5 /hpf    Epithelial cells 3+ 0 - 5 /lpf    Bacteria 1+ (A) NEG /hpf    Mucus 4+ (A) NEG /lpf    Amorphous Crystals 1+ (A) NEG   CBC WITH AUTOMATED DIFF    Collection Time: 08/14/20  8:15 PM   Result Value Ref Range    WBC 9.5 4.6 - 13.2 K/uL    RBC 4.51 4.20 - 5.30 M/uL    HGB 13.6 12.0 - 16.0 g/dL    HCT 40.7 35.0 - 45.0 %    MCV 90.2 74.0 - 97.0 FL    MCH 30.2 24.0 - 34.0 PG    MCHC 33.4 31.0 - 37.0 g/dL    RDW 12.8 11.6 - 14.5 %    PLATELET 323 494 - 912 K/uL    MPV 9.2 9.2 - 11.8 FL    NEUTROPHILS 57 40 - 73 %    LYMPHOCYTES 36 21 - 52 %    MONOCYTES 6 3 - 10 %    EOSINOPHILS 1 0 - 5 %    BASOPHILS 0 0 - 2 %    ABS. NEUTROPHILS 5.4 1.8 - 8.0 K/UL    ABS. LYMPHOCYTES 3.5 0.9 - 3.6 K/UL    ABS. MONOCYTES 0.6 0.05 - 1.2 K/UL    ABS.  EOSINOPHILS 0.1 0.0 - 0.4 K/UL    ABS. BASOPHILS 0.0 0.0 - 0.1 K/UL    DF AUTOMATED     METABOLIC PANEL, COMPREHENSIVE    Collection Time: 08/14/20  8:15 PM   Result Value Ref Range    Sodium 137 136 - 145 mmol/L    Potassium 4.0 3.5 - 5.5 mmol/L    Chloride 105 100 - 111 mmol/L    CO2 28 21 - 32 mmol/L    Anion gap 4 3.0 - 18 mmol/L    Glucose 196 (H) 74 - 99 mg/dL    BUN 11 7.0 - 18 MG/DL    Creatinine 0.95 0.6 - 1.3 MG/DL    BUN/Creatinine ratio 12 12 - 20      GFR est AA >60 >60 ml/min/1.73m2    GFR est non-AA >60 >60 ml/min/1.73m2    Calcium 9.0 8.5 - 10.1 MG/DL    Bilirubin, total 0.3 0.2 - 1.0 MG/DL    ALT (SGPT) 17 13 - 56 U/L    AST (SGOT) 7 (L) 10 - 38 U/L    Alk. phosphatase 108 45 - 117 U/L    Protein, total 8.0 6.4 - 8.2 g/dL    Albumin 3.7 3.4 - 5.0 g/dL    Globulin 4.3 (H) 2.0 - 4.0 g/dL    A-G Ratio 0.9 0.8 - 1.7         Radiologic Studies -   XR CHEST PORT   Final Result   IMPRESSION:  No acute process        CT Results  (Last 48 hours)    None        CXR Results  (Last 48 hours)               08/14/20 2011  XR CHEST PORT Final result    Impression:  IMPRESSION:  No acute process       Narrative:  EXAM:  AP Portable Chest X-ray 1 view        INDICATION: Cough       COMPARISON: None       _______________       FINDINGS:  Heart and mediastinal contours are within normal limits for portable   radiograph. Lungs are clear of active disease. There are no pleural effusions. No acute osseous findings. ________________                     Medications given in the ED-  Medications   sodium chloride 0.9 % bolus infusion 1,000 mL (1,000 mL IntraVENous New Bag 8/14/20 2047)         Medical Decision Making   I am the first provider for this patient. I reviewed the vital signs, available nursing notes, past medical history, past surgical history, family history and social history. Vital Signs-Reviewed the patient's vital signs.     Pulse Oximetry Analysis - 100% on room air, not hypoxic    Records Reviewed: Nursing Notes and Old Medical Records    Provider Notes (Medical Decision Making): Hang Moody is a 26 y.o. female presenting with history and exam concerning for Covid-19 infection. Patient is hemodynamically stable without evidence of respiratory distress. Not hypoxic on room air. Covid-19 testing was sent and is pending. Plan for discharge with strict quarantine instructions and return precautions. Patient understands and agrees with this plan. Procedures:  Procedures    ED Course:   9:09 PM  Updated patient on all results and plan. All questions answered. Diagnosis and Disposition     Critical Care: None    DISCHARGE NOTE:    Ed Garcia Zacarias's  results have been reviewed with her. She has been counseled regarding her diagnosis, treatment, and plan. She verbally conveys understanding and agreement of the signs, symptoms, diagnosis, treatment and prognosis and additionally agrees to follow up as discussed. She also agrees with the care-plan and conveys that all of her questions have been answered. I have also provided discharge instructions for her that include: educational information regarding their diagnosis and treatment, and list of reasons why they would want to return to the ED prior to their follow-up appointment, should her condition change. She has been provided with education for proper emergency department utilization. CLINICAL IMPRESSION:    1. Suspected COVID-19 virus infection        PLAN:  1. D/C Home  2. Current Discharge Medication List        3.    Follow-up Information     Follow up With Specialties Details Why Contact Andreia Delong MD [facility] Medicine Schedule an appointment as soon as possible for a visit  [address] [place] [address],[address] [place] [phone]      [facility] EMERGENCY DEPT Emergency Medicine  If symptoms worsen [address] [phone]  [phone]        _______________________________      Please note that this dictation was completed with Dragon, the computer voice recognition software. Quite often unanticipated grammatical, syntax, homophones, and other interpretive errors are inadvertently transcribed by the computer software. Please disregard these errors. Please excuse any errors that have escaped final proofreading.

## 2020-08-14 NOTE — ED TRIAGE NOTES
Pt c/o fever, body aches, sore throat, and cough for 4 days;  Per pt she has tested neg for Covid;   [facility]

## 2020-08-15 ENCOUNTER — PATIENT OUTREACH (OUTPATIENT)
Dept: CASE MANAGEMENT | Age: 30
End: 2020-08-15

## 2020-08-15 NOTE — DISCHARGE INSTRUCTIONS
Patient Education        Coronavirus (ECENS-73): Care Instructions  Overview  The coronavirus disease (COVID-19) is caused by a virus. Symptoms may include a fever, a cough, and shortness of breath. It mainly spreads person-to-person through droplets from coughing and sneezing. The virus also can spread when people are in close contact with someone who is infected. Most people have mild symptoms and can take care of themselves at home. If their symptoms get worse, they may need care in a hospital. There is no medicine to fight the virus. It's important to not spread the virus to others. If you have COVID-19, wear a face cover anytime you are around other people. You need to isolate yourself while you are sick. Your doctor or local public health official will tell you when you no longer need to be isolated. Leave your home only if you need to get medical care. Follow-up care is a key part of your treatment and safety. Be sure to make and go to all appointments, and call your doctor if you are having problems. It's also a good idea to know your test results and keep a list of the medicines you take. How can you care for yourself at home? · Get extra rest. It can help you feel better. · Drink plenty of fluids. This helps replace fluids lost from fever. Fluids also help ease a scratchy throat. Water, soup, fruit juice, and hot tea with lemon are good choices. · Take acetaminophen (such as Tylenol) to reduce a fever. It may also help with muscle aches. Read and follow all instructions on the label. · Sponge your body with lukewarm water to help with fever. Don't use cold water or ice. · Use petroleum jelly on sore skin. This can help if the skin around your nose and lips becomes sore from rubbing a lot with tissues. Tips for isolation  · Wear a cloth face cover when you are around other people. It can help stop the spread of the virus when you cough or sneeze. · Limit contact with people in your home.  If possible, stay in a separate bedroom and use a separate bathroom. · If you have to leave home, avoid crowds and try to stay at least 6 feet away from other people. · Avoid contact with pets and other animals. · Cover your mouth and nose with a tissue when you cough or sneeze. Then throw it in the trash right away. · Wash your hands often, especially after you cough or sneeze. Use soap and water, and scrub for at least 20 seconds. If soap and water aren't available, use an alcohol-based hand . · Don't share personal household items. These include bedding, towels, cups and glasses, and eating utensils. · [address] in the warmest water allowed for the fabric type, and dry it completely. It's okay to wash other people's laundry with yours. · Clean and disinfect your home every day. Use household  and disinfectant wipes or sprays. Take special care to clean things that you grab with your hands. These include doorknobs, remote controls, phones, and handles on your refrigerator and microwave. And don't forget countertops, tabletops, bathrooms, and computer keyboards. When should you call for help? [id number] anytime you think you may need emergency care. For example, call if you have life-threatening symptoms, such as:  · You have severe trouble breathing. (You can't talk at all.)  · You have constant chest pain or pressure. · You are severely dizzy or lightheaded. · You are confused or can't think clearly. · Your face and lips have a blue color. · You pass out (lose consciousness) or are very hard to wake up. Call your doctor now or seek immediate medical care if:  · You have moderate trouble breathing. (You can't speak a full sentence.)  · You are coughing up blood (more than about 1 teaspoon). · You have signs of low blood pressure. These include feeling lightheaded; being too weak to stand; and having cold, pale, clammy skin.   Watch closely for changes in your health, and be sure to contact your doctor if:  · Your symptoms get worse. · You are not getting better as expected. Call before you go to the doctor's office. Follow their instructions. And wear a cloth face cover. Current as of: May 8, 2020               Content Version: 12.5  © 2006-2020 [employer]. Care instructions adapted under license by [employer] (which disclaims liability or warranty for this information). If you have questions about a medical condition or this instruction, always ask your healthcare professional. [place] [address] any warranty or liability for your use of this information.

## 2020-08-16 LAB
SARS-COV-2, COV2NT: NOT DETECTED
SOURCE, COVRS: NORMAL
SPECIMEN TYPE, XMCV1T: NORMAL

## 2020-08-17 ENCOUNTER — PATIENT OUTREACH (OUTPATIENT)
Dept: CASE MANAGEMENT | Age: 30
End: 2020-08-17

## 2020-08-17 NOTE — PROGRESS NOTES
Patient contacted regarding recent visit for viral symptoms. This Shyanne Coffman contacted the patient by telephone to perform post discharge call. Verified name and  with patient as identifiers. Provided introduction to self, and reason for call due to viral symptoms of infection and/or exposure to COVID-19. Discussed COVID-19 related testing which was available at this time. Test results were negative. Patient informed of results, if available? yes     Advance Care Planning:   Does patient have an Advance Directive: not on file      Patient presented to emergency department/flu clinic with complaints of viral symptoms/exposure to COVID. Patient reports symptoms are the same. Due to no new or worsening symptoms the RN CTN/ESTELA was not notified for escalation. Discussed exposure protocols and quarantine with CDC Guidelines What To Do If You Are Sick    Patient was given an opportunity for questions and concerns. Stay home except to get medical care  Separate yourself from other people and animals in your home  Call ahead before visiting your doctor  Wear a facemask  Cover your coughs and sneezes  Clean your hands often  Avoid sharing personal household items  Clean all high-touch surfaces everyday    Monitor your symptoms  Seek prompt medical attention if your illness is worsening (e.g., difficulty breathing). Before seeking care, call your healthcare provider and tell them that you have, or are being evaluated for, COVID-19. Put on a facemask before you enter the facility. These steps will help the healthcare provider's office to keep other people in the office or waiting room from getting infected or exposed. Ask your healthcare provider to call the local or state health department. Persons who are placed under If you have a medical emergency and need to call 911, notify the dispatch personnel that you have, or are being evaluated for COVID-19.  If possible, put on a facemask before emergency medical services arrive. The patient agrees to contact the [facility] exposure line [phone], local [facility] department  [place]  ([phone] and PCP office for questions related to their healthcare. Author provided contact information for future reference. Patient/family/caregiver given information for [employer] and agrees to enroll no  Patient preferred e-mail: n/a  Patient preferred phone contact: n/a   Based on Loop alert triggers, patient will be contacted by nurse care manager for worsening symptoms.

## 2020-08-31 ENCOUNTER — PATIENT OUTREACH (OUTPATIENT)
Dept: CASE MANAGEMENT | Age: 30
End: 2020-08-31

## 2020-12-15 PROBLEM — B00.9 HSV-1 INFECTION: Status: ACTIVE | Noted: 2020-12-15

## 2020-12-17 ENCOUNTER — HOSPITAL ENCOUNTER (OUTPATIENT)
Dept: LAB | Age: 30
Discharge: HOME | End: 2020-12-17

## 2020-12-17 DIAGNOSIS — Z98.84 S/P LAPAROSCOPIC SLEEVE GASTRECTOMY: ICD-10-CM

## 2020-12-17 DIAGNOSIS — K90.9 INTESTINAL MALABSORPTION, UNSPECIFIED TYPE: ICD-10-CM

## 2020-12-17 DIAGNOSIS — E78.5 HYPERLIPIDEMIA, UNSPECIFIED HYPERLIPIDEMIA TYPE: ICD-10-CM

## 2020-12-17 DIAGNOSIS — E66.01 MORBID OBESITY (HCC): Primary | ICD-10-CM

## 2020-12-17 LAB — SENTARA SPECIMEN COL,SENBCF: NORMAL

## 2020-12-17 PROCEDURE — 99001 SPECIMEN HANDLING PT-LAB: CPT

## 2020-12-18 LAB
A-G RATIO,AGRAT: 1.5 RATIO (ref 1.1–2.6)
ABSOLUTE LYMPHOCYTE COUNT, 10803: 3.2 K/UL (ref 1–4.8)
ALBUMIN SERPL-MCNC: 4.1 G/DL (ref 3.5–5)
ALP SERPL-CCNC: 115 U/L (ref 25–115)
ALT SERPL-CCNC: 11 U/L (ref 5–40)
ANION GAP SERPL CALC-SCNC: 11 MMOL/L (ref 3–15)
AST SERPL W P-5'-P-CCNC: 11 U/L (ref 10–37)
BASOPHILS # BLD: 0 K/UL (ref 0–0.2)
BASOPHILS NFR BLD: 0 % (ref 0–2)
BILIRUB SERPL-MCNC: 0.3 MG/DL (ref 0.2–1.2)
BUN SERPL-MCNC: 11 MG/DL (ref 6–22)
CALCIUM SERPL-MCNC: 9.2 MG/DL (ref 8.4–10.5)
CHLORIDE SERPL-SCNC: 98 MMOL/L (ref 98–110)
CO2 SERPL-SCNC: 23 MMOL/L (ref 20–32)
CREAT SERPL-MCNC: 0.7 MG/DL (ref 0.5–1.2)
EOSINOPHIL # BLD: 0.1 K/UL (ref 0–0.5)
EOSINOPHIL NFR BLD: 1 % (ref 0–6)
ERYTHROCYTE [DISTWIDTH] IN BLOOD BY AUTOMATED COUNT: 13 % (ref 10–15.5)
FERRITIN SERPL-MCNC: 303 NG/ML (ref 10–291)
FOLATE,FOL: 7.55 NG/ML
GFRAA, 66117: >60
GFRNA, 66118: >60
GLOBULIN,GLOB: 2.8 G/DL (ref 2–4)
GLUCOSE SERPL-MCNC: 259 MG/DL (ref 70–99)
GRANULOCYTES,GRANS: 62 % (ref 40–75)
HCT VFR BLD AUTO: 41.2 % (ref 35.1–46.5)
HGB BLD-MCNC: 14.6 G/DL (ref 11.7–15.5)
IRON,IRN: 44 MCG/DL (ref 30–160)
LYMPHOCYTES, LYMLT: 30 % (ref 20–45)
MCH RBC QN AUTO: 33 PG (ref 26–34)
MCHC RBC AUTO-ENTMCNC: 35 G/DL (ref 31–36)
MCV RBC AUTO: 92 FL (ref 81–99)
MONOCYTES # BLD: 0.6 K/UL (ref 0.1–1)
MONOCYTES NFR BLD: 6 % (ref 3–12)
NEUTROPHILS # BLD AUTO: 6.7 K/UL (ref 1.8–7.7)
PLATELET # BLD AUTO: 383 K/UL (ref 140–440)
PMV BLD AUTO: 10.3 FL (ref 9–13)
POTASSIUM SERPL-SCNC: 3.9 MMOL/L (ref 3.5–5.5)
PROT SERPL-MCNC: 6.9 G/DL (ref 6.4–8.3)
RBC # BLD AUTO: 4.46 M/UL (ref 3.8–5.2)
SODIUM SERPL-SCNC: 132 MMOL/L (ref 133–145)
VIT B12 SERPL-MCNC: 308 PG/ML (ref 211–911)
WBC # BLD AUTO: 10.7 K/UL (ref 4–11)

## 2020-12-23 LAB — VITAMIN B1, WHOLE BLOOD, 66250: 140.2 NMOL/L (ref 66.5–200)

## 2020-12-31 NOTE — PATIENT INSTRUCTIONS
Patient Instructions 1. Remember hydration goals - minimum of 64 ounces of liquids per day (dehydration is the number one reason for hospital readmission). 2. Sleep 7-9 hours each night to keep your metabolism up. 3. Continue to monitor carbohydrate and protein intake you need a minimum of  Grams of protein daily- remember to keep your total carbohydrates to 50 grams or less per day for best results. 4. To maximize weight loss keep your caloric intake between 800-1,200 calories daily. If you are exercising excessively, such as training for a marathon, you need to keep a food log and meet with the dietician so they can advise you on your diet choices, carbohydrate intake and caloric intake. 5. Continue to work towards exercise goals - 60-90 minutes, 5 times a week minimum of deliberate, aerobic exercise is the ultimate goal with strength training 2 times each week. Refer to [facility] for  information. 6. Remember to take vitamins as directed in your handbook. 7. Attend support group the 2nd Thursday of each month. 8. Constipation: Milk of Magnesia is for immediate relief only. Miralax is to be used every day if constipation is a chronic problem. 9. Diarrhea: patients will occasionally develop lactose intolerance after surgery. Check to see if your protein shake has whey in it. If it does try a protein powder or drink that does not have whey and stop all yogurts, cheeses and milks to see if the diarrhea goes away. 10. If you have had labs drawn. We will only call you if you have abnormal results. Otherwise you can access the lab results in \"mychart\". You will only need the access code the first time you sign on. 11. Call us at [phone] or email us through [email or web address]" with questions,     concerns or worsening of condition, we have someone on call 24 hours a day. If you are unable to reach our office, you are to go to your Primary Care Physician or the Emergency Department. NOTE TO GASTRIC BYPASS PATIENTS:  (SAME APPLIES TO GASTRIC SLEEVE PATIENTS FOR FIRST TWO MONTHS) Remember that for the rest of your life, you are not able to take the following: 
- NSAIDs (ibuprofen, goody powder, BC powder, Motrin, Advil, Mobic, Voltaren, Excedrin, etc.) - Steroid pills or injections - Smoke (cigarettes or recreational drugs) - Alcohol Use of any of the above may cause ulcers in your stomach which may perforate causing a medical emergency and surgery. Speak to our medical staff if another medical provider requires you to take steroids or NSAIDs. Supplement Resource Guide Importance of Protein:  
Maintains lean body mass, produces antibodies to fight off infections, heals wounds, minimizes hair loss, helps to give you energy, helps with satiety, and keeping you full between meals. Importance of Calcium: 
Needed for healthy bones and teeth, normal blood clotting, and nervous system functioning, higher risk of osteoporosis and bone disease with non-compliance. Importance of Multivitamins: Many functions. Supply you with extra nutrients that you may be missing from food. May lead to iron deficiency anemia, weakness, fatigue, and many other symptoms with non-compliance. Importance of B Vitamins: 
Important for red blood cell formation, metabolism, energy, and helps to maintain a healthy nervous system. Protein Supplement Liquid diet phase: consume 90-100g protein daily. Once you are eating consume 35-50g protein each day from your protein supplement. 0-3 g fat per serving 0-3 g sugar per serving The body can only absorb 30g of protein at one time, so do not consume more than that at one time. Multi-vitamin Supplement:   
Start immediately after surgery: any complete chewable, such as: Girards Complete chewables. Avoid Girard sours or gummies. They lack iron and other important nutrients and also have added sugar. Continue with a chewable vitamin or change to an adult complete multivitamin one month after surgery. Menstruating women can take a prenatal vitamin. Make sure it has at least 18 mg iron and 452-676 mcg folic acid Calcium Supplement:  
 
Start taking within one month after surgery. Look for:  
Calcium Citrate Plus D (1500 mg per day) Recommend: Citracal 
 
Avoid chocolate chewable calcium. Can use chewable bariatric or [employer] brand or similar chewable. The body cannot absorb more than 500-600 mg of calcium at one time. Take for Life Vitamin D Take 3,000 international units daily Vitamin B12 B Complex Vitamin Start taking both within one month after surgery. Vitamin B12 (sublingual): Take 1000 mcg of Vitamin B12 three times weekly Must take sublingually (meaning you put it under your tongue) or in a liquid drop form for easy absorption. B Complex Vitamin:  
Take one pill daily or liquid drop form daily; as directed on bottle. Take for Life

## 2021-01-06 ENCOUNTER — OFFICE VISIT (OUTPATIENT)
Dept: SURGERY | Age: 31
End: 2021-01-06
Payer: MEDICAID

## 2021-01-06 VITALS
HEIGHT: 68 IN | WEIGHT: 234.8 LBS | DIASTOLIC BLOOD PRESSURE: 66 MMHG | HEART RATE: 115 BPM | BODY MASS INDEX: 35.58 KG/M2 | OXYGEN SATURATION: 99 % | SYSTOLIC BLOOD PRESSURE: 131 MMHG

## 2021-01-06 DIAGNOSIS — R19.8 POSITIVE MURPHY'S SIGN: ICD-10-CM

## 2021-01-06 DIAGNOSIS — K90.9 INTESTINAL MALABSORPTION, UNSPECIFIED TYPE: Primary | ICD-10-CM

## 2021-01-06 DIAGNOSIS — Z98.84 S/P BARIATRIC SURGERY: ICD-10-CM

## 2021-01-06 PROBLEM — F12.90 MARIJUANA USER: Status: ACTIVE | Noted: 2021-01-06

## 2021-01-06 PROCEDURE — 99214 OFFICE O/P EST MOD 30 MIN: CPT | Performed by: PHYSICIAN ASSISTANT

## 2021-01-06 NOTE — PROGRESS NOTES
Subjective:      Henrry Vicente is a 26 y.o. female is now 2.8 years status post laparoscopic sleeve gastrectomy. Doing well overall. She has lost a total of 39 pounds since surgery. Body mass index is 35.7 kg/m². Has lost 30% of EBW. Currently on a solid food diet without difficulty, reports foamies 2-3 times a week and denies vomiting and abdominal pain. Taking in ~64oz water daily. Sources of protein include steak, chicken, shrimp, crab legs. She is craving sweets and cherry pepsi. She gets plenty of steps in at work. Patient is sleeping 3  hours a night on average. She was supposed to go to pulmonologist for a sleep study because her doctor thought she had narcolepsy. She works anywhere between [employer] and 6:30AM at [employer]. She has to work nights because she can't stay awake during the day, but she has no problem being awake at night. Patient has had weight regain. She would like to have a revision from sleeve to gastric bypass however she has some other health concerns that she needs to address before considering starting insurance criteria. She states she has diabetes, but her PCP is not treating her for it. Bowel movements are loose heavy stools. The patient is having constant abdominal pain 7/10 in the epigastric and lower abdominal region that is cramping. She states she has a lot of bloating to the point that some days she is unable to put her jeans on. The nausea and vomiting are related to the pain. Smoking marijuana helps alleviate the pain and nausea. Eating food makes it worse. The patient is compliant with multivitamins, calcium, Vit D and B12 supplements.      Weight Loss Metrics 1/6/2021 12/11/2020 8/14/2020 4/9/2020 9/16/2019 2/28/2019 10/17/2018   Today's Wt 234 lb 12.8 oz 235 lb 220 lb 217 lb 231 lb 239 lb 233 lb   BMI 35.7 kg/m2 35.73 kg/m2 33.45 kg/m2 33.24 kg/m2 35.38 kg/m2 36.61 kg/m2 35.43 kg/m2          Comorbidities:    Hypertension: not applicable  Diabetes: improved, off meds, has not been checking blood glucose levels  Obstructive Sleep Apnea: not applicable  Hyperlipidemia: unchanged, on statin  Stress Urinary Incontinence: resolved  Gastroesophageal Reflux: unchanged, on PPI  Weight related arthropathy:unchanged, has fibromyalgia     Patient Active Problem List   Diagnosis Code    Morbid obesity ([facility] E66.01    Hyperlipemia E78.5    Gastroparesis K31.84    Asthma J45.909    IUD (intrauterine device) in place Z80.11    FH: ovarian cancer Z80.41    Intestinal malabsorption K90.9    S/P laparoscopic sleeve gastrectomy Z98.84    GERD (gastroesophageal reflux disease) K21.9    Elevated prolactin level R79.89    HSV-1 infection B00.9        Past Medical History:   Diagnosis Date    Asthma     Diabetes ([facility]     dx 2 years ago    Gastroparesis     suspected    GERD (gastroesophageal reflux disease)     HSV-1 infection     Hyperlipemia     Hypertension     Migraine     Morbid obesity with BMI of 40.0-44.9, adult ([facility]     Ovarian cyst     Psychiatric disorder     anxiety, PTSD    PUD (peptic ulcer disease)        Past Surgical History:   Procedure Laterality Date    HX BILATERAL SALPINGECTOMY      HX  SECTION   &     HX GASTRIC BYPASS  2018    Sleeve       Current Outpatient Medications   Medication Sig Dispense Refill    ALPRAZolam (XANAX) 0.5 mg tablet Take 0.5 mg by mouth.  cyclobenzaprine (FLEXERIL) 10 mg tablet       EPINEPHrine (EPIPEN) 0.3 mg/0.3 mL injection 0.3 mg by IntraMUSCular route.  amitriptyline (ELAVIL) 50 mg tablet 50 mg.      diclofenac (VOLTAREN) 1 % gel APPLY 2 GRAMS AA Q 8 H PRN P  0    ergocalciferol (ERGOCALCIFEROL) 50,000 unit capsule TAKE 1 CAPSULE BY MOUTH EVERY 7 DAYS      levonorgestrel (MIRENA) 20 mcg/24 hr (5 years) IUD 1 Device.  ferrous sulfate ER (IRON) 160 mg (50 mg iron) TbER tablet Take 1 Tab by mouth daily.       calcium-cholecalciferol, d3, (CALCIUM 600 + D) 600-125 mg-unit tab Take  by mouth.  Biotin 2,500 mcg cap Take  by mouth.  B.infantis-B.ani-B.long-B.bifi (PROBIOTIC 4X) 10-15 mg TbEC Take  by mouth.  multivitamins chew Take  by mouth.  omeprazole (PRILOSEC) 40 mg capsule one capsule oral route daily  2    butalbital-acetaminophen-caffeine (FIORICET, ESGIC) -40 mg per tablet Take 1 Tab by mouth daily as needed. Max 2 per day  3    albuterol (PROVENTIL HFA, VENTOLIN HFA, PROAIR HFA) 90 mcg/actuation inhaler Take 2 Puffs by inhalation every four (4) hours as needed.  atorvastatin (LIPITOR) 40 mg tablet Take 40 mg by mouth nightly.  DULoxetine (CYMBALTA) 30 mg capsule Take 30 mg by mouth.  traMADol (ULTRAM) 50 mg tablet TAKE 1 TABLET EVERY 6 HOURS AS NEEDED  0    Blood-Glucose Meter monitoring kit 1 Units by Other route.  glucose blood VI test strips (ASCENSIA AUTODISC VI, ONE TOUCH ULTRA TEST VI) strip 50 Each.  Lancets misc 1 Each.  atorvastatin (LIPITOR) 40 mg tablet 40 mg.      butalbital-acetaminophen-caffeine (FIORICET, ESGIC) -40 mg per tablet TAKE 1 TABLET BY MOUTH EVERY DAY AS NEEDED, MAY REPEAT IN 2 HOURS, MAX 2 PER DAY, 5 PER WEEK      omeprazole (PRILOSEC) 40 mg capsule TK ONE C PO  30 MINUTES BEFORE BREAKFAST.  NEED APPOINTMENT         Allergies   Allergen Reactions    Latex Rash, Itching and Swelling    Doxycycline Swelling     Throat swelling    Mushroom Anaphylaxis     Other reaction(s): anaphylaxis/angioedema    Amoxicillin Rash    Erythromycin Rash    Lactose Nausea and Vomiting     intolerance  Other reaction(s): gi distress  intolerance    Penicillins Rash and Itching     Other reaction(s): unknown    Rocephin [Ceftriaxone] Rash    Sulfa (Sulfonamide Antibiotics) Swelling, Itching and Unknown (comments)       Review of Systems:  General - No history or complaints of unexpected fever or chills  Head/Neck - No history or complaints of headache or dizziness  Cardiac - No history or complaints of chest pain, palpitations, or shortness of breath  Pulmonary - No history or complaints of shortness of breath or productive cough  Gastrointestinal - as noted above  Genitourinary - No history or complaints of hematuria/dysuria or renal lithiasis  Musculoskeletal - No history or complaints of joint  muscular weakness  Hematologic - No history of any bleeding episodes  Neurologic - No history or complaints of  migraine headaches or neurologic symptoms    Objective:     Visit Vitals  /66 (BP 1 Location: Left arm, BP Patient Position: Sitting)   Pulse (!) 115   Ht 5' 8\" (1.727 m)   Wt 106.5 kg (234 lb 12.8 oz)   LMP  (LMP Unknown)   SpO2 99%   BMI 35.70 kg/m²       General:  alert, cooperative, no distress, appears stated age   Chest: lungs clear to auscultation, breath sounds equal and symmetric, no rhonchi, rales or wheezes, no accessory muscle use   Cor:   Regular rate and rhythm or without murmur or extra heart sounds   Abdomen:  positive monterroso sign, soft, bowel sounds active, no masses or organomegaly   Incisions:   healing well, no drainage, no erythema, no hernia, no seroma, no swelling, no dehiscence, incision well approximated           Assessment and Plan   1. Intestinal malabsorption  - Continue required Vitamins: B12, B complex, D, iron, calcium, multivitamin  2. S/p laparoscopic bariatric surgery, SLEEVE GASTRECTOMY, history of morbid obesity  - Sleep goal is 7-9 hours each night. Patient education given on the effects of sleep deprivation on weight control.  - Discussed patients weight loss goals and dietary choices in relation to goals. - Reminded to measure portions, continue high protein, low carbohydrate diet. Reminded to eat regularly, to eat slowly & not to drink with meals.    - Continue cardio exercise and add resistance exercises. 60-90 minutes of aerobic activity 5 days a week and strength training 2 days each week.   - Encouraged to attend support group   - Required fluid intake is >64oz daily of sugar free beverages. 3.  Diabetes   - I have reviewed patient's chart and every blood glucose level is elevated. She is to follow up with PCP to start treatment. 4.  Abdominal pain with associated n/v/d, positive monterroso sign   - US of abdomen to r/o cholelithiasis   - will f/u after study to review results, if negative, will order HIDA scan to r/o biliary dyskinesia  5. Marijuana use   - smoking cessation education given today    Labs reviewed today  Follow up in 6 months or sooner if patient has questions, concerns or worsening of condition. If unable to reach our office and receive immediate care, patient should go to the Emergency Department immediately. Ms. Lawrence Valenzuela has a reminder for a \"due or due soon\" health maintenance. I have asked that she contact her primary care provider for a follow-up on this health maintenance.

## 2021-01-14 ENCOUNTER — VIRTUAL VISIT (OUTPATIENT)
Dept: SURGERY | Age: 31
End: 2021-01-14
Payer: MEDICAID

## 2021-01-14 VITALS — WEIGHT: 235 LBS | HEIGHT: 68 IN | BODY MASS INDEX: 35.61 KG/M2

## 2021-01-14 DIAGNOSIS — G47.9 SLEEP DISTURBANCE: ICD-10-CM

## 2021-01-14 DIAGNOSIS — K90.9 INTESTINAL MALABSORPTION, UNSPECIFIED TYPE: Primary | ICD-10-CM

## 2021-01-14 DIAGNOSIS — Z98.84 S/P BARIATRIC SURGERY: ICD-10-CM

## 2021-01-14 PROCEDURE — 99214 OFFICE O/P EST MOD 30 MIN: CPT | Performed by: PHYSICIAN ASSISTANT

## 2021-01-14 RX ORDER — DULAGLUTIDE 0.75 MG/.5ML
0.75 INJECTION, SOLUTION SUBCUTANEOUS
COMMUNITY
Start: 2021-01-08 | End: 2022-05-18

## 2021-01-14 NOTE — PROGRESS NOTES
Subjective:      Debra Tellez is a 26 y.o. female is now 2.8 years status post laparoscopic sleeve gastrectomy. Doing well overall. She has lost a total of 39 pounds since surgery. Body mass index is 35.73 kg/m². Has lost 30% of EBW. Currently on a solid food diet with difficulty, reports foamies and denies vomiting. She does still have her gall bladder. I ordered an US of her gall bladder at last visit, but she does not have anyone to watch her children, so she has been unable to get the [address]. Taking in ~64oz water daily. Sources of protein include Nectar shakes, shrimp, chicken. 60 min of activity 3 days a week, including aniurdh and walking 3-10 miles at work. Patient is sleeping 3-4 hours a night on average. She states that at one point her doctor wanted her to have a sleep study because they think she may have narcolepsy. She has never been evaluated for the disease. She has not contacted a pulmonologist for a sleep study as of this date. Bowel movements are alternating constipation and regularity. The patient is not having any pain. The patient is compliant with multivitamins, calcium, Vit D and B12 supplements.      Weight Loss Metrics 1/14/2021 1/6/2021 12/11/2020 8/14/2020 4/9/2020 9/16/2019 2/28/2019   Today's Wt 235 lb 234 lb 12.8 oz 235 lb 220 lb 217 lb 231 lb 239 lb   BMI 35.73 kg/m2 35.7 kg/m2 35.73 kg/m2 33.45 kg/m2 33.24 kg/m2 35.38 kg/m2 36.61 kg/m2          Comorbidities:    Hypertension: not applicable  Diabetes: improved, off meds, has not been checking blood glucose levels  Obstructive Sleep Apnea: not applicable  Hyperlipidemia: unchanged, on statin  Stress Urinary Incontinence: resolved  Gastroesophageal Reflux: unchanged, on PPI  Weight related arthropathy:unchanged, has fibromyalgia     Patient Active Problem List   Diagnosis Code    Morbid obesity ([facility] E66.01    Hyperlipemia E78.5    Gastroparesis K31.84    Asthma J45.909    IUD (intrauterine device) in place Z97.5    FH: ovarian cancer Z80.41    Intestinal malabsorption K90.9    S/P laparoscopic sleeve gastrectomy Z98.84    GERD (gastroesophageal reflux disease) K21.9    Elevated prolactin level R79.89    HSV-1 infection B00.9    Marijuana user F12.90        Past Medical History:   Diagnosis Date    Asthma     Diabetes ([facility]     dx 2 years ago    Gastroparesis     suspected    GERD (gastroesophageal reflux disease)     HSV-1 infection     Hyperlipemia     Hypertension     Marijuana user 2021    Migraine     Morbid obesity with BMI of 40.0-44.9, [facility] ([facility]     Ovarian cyst     Psychiatric disorder     anxiety, PTSD    PUD (peptic ulcer disease)        Past Surgical History:   Procedure Laterality Date    HX BILATERAL SALPINGECTOMY      HX  SECTION   &     HX GASTRIC BYPASS  2018    Sleeve       Current Outpatient Medications   Medication Sig Dispense Refill    dulaglutide (Trulicity) 8.34 FM/3.4 mL sub-q pen 0.75 mg by SubCUTAneous route every seven (7) days.  ALPRAZolam (XANAX) 0.5 mg tablet Take 0.5 mg by mouth.  DULoxetine (CYMBALTA) 30 mg capsule Take 30 mg by mouth.  cyclobenzaprine (FLEXERIL) 10 mg tablet       traMADol (ULTRAM) 50 mg tablet TAKE 1 TABLET EVERY 6 HOURS AS NEEDED  0    Blood-Glucose Meter monitoring kit 1 Units by Other route.  EPINEPHrine (EPIPEN) 0.3 mg/0.3 mL injection 0.3 mg by IntraMUSCular route.  amitriptyline (ELAVIL) 50 mg tablet 50 mg.      glucose blood VI test strips (ASCENSIA AUTODISC VI, ONE TOUCH ULTRA TEST VI) strip 50 Each.  diclofenac (VOLTAREN) 1 % gel APPLY 2 GRAMS AA Q 8 H PRN P  0    ergocalciferol (ERGOCALCIFEROL) 50,000 unit capsule TAKE 1 CAPSULE BY MOUTH EVERY 7 DAYS      Lancets misc 1 Each.       atorvastatin (LIPITOR) 40 mg tablet 40 mg.      butalbital-acetaminophen-caffeine (FIORICET, ESGIC) -40 mg per tablet TAKE 1 TABLET BY MOUTH EVERY DAY AS NEEDED, MAY REPEAT IN 2 HOURS, MAX 2 PER DAY, 5 PER WEEK      omeprazole (PRILOSEC) 40 mg capsule TK ONE C PO  30 MINUTES BEFORE BREAKFAST. NEED APPOINTMENT      levonorgestrel (MIRENA) 20 mcg/24 hr (5 years) IUD 1 Device.  ferrous sulfate ER (IRON) 160 mg (50 mg iron) TbER tablet Take 1 Tab by mouth daily.  calcium-cholecalciferol, d3, (CALCIUM 600 + D) 600-125 mg-unit tab Take  by mouth.  Biotin 2,500 mcg cap Take  by mouth.  B.infantis-B.ani-B.long-B.bifi (PROBIOTIC 4X) 10-15 mg TbEC Take  by mouth.  multivitamins chew Take  by mouth.  albuterol (PROVENTIL HFA, VENTOLIN HFA, PROAIR HFA) 90 mcg/actuation inhaler Take 2 Puffs by inhalation every four (4) hours as needed.          Allergies   Allergen Reactions    Latex Rash, Itching and Swelling    Doxycycline Swelling     Throat swelling    Mushroom Anaphylaxis     Other reaction(s): anaphylaxis/angioedema    Amoxicillin Rash    Erythromycin Rash    Lactose Nausea and Vomiting     intolerance  Other reaction(s): gi distress  intolerance    Metformin Seizures     seizure    Penicillins Rash and Itching     Other reaction(s): unknown    Rocephin [Ceftriaxone] Rash    Sulfa (Sulfonamide Antibiotics) Swelling, Itching and Unknown (comments)       Review of Systems:  General - No history or complaints of unexpected fever or chills  Head/Neck - No history or complaints of headache or dizziness  Cardiac - No history or complaints of chest pain, palpitations, or shortness of breath  Pulmonary - No history or complaints of shortness of breath or productive cough  Gastrointestinal - as noted above  Genitourinary - No history or complaints of hematuria/dysuria or renal lithiasis  Musculoskeletal - No history or complaints of joint  muscular weakness  Hematologic - No history of any bleeding episodes  Neurologic - No history or complaints of  migraine headaches or neurologic symptoms    Objective:     Visit Vitals  Ht 5' 8\" (1.727 m)   Wt 106.6 kg (235 lb)   BMI 35.73 kg/m²       Physical Examination: General appearance - alert, fatigued, and in mild distress and oriented to person, place, and time  Mental status - alert, oriented to person, place, and time, normal mood, behavior, speech, dress, motor activity, and thought processes  Eyes - pupils equal and reactive, extraocular eye movements intact, sclera anicteric, left eye normal, right eye normal  Ears - right ear normal, left ear normal  Neck- good extension and flexion, no obvious swelling  Chest - good air movement  Heart - N/A  Abdomen - no obvious distension, scars as noted:   Neurological - alert, oriented, normal speech, no focal findings or movement disorder noted  Musculoskeletal - no swelling noted  Extremities - normal movement        Assessment and Plan   1. Intestinal malabsorption  - Continue required Vitamins: B12, B complex, D, iron, calcium, multivitamin  2. S/p laparoscopic bariatric surgery, SLEEVE GASTRECTOMY, history of morbid obesity  - Sleep goal is 7-9 hours each night. Patient education given on the effects of sleep deprivation on weight control.  - Discussed patients weight loss goals and dietary choices in relation to goals. - Reminded to measure portions, continue high protein, low carbohydrate diet. Reminded to eat regularly, to eat slowly & not to drink with meals.    - Continue cardio exercise and add resistance exercises. 60-90 minutes of aerobic activity 5 days a week and strength training 2 days each week. - Encouraged to attend support group   - Required fluid intake is >64oz daily of sugar free beverages. 3.  Postprandial vomiting   - US of abdomen  4. Sleep disturbance   - referral to pulmonology for sleep study  5. Weight regain   - patient is to have the [address] [facility] completed, with possible HIDA scan to follow and be evaluated by pulmonology prior to making an initial consult with Dr. Sean Hill for possible conversion to gastric bypass.   I can see that she is fatigued when I am speaking to her. I feel she needs to address these issues before considering gastric bypass surgery. Labs ordered today  Follow up in 6 months or sooner if patient has questions, concerns or worsening of condition. If unable to reach our office and receive immediate care, patient should go to the Emergency Department immediately. Ms. Lucero Prado has a reminder for a \"due or due soon\" health maintenance. I have asked that she contact her primary care provider for a follow-up on this health maintenance. This visit with Debra Tellez was performed under virtual telemedicine guidelines during the coronavirus (UPQRQ-01) public health emergency on January 15, 2021   in an interactive fashion using [employer] me. They understand that this telemedicine encounter is a billable service, with coverage determined by their insurance carrier. They are aware that they may receive a bill and have provided verbal consent for this virtual visit. This visit was performed with the patient in their home environment and provider was present at [facility] at [facility].  I have spent over 30 minutes on this visit  both prior to the visit reviewing the patients chart and with the patient face to face. I have reviewed their medical history, performed a telemedicine physical examination, and discussed the plan of action to date. They understand that they will be asked to come to the office when our office is allowed normal patient interaction, as dictated by public health officials, for a face-to-face visit to rediscuss all of the things we have talked about today.

## 2021-01-15 NOTE — PATIENT INSTRUCTIONS
Patient Instructions 1. Remember hydration goals - minimum of 64 ounces of liquids per day (dehydration is the number one reason for hospital readmission). 2. Sleep 7-9 hours each night to keep your metabolism up. 3. Continue to monitor carbohydrate and protein intake you need a minimum of  Grams of protein daily- remember to keep your total carbohydrates to 50 grams or less per day for best results. 4. To maximize weight loss keep your caloric intake between 800-1,200 calories daily. If you are exercising excessively, such as training for a marathon, you need to keep a food log and meet with the dietician so they can advise you on your diet choices, carbohydrate intake and caloric intake. 5. Continue to work towards exercise goals - 60-90 minutes, 5 times a week minimum of deliberate, aerobic exercise is the ultimate goal with strength training 2 times each week. Refer to [facility] [employer] for  information. 6. Remember to take vitamins as directed in your handbook. 7. Attend support group the 2nd Thursday of each month. 8. Constipation: Milk of Magnesia is for immediate relief only. Miralax is to be used every day if constipation is a chronic problem. 9. Diarrhea: patients will occasionally develop lactose intolerance after surgery. Check to see if your protein shake has whey in it. If it does try a protein powder or drink that does not have whey and stop all yogurts, cheeses and milks to see if the diarrhea goes away. 10. If you have had labs drawn. We will only call you if you have abnormal results. Otherwise you can access the lab results in \"mychart\". You will only need the access code the first time you sign on. 11. Call us at [phone] or email us through [email or web address]" with questions,     concerns or worsening of condition, we have someone on call 24 hours a day. If you are unable to reach our office, you are to go to your Primary Care Physician or the Emergency Department. NOTE TO GASTRIC BYPASS PATIENTS:  (SAME APPLIES TO GASTRIC SLEEVE PATIENTS FOR FIRST TWO MONTHS) Remember that for the rest of your life, you are not able to take the following: 
- NSAIDs (ibuprofen, goody powder, BC powder, Motrin, Advil, Mobic, Voltaren, Excedrin, etc.) - Steroid pills or injections - Smoke (cigarettes or recreational drugs) - Alcohol Use of any of the above may cause ulcers in your stomach which may perforate causing a medical emergency and surgery. Speak to our medical staff if another medical provider requires you to take steroids or NSAIDs. Supplement Resource Guide Importance of Protein:  
Maintains lean body mass, produces antibodies to fight off infections, heals wounds, minimizes hair loss, helps to give you energy, helps with satiety, and keeping you full between meals. Importance of Calcium: 
Needed for healthy bones and teeth, normal blood clotting, and nervous system functioning, higher risk of osteoporosis and bone disease with non-compliance. Importance of Multivitamins: Many functions. Supply you with extra nutrients that you may be missing from food. May lead to iron deficiency anemia, weakness, fatigue, and many other symptoms with non-compliance. Importance of B Vitamins: 
Important for red blood cell formation, metabolism, energy, and helps to maintain a healthy nervous system. Protein Supplement Liquid diet phase: consume 90-100g protein daily. Once you are eating consume 35-50g protein each day from your protein supplement. 0-3 g fat per serving 0-3 g sugar per serving The body can only absorb 30g of protein at one time, so do not consume more than that at one time. Multi-vitamin Supplement:   
Start immediately after surgery: any complete chewable, such as: [place]s Complete chewables. Avoid [place] sours or gummies. They lack iron and other important nutrients and also have added sugar. Continue with a chewable vitamin or change to an adult complete multivitamin one month after surgery. Menstruating women can take a prenatal vitamin. Make sure it has at least 18 mg iron and 890-072 mcg folic acid Calcium Supplement:  
 
Start taking within one month after surgery. Look for:  
Calcium Citrate Plus D (1500 mg per day) Recommend: Citracal 
 
Avoid chocolate chewable calcium. Can use chewable bariatric or [employer] brand or similar chewable. The body cannot absorb more than 500-600 mg of calcium at one time. Take for Life Vitamin D Take 3,000 international units daily Vitamin B12 B Complex Vitamin Start taking both within one month after surgery. Vitamin B12 (sublingual): Take 1000 mcg of Vitamin B12 three times weekly Must take sublingually (meaning you put it under your tongue) or in a liquid drop form for easy absorption. B Complex Vitamin:  
Take one pill daily or liquid drop form daily; as directed on bottle. Take for Life

## 2021-01-27 ENCOUNTER — HOSPITAL ENCOUNTER (OUTPATIENT)
Dept: ULTRASOUND IMAGING | Age: 31
Discharge: HOME | End: 2021-01-27
Attending: PHYSICIAN ASSISTANT
Payer: MEDICAID

## 2021-01-27 DIAGNOSIS — R19.8 POSITIVE MURPHY'S SIGN: ICD-10-CM

## 2021-01-27 PROCEDURE — 76700 US EXAM ABDOM COMPLETE: CPT

## 2021-02-17 ENCOUNTER — TELEPHONE (OUTPATIENT)
Dept: SURGERY | Age: 31
End: 2021-02-17

## 2021-02-17 NOTE — TELEPHONE ENCOUNTER
I spoke to patient regarding the results of her US which was benign. She states she is still having diarrhea, but she has narrowed the cause down to dairy products. She has not had an appt with Dr. Dwain Petty, I gave her the phone number today and she is going to call. I asked her to wait for her initial consult with Dr. Kasandra Becker for conversion to gastric bypass until after she has her sleep study, because when I had seen her she was very fatigued and I did not feel that she was a good candidate for a major surgery in her current condition. She is to follow the recommendation of pulmonology and follow up with her PCP.

## 2021-02-24 ENCOUNTER — HOSPITAL ENCOUNTER (EMERGENCY)
Age: 31
Discharge: HOME | End: 2021-02-25
Attending: EMERGENCY MEDICINE
Payer: MEDICAID

## 2021-02-24 ENCOUNTER — APPOINTMENT (OUTPATIENT)
Dept: GENERAL RADIOLOGY | Age: 31
End: 2021-02-24
Attending: EMERGENCY MEDICINE
Payer: MEDICAID

## 2021-02-24 DIAGNOSIS — R07.89 ATYPICAL CHEST PAIN: Primary | ICD-10-CM

## 2021-02-24 PROCEDURE — 80053 COMPREHEN METABOLIC PANEL: CPT

## 2021-02-24 PROCEDURE — 85025 COMPLETE CBC W/AUTO DIFF WBC: CPT

## 2021-02-24 PROCEDURE — 84703 CHORIONIC GONADOTROPIN ASSAY: CPT

## 2021-02-24 PROCEDURE — 82553 CREATINE MB FRACTION: CPT

## 2021-02-24 PROCEDURE — 74011250637 HC RX REV CODE- 250/637: Performed by: EMERGENCY MEDICINE

## 2021-02-24 PROCEDURE — 99285 EMERGENCY DEPT VISIT HI MDM: CPT

## 2021-02-24 PROCEDURE — 71045 X-RAY EXAM CHEST 1 VIEW: CPT

## 2021-02-24 PROCEDURE — 85379 FIBRIN DEGRADATION QUANT: CPT

## 2021-02-24 PROCEDURE — 74011000250 HC RX REV CODE- 250: Performed by: EMERGENCY MEDICINE

## 2021-02-24 PROCEDURE — 96361 HYDRATE IV INFUSION ADD-ON: CPT

## 2021-02-24 PROCEDURE — 93005 ELECTROCARDIOGRAM TRACING: CPT

## 2021-02-24 PROCEDURE — 96374 THER/PROPH/DIAG INJ IV PUSH: CPT

## 2021-02-24 PROCEDURE — 74011250636 HC RX REV CODE- 250/636: Performed by: EMERGENCY MEDICINE

## 2021-02-24 RX ORDER — FAMOTIDINE 10 MG/ML
20 INJECTION INTRAVENOUS
Status: COMPLETED | OUTPATIENT
Start: 2021-02-24 | End: 2021-02-24

## 2021-02-24 RX ADMIN — SODIUM CHLORIDE 1000 ML: 900 INJECTION, SOLUTION INTRAVENOUS at 23:59

## 2021-02-24 RX ADMIN — LIDOCAINE HYDROCHLORIDE 40 ML: 20 SOLUTION ORAL; TOPICAL at 23:59

## 2021-02-24 RX ADMIN — FAMOTIDINE 20 MG: 10 INJECTION, SOLUTION INTRAVENOUS at 23:59

## 2021-02-25 VITALS
TEMPERATURE: 97.1 F | BODY MASS INDEX: 34.86 KG/M2 | SYSTOLIC BLOOD PRESSURE: 112 MMHG | OXYGEN SATURATION: 99 % | DIASTOLIC BLOOD PRESSURE: 73 MMHG | RESPIRATION RATE: 21 BRPM | HEIGHT: 68 IN | WEIGHT: 230 LBS | HEART RATE: 90 BPM

## 2021-02-25 LAB
ALBUMIN SERPL-MCNC: 3.7 G/DL (ref 3.4–5)
ALBUMIN/GLOB SERPL: 0.8 {RATIO} (ref 0.8–1.7)
ALP SERPL-CCNC: 108 U/L (ref 45–117)
ALT SERPL-CCNC: 20 U/L (ref 13–56)
ANION GAP SERPL CALC-SCNC: 7 MMOL/L (ref 3–18)
AST SERPL-CCNC: 6 U/L (ref 10–38)
ATRIAL RATE: 89 BPM
ATRIAL RATE: 96 BPM
BASOPHILS # BLD: 0 K/UL (ref 0–0.1)
BASOPHILS NFR BLD: 0 % (ref 0–2)
BILIRUB SERPL-MCNC: 0.2 MG/DL (ref 0.2–1)
BUN SERPL-MCNC: 13 MG/DL (ref 7–18)
BUN/CREAT SERPL: 13 (ref 12–20)
CALCIUM SERPL-MCNC: 9 MG/DL (ref 8.5–10.1)
CALCULATED P AXIS, ECG09: 45 DEGREES
CALCULATED P AXIS, ECG09: 51 DEGREES
CALCULATED R AXIS, ECG10: 17 DEGREES
CALCULATED R AXIS, ECG10: 5 DEGREES
CALCULATED T AXIS, ECG11: 10 DEGREES
CALCULATED T AXIS, ECG11: 3 DEGREES
CHLORIDE SERPL-SCNC: 104 MMOL/L (ref 100–111)
CK MB CFR SERPL CALC: NORMAL % (ref 0–4)
CK MB CFR SERPL CALC: NORMAL % (ref 0–4)
CK MB SERPL-MCNC: <1 NG/ML (ref 5–25)
CK MB SERPL-MCNC: <1 NG/ML (ref 5–25)
CK SERPL-CCNC: 74 U/L (ref 26–192)
CK SERPL-CCNC: 92 U/L (ref 26–192)
CO2 SERPL-SCNC: 27 MMOL/L (ref 21–32)
CREAT SERPL-MCNC: 1 MG/DL (ref 0.6–1.3)
D DIMER PPP FEU-MCNC: 0.32 UG/ML(FEU)
DIAGNOSIS, 93000: NORMAL
DIAGNOSIS, 93000: NORMAL
DIFFERENTIAL METHOD BLD: ABNORMAL
EOSINOPHIL # BLD: 0.1 K/UL (ref 0–0.4)
EOSINOPHIL NFR BLD: 1 % (ref 0–5)
ERYTHROCYTE [DISTWIDTH] IN BLOOD BY AUTOMATED COUNT: 13.4 % (ref 11.6–14.5)
GLOBULIN SER CALC-MCNC: 4.5 G/DL (ref 2–4)
GLUCOSE SERPL-MCNC: 148 MG/DL (ref 74–99)
HCG SERPL QL: NEGATIVE
HCT VFR BLD AUTO: 42.1 % (ref 35–45)
HGB BLD-MCNC: 13.5 G/DL (ref 12–16)
LYMPHOCYTES # BLD: 3.9 K/UL (ref 0.9–3.6)
LYMPHOCYTES NFR BLD: 34 % (ref 21–52)
MCH RBC QN AUTO: 29.3 PG (ref 24–34)
MCHC RBC AUTO-ENTMCNC: 32.1 G/DL (ref 31–37)
MCV RBC AUTO: 91.3 FL (ref 74–97)
MONOCYTES # BLD: 0.8 K/UL (ref 0.05–1.2)
MONOCYTES NFR BLD: 7 % (ref 3–10)
NEUTS SEG # BLD: 6.6 K/UL (ref 1.8–8)
NEUTS SEG NFR BLD: 58 % (ref 40–73)
P-R INTERVAL, ECG05: 142 MS
P-R INTERVAL, ECG05: 148 MS
PLATELET # BLD AUTO: 401 K/UL (ref 135–420)
PMV BLD AUTO: 9.2 FL (ref 9.2–11.8)
POTASSIUM SERPL-SCNC: 3.8 MMOL/L (ref 3.5–5.5)
PROT SERPL-MCNC: 8.2 G/DL (ref 6.4–8.2)
Q-T INTERVAL, ECG07: 366 MS
Q-T INTERVAL, ECG07: 384 MS
QRS DURATION, ECG06: 88 MS
QRS DURATION, ECG06: 92 MS
QTC CALCULATION (BEZET), ECG08: 462 MS
QTC CALCULATION (BEZET), ECG08: 467 MS
RBC # BLD AUTO: 4.61 M/UL (ref 4.2–5.3)
SODIUM SERPL-SCNC: 138 MMOL/L (ref 136–145)
TROPONIN I SERPL-MCNC: <0.02 NG/ML (ref 0–0.04)
TROPONIN I SERPL-MCNC: <0.02 NG/ML (ref 0–0.04)
VENTRICULAR RATE, ECG03: 89 BPM
VENTRICULAR RATE, ECG03: 96 BPM
WBC # BLD AUTO: 11.4 K/UL (ref 4.6–13.2)

## 2021-02-25 PROCEDURE — 93005 ELECTROCARDIOGRAM TRACING: CPT

## 2021-02-25 PROCEDURE — 84484 ASSAY OF TROPONIN QUANT: CPT

## 2021-02-25 NOTE — ED NOTES
Patient presents to ER with complaints of chest pain. States she was at home and felt a heaviness or pressure sitting on her chest, rates 4/10 at this time with shortness of breath. Denies nausea, radiation of pain, or vomiting. Patient is alert/oriented x 4, VSS, respirations even and unlabored, skin warm and dry, no acute distress noted. Patient with armband in place, bed in low position, monitor in place, call light within reach, comfort measures offered and declined, will continue to monitor.

## 2021-02-25 NOTE — ED TRIAGE NOTES
Pt presents with sudden onset vomiting diarrhea, chest pain at 2100. Pt describes pain like somebody's squeezing her. Asked pt if she received covid vaccine. She got 1st injection at 0 today.

## 2021-02-25 NOTE — ED NOTES
Second EKG and labs drawn. Patient resting and talking on phone, no acute distress or change in assessment - comfort measures offered and declined, will continue to monitor.

## 2021-02-25 NOTE — ED PROVIDER NOTES
EMERGENCY DEPARTMENT HISTORY AND PHYSICAL EXAM    Date: 2/24/2021  Patient Name: Kerry Norton    History of Presenting Illness     Chief Complaint   Patient presents with    Chest Pain         History Provided By: Patient    11:09 PM  Kerry Norton is a 26 y.o. female with PMHX of obesity, diabetes, high cholesterol, IUD in place who presents to the emergency department C/O chest pain, nausea, vomiting, shortness of breath. Per patient she was brushing her teeth when she had sudden onset of vomiting. She reports she developed pain in her chest after vomiting felt like someone was squeezing her heart and radiated to both of her arms. She reports this was associated with shortness of breath. She reports the pain is better now than it was initially but still present. She denies any fever, cough, sick contacts, recent travel. She does endorse that she received the Covid 19 vaccine earlier today. No relieving or exacerbating factors identified. No immediate family history of heart disease. She does not smoke or use any other substances. PCP: Nancy Jain MD    Current Outpatient Medications   Medication Sig Dispense Refill    dulaglutide (Trulicity) 7.96 HE/8.5 mL sub-q pen 0.75 mg by SubCUTAneous route every seven (7) days.  ALPRAZolam (XANAX) 0.5 mg tablet Take 0.5 mg by mouth.  DULoxetine (CYMBALTA) 30 mg capsule Take 30 mg by mouth.  cyclobenzaprine (FLEXERIL) 10 mg tablet       traMADol (ULTRAM) 50 mg tablet TAKE 1 TABLET EVERY 6 HOURS AS NEEDED  0    Blood-Glucose Meter monitoring kit 1 Units by Other route.  EPINEPHrine (EPIPEN) 0.3 mg/0.3 mL injection 0.3 mg by IntraMUSCular route.  amitriptyline (ELAVIL) 50 mg tablet 50 mg.      glucose blood VI test strips (ASCENSIA AUTODISC VI, ONE TOUCH ULTRA TEST VI) strip 50 Each.       diclofenac (VOLTAREN) 1 % gel APPLY 2 GRAMS AA Q 8 H PRN P  0    ergocalciferol (ERGOCALCIFEROL) 50,000 unit capsule TAKE 1 CAPSULE BY MOUTH EVERY 7 DAYS      Lancets misc 1 Each.  atorvastatin (LIPITOR) 40 mg tablet 40 mg.      butalbital-acetaminophen-caffeine (FIORICET, ESGIC) -40 mg per tablet TAKE 1 TABLET BY MOUTH EVERY DAY AS NEEDED, MAY REPEAT IN 2 HOURS, MAX 2 PER DAY, 5 PER WEEK      omeprazole (PRILOSEC) 40 mg capsule TK ONE C PO  30 MINUTES BEFORE BREAKFAST. NEED APPOINTMENT      levonorgestrel (MIRENA) 20 mcg/24 hr (5 years) IUD 1 Device.  ferrous sulfate ER (IRON) 160 mg (50 mg iron) TbER tablet Take 1 Tab by mouth daily.  calcium-cholecalciferol, d3, (CALCIUM 600 + D) 600-125 mg-unit tab Take  by mouth.  Biotin 2,500 mcg cap Take  by mouth.  B.infantis-B.ani-B.long-B.bifi (PROBIOTIC 4X) 10-15 mg TbEC Take  by mouth.  multivitamins chew Take  by mouth.  albuterol (PROVENTIL HFA, VENTOLIN HFA, PROAIR HFA) 90 mcg/actuation inhaler Take 2 Puffs by inhalation every four (4) hours as needed.          Past History     Past Medical History:  Past Medical History:   Diagnosis Date    Asthma     Diabetes ([facility] [address]     dx 2 years ago    Gastroparesis     suspected    GERD (gastroesophageal reflux disease)     HSV-1 infection     Hyperlipemia     Hypertension     Marijuana user 2021    Migraine     Morbid obesity with BMI of 40.0-44.9, adult ([address]     Ovarian cyst     Psychiatric disorder     anxiety, PTSD    PUD (peptic ulcer disease)        Past Surgical History:  Past Surgical History:   Procedure Laterality Date    HX BILATERAL SALPINGECTOMY      HX  SECTION   &     HX GASTRIC BYPASS  2018    Sleeve       Family History:  Family History   Problem Relation Age of Onset   [facility] Migraines Mother     Seizures Mother     Other Father         graves ds,     Sickle Cell Trait Brother     Other Brother     Asthma Sister     Other Sister         autism    Other Daughter         cp, epilepsy    Asthma Daughter        Social History:  Social History     Tobacco Use    Smoking status: Never Smoker    Smokeless tobacco: Never Used   Substance Use Topics    Alcohol use: No    Drug use: No     Comment: H/O in the past       Allergies: Allergies   Allergen Reactions    Latex Rash, Itching and Swelling    Doxycycline Swelling     Throat swelling    Mushroom Anaphylaxis     Other reaction(s): anaphylaxis/angioedema    Amoxicillin Rash    Erythromycin Rash    Lactose Nausea and Vomiting     intolerance  Other reaction(s): gi distress  intolerance    Metformin Seizures     seizure    Penicillins Rash and Itching     Other reaction(s): unknown    Rocephin [Ceftriaxone] Rash    Sulfa (Sulfonamide Antibiotics) Swelling, Itching and Unknown (comments)         Review of Systems   Review of Systems   Constitutional: Negative for fever. Respiratory: Positive for shortness of breath. Cardiovascular: Positive for chest pain. Gastrointestinal: Positive for nausea and vomiting. All other systems reviewed and are negative.         Physical Exam     Vitals:    02/25/21 0115 02/25/21 0145 02/25/21 0145 02/25/21 0147   BP: 106/70 112/73 112/73    Pulse: 94 88 88    Resp: 17 12 17    Temp:       SpO2: 99% 100% 100% 100%   Weight:       Height:         Physical Exam    Nursing notes and vital signs reviewed    Constitutional: Non toxic appearing, moderate distress  Head: Normocephalic, Atraumatic  Eyes: EOMI  ENT: Clear posterior oropharynx without erythema, edema, exudate  Neck: Supple  Cardiovascular: Tachycardic and regular rhythm, no murmurs, rubs, or gallops  Chest: Normal work of breathing and chest excursion bilaterally  Lungs: Clear to ausculation bilaterally  Abdomen: Soft, non tender, non distended, normoactive bowel sounds  Back: No evidence of trauma or deformity  Extremities: No evidence of trauma or deformity, no LE edema  Skin: Warm and dry, normal cap refill  Neuro: Alert and appropriate  Psychiatric: Normal mood and affect      Diagnostic Study Results     Labs - Recent Results (from the past 12 hour(s))   EKG, 12 LEAD, INITIAL    Collection Time: 02/24/21 11:44 PM   Result Value Ref Range    Ventricular Rate 96 BPM    Atrial Rate 96 BPM    P-R Interval 142 ms    QRS Duration 88 ms    Q-T Interval 366 ms    QTC Calculation (Bezet) 462 ms    Calculated P Axis 45 degrees    Calculated R Axis 5 degrees    Calculated T Axis 3 degrees    Diagnosis       Normal sinus rhythm  Prolonged QT  Abnormal ECG  When compared with ECG of 12-MAR-2018 12:11,  No significant change was found     CBC WITH AUTOMATED DIFF    Collection Time: 02/24/21 11:45 PM   Result Value Ref Range    WBC 11.4 4.6 - 13.2 K/uL    RBC 4.61 4.20 - 5.30 M/uL    HGB 13.5 12.0 - 16.0 g/dL    HCT 42.1 35.0 - 45.0 %    MCV 91.3 74.0 - 97.0 FL    MCH 29.3 24.0 - 34.0 PG    MCHC 32.1 31.0 - 37.0 g/dL    RDW 13.4 11.6 - 14.5 %    PLATELET 266 445 - 468 K/uL    MPV 9.2 9.2 - 11.8 FL    NEUTROPHILS 58 40 - 73 %    LYMPHOCYTES 34 21 - 52 %    MONOCYTES 7 3 - 10 %    EOSINOPHILS 1 0 - 5 %    BASOPHILS 0 0 - 2 %    ABS. NEUTROPHILS 6.6 1.8 - 8.0 K/UL    ABS. LYMPHOCYTES 3.9 (H) 0.9 - 3.6 K/UL    ABS. MONOCYTES 0.8 0.05 - 1.2 K/UL    ABS. EOSINOPHILS 0.1 0.0 - 0.4 K/UL    ABS. BASOPHILS 0.0 0.0 - 0.1 K/UL    DF AUTOMATED     METABOLIC PANEL, COMPREHENSIVE    Collection Time: 02/24/21 11:45 PM   Result Value Ref Range    Sodium 138 136 - 145 mmol/L    Potassium 3.8 3.5 - 5.5 mmol/L    Chloride 104 100 - 111 mmol/L    CO2 27 21 - 32 mmol/L    Anion gap 7 3.0 - 18 mmol/L    Glucose 148 (H) 74 - 99 mg/dL    BUN 13 7.0 - 18 MG/DL    Creatinine 1.00 0.6 - 1.3 MG/DL    BUN/Creatinine ratio 13 12 - 20      GFR est AA >60 >60 ml/min/1.73m2    GFR est non-AA >60 >60 ml/min/1.73m2    Calcium 9.0 8.5 - 10.1 MG/DL    Bilirubin, total 0.2 0.2 - 1.0 MG/DL    ALT (SGPT) 20 13 - 56 U/L    AST (SGOT) 6 (L) 10 - 38 U/L    Alk.  phosphatase 108 45 - 117 U/L    Protein, total 8.2 6.4 - 8.2 g/dL    Albumin 3.7 3.4 - 5.0 g/dL    Globulin 4.5 (H) 2.0 - 4.0 g/dL    A-G Ratio 0.8 0.8 - 1.7     D DIMER    Collection Time: 02/24/21 11:45 PM   Result Value Ref Range    D DIMER 0.32 <0.46 ug/ml(FEU)   HCG QL SERUM    Collection Time: 02/24/21 11:45 PM   Result Value Ref Range    HCG, Ql. Negative NEG     CARDIAC PANEL,(CK, CKMB & TROPONIN)    Collection Time: 02/24/21 11:45 PM   Result Value Ref Range    CK - MB <1.0 <3.6 ng/ml    CK-MB Index  0.0 - 4.0 %     CALCULATION NOT PERFORMED WHEN RESULT IS BELOW LINEAR LIMIT    CK 92 26 - 192 U/L    Troponin-I, QT <0.02 0.0 - 0.045 NG/ML   EKG, 12 LEAD, SUBSEQUENT    Collection Time: 02/25/21  3:52 AM   Result Value Ref Range    Ventricular Rate 89 BPM    Atrial Rate 89 BPM    P-R Interval 148 ms    QRS Duration 92 ms    Q-T Interval 384 ms    QTC Calculation (Bezet) 467 ms    Calculated P Axis 51 degrees    Calculated R Axis 17 degrees    Calculated T Axis 10 degrees    Diagnosis       Normal sinus rhythm  Normal ECG  When compared with ECG of 24-FEB-2021 23:44,  No significant change was found     CARDIAC PANEL,(CK, CKMB & TROPONIN)    Collection Time: 02/25/21  3:57 AM   Result Value Ref Range    CK - MB <1.0 <3.6 ng/ml    CK-MB Index  0.0 - 4.0 %     CALCULATION NOT PERFORMED WHEN RESULT IS BELOW LINEAR LIMIT    CK 74 26 - 192 U/L    Troponin-I, QT <0.02 0.0 - 0.045 NG/ML       Radiologic Studies -   XR CHEST PORT   Final Result      Negative radiographic examination. _______________                          CT Results  (Last 48 hours)    None        CXR Results  (Last 48 hours)               02/24/21 2323  XR CHEST PORT Final result    Impression:      Negative radiographic examination. _______________                               Narrative:  EXAM: Portable upright chest radiograph. INDICATION: \"chest pain. \"       COMPARISON: None.       _______________       FINDINGS:          LUNGS:              --Expansion:  Adequate. --Consolidation:  None detected.             --Pulmonary edema:  None detected. --Other:  Non-applicable. PLEURAL SPACE:            --Pleural effusion:  None detected. --Pneumothorax:  None detected.       _______________                 Medications given in the ED-  Medications   sodium chloride 0.9 % bolus infusion 1,000 mL (1,000 mL IntraVENous New Bag 2/24/21 2359)   famotidine (PF) (PEPCID) injection 20 mg (20 mg IntraVENous Given 2/24/21 2359)   mylanta/viscous lidocaine (GI COCKTAIL) (40 mL Oral Given 2/24/21 2359)         Medical Decision Making   I am the first provider for this patient. I reviewed the vital signs, available nursing notes, past medical history, past surgical history, family history and social history. Vital Signs-Reviewed the patient's vital signs. Pulse Oximetry Analysis - 100% on room air, not hypoxic     Cardiac Monitor:  Rate: 97 bpm  Rhythm: Normal sinus    EKG interpretation: (Preliminary)  EKG read by Dr. Carmine Mclean at 11:49 PM  Normal sinus rhythm at a rate of 96 bpm, KS interval 142 ms, QRS duration of 80 ms, unchanged when compared to prior from March 2018    Repeat EKG interpretation: (Preliminary)  EKG read by Dr. Carmine Mclean at 3:59 AM  Normal sinus rhythm at a rate of 89 bpm, KS interval 140 ms, QRS duration of 92 ms, unchanged when compared to prior    Records Reviewed: Nursing Notes, Old Medical Records and Previous electrocardiograms    Provider Notes (Medical Decision Making): Alonza Simmonds is a 26 y.o. female presenting with chest pain. Wells low risk, PERC positive, D-dimer negative. Low risk heart score with negative cardiac enzymes and EKG greater than 6 hours from onset of symptoms. Labs and imaging otherwise benign. Plan for discharge with primary care follow-up and strict return precautions. Patient understands and agrees with this plan. Procedures:  Procedures    ED Course:   4:46 AM  Updated patient on all results and plan. All questions answered.     Diagnosis and Disposition Critical Care: None    DISCHARGE NOTE:    Elo Zacarias's  results have been reviewed with her. She has been counseled regarding her diagnosis, treatment, and plan. She verbally conveys understanding and agreement of the signs, symptoms, diagnosis, treatment and prognosis and additionally agrees to follow up as discussed. She also agrees with the care-plan and conveys that all of her questions have been answered. I have also provided discharge instructions for her that include: educational information regarding their diagnosis and treatment, and list of reasons why they would want to return to the ED prior to their follow-up appointment, should her condition change. She has been provided with education for proper emergency department utilization. CLINICAL IMPRESSION:    1. Atypical chest pain        PLAN:  1. D/C Home  2. Current Discharge Medication List        3. Follow-up Information     Follow up With Specialties Details Why Paresh Molina MD [facility] Schedule an appointment as soon as possible for a visit   [address] [place] [address],[address] Jacome [phone]      [facility] EMERGENCY DEPT Emergency Medicine  If symptoms worsen [address]  [facility] [id number] [phone]        _______________________________      Please note that this dictation was completed with [employer], the computer voice recognition software. Quite often unanticipated grammatical, syntax, homophones, and other interpretive errors are inadvertently transcribed by the computer software. Please disregard these errors. Please excuse any errors that have escaped final proofreading.

## 2021-08-04 ENCOUNTER — OFFICE VISIT (OUTPATIENT)
Dept: SURGERY | Age: 31
End: 2021-08-04
Payer: MEDICAID

## 2021-08-04 VITALS
OXYGEN SATURATION: 99 % | HEIGHT: 68 IN | BODY MASS INDEX: 35.92 KG/M2 | WEIGHT: 237 LBS | DIASTOLIC BLOOD PRESSURE: 63 MMHG | TEMPERATURE: 98 F | HEART RATE: 70 BPM | RESPIRATION RATE: 16 BRPM | SYSTOLIC BLOOD PRESSURE: 113 MMHG

## 2021-08-04 DIAGNOSIS — J45.909 UNCOMPLICATED ASTHMA, UNSPECIFIED ASTHMA SEVERITY, UNSPECIFIED WHETHER PERSISTENT: ICD-10-CM

## 2021-08-04 DIAGNOSIS — K90.9 INTESTINAL MALABSORPTION, UNSPECIFIED TYPE: ICD-10-CM

## 2021-08-04 DIAGNOSIS — Z98.84 S/P LAPAROSCOPIC SLEEVE GASTRECTOMY: ICD-10-CM

## 2021-08-04 DIAGNOSIS — F12.90 MARIJUANA USER: ICD-10-CM

## 2021-08-04 DIAGNOSIS — E66.01 SEVERE OBESITY (BMI 35.0-35.9 WITH COMORBIDITY) (HCC): Primary | ICD-10-CM

## 2021-08-04 DIAGNOSIS — Z97.5 IUD (INTRAUTERINE DEVICE) IN PLACE: ICD-10-CM

## 2021-08-04 DIAGNOSIS — G47.419 PRIMARY NARCOLEPSY WITHOUT CATAPLEXY: ICD-10-CM

## 2021-08-04 DIAGNOSIS — E78.5 HYPERLIPIDEMIA, UNSPECIFIED HYPERLIPIDEMIA TYPE: ICD-10-CM

## 2021-08-04 DIAGNOSIS — K21.9 GASTROESOPHAGEAL REFLUX DISEASE, UNSPECIFIED WHETHER ESOPHAGITIS PRESENT: ICD-10-CM

## 2021-08-04 PROCEDURE — 99215 OFFICE O/P EST HI 40 MIN: CPT | Performed by: SPECIALIST

## 2021-08-04 RX ORDER — MODAFINIL 200 MG/1
TABLET ORAL
COMMUNITY
Start: 2021-07-09

## 2021-08-04 NOTE — PATIENT INSTRUCTIONS

## 2021-08-04 NOTE — PROGRESS NOTES
Revision Surgery Consultation    Subjective: The patient is a 33 y.o. obese female with a Body mass index is 36.04 kg/m². .  The patient had a sleeve gastrectomy procedure done approximatly 4 years ago in [place] by me.  her starting weight prior to surgery was 271.  she ultimately lost approximately 70 lbs with a subsequent weight regain of 37lbs. her last bariatric follow-up was 1 year ago with our PA. Oliva Guerrero notes that she had no issues in the immediate post-op phase and had no hospital readmissions in the remote post-op phase. she currently is having the following issues related to his health:weight regain and GERD. she is here today to discuss a possible revision of her sleeve because of weight regain and GERD. All of their prior evaluations available by both their PCP's and specialists physicians have been reviewed today either in the Care Everywhere portal or scanned under the media tab. I have spent a large portion of my initial consultation today reviewing the patients current dietary habits which have contributed to their health issues, weight regain and  their current obesity. They understand that generally speaking,  weight regain is  a function of resuming less that ideal dietary habits instead of being a procedural issue. They understand that older procedures are more likely to be associated with a less that perfect procedural result, such as a prior vertical banded gastroplasty or non divided gastric bypass. These procedures are more likely to result in staple line failures with resultant weight regain. This has been explained to the patient via diagrams of these older procedures and given to the patient. I have suggested to them personally a dietary regimen that they can initiate now to help with their status as it pertains to their weight.   They understand that the most important aspect of their journey through their weight loss endeavor will be their adherence to a new lifestyle of healthy eating behavior. They also understand that an adherence to an exercise program will not only help with weight loss but is ultimately important in weight maintenance. Patient Active Problem List    Diagnosis Date Noted    Severe obesity (BMI 35.0-35.9 with comorbidity) ([facility] [address]     Narcolepsy     Marijuana user 2021    HSV-1 infection 12/15/2020    Elevated prolactin level 10/23/2018    GERD (gastroesophageal reflux disease) 2018    Intestinal malabsorption 2018    S/P laparoscopic sleeve gastrectomy 2018    Status post laparoscopic sleeve gastrectomy 2018    IUD (intrauterine device) in place 2018    FH: ovarian cancer 2018    Asthma     Morbid obesity ([facility] [address]     Hyperlipemia     Gastroparesis       Past Surgical History:   Procedure Laterality Date    HX BILATERAL SALPINGECTOMY      HX  SECTION   &     HI LAP, SARA RESTRICT PROC, LONGITUDINAL GASTRECTOMY      3/2018      Social History     Tobacco Use    Smoking status: Never Smoker    Smokeless tobacco: Never Used   Substance Use Topics    Alcohol use: No      Family History   Problem Relation Age of Onset   Anahy Aline Migraines Mother     Seizures Mother     Other Father         graves ds,     Sickle Cell Trait Brother     Other Brother     Asthma Sister     Other Sister         autism    Other Daughter         cp, epilepsy    Asthma Daughter       Current Outpatient Medications   Medication Sig Dispense Refill    modafiniL (PROVIGIL) 200 mg tablet TAKE 1 TABLET IN THE MORNING AND 1 IN THE AFTERNOON      dulaglutide (Trulicity) 5.63 OZ/2.9 mL sub-q pen 0.75 mg by SubCUTAneous route every seven (7) days.  ALPRAZolam (XANAX) 0.5 mg tablet Take 0.5 mg by mouth.       cyclobenzaprine (FLEXERIL) 10 mg tablet       traMADol (ULTRAM) 50 mg tablet TAKE 1 TABLET EVERY 6 HOURS AS NEEDED  0    Blood-Glucose Meter monitoring kit 1 Units by Other route.  EPINEPHrine (EPIPEN) 0.3 mg/0.3 mL injection 0.3 mg by IntraMUSCular route.  glucose blood VI test strips (ASCENSIA AUTODISC VI, ONE TOUCH ULTRA TEST VI) strip 50 Each.  diclofenac (VOLTAREN) 1 % gel APPLY 2 GRAMS AA Q 8 H PRN P  0    ergocalciferol (ERGOCALCIFEROL) 50,000 unit capsule TAKE 1 CAPSULE BY MOUTH EVERY 7 DAYS      Lancets misc 1 Each.  atorvastatin (LIPITOR) 40 mg tablet 40 mg.      butalbital-acetaminophen-caffeine (FIORICET, ESGIC) -40 mg per tablet TAKE 1 TABLET BY MOUTH EVERY DAY AS NEEDED, MAY REPEAT IN 2 HOURS, MAX 2 PER DAY, 5 PER WEEK      omeprazole (PRILOSEC) 40 mg capsule TK ONE C PO  30 MINUTES BEFORE BREAKFAST. NEED APPOINTMENT      levonorgestrel (MIRENA) 20 mcg/24 hr (5 years) IUD 1 Device.  ferrous sulfate ER (IRON) 160 mg (50 mg iron) TbER tablet Take 1 Tab by mouth daily.  calcium-cholecalciferol, d3, (CALCIUM 600 + D) 600-125 mg-unit tab Take  by mouth.  Biotin 2,500 mcg cap Take  by mouth.  B.infantis-B.ani-B.long-B.bifi (PROBIOTIC 4X) 10-15 mg TbEC Take  by mouth.  multivitamins chew Take  by mouth.  albuterol (PROVENTIL HFA, VENTOLIN HFA, PROAIR HFA) 90 mcg/actuation inhaler Take 2 Puffs by inhalation every four (4) hours as needed.  DULoxetine (CYMBALTA) 30 mg capsule Take 30 mg by mouth.  (Patient not taking: Reported on 8/4/2021)       Allergies   Allergen Reactions    Latex Rash, Itching and Swelling    Doxycycline Swelling     Throat swelling    Mushroom Anaphylaxis     Other reaction(s): anaphylaxis/angioedema    Amoxicillin Rash    Erythromycin Rash    Lactose Nausea and Vomiting     intolerance  Other reaction(s): gi distress  intolerance    Metformin Seizures     seizure    Penicillins Rash and Itching     Other reaction(s): unknown    Rocephin [Ceftriaxone] Rash    Sulfa (Sulfonamide Antibiotics) Swelling, Itching and Unknown (comments)          Review of Systems: General - No history or complaints of unexpected fever, chills, or weight loss  Head/Neck - No history or complaints of headache, diplopia, dysphagia, hearing loss  Cardiac - No history or complaints of chest pain, palpitations, murmur, or shortness of breath  Pulmonary - No history or complaints of shortness of breath, productive cough, hemoptysis  Gastrointestinal - Modertae reflux,  abdominal pain, obstipation/constipation, blood per rectum  Genitourinary - No history or complaints of hematuria/dysuria, stress urinary incontinence symptoms, or renal lithiasis  Musculoskeletal - No history or complaints of joint pain or muscular weakness  Hematologic - No history or complaints of bleeding disorders, blood transfusions, sickle cell anemia  Neurologic - No history or complaints of  migraine headaches, seizure activity, syncopal episodes, TIA or stroke  Integumentary - No history or complaints of rashes, abnormal nevi, skin cancer  Gynecological - No menses           Objective:     Visit Vitals  /63 (BP 1 Location: Right arm, BP Patient Position: Sitting, BP Cuff Size: Adult long)   Pulse 70   Temp 98 °F (36.7 °C)   Resp 16   Ht 5' 8\" (1.727 m)   Wt 107.5 kg (237 lb)   SpO2 99%   BMI 36.04 kg/m²       Physical Examination: General appearance - alert, well appearing, and in no distress  Mental status - alert, oriented to person, place, and time, normal mood, behavior, speech, dress, motor activity, and thought processes  Eyes - pupils equal and reactive, extraocular eye movements intact, sclera anicteric, left eye normal, right eye normal  Ears - right ear normal, left ear normal  Nose - normal and patent, no erythema, discharge or polyps  Mouth - mucous membranes moist, pharynx normal without lesions  Neck - supple, no significant adenopathy  Lymphatics - no palpable lymphadenopathy, no hepatosplenomegaly  Chest - clear to auscultation, no wheezes, rales or rhonchi, symmetric air entry  Heart - normal rate, regular rhythm, normal S1, S2, no murmurs, rubs, clicks or gallops  Abdomen - soft, nontender, nondistended, no masses or organomegaly  Back exam - full range of motion, no tenderness, palpable spasm or pain on motion  Neurological - alert, oriented, normal speech, no focal findings or movement disorder noted  Musculoskeletal - no joint tenderness, deformity or swelling  Extremities - peripheral pulses normal, no pedal edema, no clubbing or cyanosis  Skin - normal coloration and turgor, no rashes, no suspicious skin lesions noted    Labs / Old Records: Old operative reports reviewed if available and are scanned under the media tab or reviewed under Care Everywhere        Assessment:     Morbid obesity status post Sleeve gastrectomy procedure approximately 4 years ago with complaint of weight regain and GERd    Plan: 1. Weight regain-Today in our office I had a lengthy discussion with Sonia Canales regarding the nature of their prior procedure. We discussed the anatomical changes to their anatomy and how this relates to  contributing weight regain. Our office will continue to attempt to obtain any medical records, if not obtained or available already ,related to their procedure. It was also discussed today that before any decisions can be made regarding a possible revision of their initial  procedure that an upper GI swallow study must be obtained to evaluate their post surgical anatomy. They understand that in patients with a prior open gastric bypass, those patients are not revision candidates due to adhesive disease usually, and the fact that post surgical anatomy  usually can not be improved upon. They understand if their gastric bypass was performed laparoscopically, then this situation might be more amendable to gastric band placement over their prior gastric bypass.  They understand, as I have explained today, that the adhesive disease associated with prior  procedures is at times a rate limiting factor. This precludes our ability to perform a revision procedure safely. The factors that contribute to this are increased risk such as age, health issues and increased risk from a procedural standpoint and have been discussed today. We will proceed with the UGI swallow study as described above. The patient understands all of the above and wishes to proceed with the study. 2.Nutrition-  I have discussed in detail the pitfalls in diet that have contributed to their weight regain and the importance of adhering to a lifelong regimen of dietary goals and proper eating habits. I have discussed the proper lifelong bariatric diet  in detail spending in excess of 20 minutes discussing this. We will schedule them for a dietary consultation with our nutritionist and urge them to continue on a regular follow-up schedule with her. 3.Maintenance vitamins- Today we have discussed the importance of vitamins as it pertains to their procedure and we will obtain appropriate lab to check all levels. They have been provided a handout regarding this today. Total time spent with the patient reviewing their complex history of bariatric surgery,diet, and plan is in excess of 60 minutes.       Secondary Diagnoses:         Signed By: Janel Swanson MD     August 4, 2021

## 2021-08-26 ENCOUNTER — HOSPITAL ENCOUNTER (OUTPATIENT)
Dept: LAB | Age: 31
Discharge: HOME | End: 2021-08-26

## 2021-08-26 ENCOUNTER — OFFICE VISIT (OUTPATIENT)
Dept: SURGERY | Age: 31
End: 2021-08-26

## 2021-08-26 DIAGNOSIS — E66.01 MORBID OBESITY (HCC): Primary | ICD-10-CM

## 2021-08-26 LAB
SENTARA SPECIMEN COL,SENBCF: NORMAL
SENTARA SPECIMEN COL,SENBCF: NORMAL

## 2021-08-26 PROCEDURE — 99001 SPECIMEN HANDLING PT-LAB: CPT

## 2021-08-27 VITALS — BODY MASS INDEX: 35.24 KG/M2 | WEIGHT: 232.5 LBS | HEIGHT: 68 IN

## 2021-08-27 NOTE — PROGRESS NOTES
Medical Weight Loss Multi-Disciplinary Program    Name: Hernando Esquivel   : 1990    Session# 1 . Pt attended in-person class. Weight obtained in office. Date: 2021    Visit Vitals  Ht 5' 8\" (1.727 m)   Wt 105.5 kg (232 lb 8 oz)   BMI 35.35 kg/m²       Pt has 4.5  lbs since initial consult visit on 21. Dietary Instructions    Reviewed intake  Understanding low carbohydrates, low sugar, higher protein meals  Instruction given for personal dietary changes  Discussed perceived compliance  Comments: RD Reviewed Diet History and Physical Activity/Exercise habits. Recommended dietary changes discussed for both before and after surgery. Reviewed recommendation to follow [id number] calorie diet, working to reduce total carbohydrate intake to  g or less per day and increasing protein intake to  g per day, compared current intake to recommendations. Recommend pt adopt an exercise routine of at least 3-5 days per week for at least 30 minutes/day. If pt unable to participate in walking, anirudh, swimming, or other exercises, recommend pt work with a physical therapist and/or participate in chair exercises, yoga, and/or increase activities of daily living, as able. Patient participated in pre-recorded education class video. Recorded video of dietitian discussing key diet principles following weight loss surgery, importance of high protein diet, sources of protein, tips for following low carbohydrate diet, and ways to measure carbohydrates utilizing carbohydrate counting. Discussed importance of sampling protein supplements, protein supplement label guidelines and popularly selected items. Also reviewed the key vitamins and minerals to supplement long term after surgery. But these vitamins will be reviewed again in a pre-operative class. Patient watched the video in its entirety and turned in the 3 embedded passcodes from the video session.       Behavior Modification    Identify obstacles to trigger change  Achieving/Rewarding goals met  Positive attitude  Comments: Reinforced importance continuing to modify lifestyle patterns and behaviors to promote weight loss and long term weight maintenance. I talked to patient about the importance of taking vitamins post op and we reviewed the vitamins that patients will be taking post op. Patient will hear this again at pre op class before surgery. Patient had the opportunity to ask questions about these vitamins that will be lifelong. Comments:  Pt set personal behavior goals as related to pre- and post-surgical recommendations and diet key principles. Recommend pt track progress and set SMART goals around post-op diet key principles. Pt completed Bariatric-specific nutrition questionnaire. RD reviewed answers and provided feedback on responses that align with bariatric recommendations to behavior changes. Provided feedback on recommendations, areas for improvement, and provided positive feedback on areas where pt is making positive behavior changes. Pt questionnaire is included in chart separate from this note. All current stated pt questions answered. Goals:   1. Work to increase to 3-4 small meals per day, with planned snacks as needed. Recommend following plate method for meal planning - focusing on lean protein, non-starchy vegetables, and measured amounts of starch. - Goal of  g protein and  g carbohydrate per day. - Recommend continuing protein supplement as meal replacement at least 1x/day OR as high protein snack option  2. Increase non caloric fluid to 64 oz per day. Eliminate caffeine, added sugar, carbonation, and straws.               -Continue to work to decrease sugar sweetened beverages - goal of calorie free beverages only              -Must eliminate caffeine prior to surgery and avoid for ~6-8 weeks   -Practice 30:30 rule,  food and flood   3. Start activity regimen, work to increase ADL  4. Start Complete MVI    Candidate for surgery (per RD): PENDING, Pt scheduled for nutrition education on 9/30/2021.

## 2021-08-30 ENCOUNTER — OFFICE VISIT (OUTPATIENT)
Dept: SURGERY | Age: 31
End: 2021-08-30
Payer: MEDICAID

## 2021-08-30 ENCOUNTER — HOSPITAL ENCOUNTER (OUTPATIENT)
Dept: LAB | Age: 31
Discharge: HOME | End: 2021-08-30

## 2021-08-30 VITALS
BODY MASS INDEX: 35.15 KG/M2 | HEART RATE: 95 BPM | SYSTOLIC BLOOD PRESSURE: 131 MMHG | WEIGHT: 231.9 LBS | TEMPERATURE: 97.8 F | DIASTOLIC BLOOD PRESSURE: 78 MMHG | HEIGHT: 68 IN | OXYGEN SATURATION: 100 %

## 2021-08-30 DIAGNOSIS — F12.90 MARIJUANA USER: ICD-10-CM

## 2021-08-30 DIAGNOSIS — Z98.84 S/P LAPAROSCOPIC SLEEVE GASTRECTOMY: ICD-10-CM

## 2021-08-30 DIAGNOSIS — K21.9 GASTROESOPHAGEAL REFLUX DISEASE, UNSPECIFIED WHETHER ESOPHAGITIS PRESENT: ICD-10-CM

## 2021-08-30 DIAGNOSIS — J45.909 UNCOMPLICATED ASTHMA, UNSPECIFIED ASTHMA SEVERITY, UNSPECIFIED WHETHER PERSISTENT: ICD-10-CM

## 2021-08-30 DIAGNOSIS — E66.01 SEVERE OBESITY (BMI 35.0-35.9 WITH COMORBIDITY) (HCC): Primary | ICD-10-CM

## 2021-08-30 DIAGNOSIS — K90.9 INTESTINAL MALABSORPTION, UNSPECIFIED TYPE: ICD-10-CM

## 2021-08-30 DIAGNOSIS — Z98.84 STATUS POST LAPAROSCOPIC SLEEVE GASTRECTOMY: ICD-10-CM

## 2021-08-30 LAB
SENTARA SPECIMEN COL,SENBCF: NORMAL
VITAMIN B1, WHOLE BLOOD, 66250: 149.9 NMOL/L (ref 66.5–200)

## 2021-08-30 PROCEDURE — 99214 OFFICE O/P EST MOD 30 MIN: CPT | Performed by: NURSE PRACTITIONER

## 2021-08-30 PROCEDURE — 99001 SPECIMEN HANDLING PT-LAB: CPT

## 2021-08-30 RX ORDER — ERGOCALCIFEROL 1.25 MG/1
50000 CAPSULE ORAL 2 TIMES WEEKLY
Qty: 52 CAPSULE | Refills: 1 | Status: SHIPPED | OUTPATIENT
Start: 2021-08-30 | End: 2022-05-18

## 2021-08-30 NOTE — PROGRESS NOTES
Bariatric Surgery Consultation    Subjective:     Antonino Michel is a 33 y.o. obese female with a Body mass index is 35.26 kg/m². She is 3 years s/p sleeve gastrectomy. Antonino Michel desires surgery at this time because of health issues and quality of life issues. Antonino Michel has been seen by a bariatric nutritionist and has been placed on an appropriate low carbohydrate diet. The patient desires laparoscopic gastric bypass surgery for surgical weight loss, however she is not currently a surgical candidate due to pending work up. Antonino Michel is here today to check progress with weight loss / evaluate nutritional status and review all subspecialty clearances in hopes of proceeding to the operating room. Office visit notes from 2018 to present have been reviewed. Antonino Michel has increased fluid intake, is focusing on protein, is eating regularly, is taking a multivitamin & has increased her activity. No recent visits with PCP. No new medications. Has been seeing [facility] for abdominal pain, constipation and diarrhea. Has had EGD and GES done in past for suspected gastroparesis. Now being worked up for IBS. Recent labs with elevated sed rate and CRP. Has UGI scheduled with them for October.     Patient Active Problem List    Diagnosis Date Noted    Severe obesity (BMI 35.0-35.9 with comorbidity) ([address]     Narcolepsy     Marijuana user 2021    HSV-1 infection 12/15/2020    Elevated prolactin level 10/23/2018    GERD (gastroesophageal reflux disease) 2018    Intestinal malabsorption 2018    S/P laparoscopic sleeve gastrectomy 2018    Status post laparoscopic sleeve gastrectomy 2018    IUD (intrauterine device) in place 2018    FH: ovarian cancer 2018    Asthma     Morbid obesity ([facility] [address]     Hyperlipemia     Gastroparesis       Past Surgical History:   Procedure Laterality Date    HX BILATERAL SALPINGECTOMY      HX  SECTION 2006 & 2010    NH LAP, SARA RESTRICT PROC, LONGITUDINAL GASTRECTOMY      3/2018      Social History     Tobacco Use    Smoking status: Never Smoker    Smokeless tobacco: Never Used   Substance Use Topics    Alcohol use: No      Family History   Problem Relation Age of Onset    Migraines Mother     Seizures Mother     Other Father         graves ds,     Sickle Cell Trait Brother     Other Brother     Asthma Sister     Other Sister         autism    Other Daughter         cp, epilepsy    Asthma Daughter       Current Outpatient Medications   Medication Sig Dispense Refill    PNV Comb #2-Iron-FA-Omega 3 29-1-400 mg cmpk Take  by mouth.  ergocalciferol (ERGOCALCIFEROL) 1,250 mcg (50,000 unit) capsule Take 1 Capsule by mouth two (2) times a week. 52 Capsule 1    modafiniL (PROVIGIL) 200 mg tablet TAKE 1 TABLET IN THE MORNING AND 1 IN THE AFTERNOON      dulaglutide (Trulicity) 2.49 BW/6.0 mL sub-q pen 0.75 mg by SubCUTAneous route every seven (7) days.  ALPRAZolam (XANAX) 0.5 mg tablet Take 0.5 mg by mouth.  Blood-Glucose Meter monitoring kit 1 Units by Other route.  EPINEPHrine (EPIPEN) 0.3 mg/0.3 mL injection 0.3 mg by IntraMUSCular route.  diclofenac (VOLTAREN) 1 % gel APPLY 2 GRAMS AA Q 8 H PRN P  0    butalbital-acetaminophen-caffeine (FIORICET, ESGIC) -40 mg per tablet TAKE 1 TABLET BY MOUTH EVERY DAY AS NEEDED, MAY REPEAT IN 2 HOURS, MAX 2 PER DAY, 5 PER WEEK      omeprazole (PRILOSEC) 40 mg capsule TK ONE C PO  30 MINUTES BEFORE BREAKFAST. NEED APPOINTMENT      levonorgestrel (MIRENA) 20 mcg/24 hr (5 years) IUD 1 Device.  calcium-cholecalciferol, d3, (CALCIUM 600 + D) 600-125 mg-unit tab Take  by mouth.  Biotin 2,500 mcg cap Take  by mouth.  B.infantis-B.ani-B.long-B.bifi (PROBIOTIC 4X) 10-15 mg TbEC Take  by mouth.       albuterol (PROVENTIL HFA, VENTOLIN HFA, PROAIR HFA) 90 mcg/actuation inhaler Take 2 Puffs by inhalation every four (4) hours as needed.  glucose blood VI test strips (ASCENSIA AUTODISC VI, ONE TOUCH ULTRA TEST VI) strip 50 Each. (Patient not taking: Reported on 8/30/2021)      Lancets misc 1 Each. (Patient not taking: Reported on 8/30/2021)      atorvastatin (LIPITOR) 40 mg tablet 40 mg.  (Patient not taking: Reported on 8/30/2021)       Allergies   Allergen Reactions    Latex Rash, Itching and Swelling    Doxycycline Swelling     Throat swelling    Mushroom Anaphylaxis     Other reaction(s): anaphylaxis/angioedema    Amoxicillin Rash    Erythromycin Rash    Lactose Nausea and Vomiting     intolerance  Other reaction(s): gi distress  intolerance    Metformin Seizures     seizure    Penicillins Rash and Itching     Other reaction(s): unknown    Rocephin [Ceftriaxone] Rash    Sulfa (Sulfonamide Antibiotics) Swelling, Itching and Unknown (comments)          Review of Systems:        General - No history or complaints of unexpected fever, chills, or weight loss  Head/Neck - No history or complaints of headache, diplopia, dysphagia, hearing loss  Cardiac - No history or complaints of chest pain, palpitations, murmur, or shortness of breath  Pulmonary - No history or complaints of shortness of breath, productive cough, hemoptysis  Gastrointestinal - has reflux controlled with PPI and avoiding food triggers,  abdominal pain, obstipation/constipation, blood per rectum  Genitourinary - No history or complaints of hematuria/dysuria, stress urinary incontinence symptoms, or renal lithiasis  Musculoskeletal - no joint pain, no muscular weakness  Hematologic - No history or complaints of bleeding disorders, blood transfusions, sickle cell anemia  Neurologic - No history or complaints of  migraine headaches, seizure activity, syncopal episodes, TIA or stroke  Integumentary - No history or complaints of rashes, abnormal nevi, skin cancer  Gynecological - No menses    Objective:     Visit Vitals  /78 (BP 1 Location: Left upper arm, BP Patient Position: Sitting, BP Cuff Size: Large adult)   Pulse 95   Temp 97.8 °F (36.6 °C)   Ht 5' 8\" (1.727 m)   Wt 105.2 kg (231 lb 14.4 oz)   SpO2 100%   BMI 35.26 kg/m²       Physical Exam:      General appearance:  alert, cooperative, no distress, appears stated age   Mental status   alert, oriented to person, place, and time   Neck  supple, no significant adenopathy     Lymphatics  no palpable lymphadenopathy, no hepatosplenomegaly   Chest  clear to auscultation, no wheezes, rales or rhonchi, symmetric air entry   Heart  normal rate, regular rhythm, normal S1, S2, no murmurs, rubs, clicks or gallops    Abdomen: soft, nontender, nondistended, no masses or organomegaly   Incision:  healing well, no drainage, no erythema, no hernia, no seroma, no swelling, no dehiscence, incision well approximated      Neurological  alert, oriented, normal speech, no focal findings or movement disorder noted   Musculoskeletal no joint tenderness, deformity or swelling   Extremities peripheral pulses normal, no pedal edema, no clubbing or cyanosis   Skin normal coloration and turgor, no rashes, no suspicious skin lesions noted       Labs:     Recent Results (from the past 2016 hour(s))   [address] [facility]. Collection Time: 08/26/21 11:09 AM   Result Value Ref Range    [facility] Specimens collected/sent to [facility]. Collection Time: 08/26/21 11:09 AM   Result Value Ref Range    [facility] Specimens collected/sent to [facility] labs in [facility] from 8/26/21:  Ferritin 314  Iron 97  Glucose 247    B12 419  Folate 2.11  Vit D 13.2  CRP 1.2  ESR 39    Assessment:     Morbid obesity with associated comorbidity of diabetes, severe central obesity & outstanding insurance requirements. Plan: To continue current medications & routine follow-up with PCP.     Continuation of Pre-Operative evaluation / clearance:  Suman Boogie has returned to the office today to discuss her status as a surgical candidate. Progress has been noted and reviewed - including review of notes from dietician. Juliet Cox is being compliant with follow-up & recommendations. Juliet Cox has 0 more pounds to lose before proceeding to the OR. (6 pounds lost since last visit)  Juliet Cox has 5 more nutritional visits to complete before proceeding to the OR. Additionally, 2 more medical visits are required. Juilet Cox has an outstanding PCP and psychological clearance to review before proceeding to the OR. Juliet Cox will return in 1 month to continue the pre-operative process and to work towards goals as outlined. Juliet Cox understand the rationales for all the above and plans to follow the diet & activity recommendations of the dietician. It has been discussed that given her severely obese condition that the best surgical option for this patient would be the laparoscopic gastric bypass surgery. Juliet Cox agrees with the surgical choice and has been educated in it's; risks, benefits, and alternatives. We will continue with the pre-operative evaluation as needed to check progress. Does have UGI with Dr Noah Jaramillo scheduled 9/15/21 and if any suggestion of delayed emptying would recommend repeating gastric emptying study. Also, needs to f/up with PCP about elevated fasting glucose and recommend checking HgA1c, possibly resuming antidiabetic medication. In terms of her abdominal pain and other symptoms along with elevated inflammatory markers. She definitely needs more thorough evaluation of cause prior to possible conversion to gastric bypass. Has been smoking marijuana to help with pain. Aware of need to stop all forms of smoking at least 2 months prior to surgery.     Secondary Diagnoses:     Dietary Intervention  - The patient is currently followed by a bariatric nutritionist for an attempt at preoperative weight loss as has been dictated by their insurance carrier. They will be assessed at various times during their follow up to evaluate their progress depending on the length of time that is required once again by their carrier. I have explained the importance of preoperative weight loss and the benefits regarding lower surgical risk and also assisting the patient in reaching their weight loss goal.  Finally they understand there is a physiologic benefit from the standpoint of hepatic volume reduction preoperatively. I have reiterated the importance of a low carbohydrate and high protein regimen to achieve their stated goal.    Adult Onset Diabetes - The patient has paul given a very low carbohydrate diet preoperatively along with instructions to monitor their blood sugars on a regular daily basis. When  their surgery is performed  we will be monitoring the patient with sliding scale insulin and accuchecks.  Based on those values we will determine whether the patient needs a reduction of those medications postoperatively or total removal of those medications on discharge.  We will have the patient continue accuchecks postoperatively while at home also and report to me or their family physician for appropriate adjustments as needed.  The patient also understands that in the event of uncontrolled blood sugar preoperatively that we may choose to postpone their surgery. GERD -The patient understands that weight loss surgery is not a guaranteed cure for reflux disease but does understand the benefits that weight loss can have on reflux disease. They also understand that at the time of surgery the gastroesophageal junction will be evaluated for the presence of a diaphragmatic hernia. Hernias will be corrected always with the gastric band and sleeve gastrectomy procedures, but only on a case by case basis with the gastric bypass.   The patient also understands that neither weight loss surgery nor repair of a diaphragmatic hernia repair guarantees the complete cessation of the disease. Smoking Cessation - Today I have counseled the patient extensively regarding smoking cessation. They have been counseled extensively about the detrimental effects of smoking on their weight loss surgical procedure particularly for the gastric bypass and sleeve gastrectomy procedures. They understand that smoking leads to pulmonary issues postoperatively and can lead to gastric ulcers and marginal ulcers in the post bariatric surgery pouch that has been created. They understand that they must stop smoking prior to surgery or it may affect their ultimate progression to their procedure. Restrictive Airway Disease - We will continue all of their pulmonary medications in the form of oral pills and inhalers in both the perioperative and postoperative period. They understand that their symptoms should improve with weight loss. Any further testing related to this will be turned over to their family physician or pulmonologist. The patient understands that if they require oral or IV steroids in the future that they will notify us. This is particularly important for gastric bypass patients at all times and both sleeve gastrectomy and gastric bypass patients in the 1 month pre op and 1 month post operative period. They understand that inhaled steroids are exempt from this. Ms. Eder Blankenship has a reminder for a \"due or due soon\" health maintenance. I have asked that she contact her primary care provider for follow-up on this health maintenance.       Signed By: Ritesh Castillo NP     August 30, 2021

## 2021-08-30 NOTE — PATIENT INSTRUCTIONS
Patient Instructions      1. Remember hydration goals - minimum of 64 ounces of liquids per day (dehydration is the number one reason for hospital readmission). 2. Continue to monitor carbohydrate and protein intake you need a minimum of  Grams of protein daily- remember to keep your total carbohydrates to 50 grams or less per day for best results. 3. Continue to work towards exercise goals - 60-90 minutes, 5 times a week minimum of deliberate, aerobic exercise is the ultimate goal with strength training 2 times each week. Refer to [employer] for  information. 4. Remember to take vitamins as directed. 5. Attend support group the 2nd Thursday of each month. 6.  Constipation: Milk of Magnesia is for immediate relief only. Miralax is to be used every day if constipation is a chronic problem. 7.  Diarrhea: patients will occasionally develop lactose intolerance after surgery. Check to see if your protein shake has whey in it. If it does try a protein powder or drink that does not have whey and stop all yogurts, cheeses and milks to see if the diarrhea goes away. 8.  If you have had labs drawn. We will only call you if you have abnormal results. Otherwise you can access the lab results in \"mychart\". You will only need the access code the first time you sign on. 9.  Call us at [phone] or email us through [email or web address]" with questions,     concerns or worsening of condition, we have someone on call 24 hours a day. If you are unable to reach our office, you are to go to your Primary Care Physician or the Emergency Department.      NOTE TO GASTRIC BYPASS PATIENTS:  (SAME APPLIES TO GASTRIC SLEEVE PATIENTS FOR FIRST TWO MONTHS)  Remember that for the rest of your life, you are not able to take the following:  - NSAIDs (ibuprofen, goody powder, BC powder, Motrin, Advil, Mobic, Voltaren, Excedrin, etc.)  - Steroid pills or injections  - Smoke (cigarettes or recreational drugs)  - Alcohol  Use of any of the above may cause ulcers in your stomach which may perforate causing a medical emergency and surgery. Speak to our medical staff if another medical provider requires you to take steroids or NSAIDs. Supplement Resource Guide    Importance of Protein:   Maintains lean body mass, produces antibodies to fight off infections, heals wounds, minimizes hair loss, helps to give you energy, helps with satiety, and keeping you full between meals. Importance of Calcium:  Needed for healthy bones and teeth, normal blood clotting, and nervous system functioning, higher risk of osteoporosis and bone disease with non-compliance. Importance of Multivitamins: Many functions. Supply you with extra nutrients that you may be missing from food. May lead to iron deficiency anemia, weakness, fatigue, and many other symptoms with non-compliance. Importance of B Vitamins:  Important for red blood cell formation, metabolism, energy, and helps to maintain a healthy nervous system. Protein Supplement  Find one you like now. Use immediately after surgery. Look for:  35-50g protein each day from your protein supplement once you reach the progression diet. 0-3 g fat per serving  0-3 g sugar per serving    Protein drinks should be split in separate dosages. Recommend: Lifelong  1 year + Calcium Supplement:     Start taking within a month after surgery. Look for: Calcium Citrate Plus D (1500 mg per day)  Recommend: Citracal     .            Avoid chocolate chewable calcium. Can use chewable bariatric or [employer] brand or similar chewable. The body cannot absorb more than 500-600 mg of calcium at a time. Take for Life Multi-vitamin Supplement:      Start immediately after surgery: any complete chewable, such as: [address] Complete chewables. Avoid [address] sours or gummies.   They lack iron and other important nutrients and also have added sugar. Continue with chewable vitamin or change to adult complete multivitamin one month after surgery. Menstruating women can take a prenatal vitamin. Make sure has at least 18 mg iron and 741-502 mcg folic acid   Vitamin Y94, B Complex Vitamin, and Biotin  Start taking within a month after surgery. Vitamin B12:  1000 mcg of Vitamin B12 three times weekly    Must take sublingually (meaning you take it under your tongue) or in a liquid drop form for easy absorption. B Complex Vitamin: Take a pill or liquid drop form once daily. Biotin: This vitamin can help prevent hair loss. Recommend 5mg   (5000 mcg) a day  Biotin is Optional              Learning About Being Physically Active  What is physical activity? Being physically active means doing any kind of activity that gets your body moving. The types of physical activity that can help you get fit and stay healthy include:  · Aerobic or \"cardio\" activities. These make your heart beat faster and make you breathe harder, such as brisk walking, riding a bike, or running. They strengthen your heart and lungs and build up your endurance. · Strength training activities. These make your muscles work against, or \"resist,\" something. Examples include lifting weights or doing push-ups. These activities help tone and strengthen your muscles and bones. · Stretches. These let you move your joints and muscles through their full range of motion. Stretching helps you be more flexible. What are the benefits of being active? Being active is one of the best things you can do for your health. It helps you to:  · Feel stronger and have more energy to do all the things you like to do. · Focus better at school or work. · Feel, think, and sleep better. · Reach and stay at a healthy weight. · Lose fat and build lean muscle.   · Lower your risk for serious health problems, including diabetes, heart attack, high blood pressure, and some cancers. · Keep your heart, lungs, bones, muscles, and joints strong and healthy. How can you make being active part of your life? Start slowly. Make it your long-term goal to get at least 30 minutes of exercise on most days of the week. Walking is a good choice. You also may want to do other activities, such as running, swimming, cycling, or playing tennis or team sports. Pick activities that you like--ones that make your heart beat faster, your muscles stronger, and your muscles and joints more flexible. If you find more than one thing you like doing, do them all. You don't have to do the same thing every day. Get your heart pumping every day. Any activity that makes your heart beat faster and keeps it at that rate for a while counts. Here are some great ways to get your heart beating faster:  · Go for a brisk walk, run, or bike ride. · Go for a hike or swim. · Go in-line skating. · Play a game of touch football, basketball, or soccer. · Ride a bike. · Play tennis or racquetball. · Climb stairs. Even some household chores can be aerobic--just do them at a faster pace. Vacuuming, raking or mowing the lawn, sweeping the garage, and washing and waxing the car all can help get your heart rate up. Strengthen your muscles during the week. You don't have to lift heavy weights or grow big, bulky muscles to get stronger. Doing a few simple activities that make your muscles work against, or \"resist,\" something can help you get stronger. For example, you can:  · Do push-ups or sit-ups, which use your own body weight as resistance. · Lift weights or dumbbells or use stretch bands at home or in a gym or community center. Stretch your muscles often. Stretching will help you as you become more active. It can help you stay flexible, loosen tight muscles, and avoid injury. It can also help improve your balance and posture and can be a great way to relax.   Be sure to stretch the muscles you'll be using when you work out. It's best to warm your muscles slightly before you stretch them. Walk or do some other light aerobic activity for a few minutes, and then start stretching. When you stretch your muscles:  · Do it slowly. Stretching is not about going fast or making sudden movements. · Don't push or bounce during a stretch. · Hold each stretch for at least 15 to 30 seconds, if you can. You should feel a stretch in the muscle, but not pain. · Breathe out as you do the stretch. Then breathe in as you hold the stretch. Don't hold your breath. If you're worried about how more activity might affect your health, have a checkup before you start. Follow any special advice your doctor gives you for getting a smart start. Where can you learn more? Go to http://www.gray.com/  Enter [id number] in the search box to learn more about \"Learning About Being Physically Active. \"  Current as of: September 10, 2020               Content Version: 12.8  © 2006-2021 [employer]. Care instructions adapted under license by [employer] (which disclaims liability or warranty for this information). If you have questions about a medical condition or this instruction, always ask your healthcare professional. [address] any warranty or liability for your use of this information.

## 2021-09-15 ENCOUNTER — APPOINTMENT (OUTPATIENT)
Dept: GENERAL RADIOLOGY | Age: 31
End: 2021-09-15
Attending: SPECIALIST
Payer: MEDICAID

## 2021-09-15 ENCOUNTER — APPOINTMENT (OUTPATIENT)
Dept: SURGERY | Age: 31
End: 2021-09-15

## 2021-09-15 ENCOUNTER — HOSPITAL ENCOUNTER (OUTPATIENT)
Age: 31
Setting detail: OUTPATIENT SURGERY
Discharge: HOME | End: 2021-09-15
Attending: SPECIALIST | Admitting: SPECIALIST
Payer: MEDICAID

## 2021-09-15 VITALS
HEIGHT: 68 IN | OXYGEN SATURATION: 98 % | DIASTOLIC BLOOD PRESSURE: 85 MMHG | SYSTOLIC BLOOD PRESSURE: 136 MMHG | BODY MASS INDEX: 34.75 KG/M2 | HEART RATE: 95 BPM | TEMPERATURE: 97.9 F | RESPIRATION RATE: 16 BRPM | WEIGHT: 229.3 LBS

## 2021-09-15 DIAGNOSIS — K21.9 GASTROESOPHAGEAL REFLUX DISEASE, UNSPECIFIED WHETHER ESOPHAGITIS PRESENT: ICD-10-CM

## 2021-09-15 DIAGNOSIS — E66.01 MORBID OBESITY (HCC): ICD-10-CM

## 2021-09-15 PROCEDURE — 76040000019: Performed by: SPECIALIST

## 2021-09-15 PROCEDURE — 74240 X-RAY XM UPR GI TRC 1CNTRST: CPT

## 2021-09-15 PROCEDURE — 74240 X-RAY XM UPR GI TRC 1CNTRST: CPT | Performed by: SPECIALIST

## 2021-09-15 PROCEDURE — 74011000250 HC RX REV CODE- 250: Performed by: SPECIALIST

## 2021-09-15 RX ORDER — PANTOPRAZOLE SODIUM 40 MG/1
40 TABLET, DELAYED RELEASE ORAL DAILY
Qty: 30 TABLET | Refills: 2 | Status: SHIPPED | OUTPATIENT
Start: 2021-09-15 | End: 2022-04-28

## 2021-09-15 NOTE — PROCEDURES
[facility]    Upper GI Procedure Report      Caleb Tong  MR# [id number]  1990  Date of Service - 9/15/2021    Pre-Op Diagnosis - patient is status post sleeve resection performed by myself 3.5 years ago with complaint of weight regain and significant weight regain. They now present for UGI to assess their post surgical anatomy. Post-Op Diagnosis -same    Procedure - UGI study with barium    Surgeon - Rex Lawrence MD    Assistant - None    Complications - None    Specimens - None    Implants - None    Estimate Blood Loss - None    Statement of Medical Necessity - Need for radiologic evaluation prior to possible conversion to a bypass. Procedure -the patient was brought to the fluoroscopy suite where they were given thin barium. On swallowing the barium the patient was noted to have normal peristalsis of their esophagus with progressive flow into the distal esophagus. Specific findings of the distal esophagus revealed that they did not have a hiatal hernia. Contrast then flowed into the gastric cardia with the following findings: Contrast flowed through the esophagus and into a normal shaped sleeve stomach with no kinking or obstruction. Her sleeve was of normal continuity and caliber. Given her GERD that has been resistant to conservative care the best course of action would be a sleeve to bypass conversion for the treatment of reflux. Bernardo Porter MD

## 2021-09-30 ENCOUNTER — OFFICE VISIT (OUTPATIENT)
Dept: SURGERY | Age: 31
End: 2021-09-30

## 2021-09-30 DIAGNOSIS — E66.01 MORBID OBESITY (HCC): Primary | ICD-10-CM

## 2021-10-01 VITALS — HEIGHT: 68 IN | WEIGHT: 232.9 LBS | BODY MASS INDEX: 35.3 KG/M2

## 2021-10-01 NOTE — PROGRESS NOTES
Medical Weight Loss Multi-Disciplinary Program    Name: Metta Claude   : 1990    Session# 2 , Pt attended in-person class. Weight obtained in office. Date: 10/1/2021    Visit Vitals  Ht 5' 8\" (1.727 m)   Wt 105.6 kg (232 lb 14.4 oz)   BMI 35.41 kg/m²       Pt has GAINED 0.4 lbs since last nutrition visit on 21. Pt has LOST 4.1  lbs since initial consult on 21. Dietary Instructions    Reviewed intake  Understanding low carbohydrates, low sugar, higher protein meals  Instruction given for personal dietary changes  Discussed perceived compliance  Comments: RD Reviewed Diet History and Physical Activity/Exercise habits. Recommended dietary changes for both before and after surgery. Reviewed recommendation to follow [id number] calorie diet, working to reduce total carbohydrate intake to  g or less per day and increasing protein intake to  g per day, compared current intake to recommendations. Recommend pt adopt an exercise routine of at least 3-5 days a week for at least 30 minutes/day. If pt unable to participate in walking, anirudh, swimming, or other exercises, recommend pt work with a physical therapist and/or participate in chair exercises, yoga, and/or increase activities of daily living as able. Patient participated in pre-recorded education class video. Recorded video of dietitian discussing role of low carbohydrate diet for promoting weight loss before and after surgery. Reviewed sources of carbohydrates, label reading tips and reviewed exchange list for carbohydrates. Discussed ways to reduce carbohydrates and reviewed example day of high carbohydrate vs. Low carbohydrate diet. This class reviewed materials provided at patients initial appointment and provided in education packet to patient. Patient watched the video in its entirety and turned in the 3 embedded passcodes from the video session.       Behavior Modification    Identify obstacles to trigger change  Achieving/Rewarding goals met  Positive attitude  Comments: Reinforced importance continuing to modify lifestyle patterns and behaviors to promote weight loss and long term weight maintenance     Comments:  Pt instructed to start carbohydrate counting and label reading. Recommend pt start reducing and eliminating sugar-sweetened beverages, concentrated sweets (cakes/candy/cupcakes/poptarts/cereals/etc.), juice, and high-carbohydrate foods. Pt completed Bariatric-specific nutrition questionnaire. RD reviewed answers and provided feedback on responses that align with bariatric recommendations to behavior changes. Provided feedback on recommendations, areas for improvement, and provided positive feedback on areas where pt is making positive behavior changes. Pt questionnaire is included in chart separate from this note. All current stated pt questions answered. Physical Activity/Exercise      Patient has been educated on the importance of starting a physical activity regimen, reinforced the importance of regular physical activity for total health and weight management. Suggested starting exercise regimen of cardiovascular and resistance training for at least 3-5 days per week for 30-60 minutes. Encouraged pt to set and keep weekly exercise goals. Goals:   1. Work to increase to 3-4 small meals per day, with planned snacks as needed. Recommend following plate method for meal planning - focusing on lean protein, non-starchy vegetables, and measured amounts of starch. - Goal of  g protein and  g carbohydrate per day. - Recommend continuing protein supplement as meal replacement at least 1x/day OR as high protein snack option  2. Increase non caloric fluid to 64 oz per day.   Eliminate caffeine, added sugar, carbonation, and straws.               -Continue to work to decrease sugar sweetened beverages - goal of calorie free beverages only              -Must eliminate caffeine prior to surgery and avoid for ~6-8 weeks   -Practice 30:30 rule,  food and flood   3. Start activity regimen, work to increase ADL  4. Start Complete MVI    Candidate for surgery (per RD): PENDING  Pt is scheduled for nutrition education on 10/21/2021.

## 2021-10-21 ENCOUNTER — OFFICE VISIT (OUTPATIENT)
Dept: SURGERY | Age: 31
End: 2021-10-21

## 2021-10-21 DIAGNOSIS — E66.01 MORBID OBESITY (HCC): Primary | ICD-10-CM

## 2021-10-22 VITALS — BODY MASS INDEX: 35.51 KG/M2 | HEIGHT: 68 IN | WEIGHT: 234.3 LBS

## 2021-10-22 NOTE — PROGRESS NOTES
Medical Weight Loss Multi-Disciplinary Program    Name: Matthias Ratliff   : 1990    Session# 3, Pt attended in-person class. Weight obtained in office. Date: 10/22/2021    Visit Vitals  Ht 5' 8\" (1.727 m)   Wt 106.3 kg (234 lb 4.8 oz)   BMI 35.63 kg/m²       Pt has  GAINED 1.4 lbs since last nutrition visit on 21. Pt has LOST 2.7 lbs since initial consult on 21. Dietary Instructions    Reviewed intake  Understanding low carbohydrates, low sugar, higher protein meals  Instruction given for personal dietary changes  Discussed perceived compliance  Comments: Comments: RD Reviewed Diet History and Physical Activity/Exercise habits. Recommended dietary changes discussed for both before and after surgery. Reviewed recommendation to follow [id number] calorie diet, working to reduce total carbohydrate intake to  g or less per day and increasing protein intake to  g per day, compared current intake to recommendations. Recommend pt adopt an exercise routine of at least 3-5 days a week for at least 30 minutes/day. If pt unable to participate in walking, anirudh, swimming, or other exercises, recommend pt work with a physical therapist and/or participate in chair exercises, yoga, and/or increase activities of daily living as able. Patient participated in pre-recorded education class video. Recorded video of dietitian discussing role of high protein diet for promoting weight loss before and after surgery. Reviewed sources of protein, label reading tips, and ways to measure proteins. Spent portion of education emphasizing the role of protein supplements in the post operative diet. Reviewed label reading for protein shakes and types of protein supplements. Patient watched the video in its entirety and turned in the 3 embedded passcodes from the video session.       Behavior Modification    Identify obstacles to trigger change  Achieving/Rewarding goals met  Positive attitude  Comments: Reinforced importance continuing to modify lifestyle patterns and behaviors to promote weight loss and long term weight maintenance. Comments:  Pt set goals to make sure they are meeting protein recommendations, switch to lean sources of protein, start sampling protein supplements, and read labels for protein content in foods. Pt completed Bariatric-specific nutrition questionnaire. RD reviewed answers and provided feedback on responses that align with bariatric recommendations to behavior changes. Provided feedback on recommendations, areas for improvement, and provided positive feedback on areas where pt is making positive behavior changes. Pt questionnaire is included in chart separate from this note. All current stated pt questions answered. Physical Activity/Exercise    Discussed Perceived Compliance  Motivation    Patient has been educated on the importance of starting and maintaining a physical activity regimen, reinforced the importance of regular physical activity for total health and weight management. Suggested starting or continuing exercise regimen of cardiovascular and resistance training for at least 3-5 days per week for 30-60 minutes. Recommend pt track progress weekly. Goals:   1. Work to increase to 3-4 small meals per day, with planned snacks as needed. Recommend following plate method for meal planning - focusing on lean protein, non-starchy vegetables, and measured amounts of starch. - Goal of  g protein and  g carbohydrate per day. - Recommend continuing protein supplement as meal replacement at least 1x/day OR as high protein snack option  2. Increase non caloric fluid to 64 oz per day.   Eliminate caffeine, added sugar, carbonation, and straws.               -Continue to work to decrease sugar sweetened beverages - goal of calorie free beverages only              -Must eliminate caffeine prior to surgery and avoid for ~6-8 weeks   -Practice 30:30 rule,  food and flood   3. Start activity regimen, work to increase ADL  4. Start Complete MVI    Candidate for surgery (per RD): PENDING Pt is scheduled for nutrition education on 11/18/2021.

## 2021-10-25 ENCOUNTER — VIRTUAL VISIT (OUTPATIENT)
Dept: SURGERY | Age: 31
End: 2021-10-25
Payer: MEDICAID

## 2021-10-25 VITALS — HEIGHT: 68 IN | BODY MASS INDEX: 35.46 KG/M2 | WEIGHT: 234 LBS

## 2021-10-25 DIAGNOSIS — K21.9 GASTROESOPHAGEAL REFLUX DISEASE, UNSPECIFIED WHETHER ESOPHAGITIS PRESENT: ICD-10-CM

## 2021-10-25 DIAGNOSIS — J45.909 UNCOMPLICATED ASTHMA, UNSPECIFIED ASTHMA SEVERITY, UNSPECIFIED WHETHER PERSISTENT: ICD-10-CM

## 2021-10-25 DIAGNOSIS — K90.9 INTESTINAL MALABSORPTION, UNSPECIFIED TYPE: ICD-10-CM

## 2021-10-25 DIAGNOSIS — Z98.84 S/P LAPAROSCOPIC SLEEVE GASTRECTOMY: ICD-10-CM

## 2021-10-25 DIAGNOSIS — E66.01 SEVERE OBESITY (BMI 35.0-35.9 WITH COMORBIDITY) (HCC): Primary | ICD-10-CM

## 2021-10-25 PROCEDURE — 99214 OFFICE O/P EST MOD 30 MIN: CPT | Performed by: NURSE PRACTITIONER

## 2021-10-25 NOTE — PROGRESS NOTES
Pt is scheduled for a virtual appt with Ally Mabie this afternoon. Pt stated that she is doing well.

## 2021-10-25 NOTE — PATIENT INSTRUCTIONS
Patient Instructions      1. Remember hydration goals - minimum of 64 ounces of liquids per day (dehydration is the number one reason for hospital readmission). 2. Continue to monitor carbohydrate and protein intake you need a minimum of  Grams of protein daily- remember to keep your total carbohydrates to 50 grams or less per day for best results. 3. Continue to work towards exercise goals - 60-90 minutes, 5 times a week minimum of deliberate, aerobic exercise is the ultimate goal with strength training 2 times each week. Refer to [employer] for  information. 4. Remember to take vitamins as directed. 5. Attend support group the 2nd Thursday of each month. 6.  Constipation: Milk of Magnesia is for immediate relief only. Miralax is to be used every day if constipation is a chronic problem. 7.  Diarrhea: patients will occasionally develop lactose intolerance after surgery. Check to see if your protein shake has whey in it. If it does try a protein powder or drink that does not have whey and stop all yogurts, cheeses and milks to see if the diarrhea goes away. 8.  If you have had labs drawn. We will only call you if you have abnormal results. Otherwise you can access the lab results in \"mychart\". You will only need the access code the first time you sign on. 9.  Call us at [phone] or email us through [email or web address] with questions,     concerns or worsening of condition, we have someone on call 24 hours a day. If you are unable to reach our office, you are to go to your Primary Care Physician or the Emergency Department.      NOTE TO GASTRIC BYPASS PATIENTS:  (SAME APPLIES TO GASTRIC SLEEVE PATIENTS FOR FIRST TWO MONTHS)  Remember that for the rest of your life, you are not able to take the following:  - NSAIDs (ibuprofen, goody powder, BC powder, Motrin, Advil, Mobic, Voltaren, Excedrin, etc.)  - Steroid pills or injections  - Smoke (cigarettes or recreational drugs)  - Alcohol  Use of any of the above may cause ulcers in your stomach which may perforate causing a medical emergency and surgery. Speak to our medical staff if another medical provider requires you to take steroids or NSAIDs. Supplement Resource Guide    Importance of Protein:   Maintains lean body mass, produces antibodies to fight off infections, heals wounds, minimizes hair loss, helps to give you energy, helps with satiety, and keeping you full between meals. Importance of Calcium:  Needed for healthy bones and teeth, normal blood clotting, and nervous system functioning, higher risk of osteoporosis and bone disease with non-compliance. Importance of Multivitamins: Many functions. Supply you with extra nutrients that you may be missing from food. May lead to iron deficiency anemia, weakness, fatigue, and many other symptoms with non-compliance. Importance of B Vitamins:  Important for red blood cell formation, metabolism, energy, and helps to maintain a healthy nervous system. Protein Supplement  Find one you like now. Use immediately after surgery. Look for:  35-50g protein each day from your protein supplement once you reach the progression diet. 0-3 g fat per serving  0-3 g sugar per serving    Protein drinks should be split in separate dosages. Recommend: Lifelong  1 year + Calcium Supplement:     Start taking within a month after surgery. Look for: Calcium Citrate Plus D (1500 mg per day)  Recommend: Citracal     .            Avoid chocolate chewable calcium. Can use chewable bariatric or [employer] brand or similar chewable. The body cannot absorb more than 500-600 mg of calcium at a time. Take for Life Multi-vitamin Supplement:      Start immediately after surgery: any complete chewable, such as: [employer]as Complete chewables. Avoid Rolla sours or gummies.   They lack iron and other important nutrients and also have added sugar. Continue with chewable vitamin or change to adult complete multivitamin one month after surgery. Menstruating women can take a prenatal vitamin. Make sure has at least 18 mg iron and 675-347 mcg folic acid   Vitamin B91, B Complex Vitamin, and Biotin  Start taking within a month after surgery. Vitamin B12:  1000 mcg of Vitamin B12 three times weekly    Must take sublingually (meaning you take it under your tongue) or in a liquid drop form for easy absorption. B Complex Vitamin: Take a pill or liquid drop form once daily. Biotin: This vitamin can help prevent hair loss. Recommend 5mg   (5000 mcg) a day  Biotin is Optional              Learning About Being Physically Active  What is physical activity? Being physically active means doing any kind of activity that gets your body moving. The types of physical activity that can help you get fit and stay healthy include:  · Aerobic or \"cardio\" activities. These make your heart beat faster and make you breathe harder, such as brisk walking, riding a bike, or running. They strengthen your heart and lungs and build up your endurance. · Strength training activities. These make your muscles work against, or \"resist,\" something. Examples include lifting weights or doing push-ups. These activities help tone and strengthen your muscles and bones. · Stretches. These let you move your joints and muscles through their full range of motion. Stretching helps you be more flexible. What are the benefits of being active? Being active is one of the best things you can do for your health. It helps you to:  · Feel stronger and have more energy to do all the things you like to do. · Focus better at school or work. · Feel, think, and sleep better. · Reach and stay at a healthy weight. · Lose fat and build lean muscle.   · Lower your risk for serious health problems, including diabetes, heart attack, high blood pressure, and some cancers. · Keep your heart, lungs, bones, muscles, and joints strong and healthy. How can you make being active part of your life? Start slowly. Make it your long-term goal to get at least 30 minutes of exercise on most days of the week. Walking is a good choice. You also may want to do other activities, such as running, swimming, cycling, or playing tennis or team sports. Pick activities that you likeones that make your heart beat faster, your muscles stronger, and your muscles and joints more flexible. If you find more than one thing you like doing, do them all. You don't have to do the same thing every day. Get your heart pumping every day. Any activity that makes your heart beat faster and keeps it at that rate for a while counts. Here are some great ways to get your heart beating faster:  · Go for a brisk walk, run, or bike ride. · Go for a hike or swim. · Go in-line skating. · Play a game of touch football, basketball, or soccer. · Ride a bike. · Play tennis or racquetball. · Climb stairs. Even some household chores can be aerobicjust do them at a faster pace. Vacuuming, raking or mowing the lawn, sweeping the garage, and washing and waxing the car all can help get your heart rate up. Strengthen your muscles during the week. You don't have to lift heavy weights or grow big, bulky muscles to get stronger. Doing a few simple activities that make your muscles work against, or \"resist,\" something can help you get stronger. For example, you can:  · Do push-ups or sit-ups, which use your own body weight as resistance. · Lift weights or dumbbells or use stretch bands at home or in a gym or community center. Stretch your muscles often. Stretching will help you as you become more active. It can help you stay flexible, loosen tight muscles, and avoid injury. It can also help improve your balance and posture and can be a great way to relax.   Be sure to stretch the muscles you'll be using when you work out. It's best to warm your muscles slightly before you stretch them. Walk or do some other light aerobic activity for a few minutes, and then start stretching. When you stretch your muscles:  · Do it slowly. Stretching is not about going fast or making sudden movements. · Don't push or bounce during a stretch. · Hold each stretch for at least 15 to 30 seconds, if you can. You should feel a stretch in the muscle, but not pain. · Breathe out as you do the stretch. Then breathe in as you hold the stretch. Don't hold your breath. If you're worried about how more activity might affect your health, have a checkup before you start. Follow any special advice your doctor gives you for getting a smart start. Where can you learn more? Go to http://www.gray.com/  Enter [id number] in the search box to learn more about \"Learning About Being Physically Active. \"  Current as of: May 12, 2021               Content Version: 13.0  © 2006-2021 [employer]. Care instructions adapted under license by [employer] (which disclaims liability or warranty for this information). If you have questions about a medical condition or this instruction, always ask your healthcare professional. [address] any warranty or liability for your use of this information.

## 2021-10-25 NOTE — PROGRESS NOTES
Bariatric Revision Surgery Follow-Up    Subjective:     Alonza Simmonds is a 33 y.o. obese female with a Body mass index is 35.58 kg/m². She is 3.5 years s/p sleeve gastrectomy. Alonza Simmonds desires surgery at this time because of health issues and quality of life issues. Alonza Simmonds has been seen by a bariatric nutritionist and has been placed on an appropriate low carbohydrate diet. The patient desires conversion from laparoscopic sleeve gastrectomy to gastric bypass for surgical weight loss, however she is not currently a surgical candidate due to pending work up. Alonza Simmonds is here today to check progress with weight loss / evaluate nutritional status and review all subspecialty clearances in hopes of proceeding to the operating room. Office visit notes from 2018 to present have been reviewed. Alonza Simmonds has increased fluid intake, is focusing on protein, is eating regularly, is taking a multivitamin & has increased her activity. No recent visits with PCP. No new medications. UGI done 9/15/21 showed normal peristalsis of their esophagus with progressive flow into the distal esophagus. Specific findings of the distal esophagus revealed that they did not have a hiatal hernia. Contrast then flowed into the gastric cardia with the following findings: Contrast flowed through the esophagus and into a normal shaped sleeve stomach with no kinking or obstruction. Her sleeve was of normal continuity and caliber. Given her GERD that has been resistant to conservative care the     Most recent HgA1c 8.8% 10/20/21.  Has not been smoking anything since August.    Patient Active Problem List    Diagnosis Date Noted    Severe obesity (BMI 35.0-35.9 with comorbidity) ([facility] [address]     Narcolepsy     Marijuana user 01/06/2021    HSV-1 infection 12/15/2020    Elevated prolactin level 10/23/2018    GERD (gastroesophageal reflux disease) 07/31/2018    Intestinal malabsorption 04/17/2018    S/P laparoscopic sleeve gastrectomy 2018    Status post laparoscopic sleeve gastrectomy 2018    IUD (intrauterine device) in place 2018    FH: ovarian cancer 2018    Asthma     Morbid obesity ([address]     Hyperlipemia     Gastroparesis       Past Surgical History:   Procedure Laterality Date    HX BILATERAL SALPINGECTOMY      HX  SECTION   &     [place] LAP, SARA RESTRICT PROC, LONGITUDINAL GASTRECTOMY      3/2018      Social History     Tobacco Use    Smoking status: Never Smoker    Smokeless tobacco: Never Used   Substance Use Topics    Alcohol use: No      Family History   Problem Relation Age of Onset   Jannell Outhouse Migraines Mother     Seizures Mother     Other Father         graves ds,     Sickle Cell Trait Brother     Other Brother     Asthma Sister     Other Sister         autism    Other Daughter         cp, epilepsy    Asthma Daughter       Current Outpatient Medications   Medication Sig Dispense Refill    pantoprazole (PROTONIX) 40 mg tablet Take 1 Tablet by mouth daily. 30 Tablet 2    PNV Comb #2-Iron-FA-Omega 3 29-1-400 mg cmpk Take  by mouth.  ergocalciferol (ERGOCALCIFEROL) 1,250 mcg (50,000 unit) capsule Take 1 Capsule by mouth two (2) times a week. 52 Capsule 1    modafiniL (PROVIGIL) 200 mg tablet TAKE 1 TABLET IN THE MORNING AND 1 IN THE AFTERNOON      dulaglutide (Trulicity) 1.99 WD/5.2 mL sub-q pen 0.75 mg by SubCUTAneous route every seven (7) days.  ALPRAZolam (XANAX) 0.5 mg tablet Take 0.5 mg by mouth.  Blood-Glucose Meter monitoring kit 1 Units by Other route.  EPINEPHrine (EPIPEN) 0.3 mg/0.3 mL injection 0.3 mg by IntraMUSCular route.  glucose blood VI test strips (ASCENSIA AUTODISC VI, ONE TOUCH ULTRA TEST VI) strip 50 Each.  diclofenac (VOLTAREN) 1 % gel APPLY 2 GRAMS AA Q 8 H PRN P  0    Lancets misc 1 Each.       atorvastatin (LIPITOR) 40 mg tablet 40 mg.      butalbital-acetaminophen-caffeine (FIORICET, ESGIC) -40 mg per tablet TAKE 1 TABLET BY MOUTH EVERY DAY AS NEEDED, MAY REPEAT IN 2 HOURS, MAX 2 PER DAY, 5 PER WEEK      levonorgestrel (MIRENA) 20 mcg/24 hr (5 years) IUD 1 Device.  calcium-cholecalciferol, d3, (CALCIUM 600 + D) 600-125 mg-unit tab Take  by mouth.  Biotin 2,500 mcg cap Take  by mouth.  B.infantis-B.ani-B.long-B.bifi (PROBIOTIC 4X) 10-15 mg TbEC Take  by mouth.  albuterol (PROVENTIL HFA, VENTOLIN HFA, PROAIR HFA) 90 mcg/actuation inhaler Take 2 Puffs by inhalation every four (4) hours as needed.        Allergies   Allergen Reactions    Latex Rash, Itching and Swelling    Doxycycline Swelling     Throat swelling    Mushroom Anaphylaxis     Other reaction(s): anaphylaxis/angioedema    Amoxicillin Rash    Erythromycin Rash    Lactose Nausea and Vomiting     intolerance  Other reaction(s): gi distress  intolerance    Metformin Seizures     seizure    Penicillins Rash and Itching     Other reaction(s): unknown    Rocephin [Ceftriaxone] Rash    Sulfa (Sulfonamide Antibiotics) Swelling, Itching and Unknown (comments)          Review of Systems:        General - No history or complaints of unexpected fever, chills, or weight loss  Head/Neck - No history or complaints of headache, diplopia, dysphagia, hearing loss  Cardiac - No history or complaints of chest pain, palpitations, murmur, or shortness of breath  Pulmonary - No history or complaints of shortness of breath, productive cough, hemoptysis  Gastrointestinal - has reflux,  abdominal pain, obstipation/constipation, blood per rectum  Genitourinary - No history or complaints of hematuria/dysuria, stress urinary incontinence symptoms, or renal lithiasis  Musculoskeletal - no joint pain, no muscular weakness  Hematologic - No history or complaints of bleeding disorders, blood transfusions, sickle cell anemia  Neurologic - No history or complaints of  migraine headaches, seizure activity, syncopal episodes, TIA or stroke  Integumentary - No history or complaints of rashes, abnormal nevi, skin cancer  Gynecological - No menses    Objective:     Visit Vitals  Ht 5' 8\" (1.727 m)   Wt 106.1 kg (234 lb)   BMI 35.58 kg/m²       Physical Exam:    General appearance - well appearing and in no distress  Mental status - alert, oriented to person, place, and time  Pulmonary - normal respiratory effort  Abdomen - no obvious distention  Neurological - normal speech, no focal findings or movement disorder noted  Extremities - normal movement  Musculoskeletal - moving extremities without difficulty  Skin - no rashes, no suspicious skin lesions noted      Labs:     Recent Results (from the past 2016 hour(s))   [facility] Collection Time: 08/26/21 11:09 AM   Result Value Ref Range    [facility] Specimens collected/sent to [facility]. Collection Time: 08/26/21 11:09 AM   Result Value Ref Range    [facility] Specimens collected/sent to [facility]     VITAMIN B1, WHOLE BLOOD    Collection Time: 08/26/21 11:10 AM   Result Value Ref Range    VITAMIN B1, WHOLE BLOOD 149.9 66.5 - 200.0 nmol/L   [facility] Collection Time: 08/30/21  1:28 PM   Result Value Ref Range    [facility] Specimens collected/sent to [facility]             Assessment:     Morbid obesity with associated comorbidity of diabetes, severe central obesity & outstanding insurance requirements. Plan: To continue current medications & routine follow-up with PCP. Continuation of Pre-Operative evaluation / clearance:  Garfield Caballero has returned to the office today to discuss her status as a surgical candidate. Progress has been noted and reviewed - including review of notes from dietician. Garfield Caballero is being compliant with follow-up & recommendations. Garfield Caballero has 0 more pounds to lose before proceeding to the OR.   (3 pounds gained since last visit, 3 pounds lost since revision consult)  Sofya Spivey has 3 more nutritional visits to complete before proceeding to the OR. Additionally, 2 more medical visits are required. Sofya Spivey has an outstanding psychological and PCP clearance to review before proceeding to the OR. Sofya Spivey will return in 1 month to continue the pre-operative process and to work towards goals as outlined. Sofya Spivey understand the rationales for all the above and plans to follow the diet & activity recommendations of the dietician. It has been discussed that given her severely obese condition that the best surgical option for this patient would be the laparoscopic gastric bypass surgery. Sofya Spivey agrees with the surgical choice and has been educated in it's; risks, benefits, and alternatives. We will continue with the pre-operative evaluation as needed to check progress. Secondary Diagnoses:     Dietary Intervention  - The patient is currently followed by a bariatric nutritionist for an attempt at preoperative weight loss as has been dictated by their insurance carrier. They will be assessed at various times during their follow up to evaluate their progress depending on the length of time that is required once again by their carrier. I have explained the importance of preoperative weight loss and the benefits regarding lower surgical risk and also assisting the patient in reaching their weight loss goal.  Finally they understand there is a physiologic benefit from the standpoint of hepatic volume reduction preoperatively. I have reiterated the importance of a low carbohydrate and high protein regimen to achieve their stated goal.     Adult Onset Diabetes - The patient has paul given a very low carbohydrate diet preoperatively along with instructions to monitor their blood sugars on a regular daily basis.  When  their surgery is performed  we will be monitoring the patient with sliding scale insulin and accuchecks.  Based on those values we will determine whether the patient needs a reduction of those medications postoperatively or total removal of those medications on discharge.  We will have the patient continue accuchecks postoperatively while at home also and report to me or their family physician for appropriate adjustments as needed.  The patient also understands that in the event of uncontrolled blood sugar preoperatively that we may choose to postpone their surgery.     GERD -The patient understands that weight loss surgery is not a guaranteed cure for reflux disease but does understand the benefits that weight loss can have on reflux disease. They also understand that at the time of surgery the gastroesophageal junction will be evaluated for the presence of a diaphragmatic hernia. Hernias will be corrected always with the gastric band and sleeve gastrectomy procedures, but only on a case by case basis with the gastric bypass. The patient also understands that neither weight loss surgery nor repair of a diaphragmatic hernia repair guarantees the complete cessation of the disease.      Smoking Cessation - Today I have counseled the patient extensively regarding smoking cessation. They have been counseled extensively about the detrimental effects of smoking on their weight loss surgical procedure particularly for the gastric bypass and sleeve gastrectomy procedures. They understand that smoking leads to pulmonary issues postoperatively and can lead to gastric ulcers and marginal ulcers in the post bariatric surgery pouch that has been created. They understand that they must stop smoking prior to surgery or it may affect their ultimate progression to their procedure.     Restrictive Airway Disease - We will continue all of their pulmonary medications in the form of oral pills and inhalers in both the perioperative and postoperative period. They understand that their symptoms should improve with weight loss.  Any further testing related to this will be turned over to their family physician or pulmonologist. The patient understands that if they require oral or IV steroids in the future that they will notify us. This is particularly important for gastric bypass patients at all times and both sleeve gastrectomy and gastric bypass patients in the 1 month pre op and 1 month post operative period. They understand that inhaled steroids are exempt from this.       Ms. Manoj Thrasher has a reminder for a \"due or due soon\" health maintenance. I have asked that she contact her primary care provider for follow-up on this health maintenance.       Signed By: Pamela Gilbert NP     October 25, 2021

## 2021-11-18 ENCOUNTER — OFFICE VISIT (OUTPATIENT)
Dept: SURGERY | Age: 31
End: 2021-11-18
Payer: MEDICAID

## 2021-11-18 ENCOUNTER — OFFICE VISIT (OUTPATIENT)
Dept: SURGERY | Age: 31
End: 2021-11-18

## 2021-11-18 VITALS
DIASTOLIC BLOOD PRESSURE: 60 MMHG | WEIGHT: 234 LBS | BODY MASS INDEX: 35.46 KG/M2 | TEMPERATURE: 98.6 F | HEIGHT: 68 IN | SYSTOLIC BLOOD PRESSURE: 120 MMHG | OXYGEN SATURATION: 99 % | HEART RATE: 76 BPM

## 2021-11-18 DIAGNOSIS — K90.9 INTESTINAL MALABSORPTION, UNSPECIFIED TYPE: ICD-10-CM

## 2021-11-18 DIAGNOSIS — E66.01 SEVERE OBESITY (BMI 35.0-35.9 WITH COMORBIDITY) (HCC): Primary | ICD-10-CM

## 2021-11-18 DIAGNOSIS — J45.909 UNCOMPLICATED ASTHMA, UNSPECIFIED ASTHMA SEVERITY, UNSPECIFIED WHETHER PERSISTENT: ICD-10-CM

## 2021-11-18 DIAGNOSIS — K21.9 GASTROESOPHAGEAL REFLUX DISEASE, UNSPECIFIED WHETHER ESOPHAGITIS PRESENT: ICD-10-CM

## 2021-11-18 DIAGNOSIS — Z98.84 S/P LAPAROSCOPIC SLEEVE GASTRECTOMY: ICD-10-CM

## 2021-11-18 PROCEDURE — 99214 OFFICE O/P EST MOD 30 MIN: CPT | Performed by: NURSE PRACTITIONER

## 2021-11-18 NOTE — PATIENT INSTRUCTIONS
Patient Instructions      1. Remember hydration goals - minimum of 64 ounces of liquids per day (dehydration is the number one reason for hospital readmission). 2. Continue to monitor carbohydrate and protein intake you need a minimum of  Grams of protein daily- remember to keep your total carbohydrates to 50 grams or less per day for best results. 3. Continue to work towards exercise goals - 60-90 minutes, 5 times a week minimum of deliberate, aerobic exercise is the ultimate goal with strength training 2 times each week. Refer to [employer] for  information. 4. Remember to take vitamins as directed. 5. Attend support group the 2nd Thursday of each month. 6.  Constipation: Milk of Magnesia is for immediate relief only. Miralax is to be used every day if constipation is a chronic problem. 7.  Diarrhea: patients will occasionally develop lactose intolerance after surgery. Check to see if your protein shake has whey in it. If it does try a protein powder or drink that does not have whey and stop all yogurts, cheeses and milks to see if the diarrhea goes away. 8.  If you have had labs drawn. We will only call you if you have abnormal results. Otherwise you can access the lab results in \"mychart\". You will only need the access code the first time you sign on. 9.  Call us at [phone] or email us through [employer]" with questions,     concerns or worsening of condition, we have someone on call 24 hours a day. If you are unable to reach our office, you are to go to your Primary Care Physician or the Emergency Department.      NOTE TO GASTRIC BYPASS PATIENTS:  (SAME APPLIES TO GASTRIC SLEEVE PATIENTS FOR FIRST TWO MONTHS)  Remember that for the rest of your life, you are not able to take the following:  - NSAIDs (ibuprofen, goody powder, BC powder, Motrin, Advil, Mobic, Voltaren, Excedrin, etc.)  - Steroid pills or injections  - Smoke (cigarettes or recreational drugs)  - Alcohol  Use of any of the above may cause ulcers in your stomach which may perforate causing a medical emergency and surgery. Speak to our medical staff if another medical provider requires you to take steroids or NSAIDs. Supplement Resource Guide    Importance of Protein:   Maintains lean body mass, produces antibodies to fight off infections, heals wounds, minimizes hair loss, helps to give you energy, helps with satiety, and keeping you full between meals. Importance of Calcium:  Needed for healthy bones and teeth, normal blood clotting, and nervous system functioning, higher risk of osteoporosis and bone disease with non-compliance. Importance of Multivitamins: Many functions. Supply you with extra nutrients that you may be missing from food. May lead to iron deficiency anemia, weakness, fatigue, and many other symptoms with non-compliance. Importance of B Vitamins:  Important for red blood cell formation, metabolism, energy, and helps to maintain a healthy nervous system. Protein Supplement  Find one you like now. Use immediately after surgery. Look for:  35-50g protein each day from your protein supplement once you reach the progression diet. 0-3 g fat per serving  0-3 g sugar per serving    Protein drinks should be split in separate dosages. Recommend: Lifelong  1 year + Calcium Supplement:     Start taking within a month after surgery. Look for: Calcium Citrate Plus D (1500 mg per day)  Recommend: Citracal     .            Avoid chocolate chewable calcium. Can use chewable bariatric or [employer] brand or similar chewable. The body cannot absorb more than 500-600 mg of calcium at a time. Take for Life Multi-vitamin Supplement:      Start immediately after surgery: any complete chewable, such as: [employer]s Complete chewables. Avoid [employer] sours or gummies.   They lack iron and other important nutrients and also have added sugar. Continue with chewable vitamin or change to adult complete multivitamin one month after surgery. Menstruating women can take a prenatal vitamin. Make sure has at least 18 mg iron and 107-034 mcg folic acid   Vitamin A10, B Complex Vitamin, and Biotin  Start taking within a month after surgery. Vitamin B12:  1000 mcg of Vitamin B12 three times weekly    Must take sublingually (meaning you take it under your tongue) or in a liquid drop form for easy absorption. B Complex Vitamin: Take a pill or liquid drop form once daily. Biotin: This vitamin can help prevent hair loss. Recommend 5mg   (5000 mcg) a day  Biotin is Optional              Learning About Being Physically Active  What is physical activity? Being physically active means doing any kind of activity that gets your body moving. The types of physical activity that can help you get fit and stay healthy include:  · Aerobic or \"cardio\" activities. These make your heart beat faster and make you breathe harder, such as brisk walking, riding a bike, or running. They strengthen your heart and lungs and build up your endurance. · Strength training activities. These make your muscles work against, or \"resist,\" something. Examples include lifting weights or doing push-ups. These activities help tone and strengthen your muscles and bones. · Stretches. These let you move your joints and muscles through their full range of motion. Stretching helps you be more flexible. What are the benefits of being active? Being active is one of the best things you can do for your health. It helps you to:  · Feel stronger and have more energy to do all the things you like to do. · Focus better at school or work. · Feel, think, and sleep better. · Reach and stay at a healthy weight. · Lose fat and build lean muscle.   · Lower your risk for serious health problems, including diabetes, heart attack, high blood pressure, and some cancers. · Keep your heart, lungs, bones, muscles, and joints strong and healthy. How can you make being active part of your life? Start slowly. Make it your long-term goal to get at least 30 minutes of exercise on most days of the week. Walking is a good choice. You also may want to do other activities, such as running, swimming, cycling, or playing tennis or team sports. Pick activities that you likeones that make your heart beat faster, your muscles stronger, and your muscles and joints more flexible. If you find more than one thing you like doing, do them all. You don't have to do the same thing every day. Get your heart pumping every day. Any activity that makes your heart beat faster and keeps it at that rate for a while counts. Here are some great ways to get your heart beating faster:  · Go for a brisk walk, run, or bike ride. · Go for a hike or swim. · Go in-line skating. · Play a game of touch football, basketball, or soccer. · Ride a bike. · Play tennis or racquetball. · Climb stairs. Even some household chores can be aerobicjust do them at a faster pace. Vacuuming, raking or mowing the lawn, sweeping the garage, and washing and waxing the car all can help get your heart rate up. Strengthen your muscles during the week. You don't have to lift heavy weights or grow big, bulky muscles to get stronger. Doing a few simple activities that make your muscles work against, or \"resist,\" something can help you get stronger. For example, you can:  · Do push-ups or sit-ups, which use your own body weight as resistance. · Lift weights or dumbbells or use stretch bands at home or in a gym or community center. Stretch your muscles often. Stretching will help you as you become more active. It can help you stay flexible, loosen tight muscles, and avoid injury. It can also help improve your balance and posture and can be a great way to relax.   Be sure to stretch the muscles you'll be using when you work out. It's best to warm your muscles slightly before you stretch them. Walk or do some other light aerobic activity for a few minutes, and then start stretching. When you stretch your muscles:  · Do it slowly. Stretching is not about going fast or making sudden movements. · Don't push or bounce during a stretch. · Hold each stretch for at least 15 to 30 seconds, if you can. You should feel a stretch in the muscle, but not pain. · Breathe out as you do the stretch. Then breathe in as you hold the stretch. Don't hold your breath. If you're worried about how more activity might affect your health, have a checkup before you start. Follow any special advice your doctor gives you for getting a smart start. Where can you learn more? Go to http://www.gray.com/  Enter [id number] in the search box to learn more about \"Learning About Being Physically Active. \"  Current as of: May 12, 2021               Content Version: 13.0  © 2006-2021 [employer]. Care instructions adapted under license by [facility] (which disclaims liability or warranty for this information). If you have questions about a medical condition or this instruction, always ask your healthcare professional. [address] any warranty or liability for your use of this information.

## 2021-11-18 NOTE — PROGRESS NOTES
Bariatric Surgery Consultation    Subjective:     Metta Claude is a 33 y.o. obese female with a Body mass index is 35.58 kg/m². Qasim Lawrence Metta Claude desires surgery at this time because of health issues and quality of life issues. Metta Claude has been seen by a bariatric nutritionist and has been placed on an appropriate low carbohydrate diet. The patient desires conversion from laparoscopic sleeve gastrectomy to gastric bypass  for surgical weight loss, however she is not currently a surgical candidate due to pending work up. Metta Claude is here today to check progress with weight loss / evaluate nutritional status and review all subspecialty clearances in hopes of proceeding to the operating room. Office visit notes from 2018 to present have been reviewed. Metta Claude has increased fluid intake, is focusing on protein, is eating regularly, is taking a multivitamin & has increased her activity. No recent visits with PCP. No new medications. Most recent HgA1c 8.8% 10/20/21. Trying to get glucometer from PCP to check blood sugars at home.  Has not been vaping since August.    Patient Active Problem List    Diagnosis Date Noted    Severe obesity (BMI 35.0-35.9 with comorbidity) ([facility] [address]     Narcolepsy     Marijuana user 2021    HSV-1 infection 12/15/2020    Elevated prolactin level 10/23/2018    GERD (gastroesophageal reflux disease) 2018    Intestinal malabsorption 2018    S/P laparoscopic sleeve gastrectomy 2018    Status post laparoscopic sleeve gastrectomy 2018    IUD (intrauterine device) in place 2018    FH: ovarian cancer 2018    Asthma     Morbid obesity ([facility] [address]     Hyperlipemia     Gastroparesis       Past Surgical History:   Procedure Laterality Date    HX BILATERAL SALPINGECTOMY      HX  SECTION   &     OH LAP, SARA RESTRICT PROC, LONGITUDINAL GASTRECTOMY      3/2018      Social History     Tobacco Use    Smoking status: Never Smoker    Smokeless tobacco: Never Used   Substance Use Topics    Alcohol use: No      Family History   Problem Relation Age of Onset   Shonda Her Migraines Mother     Seizures Mother     Other Father         graves ds,     Sickle Cell Trait Brother     Other Brother     Asthma Sister     Other Sister         autism    Other Daughter         cp, epilepsy    Asthma Daughter       Current Outpatient Medications   Medication Sig Dispense Refill    pantoprazole (PROTONIX) 40 mg tablet Take 1 Tablet by mouth daily. 30 Tablet 2    PNV Comb #2-Iron-FA-Omega 3 29-1-400 mg cmpk Take  by mouth.  ergocalciferol (ERGOCALCIFEROL) 1,250 mcg (50,000 unit) capsule Take 1 Capsule by mouth two (2) times a week. 52 Capsule 1    modafiniL (PROVIGIL) 200 mg tablet TAKE 1 TABLET IN THE MORNING AND 1 IN THE AFTERNOON      dulaglutide (Trulicity) 4.07 TU/5.9 mL sub-q pen 0.75 mg by SubCUTAneous route every seven (7) days.  ALPRAZolam (XANAX) 0.5 mg tablet Take 0.5 mg by mouth.  Blood-Glucose Meter monitoring kit 1 Units by Other route.  EPINEPHrine (EPIPEN) 0.3 mg/0.3 mL injection 0.3 mg by IntraMUSCular route.  glucose blood VI test strips (ASCENSIA AUTODISC VI, ONE TOUCH ULTRA TEST VI) strip 50 Each.  diclofenac (VOLTAREN) 1 % gel APPLY 2 GRAMS AA Q 8 H PRN P  0    Lancets misc 1 Each.  atorvastatin (LIPITOR) 40 mg tablet 40 mg.      butalbital-acetaminophen-caffeine (FIORICET, ESGIC) -40 mg per tablet TAKE 1 TABLET BY MOUTH EVERY DAY AS NEEDED, MAY REPEAT IN 2 HOURS, MAX 2 PER DAY, 5 PER WEEK      levonorgestrel (MIRENA) 20 mcg/24 hr (5 years) IUD 1 Device.  calcium-cholecalciferol, d3, (CALCIUM 600 + D) 600-125 mg-unit tab Take  by mouth.  Biotin 2,500 mcg cap Take  by mouth.  B.infantis-B.ani-B.long-B.bifi (PROBIOTIC 4X) 10-15 mg TbEC Take  by mouth.       albuterol (PROVENTIL HFA, VENTOLIN HFA, PROAIR HFA) 90 mcg/actuation inhaler Take 2 Puffs by inhalation every four (4) hours as needed.        Allergies   Allergen Reactions    Latex Rash, Itching and Swelling    Doxycycline Swelling     Throat swelling    Mushroom Anaphylaxis     Other reaction(s): anaphylaxis/angioedema    Amoxicillin Rash    Erythromycin Rash    Lactose Nausea and Vomiting     intolerance  Other reaction(s): gi distress  intolerance    Metformin Seizures     seizure    Penicillins Rash and Itching     Other reaction(s): unknown    Rocephin [Ceftriaxone] Rash    Sulfa (Sulfonamide Antibiotics) Swelling, Itching and Unknown (comments)          Review of Systems:        General - No history or complaints of unexpected fever, chills, or weight loss  Head/Neck - No history or complaints of headache, diplopia, dysphagia, hearing loss  Cardiac - No history or complaints of chest pain, palpitations, murmur, or shortness of breath  Pulmonary - No history or complaints of shortness of breath, productive cough, hemoptysis  Gastrointestinal - has reflux,  abdominal pain, obstipation/constipation, blood per rectum  Genitourinary - No history or complaints of hematuria/dysuria, stress urinary incontinence symptoms, or renal lithiasis  Musculoskeletal - no joint pain, no muscular weakness  Hematologic - No history or complaints of bleeding disorders, blood transfusions, sickle cell anemia  Neurologic - No history or complaints of  migraine headaches, seizure activity, syncopal episodes, TIA or stroke  Integumentary - No history or complaints of rashes, abnormal nevi, skin cancer  Gynecological - No  Menses with IUD    Objective:     Visit Vitals  /60 (BP 1 Location: Right arm, BP Patient Position: Sitting, BP Cuff Size: Adult)   Pulse 76   Temp 98.6 °F (37 °C)   Ht 5' 8\" (1.727 m)   Wt 106.1 kg (234 lb)   SpO2 99%   BMI 35.58 kg/m²       Physical Exam:      General appearance:  alert, cooperative, no distress, appears stated age   Mental status   alert, oriented to person, place, and time   Neck supple, no significant adenopathy     Lymphatics  no palpable lymphadenopathy, no hepatosplenomegaly   Chest  clear to auscultation, no wheezes, rales or rhonchi, symmetric air entry   Heart  normal rate, regular rhythm, normal S1, S2, no murmurs, rubs, clicks or gallops    Abdomen: soft, nontender, nondistended, no masses or organomegaly   Incision:  healing well, no drainage, no erythema, no hernia, no seroma, no swelling, no dehiscence, incision well approximated      Neurological  alert, oriented, normal speech, no focal findings or movement disorder noted   Musculoskeletal no joint tenderness, deformity or swelling   Extremities peripheral pulses normal, no pedal edema, no clubbing or cyanosis   Skin normal coloration and turgor, no rashes, no suspicious skin lesions noted       Labs:     Recent Results (from the past 2016 hour(s))   [facility], [facility]. Collection Time: 08/26/21 11:09 AM   Result Value Ref Range    [facility] Specimens collected[facility]. Collection Time: 08/26/21 11:09 AM   Result Value Ref Range    [facility] Specimens collected/sent to [facility]     VITAMIN B1, WHOLE BLOOD    Collection Time: 08/26/21 11:10 AM   Result Value Ref Range    VITAMIN B1, WHOLE BLOOD 149.9 66.5 - 200.0 nmol/L   [facility]. Collection Time: 08/30/21  1:28 PM   Result Value Ref Range    [facility] SPECIM[facility] Specimens collected/sent to [facility]         UGI done 9/15/21  Statement of Medical Necessity - Need for radiologic evaluation prior to possible conversion to a bypass.     Procedure -the patient was brought to the fluoroscopy suite where they were given thin barium. On swallowing the barium the patient was noted to have normal peristalsis of their esophagus with progressive flow into the distal esophagus. Specific findings of the distal esophagus revealed that they did not have a hiatal hernia.   Contrast then flowed into the gastric cardia with the following findings: Contrast flowed through the esophagus and into a normal shaped sleeve stomach with no kinking or obstruction. Her sleeve was of normal continuity and caliber. Given her GERD that has been resistant to conservative care the best course of action would be a sleeve to bypass conversion for the treatment of reflux. .    Assessment:     Morbid obesity with associated comorbidity of diabetes, severe central obesity & outstanding insurance requirements. Plan: To continue current medications & routine follow-up with PCP. Continuation of Pre-Operative evaluation / clearance:  Metta Claude has returned to the office today to discuss her status as a surgical candidate. Progress has been noted and reviewed - including review of notes from dietician. Metta Claude is being compliant with follow-up & recommendations. Metta Claude has 0 more pounds to lose before proceeding to the OR.  (0 pounds lost since last visit)  Metta Claude has 2 more nutritional visits to complete before proceeding to the OR. Additionally, 1 more medical visits are required. Metta Claude has an outstanding psychological and PCP clearance to review before proceeding to the OR. Metta Claude will return in 1 month to continue the pre-operative process and to work towards goals as outlined. Metta Claude understand the rationales for all the above and plans to follow the diet & activity recommendations of the dietician. It has been discussed that given her severely obese condition that the best surgical option for this patient would be the laparoscopic gastric bypass surgery. Metta Claude agrees with the surgical choice and has been educated in it's; risks, benefits, and alternatives. We will continue with the pre-operative evaluation as needed to check progress.     Secondary Diagnoses:     Dietary Intervention  - The patient is currently followed by a bariatric nutritionist for an attempt at preoperative weight loss as has been dictated by their insurance carrier. They will be assessed at various times during their follow up to evaluate their progress depending on the length of time that is required once again by their carrier. I have explained the importance of preoperative weight loss and the benefits regarding lower surgical risk and also assisting the patient in reaching their weight loss goal.  Finally they understand there is a physiologic benefit from the standpoint of hepatic volume reduction preoperatively. I have reiterated the importance of a low carbohydrate and high protein regimen to achieve their stated goal.     Adult Onset Diabetes - The patient has paul given a very low carbohydrate diet preoperatively along with instructions to monitor their blood sugars on a regular daily basis.  When  their surgery is performed  we will be monitoring the patient with sliding scale insulin and accuchecks.  Based on those values we will determine whether the patient needs a reduction of those medications postoperatively or total removal of those medications on discharge.  We will have the patient continue accuchecks postoperatively while at home also and report to me or their family physician for appropriate adjustments as needed.  The patient also understands that in the event of uncontrolled blood sugar preoperatively that we may choose to postpone their surgery.     GERD -The patient understands that weight loss surgery is not a guaranteed cure for reflux disease but does understand the benefits that weight loss can have on reflux disease. Erasto Potts also understand that at the time of surgery the gastroesophageal junction will be evaluated for the presence of a diaphragmatic hernia. Delanna Wali will be corrected always with the gastric band and sleeve gastrectomy procedures, but only on a case by case basis with the gastric bypass.  The patient also understands that neither weight loss surgery nor repair of a diaphragmatic hernia repair guarantees the complete cessation of the disease.      Smoking Cessation - Today I have counseled the patient extensively regarding smoking cessation. Idalia Argueta have been counseled extensively about the detrimental effects of smoking on their weight loss surgical procedure particularly for the gastric bypass and sleeve gastrectomy procedures. Idalia Leonges understand that smoking leads to pulmonary issues postoperatively and can lead to gastric ulcers and marginal ulcers in the post bariatric surgery pouch that has been created. Idalia Leonges understand that they must stop smoking prior to surgery or it may affect their ultimate progression to their procedure.     Restrictive Airway Disease - We will continue all of their pulmonary medications in the form of oral pills and inhalers in both the perioperative and postoperative period. They understand that their symptoms should improve with weight loss. Any further testing related to this will be turned over to their family physician or pulmonologist. The patient understands that if they require oral or IV steroids in the future that they will notify us. This is particularly important for gastric bypass patients at all times and both sleeve gastrectomy and gastric bypass patients in the 1 month pre op and 1 month post operative period. They understand that inhaled steroids are exempt from this.       Ms. Violeta Sanches has a reminder for a \"due or due soon\" health maintenance. I have asked that she contact her primary care provider for follow-up on this health maintenance.       Signed By: Chandra Rubio NP     November 18, 2021

## 2021-11-19 VITALS — HEIGHT: 68 IN | WEIGHT: 234.6 LBS | BODY MASS INDEX: 35.55 KG/M2

## 2021-11-19 NOTE — PROGRESS NOTES
Medical Weight Loss Multi-Disciplinary Program    Name: Madai Fernandez   : 1990    Session# 4 , Pt attended in-person class. Weight obtained in office. Date: 2021    Visit Vitals  Ht 5' 8\" (1.727 m)   Wt 106.4 kg (234 lb 9.6 oz)   BMI 35.67 kg/m²       Pt has  GAINED 0.3 lbs since last nutrition visit on 10/21/21. Pt has LOST 2.4 lbs since initial consult on 21. Dietary Instructions    Reviewed intake  Understanding low carbohydrates, low sugar, higher protein meals  Instruction given for personal dietary changes  Discussed perceived compliance  Comments: RD Reviewed Diet History and Physical Activity/Exercise habits. Recommended dietary changes discussed for both before and after surgery. Reviewed recommendation to follow [id number] calorie diet, working to reduce total carbohydrate intake to  g or less per day and increasing protein intake to  g per day, compared current intake to recommendations. Recommend pt adopt an exercise routine of at least 3-5 days a week for at least 30 minutes/day. If pt unable to participate in walking, anirudh, swimming, or other exercises, recommend pt work with a physical therapist and/or participate in chair exercises, yoga, and/or increase activities of daily living as able. Patient participated in pre-recorded education class video. Recorded video of dietitian discussing key diet principles. Reviewed importance of small structured meals, adequate hydration,  food and fluid, supplementation of vitamins/minerals and protein shakes, also reviewed recommendations for physical activity. Patient has previously received pre-operative education packet that reviewed these topics, but discussed in details the role of dietary and behavior changes to promote optimal long term weight loss following bariatric surgery. Patient watched the video in its entirety and turned in the 3 embedded passcodes from the video session.  Pt completed additional \"homework\" for this visit's session, including a food recall, physical activity summary, and additional nutrition-related responses to questionnaire. Behavior Modification    Identify obstacles to trigger change  Achieving/Rewarding goals met  Positive attitude  Comments: Reinforced importance continuing to modify lifestyle patterns and behaviors to promote weight loss and long term weight maintenance     Comments:  During today's lesson discussed post-operative key diet principles and reviewed dietary and behavior changes to begin making now. Pt completed Bariatric-specific nutrition questionnaire. RD reviewed answers and provided feedback on responses that align with bariatric recommendations to behavior changes. Provided feedback on recommendations, areas for improvement, and provided positive feedback on areas where pt is making positive behavior changes. Pt questionnaire is included in chart separate from this note. All current stated pt questions answered. Physical Activity/Exercise    Discussed Perceived Compliance  Motivation    Patient has been educated on the importance of a physical activity regimen, reinforced the importance of regular physical activity for total health and weight management. Suggested starting or continuing exercise regimen of cardiovascular and resistance training for at least 3-5 days per week for 30-60 minutes. Recommend pt track weekly progress and adherence to recommendations. Goals:   1. Work to increase to 3-4 small meals per day, with planned snacks as needed. Recommend following plate method for meal planning - focusing on lean protein, non-starchy vegetables, and measured amounts of starch. - Goal of  g protein and  g carbohydrate per day. - Recommend continuing protein supplement as meal replacement at least 1x/day OR as high protein snack option  2. Increase non caloric fluid to 64 oz per day.   Eliminate caffeine, added sugar, carbonation, and straws.               -Continue to work to decrease sugar sweetened beverages - goal of calorie free beverages only              -Must eliminate caffeine prior to surgery and avoid for ~6-8 weeks   -Practice 30:30 rule,  food and flood   3. Start activity regimen, work to increase ADL  4. Start Complete MVI    Candidate for surgery (per RD): PENDING Pt is scheduled for nutrition education on 12/10/2021.

## 2021-12-10 ENCOUNTER — OFFICE VISIT (OUTPATIENT)
Dept: SURGERY | Age: 31
End: 2021-12-10

## 2021-12-10 DIAGNOSIS — E66.01 SEVERE OBESITY (BMI 35.0-35.9 WITH COMORBIDITY) (HCC): Primary | ICD-10-CM

## 2021-12-13 VITALS — HEIGHT: 68 IN | WEIGHT: 236.1 LBS | BODY MASS INDEX: 35.78 KG/M2

## 2021-12-13 NOTE — PROGRESS NOTES
Medical Weight Loss Multi-Disciplinary Program    Name: Sofya Spivey   : 1990    Session# 5 , Pt attended in-person class. Weight obtained in office. Date: 2021    Visit Vitals  Ht 5' 8\" (1.727 m)   Wt 107.1 kg (236 lb 1.6 oz)   BMI 35.90 kg/m²       Pt has  GAINED 1.5 lbs since last nutrition visit on 21. Pt has LOST 0.9 lbs since initial consult on 21. Dietary Instructions    Reviewed intake  Understanding low carbohydrates, low sugar, higher protein meals  Instruction given for personal dietary changes  Discussed perceived compliance  Comments: RD Reviewed Diet History and Physical Activity/Exercise habits. Recommended dietary changes discussed for both before and after surgery. Reviewed recommendation to follow [id number] calorie diet, working to reduce total carbohydrate intake to  g or less per day and increasing protein intake to  g per day, compared current intake to recommendations. Recommend pt adopt an exercise routine of at least 3-5 days a week for at least 30 minutes/day. If pt unable to participate in walking, anirudh, swimming, or other exercises, recommend pt work with a physical therapist and/or participate in chair exercises, yoga, and/or increase activities of daily living as able. Patient participated in pre-recorded education class video. Recorded video of dietitian discussing key diet principles and reviewing recommendations preparing for bariatric surgery. Reviewed importance of small structured meals, adequate hydration,  food and fluid, supplementation of vitamins/minerals following surgery. Reviewed tips and recommendations for tolerance and behavior modifications to begin initiating now. Patient has previously received pre-operative education packet that reviewed these topics, but discussed in details the role of dietary and behavior changes to promote optimal long term weight loss following bariatric surgery.      Patient watched the video in its entirety and turned in the 3 embedded passcodes from the video session. Pt submitted bariatric quiz which requires at least 80% pass-rate for surgical approval from RD. Pt also submitted \"homework\" from this videos session - answering nutrition and exercise questions related to their behavior changes, goals, and the recommendations. Behavior Modification    Identify obstacles to trigger change  Achieving/Rewarding goals met  Positive attitude  Comments: Reinforced importance continuing to modify lifestyle patterns and behaviors to promote weight loss and long term weight maintenance     Comments:  During today's lesson discussed post-operative key diet principles and reviewed dietary and behavior changes to begin making now. Pt completed Bariatric-specific nutrition questionnaire. RD reviewed answers and provided feedback on responses that align with bariatric recommendations to behavior changes. Provided feedback on recommendations, areas for improvement, and provided positive feedback on areas where pt is making positive behavior changes. All current stated pt questions answered. Physical Activity/Exercise    Discussed Perceived Compliance  Motivation    Patient has been educated on the importance of started physical activity regimen, reinforced the importance of regular physical activity for total health and weight management. Suggested starting exercise regimen of cardiovascular and resistance training for at least 3-5 days per week for 30-60 minutes, patient was receptive to recommendation. Goals:   1. Work to increase to 3-4 small meals per day, with planned snacks as needed. Recommend following plate method for meal planning - focusing on lean protein, non-starchy vegetables, and measured amounts of starch. - Goal of  g protein and  g carbohydrate per day.                - Recommend continuing protein supplement as meal replacement at least 1x/day OR as high protein snack option  2. Increase non caloric fluid to 64 oz per day. Eliminate caffeine, added sugar, carbonation, and straws.               -Continue to work to decrease sugar sweetened beverages - goal of calorie free beverages only              -Must eliminate caffeine prior to surgery and avoid for ~6-8 weeks   -Practice 30:30 rule,  food and flood   3. Start activity regimen, work to increase ADL  4. Start Complete MVI    Candidate for surgery (per RD): PENDING Pt is scheduled for nutrition education on 1/4/2022.

## 2021-12-14 ENCOUNTER — OFFICE VISIT (OUTPATIENT)
Dept: SURGERY | Age: 31
End: 2021-12-14
Payer: MEDICAID

## 2021-12-14 VITALS
OXYGEN SATURATION: 100 % | BODY MASS INDEX: 35.8 KG/M2 | HEIGHT: 68 IN | HEART RATE: 92 BPM | WEIGHT: 236.2 LBS | DIASTOLIC BLOOD PRESSURE: 70 MMHG | SYSTOLIC BLOOD PRESSURE: 121 MMHG

## 2021-12-14 DIAGNOSIS — E66.01 SEVERE OBESITY (BMI 35.0-35.9 WITH COMORBIDITY) (HCC): Primary | ICD-10-CM

## 2021-12-14 DIAGNOSIS — K21.9 GASTROESOPHAGEAL REFLUX DISEASE, UNSPECIFIED WHETHER ESOPHAGITIS PRESENT: ICD-10-CM

## 2021-12-14 DIAGNOSIS — J45.909 UNCOMPLICATED ASTHMA, UNSPECIFIED ASTHMA SEVERITY, UNSPECIFIED WHETHER PERSISTENT: ICD-10-CM

## 2021-12-14 DIAGNOSIS — E11.9 CONTROLLED TYPE 2 DIABETES MELLITUS WITHOUT COMPLICATION, WITHOUT LONG-TERM CURRENT USE OF INSULIN (HCC): ICD-10-CM

## 2021-12-14 DIAGNOSIS — K90.9 INTESTINAL MALABSORPTION, UNSPECIFIED TYPE: ICD-10-CM

## 2021-12-14 DIAGNOSIS — Z98.84 S/P LAPAROSCOPIC SLEEVE GASTRECTOMY: ICD-10-CM

## 2021-12-14 PROCEDURE — 99214 OFFICE O/P EST MOD 30 MIN: CPT | Performed by: NURSE PRACTITIONER

## 2021-12-14 NOTE — PROGRESS NOTES
Bariatric Surgery Consultation    Subjective:     Faby Delgado is a 33 y.o. obese female with a Body mass index is 35.91 kg/m². Manuel Patel Faby Delgado desires surgery at this time because of health issues and quality of life issues. Faby Delgado has been seen by a bariatric nutritionist and has been placed on an appropriate low carbohydrate diet. The patient desires conversion from laparoscopic sleeve gastrectomy to gastric bypass for surgical weight loss, however she is not currently a surgical candidate due to pending work up. Faby Delgado is here today to check progress with weight loss / evaluate nutritional status and review all subspecialty clearances in hopes of proceeding to the operating room. Office visit notes from 2018 to present have been reviewed. Faby Delgado has increased fluid intake, is focusing on protein, is eating regularly, is taking a multivitamin & has increased her activity. No recent visits with PCP. No new medications. Most recent HgA1c 8.8% 10/20/21. Trying to get glucometer from PCP to check blood sugars at home. Sees PCP for repeat HgA1c scheduled later this month.     Has not been vaping since August.    Patient Active Problem List    Diagnosis Date Noted    Severe obesity (BMI 35.0-35.9 with comorbidity) ([facility] [address]     Narcolepsy     Marijuana user 2021    HSV-1 infection 12/15/2020    Elevated prolactin level 10/23/2018    GERD (gastroesophageal reflux disease) 2018    Intestinal malabsorption 2018    S/P laparoscopic sleeve gastrectomy 2018    Status post laparoscopic sleeve gastrectomy 2018    IUD (intrauterine device) in place 2018    FH: ovarian cancer 2018    Asthma     Hyperlipemia     Gastroparesis       Past Surgical History:   Procedure Laterality Date    HX BILATERAL SALPINGECTOMY      HX  SECTION   &     [place], S[place] RESTRICT PROC, LONGITUDINAL GASTRECTOMY      3/2018      Social History     Tobacco Use    Smoking status: Never Smoker    Smokeless tobacco: Never Used   Substance Use Topics    Alcohol use: No      Family History   Problem Relation Age of Onset   [employer] Migraines Mother     Seizures Mother     Other Father         graves ds,     Sickle Cell Trait Brother     Other Brother     Asthma Sister     Other Sister         autism    Other Daughter         cp, epilepsy    Asthma Daughter       Current Outpatient Medications   Medication Sig Dispense Refill    pantoprazole (PROTONIX) 40 mg tablet Take 1 Tablet by mouth daily. 30 Tablet 2    PNV Comb #2-Iron-FA-Omega 3 29-1-400 mg cmpk Take  by mouth.  ergocalciferol (ERGOCALCIFEROL) 1,250 mcg (50,000 unit) capsule Take 1 Capsule by mouth two (2) times a week. 52 Capsule 1    modafiniL (PROVIGIL) 200 mg tablet TAKE 1 TABLET IN THE MORNING AND 1 IN THE AFTERNOON      dulaglutide (Trulicity) 0.74 FZ/0.1 mL sub-q pen 0.75 mg by SubCUTAneous route every seven (7) days.  ALPRAZolam (XANAX) 0.5 mg tablet Take 0.5 mg by mouth.  Blood-Glucose Meter monitoring kit 1 Units by Other route.  EPINEPHrine (EPIPEN) 0.3 mg/0.3 mL injection 0.3 mg by IntraMUSCular route.  glucose blood VI test strips (ASCENSIA AUTODISC VI, ONE TOUCH ULTRA TEST VI) strip 50 Each.  diclofenac (VOLTAREN) 1 % gel APPLY 2 GRAMS AA Q 8 H PRN P  0    Lancets misc 1 Each.  butalbital-acetaminophen-caffeine (FIORICET, ESGIC) -40 mg per tablet TAKE 1 TABLET BY MOUTH EVERY DAY AS NEEDED, MAY REPEAT IN 2 HOURS, MAX 2 PER DAY, 5 PER WEEK      levonorgestrel (MIRENA) 20 mcg/24 hr (5 years) IUD 1 Device.  calcium-cholecalciferol, d3, (CALCIUM 600 + D) 600-125 mg-unit tab Take  by mouth.  Biotin 2,500 mcg cap Take  by mouth.  B.infantis-B.ani-B.long-B.bifi (PROBIOTIC 4X) 10-15 mg TbEC Take  by mouth.       albuterol (PROVENTIL HFA, VENTOLIN HFA, PROAIR HFA) 90 mcg/actuation inhaler Take 2 Puffs by inhalation every four (4) hours as needed.  atorvastatin (LIPITOR) 40 mg tablet 40 mg.  (Patient not taking: Reported on 12/14/2021)       Allergies   Allergen Reactions    Latex Rash, Itching and Swelling    Doxycycline Swelling     Throat swelling    Mushroom Anaphylaxis     Other reaction(s): anaphylaxis/angioedema    Amoxicillin Rash    Erythromycin Rash    Lactose Nausea and Vomiting     intolerance  Other reaction(s): gi distress  intolerance    Metformin Seizures     seizure    Penicillins Rash and Itching     Other reaction(s): unknown    Rocephin [Ceftriaxone] Rash    Sulfa (Sulfonamide Antibiotics) Swelling, Itching and Unknown (comments)          Review of Systems:        General - No history or complaints of unexpected fever, chills, or weight loss  Head/Neck - No history or complaints of headache, diplopia, dysphagia, hearing loss  Cardiac - No history or complaints of chest pain, palpitations, murmur, or shortness of breath  Pulmonary - No history or complaints of shortness of breath, productive cough, hemoptysis  Gastrointestinal - has reflux,  abdominal pain, obstipation/constipation, blood per rectum  Genitourinary - No history or complaints of hematuria/dysuria, stress urinary incontinence symptoms, or renal lithiasis  Musculoskeletal - no joint pain, no muscular weakness  Hematologic - No history or complaints of bleeding disorders, blood transfusions, sickle cell anemia  Neurologic - No history or complaints of  migraine headaches, seizure activity, syncopal episodes, TIA or stroke  Integumentary - No history or complaints of rashes, abnormal nevi, skin cancer  Gynecological - No menses with IUD    Objective:     Visit Vitals  /70 (BP 1 Location: Left upper arm, BP Patient Position: Sitting, BP Cuff Size: Large adult)   Pulse 92   Ht 5' 8\" (1.727 m)   Wt 107.1 kg (236 lb 3.2 oz)   SpO2 100%   BMI 35.91 kg/m²       Physical Exam:      General appearance:  alert, cooperative, no distress, appears stated age   Mental status   alert, oriented to person, place, and time   Neck  supple, no significant adenopathy     Lymphatics  no palpable lymphadenopathy, no hepatosplenomegaly   Chest  clear to auscultation, no wheezes, rales or rhonchi, symmetric air entry   Heart  normal rate, regular rhythm, normal S1, S2, no murmurs, rubs, clicks or gallops    Abdomen: soft, nontender, nondistended, no masses or organomegaly   Incision:  healing well, no drainage, no erythema, no hernia, no seroma, no swelling, no dehiscence, incision well approximated      Neurological  alert, oriented, normal speech, no focal findings or movement disorder noted   Musculoskeletal no joint tenderness, deformity or swelling   Extremities peripheral pulses normal, no pedal edema, no clubbing or cyanosis   Skin normal coloration and turgor, no rashes, no suspicious skin lesions noted       Labs:     No results found for this or any previous visit (from the past 2016 hour(s)). UGI done 9/15/21  Statement of Medical Necessity - Need for radiologic evaluation prior to possible conversion to a bypass.     Procedure -the patient was brought to the fluoroscopy suite where they were given thin barium.  On swallowing the barium the patient was noted to have normal peristalsis of their esophagus with progressive flow into the distal esophagus.  Specific findings of the distal esophagus revealed that they did not have a hiatal hernia.  Contrast then flowed into the gastric cardia with the following findings: Contrast flowed through the esophagus and into a normal shaped sleeve stomach with no kinking or obstruction.  Her sleeve was of normal continuity and caliber.  Given her GERD that has been resistant to conservative care the best course of action would be a sleeve to bypass conversion for the treatment of reflux. .    Assessment:     Morbid obesity with associated comorbidity of diabetes, severe central obesity & outstanding insurance requirements. Plan: To continue current medications & routine follow-up with PCP. Continuation of Pre-Operative evaluation / clearance:  Ciaran Dinero has returned to the office today to discuss her status as a surgical candidate. Progress has been noted and reviewed - including review of notes from dietician. Ciaran Dinero is being compliant with follow-up & recommendations. Ciaran Dinero has 2 more pounds to lose before proceeding to the OR. (2 pounds gained since last visit)  Ciaran Dinero has  1 more nutritional visits to complete before proceeding to the OR. Additionally, 0 more medical visits are required. Ciaran Dinero has an outstanding psychological clearance to review before proceeding to the OR. Ciaran Dinero will return in 1 month to continue the pre-operative process and to work towards goals as outlined. Ciaran Dinero understand the rationales for all the above and plans to follow the diet & activity recommendations of the dietician. It has been discussed that given her severely obese condition that the best surgical option for this patient would be the laparoscopic gastric bypass surgery. Ciaran Dinero agrees with the surgical choice and has been educated in it's; risks, benefits, and alternatives. We will continue with the pre-operative evaluation as needed to check progress. Secondary Diagnoses:     Dietary Intervention  - The patient is currently followed by a bariatric nutritionist for an attempt at preoperative weight loss as has been dictated by their insurance carrier. They will be assessed at various times during their follow up to evaluate their progress depending on the length of time that is required once again by their carrier.   I have explained the importance of preoperative weight loss and the benefits regarding lower surgical risk and also assisting the patient in reaching their weight loss goal.  Finally they understand there is a physiologic benefit from the standpoint of hepatic volume reduction preoperatively. I have reiterated the importance of a low carbohydrate and high protein regimen to achieve their stated goal.     Adult Onset Diabetes - The patient has paul given a very low carbohydrate diet preoperatively along with instructions to monitor their blood sugars on a regular daily basis. When  their surgery is performed  we will be monitoring the patient with sliding scale insulin and accuchecks.  Based on those values we will determine whether the patient needs a reduction of those medications postoperatively or total removal of those medications on discharge.  We will have the patient continue accuchecks postoperatively while at home also and report to me or their family physician for appropriate adjustments as needed.  The patient also understands that in the event of uncontrolled blood sugar preoperatively that we may choose to postpone their surgery.     GERD -The patient understands that weight loss surgery is not a guaranteed cure for reflux disease but does understand the benefits that weight loss can have on reflux disease. Gutierrez Adonis also understand that at the time of surgery the gastroesophageal junction will be evaluated for the presence of a diaphragmatic hernia. [place] will be corrected always with the gastric band and sleeve gastrectomy procedures, but only on a case by case basis with the gastric bypass.  The patient also understands that neither weight loss surgery nor repair of a diaphragmatic hernia repair guarantees the complete cessation of the disease.      Restrictive Airway Disease - We will continue all of their pulmonary medications in the form of oral pills and inhalers in both the perioperative and postoperative period. They understand that their symptoms should improve with weight loss.  Any further testing related to this will be turned over to their family physician or pulmonologist. The patient understands that if they require oral or IV steroids in the future that they will notify us. This is particularly important for gastric bypass patients at all times and both sleeve gastrectomy and gastric bypass patients in the 1 month pre op and 1 month post operative period. They understand that inhaled steroids are exempt from this. Ms. Jaden Lozano has a reminder for a \"due or due soon\" health maintenance. I have asked that she contact her primary care provider for follow-up on this health maintenance.       Signed By: Angela Ortiz NP     December 14, 2021

## 2021-12-14 NOTE — PATIENT INSTRUCTIONS
Patient Instructions      1. Remember hydration goals - minimum of 64 ounces of liquids per day (dehydration is the number one reason for hospital readmission). 2. Continue to monitor carbohydrate and protein intake you need a minimum of  Grams of protein daily- remember to keep your total carbohydrates to 50 grams or less per day for best results. 3. Continue to work towards exercise goals - 60-90 minutes, 5 times a week minimum of deliberate, aerobic exercise is the ultimate goal with strength training 2 times each week. Refer to [employer] for  information. 4. Remember to take vitamins as directed. 5. Attend support group the 2nd Thursday of each month. 6.  Constipation: Milk of Magnesia is for immediate relief only. Miralax is to be used every day if constipation is a chronic problem. 7.  Diarrhea: patients will occasionally develop lactose intolerance after surgery. Check to see if your protein shake has whey in it. If it does try a protein powder or drink that does not have whey and stop all yogurts, cheeses and milks to see if the diarrhea goes away. 8.  If you have had labs drawn. We will only call you if you have abnormal results. Otherwise you can access the lab results in \"mychart\". You will only need the access code the first time you sign on. 9.  Call us at [phone] or email us through [email or web address]" with questions,     concerns or worsening of condition, we have someone on call 24 hours a day. If you are unable to reach our office, you are to go to your Primary Care Physician or the Emergency Department.      NOTE TO GASTRIC BYPASS PATIENTS:  (SAME APPLIES TO GASTRIC SLEEVE PATIENTS FOR FIRST TWO MONTHS)  Remember that for the rest of your life, you are not able to take the following:  - NSAIDs (ibuprofen, goody powder, BC powder, Motrin, Advil, Mobic, Voltaren, Excedrin, etc.)  - Steroid pills or injections  - Smoke (cigarettes or recreational drugs)  - Alcohol  Use of any of the above may cause ulcers in your stomach which may perforate causing a medical emergency and surgery. Speak to our medical staff if another medical provider requires you to take steroids or NSAIDs. Supplement Resource Guide    Importance of Protein:   Maintains lean body mass, produces antibodies to fight off infections, heals wounds, minimizes hair loss, helps to give you energy, helps with satiety, and keeping you full between meals. Importance of Calcium:  Needed for healthy bones and teeth, normal blood clotting, and nervous system functioning, higher risk of osteoporosis and bone disease with non-compliance. Importance of Multivitamins: Many functions. Supply you with extra nutrients that you may be missing from food. May lead to iron deficiency anemia, weakness, fatigue, and many other symptoms with non-compliance. Importance of B Vitamins:  Important for red blood cell formation, metabolism, energy, and helps to maintain a healthy nervous system. Protein Supplement  Find one you like now. Use immediately after surgery. Look for:  35-50g protein each day from your protein supplement once you reach the progression diet. 0-3 g fat per serving  0-3 g sugar per serving    Protein drinks should be split in separate dosages. Recommend: Lifelong  1 year + Calcium Supplement:     Start taking within a month after surgery. Look for: Calcium Citrate Plus D (1500 mg per day)  Recommend: Citracal     .            Avoid chocolate chewable calcium. Can use chewable bariatric or [employer] brand or similar chewable. The body cannot absorb more than 500-600 mg of calcium at a time. Take for Life Multi-vitamin Supplement:      Start immediately after surgery: any complete chewable, such as: [employer]s Complete chewables. Avoid Bra[employer]waite sours or gummies.   They lack iron and other important nutrients and also have added sugar. Continue with chewable vitamin or change to adult complete multivitamin one month after surgery. Menstruating women can take a prenatal vitamin. Make sure has at least 18 mg iron and 482-972 mcg folic acid   Vitamin M93, B Complex Vitamin, and Biotin  Start taking within a month after surgery. Vitamin B12:  1000 mcg of Vitamin B12 three times weekly    Must take sublingually (meaning you take it under your tongue) or in a liquid drop form for easy absorption. B Complex Vitamin: Take a pill or liquid drop form once daily. Biotin: This vitamin can help prevent hair loss. Recommend 5mg   (5000 mcg) a day  Biotin is Optional              Learning About Being Physically Active  What is physical activity? Being physically active means doing any kind of activity that gets your body moving. The types of physical activity that can help you get fit and stay healthy include:  · Aerobic or \"cardio\" activities. These make your heart beat faster and make you breathe harder, such as brisk walking, riding a bike, or running. They strengthen your heart and lungs and build up your endurance. · Strength training activities. These make your muscles work against, or \"resist,\" something. Examples include lifting weights or doing push-ups. These activities help tone and strengthen your muscles and bones. · Stretches. These let you move your joints and muscles through their full range of motion. Stretching helps you be more flexible. What are the benefits of being active? Being active is one of the best things you can do for your health. It helps you to:  · Feel stronger and have more energy to do all the things you like to do. · Focus better at school or work. · Feel, think, and sleep better. · Reach and stay at a healthy weight. · Lose fat and build lean muscle.   · Lower your risk for serious health problems, including diabetes, heart attack, high blood pressure, and some cancers. · Keep your heart, lungs, bones, muscles, and joints strong and healthy. How can you make being active part of your life? Start slowly. Make it your long-term goal to get at least 30 minutes of exercise on most days of the week. Walking is a good choice. You also may want to do other activities, such as running, swimming, cycling, or playing tennis or team sports. Pick activities that you likeones that make your heart beat faster, your muscles stronger, and your muscles and joints more flexible. If you find more than one thing you like doing, do them all. You don't have to do the same thing every day. Get your heart pumping every day. Any activity that makes your heart beat faster and keeps it at that rate for a while counts. Here are some great ways to get your heart beating faster:  · Go for a brisk walk, run, or bike ride. · Go for a hike or swim. · Go in-line skating. · Play a game of touch football, basketball, or soccer. · Ride a bike. · Play tennis or racquetball. · Climb stairs. Even some household chores can be aerobicjust do them at a faster pace. Vacuuming, raking or mowing the lawn, sweeping the garage, and washing and waxing the car all can help get your heart rate up. Strengthen your muscles during the week. You don't have to lift heavy weights or grow big, bulky muscles to get stronger. Doing a few simple activities that make your muscles work against, or \"resist,\" something can help you get stronger. For example, you can:  · Do push-ups or sit-ups, which use your own body weight as resistance. · Lift weights or dumbbells or use stretch bands at home or in a gym or community center. Stretch your muscles often. Stretching will help you as you become more active. It can help you stay flexible, loosen tight muscles, and avoid injury. It can also help improve your balance and posture and can be a great way to relax.   Be sure to stretch the muscles you'll be using when you work out. It's best to warm your muscles slightly before you stretch them. Walk or do some other light aerobic activity for a few minutes, and then start stretching. When you stretch your muscles:  · Do it slowly. Stretching is not about going fast or making sudden movements. · Don't push or bounce during a stretch. · Hold each stretch for at least 15 to 30 seconds, if you can. You should feel a stretch in the muscle, but not pain. · Breathe out as you do the stretch. Then breathe in as you hold the stretch. Don't hold your breath. If you're worried about how more activity might affect your health, have a checkup before you start. Follow any special advice your doctor gives you for getting a smart start. Where can you learn more? Go to http://www.gray.com/  Enter [id number] in the search box to learn more about \"Learning About Being Physically Active. \"  Current as of: May 12, 2021               Content Version: 13.0  © 2006-2021 [employer]. Care instructions adapted under license by [employer] (which disclaims liability or warranty for this information). If you have questions about a medical condition or this instruction, always ask your healthcare professional. [address] any warranty or liability for your use of this information.

## 2022-01-20 ENCOUNTER — OFFICE VISIT (OUTPATIENT)
Dept: SURGERY | Age: 32
End: 2022-01-20

## 2022-01-20 VITALS — HEIGHT: 68 IN | BODY MASS INDEX: 35.92 KG/M2 | WEIGHT: 237 LBS

## 2022-01-20 DIAGNOSIS — E66.01 MORBID OBESITY (HCC): Primary | ICD-10-CM

## 2022-01-20 NOTE — PROGRESS NOTES
[facility] Weight [facility]Disciplinary Program    Name: David March   : 1990    Session# 6, Pt attended in-person class. Weight obtained in office. Date: 2022    Visit Vitals  Ht 5' 8\" (1.727 m)   Wt 107.5 kg (237 lb)   BMI 36.04 kg/m²       Pt has GAINED 0.9 lbs since last nutrition visit on 12/10/21. This is a total weight loss of 0.0 lbs since initial revision consult on 21. Dietary Instructions    Reviewed intake  Understanding low carbohydrates, low sugar, higher protein meals  Instruction given for personal dietary changes  Discussed perceived compliance  Comments: RD Reviewed Diet History and Physical Activity/Exercise habits. Recommended dietary changes discussed for both before and after surgery. Reviewed recommendation to follow [id number] calorie diet, working to reduce total carbohydrate intake to  g or less per day and increasing protein intake to  g per day, compared current intake to recommendations. Recommend pt adopt an exercise routine of at least 3-5 days a week for at least 30 minutes/day. If pt unable to participate in walking, anirudh, swimming, or other exercises, recommend pt work with a physical therapist and/or participate in chair exercises, yoga, and/or increase activities of daily living as able. Patient participated in pre-recorded education class video. Recorded video of dietitian discussing key diet principles and reviewing recommendations preparing for bariatric surgery. Reviewed diet progression guide with portions following surgery, discussed week 1-2 liquid diet. Reinforced importance of adequate hydration and protein intake. Discussed appropriate choices for the liquid diet and modifications regarding sugar free beverages, carbonation, caffeine, utilization of straws and elimination of alcohol. Reviewed daily schedule, shopping list, and behavior modifications following surgery.    Patient received pre-operative education packet that reviewed these topics, but discussed in details the role of dietary and behavior changes to promote optimal long term weight loss following bariatric surgery. Patient watched the video in its entirety and turned in the 3 embedded passcodes from the video session. Pt submitted \"homework\" for today's education class. Pt completed 24-hour recall along with provided a physical activity log. Pt submitted nutrition, exercise, and behavior goals that they plan to make until surgery and after surgery. Behavior Modification    Identify obstacles to trigger change  Achieving/Rewarding goals met  Positive attitude  Comments: Reinforced importance continuing to modify lifestyle patterns and behaviors to promote weight loss and long term weight maintenance. During today's lesson discussed post-operative key diet principles and reviewed dietary and behavior changes to begin making now     Comments: Provided opportunity for patient to ask any last-minute questions prior to finishing nutrition visits. Provided contact information for additional questions as they arise. Pt completed Bariatric-specific nutrition questionnaire. RD reviewed answers and provided feedback on responses that align with bariatric recommendations to behavior changes. Provided feedback on recommendations, areas for improvement, and provided positive feedback on areas where pt is making positive behavior changes. All current stated pt questions answered. Physical Activity/Exercise    Discussed Perceived Compliance  Motivation    Patient has been educated on the importance of started physical activity regimen, reinforced the importance of regular physical activity for total health and weight management. Suggested starting exercise regimen of cardiovascular and resistance training for at least 3-5 days per week for 30-60 minutes, patient was receptive to recommendation. Goals:   1.  Work to increase to 3-4 small meals per day, with planned snacks as needed. Recommend following plate method for meal planning - focusing on lean protein, non-starchy vegetables, and measured amounts of starch. - Goal of  g protein and  g carbohydrate per day. - Recommend continuing protein supplement as meal replacement at least 1x/day OR as high protein snack option  2. Increase non caloric fluid to 64 oz per day. Eliminate caffeine, added sugar, carbonation, and straws.               -Continue to work to decrease sugar sweetened beverages - goal of calorie free beverages only              -Must eliminate caffeine prior to surgery and avoid for ~6-8 weeks   -Practice 30:30 rule,  food and flood   3. Start activity regimen, work to increase ADL  4. Start Complete MVI    Candidate for surgery (per RD): YES -   Pt acknowledges understanding that bariatric surgery requires  lifelong adherence to dietary and exercise behavior change recommendations in order to be successful with weight loss and weight-loss maintenance. Pt demonstrates understanding of post-op key diet principles and recommendations through recall and class discussion. Pt passed bariatric quiz with at least 80% pass rate. Note that pt WLG from Rishi Akhtar NP 12/14/2021 note states that pt needs to lose to 234 lbs. Therefore, recommend that pt return to clinic for individual nutrition counseling to assist with this.

## 2022-02-23 ENCOUNTER — DOCUMENTATION ONLY (OUTPATIENT)
Dept: SURGERY | Age: 32
End: 2022-02-23

## 2022-02-23 NOTE — PROGRESS NOTES
Per [facility] requirements;  E-mail and letter sent for follow up appointment. [facility]  [address] [place], [address]                 [facility]  [facility]  [facility]      Dear Patient,    Your health is our main concern. It is important for your health to have follow-up lab work and to see your surgeon at 2 months, 4 months, 6 months, 9 months and annually after your weight loss surgery. Additionally, the Department of Bariatric Surgery at our hospital is a member of the Metabolic and Bariatric Surgery Accreditation and Quality Improvement Program [facility]). As a participant in this program, we gather information on the outcomes of our patients after surgery. Please call the office for a follow up appointment at [phone]. If you have moved out of the area or have changed surgeons please call us and let us know the name of your doctor. Your health and feedback are important to us. We greatly appreciate your response.        Thank you,  [facility] [address] [facility]

## 2022-03-18 PROBLEM — Z98.84 S/P LAPAROSCOPIC SLEEVE GASTRECTOMY: Status: ACTIVE | Noted: 2018-04-17

## 2022-03-18 PROBLEM — B00.9 HSV-1 INFECTION: Status: ACTIVE | Noted: 2020-12-15

## 2022-03-19 PROBLEM — K21.9 GERD (GASTROESOPHAGEAL REFLUX DISEASE): Status: ACTIVE | Noted: 2018-07-31

## 2022-03-19 PROBLEM — Z97.5 IUD (INTRAUTERINE DEVICE) IN PLACE: Status: ACTIVE | Noted: 2018-02-08

## 2022-03-19 PROBLEM — R79.89 ELEVATED PROLACTIN LEVEL: Status: ACTIVE | Noted: 2018-10-23

## 2022-03-19 PROBLEM — Z98.84 STATUS POST LAPAROSCOPIC SLEEVE GASTRECTOMY: Status: ACTIVE | Noted: 2018-03-01

## 2022-03-19 PROBLEM — Z80.41 FH: OVARIAN CANCER: Status: ACTIVE | Noted: 2018-02-08

## 2022-03-20 PROBLEM — K90.9 INTESTINAL MALABSORPTION: Status: ACTIVE | Noted: 2018-04-17

## 2022-03-20 PROBLEM — F12.90 MARIJUANA USER: Status: ACTIVE | Noted: 2021-01-06

## 2022-04-22 ENCOUNTER — TELEPHONE (OUTPATIENT)
Dept: SURGERY | Age: 32
End: 2022-04-22

## 2022-04-22 NOTE — TELEPHONE ENCOUNTER
A telephone call was placed to patient to assess habits 30 days prior to having surgery. Diabetes:  Have your blood sugar levels been well controlled and significantly under 200 the entire month prior to surgery? If not, you need to let us know and see your family physician for management. Patient is aware      Hypertension:  Have your blood pressures been well-controlled with a systolic pressure lower than 994 and a diastolic pressure lower than 100? Patient aware and said their blood pressure is well controlled with their medications. Steroids (oral or injections of any kind):  Have you taken any steroid injections in your back, knee, foot, or any part of your body? Have you taken steroids for any type of respiratory virus, asthma, or COPD? Patient is aware and denied steroid use. Non-steroidal Anti-inflammatory medications:  Have you taken Motrin, Aleve, Naproxen, Aspirin, Celebrex, or any other over-the-counter or prescription NSAID? Patient is aware and denied NSAID use. Recreational Drug Use (tobacco, vaping with tobacco, marijuana/edibles, cocaine, or any other recreational drugs):  Have you had any nicotine products to include cigarettes, vaping, nicotine patches, and/or nicotine gum? Have you smoked marijuana and/or ate edibles containing marijuana? Have you consumed any cocaine and/or any other recreational drugs? Patient is aware and denied any recreational drug use. Medication:  Have you had any significant changes to any medication? Patient is aware and denied changes to any medications.

## 2022-04-28 RX ORDER — PANTOPRAZOLE SODIUM 40 MG/1
TABLET, DELAYED RELEASE ORAL
Qty: 30 TABLET | Refills: 2 | Status: SHIPPED | OUTPATIENT
Start: 2022-04-28

## 2022-05-02 ENCOUNTER — OFFICE VISIT (OUTPATIENT)
Dept: SURGERY | Age: 32
End: 2022-05-02
Payer: MEDICAID

## 2022-05-02 ENCOUNTER — HOSPITAL ENCOUNTER (OUTPATIENT)
Dept: BARIATRICS/WEIGHT MGMT | Age: 32
Discharge: HOME | End: 2022-05-02

## 2022-05-02 ENCOUNTER — NURSE NAVIGATOR (OUTPATIENT)
Dept: SURGERY | Age: 32
End: 2022-05-02

## 2022-05-02 ENCOUNTER — HOSPITAL ENCOUNTER (OUTPATIENT)
Dept: PREADMISSION TESTING | Age: 32
Discharge: HOME | End: 2022-05-02
Payer: MEDICAID

## 2022-05-02 VITALS
OXYGEN SATURATION: 97 % | HEART RATE: 86 BPM | RESPIRATION RATE: 16 BRPM | WEIGHT: 237.5 LBS | HEIGHT: 68 IN | SYSTOLIC BLOOD PRESSURE: 129 MMHG | TEMPERATURE: 97.3 F | BODY MASS INDEX: 36 KG/M2 | DIASTOLIC BLOOD PRESSURE: 75 MMHG

## 2022-05-02 DIAGNOSIS — E11.9 CONTROLLED TYPE 2 DIABETES MELLITUS WITHOUT COMPLICATION, WITHOUT LONG-TERM CURRENT USE OF INSULIN (HCC): ICD-10-CM

## 2022-05-02 DIAGNOSIS — E66.01 SEVERE OBESITY (BMI 35.0-35.9 WITH COMORBIDITY) (HCC): ICD-10-CM

## 2022-05-02 DIAGNOSIS — K21.9 GASTROESOPHAGEAL REFLUX DISEASE, UNSPECIFIED WHETHER ESOPHAGITIS PRESENT: ICD-10-CM

## 2022-05-02 DIAGNOSIS — Z98.84 S/P LAPAROSCOPIC SLEEVE GASTRECTOMY: ICD-10-CM

## 2022-05-02 DIAGNOSIS — Z78.9 USES BIRTH CONTROL: ICD-10-CM

## 2022-05-02 DIAGNOSIS — Z97.5 IUD (INTRAUTERINE DEVICE) IN PLACE: ICD-10-CM

## 2022-05-02 DIAGNOSIS — G47.419 PRIMARY NARCOLEPSY WITHOUT CATAPLEXY: ICD-10-CM

## 2022-05-02 DIAGNOSIS — J45.909 UNCOMPLICATED ASTHMA, UNSPECIFIED ASTHMA SEVERITY, UNSPECIFIED WHETHER PERSISTENT: ICD-10-CM

## 2022-05-02 DIAGNOSIS — G89.18 POST-OP PAIN: Primary | ICD-10-CM

## 2022-05-02 DIAGNOSIS — E78.5 HYPERLIPIDEMIA, UNSPECIFIED HYPERLIPIDEMIA TYPE: ICD-10-CM

## 2022-05-02 DIAGNOSIS — K90.9 INTESTINAL MALABSORPTION, UNSPECIFIED TYPE: ICD-10-CM

## 2022-05-02 PROBLEM — F12.90 MARIJUANA USER: Status: RESOLVED | Noted: 2021-01-06 | Resolved: 2022-05-02

## 2022-05-02 LAB
ALBUMIN SERPL-MCNC: 3.6 G/DL (ref 3.4–5)
ALBUMIN/GLOB SERPL: 0.9 {RATIO} (ref 0.8–1.7)
ALP SERPL-CCNC: 106 U/L (ref 45–117)
ALT SERPL-CCNC: 16 U/L (ref 13–56)
ANION GAP SERPL CALC-SCNC: 4 MMOL/L (ref 3–18)
AST SERPL-CCNC: 9 U/L (ref 10–38)
ATRIAL RATE: 91 BPM
BASOPHILS # BLD: 0 K/UL (ref 0–0.1)
BASOPHILS NFR BLD: 0 % (ref 0–2)
BILIRUB SERPL-MCNC: 0.4 MG/DL (ref 0.2–1)
BUN SERPL-MCNC: 14 MG/DL (ref 7–18)
BUN/CREAT SERPL: 18 (ref 12–20)
CALCIUM SERPL-MCNC: 9.2 MG/DL (ref 8.5–10.1)
CALCULATED P AXIS, ECG09: 62 DEGREES
CALCULATED R AXIS, ECG10: 53 DEGREES
CALCULATED T AXIS, ECG11: 26 DEGREES
CHLORIDE SERPL-SCNC: 106 MMOL/L (ref 100–111)
CO2 SERPL-SCNC: 28 MMOL/L (ref 21–32)
CREAT SERPL-MCNC: 0.79 MG/DL (ref 0.6–1.3)
DIAGNOSIS, 93000: NORMAL
DIFFERENTIAL METHOD BLD: NORMAL
EOSINOPHIL # BLD: 0.1 K/UL (ref 0–0.4)
EOSINOPHIL NFR BLD: 1 % (ref 0–5)
ERYTHROCYTE [DISTWIDTH] IN BLOOD BY AUTOMATED COUNT: 12.6 % (ref 11.6–14.5)
GLOBULIN SER CALC-MCNC: 4.1 G/DL (ref 2–4)
GLUCOSE SERPL-MCNC: 166 MG/DL (ref 74–99)
HCG SERPL QL: NEGATIVE
HCT VFR BLD AUTO: 41 % (ref 35–45)
HGB BLD-MCNC: 13.7 G/DL (ref 12–16)
IMM GRANULOCYTES # BLD AUTO: 0 K/UL (ref 0–0.04)
IMM GRANULOCYTES NFR BLD AUTO: 0 % (ref 0–0.5)
LYMPHOCYTES # BLD: 3.2 K/UL (ref 0.9–3.6)
LYMPHOCYTES NFR BLD: 35 % (ref 21–52)
MCH RBC QN AUTO: 31 PG (ref 24–34)
MCHC RBC AUTO-ENTMCNC: 33.4 G/DL (ref 31–37)
MCV RBC AUTO: 92.8 FL (ref 78–100)
MONOCYTES # BLD: 0.6 K/UL (ref 0.05–1.2)
MONOCYTES NFR BLD: 6 % (ref 3–10)
NEUTS SEG # BLD: 5.3 K/UL (ref 1.8–8)
NEUTS SEG NFR BLD: 58 % (ref 40–73)
NRBC # BLD: 0 K/UL (ref 0–0.01)
NRBC BLD-RTO: 0 PER 100 WBC
P-R INTERVAL, ECG05: 152 MS
PLATELET # BLD AUTO: 360 K/UL (ref 135–420)
PMV BLD AUTO: 9.2 FL (ref 9.2–11.8)
POTASSIUM SERPL-SCNC: 3.7 MMOL/L (ref 3.5–5.5)
PROT SERPL-MCNC: 7.7 G/DL (ref 6.4–8.2)
Q-T INTERVAL, ECG07: 370 MS
QRS DURATION, ECG06: 88 MS
QTC CALCULATION (BEZET), ECG08: 455 MS
RBC # BLD AUTO: 4.42 M/UL (ref 4.2–5.3)
SODIUM SERPL-SCNC: 138 MMOL/L (ref 136–145)
VENTRICULAR RATE, ECG03: 91 BPM
WBC # BLD AUTO: 9.1 K/UL (ref 4.6–13.2)

## 2022-05-02 PROCEDURE — 80053 COMPREHEN METABOLIC PANEL: CPT

## 2022-05-02 PROCEDURE — 84703 CHORIONIC GONADOTROPIN ASSAY: CPT

## 2022-05-02 PROCEDURE — 99215 OFFICE O/P EST HI 40 MIN: CPT | Performed by: SPECIALIST

## 2022-05-02 PROCEDURE — 36415 COLL VENOUS BLD VENIPUNCTURE: CPT

## 2022-05-02 PROCEDURE — 83036 HEMOGLOBIN GLYCOSYLATED A1C: CPT

## 2022-05-02 PROCEDURE — 85025 COMPLETE CBC W/AUTO DIFF WBC: CPT

## 2022-05-02 PROCEDURE — 93005 ELECTROCARDIOGRAM TRACING: CPT

## 2022-05-02 RX ORDER — ONDANSETRON 8 MG/1
8 TABLET, ORALLY DISINTEGRATING ORAL
Qty: 30 TABLET | Refills: 0 | Status: SHIPPED | OUTPATIENT
Start: 2022-05-02

## 2022-05-02 RX ORDER — ENOXAPARIN SODIUM 100 MG/ML
40 INJECTION SUBCUTANEOUS EVERY 12 HOURS
Qty: 20 EACH | Refills: 0 | Status: SHIPPED | OUTPATIENT
Start: 2022-05-02 | End: 2022-05-12

## 2022-05-02 RX ORDER — POLYETHYLENE GLYCOL 3350 17 G/17G
POWDER, FOR SOLUTION ORAL
COMMUNITY
Start: 2021-09-08 | End: 2022-05-18

## 2022-05-02 RX ORDER — BACLOFEN 10 MG/1
10 TABLET ORAL
COMMUNITY
Start: 2021-12-07 | End: 2022-05-18

## 2022-05-02 RX ORDER — BISACODYL 5 MG
TABLET, DELAYED RELEASE (ENTERIC COATED) ORAL AS DIRECTED
COMMUNITY
Start: 2021-09-08 | End: 2022-05-18

## 2022-05-02 RX ORDER — OXYCODONE AND ACETAMINOPHEN 5; 325 MG/1; MG/1
1 TABLET ORAL
Qty: 30 TABLET | Refills: 0 | Status: SHIPPED | OUTPATIENT
Start: 2022-05-02 | End: 2022-05-07

## 2022-05-02 NOTE — PROGRESS NOTES
Patient attended bariatric surgery pre-operative education class today. Patient appeared to have an understanding of the surgical procedure, pre-op and post-op risks, and lifestyle changes. Patient asked appropriate questions, and all questions were answered in their entirety. Patient was given a bariatric binder of resources; form acknowledging receipt of said book was completed. Lovenox injections and Zofran were sent to patient's pharmacy on file.

## 2022-05-02 NOTE — PROGRESS NOTES
Sleeve to Gastric Bypass Conversion - History and Physical    Subjective: The patient is a 27 y.o. obese female with a Body mass index is 36.11 kg/m². .   she presents now to review their work up to date to see if they are a candidate for surgery and whether or not to proceed with the previously requested procedure. She had a sleeve via this office in March 2018. Bariatric comorbidities continue to include:   Patient Active Problem List   Diagnosis Code    Controlled type 2 diabetes mellitus without complication, without long-term current use of insulin ([facility] [address] E11.9    Hyperlipemia E78.5    Gastroparesis K31.84    Asthma J45.909    IUD (intrauterine device) in place Z80.11    FH: ovarian cancer Z80.41    Intestinal malabsorption K90.9    S/P laparoscopic sleeve gastrectomy Z98.84    GERD (gastroesophageal reflux disease) K21.9    Elevated prolactin level R79.89    HSV-1 infection B00.9    Marijuana user F12.90    Status post laparoscopic sleeve gastrectomy Z98.84    Severe obesity (BMI 35.0-35.9 with comorbidity) (HCC) E66.01, Z68.35    Narcolepsy G47.419    Uses birth control Z78.9       They have been generally well prior to this visit and have had no recent significant illnesses. The patient has had no gastrointestinal issues that would preclude them from proceeding with the surgery they have chosen. Emil Quinteros has recently tried a preoperative weight loss program  in addition to seeing a bariatric nutritionist preoperatively. We have discussed on at least one other occasion about the various types of surgical weight loss procedures and they have considered these options after our initial consultation. We have once again discussed these procedures in detail and they have now decided on a surgical procedure. They present today to discuss this and confirm that their evaluation pre operatively is acceptable to continue with surgery.     The patient desires laparoscopic gastric bypass surgery for surgical weight loss. The patients goal weight is 177 lb. (this represents a BMI of 27)    These goals are consistent with expected outcomes of their desired operation. her Medical goals are resolution of these health issues. Patient Active Problem List    Diagnosis Date Noted    Uses birth control     Severe obesity (BMI 35.0-35.9 with comorbidity) ([facility] [address]     Narcolepsy     Marijuana user 2021    HSV-1 infection 12/15/2020    Elevated prolactin level 10/23/2018    GERD (gastroesophageal reflux disease) 2018    Intestinal malabsorption 2018    S/P laparoscopic sleeve gastrectomy 2018    Status post laparoscopic sleeve gastrectomy 2018    IUD (intrauterine device) in place 2018    FH: ovarian cancer 2018    Asthma     Controlled type 2 diabetes mellitus without complication, without long-term current use of insulin ([facility])     Hyperlipemia     Gastroparesis       Past Surgical History:   Procedure Laterality Date    HX BILATERAL SALPINGECTOMY      HX  SECTION      X 2    OH LAP, SARA RESTRICT PROC, LONGITUDINAL GASTRECTOMY      3/2018      Social History     Tobacco Use    Smoking status: Never Smoker    Smokeless tobacco: Never Used   Substance Use Topics    Alcohol use: No      Family History   Problem Relation Age of Onset   Parada Migraines Mother     Seizures Mother     Other Father         graves ds,     Sickle Cell Trait Brother     Other Brother     Asthma Sister     Other Sister         autism    Other Daughter         cp, epilepsy    Asthma Daughter       Current Outpatient Medications   Medication Sig Dispense Refill    baclofen (LIORESAL) 10 mg tablet Take 10 mg by mouth three (3) times daily as needed.  bisacodyL (Dulcolax, bisacodyl,) 5 mg EC tablet as directed.  polyethylene glycol (MIRALAX) 17 gram/dose powder Take as a split dose as directed.       pantoprazole (PROTONIX) 40 mg tablet TAKE 1 TABLET BY MOUTH EVERY DAY 30 Tablet 2    PNV Comb #2-Iron-FA-Omega 3 29-1-400 mg cmpk Take  by mouth.  ergocalciferol (ERGOCALCIFEROL) 1,250 mcg (50,000 unit) capsule Take 1 Capsule by mouth two (2) times a week. 52 Capsule 1    modafiniL (PROVIGIL) 200 mg tablet TAKE 1 TABLET IN THE MORNING AND 1 IN THE AFTERNOON      dulaglutide (Trulicity) 9.85 VD/7.0 mL sub-q pen 0.75 mg by SubCUTAneous route every seven (7) days.  ALPRAZolam (XANAX) 0.5 mg tablet Take 0.5 mg by mouth.  Blood-Glucose Meter monitoring kit 1 Units by Other route.  EPINEPHrine (EPIPEN) 0.3 mg/0.3 mL injection 0.3 mg by IntraMUSCular route.  glucose blood VI test strips (ASCENSIA AUTODISC VI, ONE TOUCH ULTRA TEST VI) strip 50 Each.  diclofenac (VOLTAREN) 1 % gel APPLY 2 GRAMS AA Q 8 H PRN P  0    Lancets misc 1 Each.  butalbital-acetaminophen-caffeine (FIORICET, ESGIC) -40 mg per tablet TAKE 1 TABLET BY MOUTH EVERY DAY AS NEEDED, MAY REPEAT IN 2 HOURS, MAX 2 PER DAY, 5 PER WEEK      levonorgestrel (MIRENA) 20 mcg/24 hr (5 years) IUD 1 Device.  calcium-cholecalciferol, d3, (CALCIUM 600 + D) 600-125 mg-unit tab Take  by mouth.  albuterol (PROVENTIL HFA, VENTOLIN HFA, PROAIR HFA) 90 mcg/actuation inhaler Take 2 Puffs by inhalation every four (4) hours as needed.  enoxaparin (LOVENOX) 40 mg/0.4 mL 0.4 mL by SubCUTAneous route every twelve (12) hours every twelve (12) hours for 10 days. Indications: deep vein thrombosis prevention (Patient not taking: Reported on 5/2/2022) 20 Each 0    ondansetron (ZOFRAN ODT) 8 mg disintegrating tablet Take 1 Tablet by mouth every eight (8) hours as needed for Nausea or Vomiting. (Patient not taking: Reported on 5/2/2022) 30 Tablet 0    Biotin 2,500 mcg cap Take  by mouth. (Patient not taking: Reported on 5/2/2022)      B.infantis-B.ani-B.long-B.bifi (PROBIOTIC 4X) 10-15 mg TbEC Take  by mouth.  (Patient not taking: Reported on 5/2/2022)       Allergies   Allergen Reactions    Latex Rash, Itching and Swelling    Doxycycline Swelling     Throat swelling    Mushroom Anaphylaxis     Other reaction(s): anaphylaxis/angioedema    Amoxicillin Rash    Dog Dander Hives     When patient went for allergy testing she tested positive for dogs and cats.     Erythromycin Rash    Lactose Nausea and Vomiting     intolerance  Other reaction(s): gi distress  intolerance    Metformin Seizures     seizure    Mold Other (comments)    Penicillins Rash and Itching     Other reaction(s): unknown    Rocephin [Ceftriaxone] Rash    Sulfa (Sulfonamide Antibiotics) Swelling, Itching and Unknown (comments)        Review of Systems:        General - No history or complaints of unexpected fever, chills, or weight loss  Head/Neck - No history or complaints of headache, diplopia, dysphagia, hearing loss  Cardiac - No history or complaints of chest pain, palpitations, murmur, or shortness of breath  Pulmonary - No history or complaints of shortness of breath, productive cough, hemoptysis  Gastrointestinal - (+) history of reflux, No history or complaints of abdominal pain, obstipation/constipation, blood per rectum  Genitourinary - No history or complaints of hematuria/dysuria, stress urinary incontinence symptoms, or renal lithiasis  Musculoskeletal - No history or complaints of joint pain or muscular weakness  Hematologic - No history or complaints of bleeding disorders, blood transfusions, sickle cell anemia  Neurologic - No history or complaints of  migraine headaches, seizure activity, syncopal episodes, TIA or stroke  Integumentary - No history or complaints of rashes, abnormal nevi, skin cancer  Gynecological - unremarkable    Objective:     Visit Vitals  /75   Pulse 86   Temp 97.3 °F (36.3 °C)   Resp 16   Ht 5' 8\" (1.727 m)   Wt 107.7 kg (237 lb 8 oz)   SpO2 97%   BMI 36.11 kg/m²     Physical Examination: General appearance - alert, well appearing, and in no distress  Mental status - alert, oriented to person, place, and time  Eyes - pupils equal and reactive, extraocular eye movements intact  Nose - normal and patent, no erythema, discharge or polyps  Mouth - mucous membranes moist, pharynx normal without lesions  Neck - supple, no significant adenopathy  Lymphatics - no palpable lymphadenopathy, no hepatosplenomegaly  Chest - clear to auscultation, no wheezes, rales or rhonchi, symmetric air entry  Heart - normal rate, regular rhythm, normal S1, S2, no murmurs, rubs, clicks or gallops  Abdomen - soft, nontender, nondistended, no masses or organomegaly  Back exam - full range of motion, no tenderness, palpable spasm or pain on motion  Neurological - alert, oriented, normal speech, no focal findings or movement disorder noted  Musculoskeletal - no joint tenderness, deformity or swelling  Extremities - peripheral pulses normal, no pedal edema, no clubbing or cyanosis  Skin - normal coloration and turgor, no rashes, no suspicious skin lesions noted    Labs / Preoperative Evaluations:     Recent Results (from the past 1008 hour(s))   CBC WITH AUTOMATED DIFF    Collection Time: 05/02/22  1:13 PM   Result Value Ref Range    WBC 9.1 4.6 - 13.2 K/uL    RBC 4.42 4.20 - 5.30 M/uL    HGB 13.7 12.0 - 16.0 g/dL    HCT 41.0 35.0 - 45.0 %    MCV 92.8 78.0 - 100.0 FL    MCH 31.0 24.0 - 34.0 PG    MCHC 33.4 31.0 - 37.0 g/dL    RDW 12.6 11.6 - 14.5 %    PLATELET 990 008 - 373 K/uL    MPV 9.2 9.2 - 11.8 FL    NRBC 0.0 0  WBC    ABSOLUTE NRBC 0.00 0.00 - 0.01 K/uL    NEUTROPHILS 58 40 - 73 %    LYMPHOCYTES 35 21 - 52 %    MONOCYTES 6 3 - 10 %    EOSINOPHILS 1 0 - 5 %    BASOPHILS 0 0 - 2 %    IMMATURE GRANULOCYTES 0 0.0 - 0.5 %    ABS. NEUTROPHILS 5.3 1.8 - 8.0 K/UL    ABS. LYMPHOCYTES 3.2 0.9 - 3.6 K/UL    ABS. MONOCYTES 0.6 0.05 - 1.2 K/UL    ABS. EOSINOPHILS 0.1 0.0 - 0.4 K/UL    ABS. BASOPHILS 0.0 0.0 - 0.1 K/UL    ABS. IMM.  GRANS. 0.0 0.00 - 0.04 K/UL    DF AUTOMATED     HCG QL SERUM    Collection Time: 05/02/22  1:13 PM   Result Value Ref Range    HCG, Ql. Negative NEG     METABOLIC PANEL, COMPREHENSIVE    Collection Time: 05/02/22  1:13 PM   Result Value Ref Range    Sodium 138 136 - 145 mmol/L    Potassium 3.7 3.5 - 5.5 mmol/L    Chloride 106 100 - 111 mmol/L    CO2 28 21 - 32 mmol/L    Anion gap 4 3.0 - 18 mmol/L    Glucose 166 (H) 74 - 99 mg/dL    BUN 14 7.0 - 18 MG/DL    Creatinine 0.79 0.6 - 1.3 MG/DL    BUN/Creatinine ratio 18 12 - 20      GFR est AA >60 >60 ml/min/1.73m2    GFR est non-AA >60 >60 ml/min/1.73m2    Calcium 9.2 8.5 - 10.1 MG/DL    Bilirubin, total 0.4 0.2 - 1.0 MG/DL    ALT (SGPT) 16 13 - 56 U/L    AST (SGOT) 9 (L) 10 - 38 U/L    Alk. phosphatase 106 45 - 117 U/L    Protein, total 7.7 6.4 - 8.2 g/dL    Albumin 3.6 3.4 - 5.0 g/dL    Globulin 4.1 (H) 2.0 - 4.0 g/dL    A-G Ratio 0.9 0.8 - 1.7         Assessment:     Morbid obesity with associated comorbidity    Plan:     laparoscopic gastric bypass surgery    This is a 27 y.o. female with a BMI of Body mass index is 36.11 kg/m². and the weight-related co-morbidties as noted above. 2002 Maynor Grover meets the NIH criteria for bariatric surgery based upon the BMI of Body mass index is 36.11 kg/m². and multiple weight-related co-morbidties. 2002 Maynor Grover has elected laparoscopic gastric bypass as her intervention of choice for treatment of morbid obestiy through surgical means secondary to its uniform results,  profound baseline suppression of hunger and pace at which weight is lost.    In the office today, following Jonelle's history and physical examination, a 40 minute discussion regarding the anatomic alterations for the laparoscopic gastric bypass  was undertaken. The dietary expectations and the patient  dependent factors for success were thoroughly discussed, to include the need for interval follow-up and long-term dietary changes associated with success.  The possible short and long term  complications of the gastric bypass were also discussed, to include but not limited to;death, DVT/PE, staple line leak, bleeding, stricture formation, infection,internal hernia  and pouch dilation. Specific weight related outcomes for success were also discussed with an emphasis on careful and close follow-up with the first year and Dietary behavior modification over the first years as baseline cyclical hunger returns  The patient expressed an understanding of the above factors, and her questions were answered in their entirety. In addition, the patient attended a 1.5 hour power point seminar regarding obesity, surgical weight loss including, adjustable gastric band, gastric bypass, and sleeve gastrectomy. This discussion contrasted the different surgical techniques, mechanisms of actions and expected outcomes, and surgical and medical risks associated with each procedure. During this seminar, there was a long question and answer session where each questions was answered until there were no additional questions. Today, the patient had all of her questions answered and the decision was made today that the patient's preoperative evaluation is acceptable for them  to proceed with bariatric surgery  choosing adjustable gastric bypass as her surgical option. The patient understands the plan of action    There has been no exposure to nicotine in any form    They were not asked to obtain any special clearances prior to proceeding to surgery    Her post-op meds have been sent to her pharmacy     Secondary Diagnoses:     DVT / Pulmonary Embolus Risk - The patient is at a higher risk for post operative DVT / pulmonary embolus secondary to their morbid obese status, relative sedentary lifestyle, and impending general anesthetic. We will plan to use anticoagulation therapy pre and post operative as well as  pneumatic compression devices and encourage ambulation once on the hospital nursing floor.   The need for possible at home anticoagulation therapy has also been discussed and any decision on this matter will be made during post operative evaluations. The patient understands that their efforts at ambulation are of vital importance to reduce the risk of this complication thus placing significant burden on them as to the prevention of such issues. Signs and symptoms of DVT / PE have been discussed with the patient and they have been instructed to call the office if any these occur in the \"at home\" post op phase    Adult Onset Diabetes - The patient has paul given a very low carbohydrate diet preoperatively along with instructions to monitor their blood sugars on a regular daily basis. When  their surgery is performed  we will be monitoring the patient with sliding scale insulin and accuchecks.  Based on those values we will determine whether the patient needs a reduction of those medications postoperatively or total removal of those medications on discharge.  We will have the patient continue accuchecks postoperatively while at home also and report to me or their family physician for appropriate adjustments as needed.  The patient also understands that in the event of uncontrolled blood sugar preoperatively that we may choose to postpone their surgery. Hypertension - The patient has a clear understanding of how weight loss improves hypertension as a whole, but also they understand that there is a significant genetic component to this disease process. We will monitor the patients blood pressure while in the hospital and the plan would be to continue those medications postoperatively.  If a diuretic is being used we will stop them on discharge to prevent dehydration particularly with the sleeve gastrectomy and the gastric bypass procedures.  They will be instructed to monitor their blood pressure postoperatively while at home and notify their primary care physician in the event of any significantly high or uncharacteristic readings.     Hyperlipidemia - The patient understands that studies show that almost all patient will realize an improvement in their lipid profile with weight loss that occurs with these procedures. They however also understand that hyperlipidemia is a multifactorial disease particularly as it pertains to their genetic background and that there is no guarantee toward cure  of this issue. We will resume their medications immediately postoperatively as this tends to decrease any post operative cardiac events.  The patient will follow up with their family physician in the postoperative period with plans to repeat their lipid panel 2-3 month postoperative for potential adjustment or removal of these medications.     Signed By: [place], MD     May 2, 2022

## 2022-05-02 NOTE — PROGRESS NOTES
CLINICAL NUTRITION PRE-OPERATIVE EDUCATION    Patient's Name: Richa Mora   Age: 27 y.o. YOB: 1990   Sex: female    Education & Materials Provided: Post-op Binder to include:   Liquid Diet Shopping List    Supplemental Resource Guide: MVI, B12, Calcium Citrate, Vitamin D, Vitamin B50, and iron recommendations   Protein Supplement Information    Fluid Requirements/ No Straws   No Caffeine or Carbonation    No alcohol               No Snacks or No Concentrated Sweets      Exercising    Key Diet Principles             Addressed Current Habits/Changes to Make    Patient was instructed on clear liquid diet to follow for one (1) day prior to surgery.  Patient has been educated on the liquid diet to begin day 2 post-op and continue for 2 weeks post surgery.  Patient understands the transition of the diet and need to remain on full liquid diet until advanced by provider and dietitian. Summary:  Patient has completed the required amount of visits with the Registered Dietitian. During these nutrition visits, we focused on dietary changes, behavior changes, and the importance of establishing an exercise routine. The diet protocol that patient was prescribed emphasized on low carbohydrates (less than 50 grams per day prior to surgery and 60-80 grams of protein per day). At today's session, patient was educated on the post-op diet protocol. Patient understands the importance of keeping total fat and sugar less than 3 grams per serving. Patient is aware of the transition of the diet stages and is aware that they will be on clear liquids for 1 day pre-op, followed by modified full liquid diet for 2 weeks post-op. Patient understands the body needs ~ 60-70 grams of protein per day. During the liquid phase, patient will need a minimum of  60 grams of protein from shakes.   Once eating soft protein, patient will need 40-60 g protein from protein supplement shakes per day to meet goal of  g protein total intake/day. Patient is aware of the importance of the vitamins and protein drinks. We spent a lot of time talking about the vitamins. Patient understands the importance of being compliant with the diet protocol and the complications and risks that can occur if they are non-compliant with the nutritional protocol and non-compliant with the vitamins. Patient has also been educated on the pre-op liquid diet for 1 day. Patient understands that failure to comply in pre-op liquid diet could result in surgery being canceled. Patient's 2 week post-op nutrition virtual appointment has been scheduled. At this 2 week visit, RD will assess how patient is tolerating liquids and recommend to provider advancement of pts diet to soft pureed diet, if tolerating liquid protein diet without difficulty. Patient will also have a virtual appointment with RD again at 1 month post-op to assess tolerance of soft puree diet and advance to soft protein with soft vegetables, if soft pureed diet is tolerated. RD will assess patient's compliance with current protocol, including diet, vitamins, protein shakes, and exercise. Post-op diet guidelines will be reinforced. RD is available for questions and to meet with patient outside of the 2 week and 1 month post-op visit. RD contact information in pre-op binder was reviewed in class.     Mariano Mcbride MS, BLADIMIR/LD  5/2/2022

## 2022-05-02 NOTE — H&P (VIEW-ONLY)
Sleeve to Gastric Bypass Conversion - History and Physical    Subjective: The patient is a 27 y.o. obese female with a Body mass index is 36.11 kg/m². .   she presents now to review their work up to date to see if they are a candidate for surgery and whether or not to proceed with the previously requested procedure. She had a sleeve via this office in March 2018. Bariatric comorbidities continue to include:   Patient Active Problem List   Diagnosis Code    Controlled type 2 diabetes mellitus without complication, without long-term current use of insulin ([facility] [address] E11.9    Hyperlipemia E78.5    Gastroparesis K31.84    Asthma J45.909    IUD (intrauterine device) in place Z80.11    FH: ovarian cancer Z80.41    Intestinal malabsorption K90.9    S/P laparoscopic sleeve gastrectomy Z98.84    GERD (gastroesophageal reflux disease) K21.9    Elevated prolactin level R79.89    HSV-1 infection B00.9    Marijuana user F12.90    Status post laparoscopic sleeve gastrectomy Z98.84    Severe obesity (BMI 35.0-35.9 with comorbidity) (HCC) E66.01, Z68.35    Narcolepsy G47.419    Uses birth control Z78.9       They have been generally well prior to this visit and have had no recent significant illnesses. The patient has had no gastrointestinal issues that would preclude them from proceeding with the surgery they have chosen. Richa Mora has recently tried a preoperative weight loss program  in addition to seeing a bariatric nutritionist preoperatively. We have discussed on at least one other occasion about the various types of surgical weight loss procedures and they have considered these options after our initial consultation. We have once again discussed these procedures in detail and they have now decided on a surgical procedure. They present today to discuss this and confirm that their evaluation pre operatively is acceptable to continue with surgery.     The patient desires laparoscopic gastric bypass surgery for surgical weight loss. The patients goal weight is 177 lb. (this represents a BMI of 27)    These goals are consistent with expected outcomes of their desired operation. her Medical goals are resolution of these health issues. Patient Active Problem List    Diagnosis Date Noted    Uses birth control     Severe obesity (BMI 35.0-35.9 with comorbidity) ([facility] [address]     Narcolepsy     Marijuana user 2021    HSV-1 infection 12/15/2020    Elevated prolactin level 10/23/2018    GERD (gastroesophageal reflux disease) 2018    Intestinal malabsorption 2018    S/P laparoscopic sleeve gastrectomy 2018    Status post laparoscopic sleeve gastrectomy 2018    IUD (intrauterine device) in place 2018    FH: ovarian cancer 2018    Asthma     Controlled type 2 diabetes mellitus without complication, without long-term current use of insulin ([facility])     Hyperlipemia     Gastroparesis       Past Surgical History:   Procedure Laterality Date    HX BILATERAL SALPINGECTOMY      HX  SECTION      X 2    [place] LAP, SARA RESTRICT PROC, LONGITUDINAL GASTRECTOMY      3/2018      Social History     Tobacco Use    Smoking status: Never Smoker    Smokeless tobacco: Never Used   Substance Use Topics    Alcohol use: No      Family History   Problem Relation Age of Onset   Parada Migraines Mother     Seizures Mother     Other Father         graves ds,     Sickle Cell Trait Brother     Other Brother     Asthma Sister     Other Sister         autism    Other Daughter         cp, epilepsy    Asthma Daughter       Current Outpatient Medications   Medication Sig Dispense Refill    baclofen (LIORESAL) 10 mg tablet Take 10 mg by mouth three (3) times daily as needed.  bisacodyL (Dulcolax, bisacodyl,) 5 mg EC tablet as directed.  polyethylene glycol (MIRALAX) 17 gram/dose powder Take as a split dose as directed.       pantoprazole (PROTONIX) 40 mg tablet TAKE 1 TABLET BY MOUTH EVERY DAY 30 Tablet 2    PNV Comb #2-Iron-FA-Omega 3 29-1-400 mg cmpk Take  by mouth.  ergocalciferol (ERGOCALCIFEROL) 1,250 mcg (50,000 unit) capsule Take 1 Capsule by mouth two (2) times a week. 52 Capsule 1    modafiniL (PROVIGIL) 200 mg tablet TAKE 1 TABLET IN THE MORNING AND 1 IN THE AFTERNOON      dulaglutide (Trulicity) 7.13 NQ/4.5 mL sub-q pen 0.75 mg by SubCUTAneous route every seven (7) days.  ALPRAZolam (XANAX) 0.5 mg tablet Take 0.5 mg by mouth.  Blood-Glucose Meter monitoring kit 1 Units by Other route.  EPINEPHrine (EPIPEN) 0.3 mg/0.3 mL injection 0.3 mg by IntraMUSCular route.  glucose blood VI test strips (ASCENSIA AUTODISC VI, ONE TOUCH ULTRA TEST VI) strip 50 Each.  diclofenac (VOLTAREN) 1 % gel APPLY 2 GRAMS AA Q 8 H PRN P  0    Lancets misc 1 Each.  butalbital-acetaminophen-caffeine (FIORICET, ESGIC) -40 mg per tablet TAKE 1 TABLET BY MOUTH EVERY DAY AS NEEDED, MAY REPEAT IN 2 HOURS, MAX 2 PER DAY, 5 PER WEEK      levonorgestrel (MIRENA) 20 mcg/24 hr (5 years) IUD 1 Device.  calcium-cholecalciferol, d3, (CALCIUM 600 + D) 600-125 mg-unit tab Take  by mouth.  albuterol (PROVENTIL HFA, VENTOLIN HFA, PROAIR HFA) 90 mcg/actuation inhaler Take 2 Puffs by inhalation every four (4) hours as needed.  enoxaparin (LOVENOX) 40 mg/0.4 mL 0.4 mL by SubCUTAneous route every twelve (12) hours every twelve (12) hours for 10 days. Indications: deep vein thrombosis prevention (Patient not taking: Reported on 5/2/2022) 20 Each 0    ondansetron (ZOFRAN ODT) 8 mg disintegrating tablet Take 1 Tablet by mouth every eight (8) hours as needed for Nausea or Vomiting. (Patient not taking: Reported on 5/2/2022) 30 Tablet 0    Biotin 2,500 mcg cap Take  by mouth. (Patient not taking: Reported on 5/2/2022)      B.infantis-B.ani-B.long-B.bifi (PROBIOTIC 4X) 10-15 mg TbEC Take  by mouth.  (Patient not taking: Reported on 5/2/2022)       Allergies   Allergen Reactions    Latex Rash, Itching and Swelling    Doxycycline Swelling     Throat swelling    Mushroom Anaphylaxis     Other reaction(s): anaphylaxis/angioedema    Amoxicillin Rash    Dog Dander Hives     When patient went for allergy testing she tested positive for dogs and cats.     Erythromycin Rash    Lactose Nausea and Vomiting     intolerance  Other reaction(s): gi distress  intolerance    Metformin Seizures     seizure    Mold Other (comments)    Penicillins Rash and Itching     Other reaction(s): unknown    Rocephin [Ceftriaxone] Rash    Sulfa (Sulfonamide Antibiotics) Swelling, Itching and Unknown (comments)        Review of Systems:        General - No history or complaints of unexpected fever, chills, or weight loss  Head/Neck - No history or complaints of headache, diplopia, dysphagia, hearing loss  Cardiac - No history or complaints of chest pain, palpitations, murmur, or shortness of breath  Pulmonary - No history or complaints of shortness of breath, productive cough, hemoptysis  Gastrointestinal - (+) history of reflux, No history or complaints of abdominal pain, obstipation/constipation, blood per rectum  Genitourinary - No history or complaints of hematuria/dysuria, stress urinary incontinence symptoms, or renal lithiasis  Musculoskeletal - No history or complaints of joint pain or muscular weakness  Hematologic - No history or complaints of bleeding disorders, blood transfusions, sickle cell anemia  Neurologic - No history or complaints of  migraine headaches, seizure activity, syncopal episodes, TIA or stroke  Integumentary - No history or complaints of rashes, abnormal nevi, skin cancer  Gynecological - unremarkable    Objective:     Visit Vitals  /75   Pulse 86   Temp 97.3 °F (36.3 °C)   Resp 16   Ht 5' 8\" (1.727 m)   Wt 107.7 kg (237 lb 8 oz)   SpO2 97%   BMI 36.11 kg/m²     Physical Examination: General appearance - alert, well appearing, and in no distress  Mental status - alert, oriented to person, place, and time  Eyes - pupils equal and reactive, extraocular eye movements intact  Nose - normal and patent, no erythema, discharge or polyps  Mouth - mucous membranes moist, pharynx normal without lesions  Neck - supple, no significant adenopathy  Lymphatics - no palpable lymphadenopathy, no hepatosplenomegaly  Chest - clear to auscultation, no wheezes, rales or rhonchi, symmetric air entry  Heart - normal rate, regular rhythm, normal S1, S2, no murmurs, rubs, clicks or gallops  Abdomen - soft, nontender, nondistended, no masses or organomegaly  Back exam - full range of motion, no tenderness, palpable spasm or pain on motion  Neurological - alert, oriented, normal speech, no focal findings or movement disorder noted  Musculoskeletal - no joint tenderness, deformity or swelling  Extremities - peripheral pulses normal, no pedal edema, no clubbing or cyanosis  Skin - normal coloration and turgor, no rashes, no suspicious skin lesions noted    Labs / Preoperative Evaluations:     Recent Results (from the past 1008 hour(s))   CBC WITH AUTOMATED DIFF    Collection Time: 05/02/22  1:13 PM   Result Value Ref Range    WBC 9.1 4.6 - 13.2 K/uL    RBC 4.42 4.20 - 5.30 M/uL    HGB 13.7 12.0 - 16.0 g/dL    HCT 41.0 35.0 - 45.0 %    MCV 92.8 78.0 - 100.0 FL    MCH 31.0 24.0 - 34.0 PG    MCHC 33.4 31.0 - 37.0 g/dL    RDW 12.6 11.6 - 14.5 %    PLATELET 103 026 - 444 K/uL    MPV 9.2 9.2 - 11.8 FL    NRBC 0.0 0  WBC    ABSOLUTE NRBC 0.00 0.00 - 0.01 K/uL    NEUTROPHILS 58 40 - 73 %    LYMPHOCYTES 35 21 - 52 %    MONOCYTES 6 3 - 10 %    EOSINOPHILS 1 0 - 5 %    BASOPHILS 0 0 - 2 %    IMMATURE GRANULOCYTES 0 0.0 - 0.5 %    ABS. NEUTROPHILS 5.3 1.8 - 8.0 K/UL    ABS. LYMPHOCYTES 3.2 0.9 - 3.6 K/UL    ABS. MONOCYTES 0.6 0.05 - 1.2 K/UL    ABS. EOSINOPHILS 0.1 0.0 - 0.4 K/UL    ABS. BASOPHILS 0.0 0.0 - 0.1 K/UL    ABS. IMM.  GRANS. 0.0 0.00 - 0.04 K/UL    DF AUTOMATED     HCG QL SERUM    Collection Time: 05/02/22  1:13 PM   Result Value Ref Range    HCG, Ql. Negative NEG     METABOLIC PANEL, COMPREHENSIVE    Collection Time: 05/02/22  1:13 PM   Result Value Ref Range    Sodium 138 136 - 145 mmol/L    Potassium 3.7 3.5 - 5.5 mmol/L    Chloride 106 100 - 111 mmol/L    CO2 28 21 - 32 mmol/L    Anion gap 4 3.0 - 18 mmol/L    Glucose 166 (H) 74 - 99 mg/dL    BUN 14 7.0 - 18 MG/DL    Creatinine 0.79 0.6 - 1.3 MG/DL    BUN/Creatinine ratio 18 12 - 20      GFR est AA >60 >60 ml/min/1.73m2    GFR est non-AA >60 >60 ml/min/1.73m2    Calcium 9.2 8.5 - 10.1 MG/DL    Bilirubin, total 0.4 0.2 - 1.0 MG/DL    ALT (SGPT) 16 13 - 56 U/L    AST (SGOT) 9 (L) 10 - 38 U/L    Alk. phosphatase 106 45 - 117 U/L    Protein, total 7.7 6.4 - 8.2 g/dL    Albumin 3.6 3.4 - 5.0 g/dL    Globulin 4.1 (H) 2.0 - 4.0 g/dL    A-G Ratio 0.9 0.8 - 1.7         Assessment:     Morbid obesity with associated comorbidity    Plan:     laparoscopic gastric bypass surgery    This is a 27 y.o. female with a BMI of Body mass index is 36.11 kg/m². and the weight-related co-morbidties as noted above. Aminah Andre meets the NIH criteria for bariatric surgery based upon the BMI of Body mass index is 36.11 kg/m². and multiple weight-related co-morbidties. Aminah Andre has elected laparoscopic gastric bypass as her intervention of choice for treatment of morbid obestiy through surgical means secondary to its uniform results,  profound baseline suppression of hunger and pace at which weight is lost.    In the office today, following Jonelle's history and physical examination, a 40 minute discussion regarding the anatomic alterations for the laparoscopic gastric bypass  was undertaken. The dietary expectations and the patient  dependent factors for success were thoroughly discussed, to include the need for interval follow-up and long-term dietary changes associated with success.  The possible short and long term  complications of the gastric bypass were also discussed, to include but not limited to;death, DVT/PE, staple line leak, bleeding, stricture formation, infection,internal hernia  and pouch dilation. Specific weight related outcomes for success were also discussed with an emphasis on careful and close follow-up with the first year and Dietary behavior modification over the first years as baseline cyclical hunger returns  The patient expressed an understanding of the above factors, and her questions were answered in their entirety. In addition, the patient attended a 1.5 hour power point seminar regarding obesity, surgical weight loss including, adjustable gastric band, gastric bypass, and sleeve gastrectomy. This discussion contrasted the different surgical techniques, mechanisms of actions and expected outcomes, and surgical and medical risks associated with each procedure. During this seminar, there was a long question and answer session where each questions was answered until there were no additional questions. Today, the patient had all of her questions answered and the decision was made today that the patient's preoperative evaluation is acceptable for them  to proceed with bariatric surgery  choosing adjustable gastric bypass as her surgical option. The patient understands the plan of action    There has been no exposure to nicotine in any form    They were not asked to obtain any special clearances prior to proceeding to surgery    Her post-op meds have been sent to her pharmacy     Secondary Diagnoses:     DVT / Pulmonary Embolus Risk - The patient is at a higher risk for post operative DVT / pulmonary embolus secondary to their morbid obese status, relative sedentary lifestyle, and impending general anesthetic. We will plan to use anticoagulation therapy pre and post operative as well as  pneumatic compression devices and encourage ambulation once on the hospital nursing floor.   The need for possible at home anticoagulation therapy has also been discussed and any decision on this matter will be made during post operative evaluations. The patient understands that their efforts at ambulation are of vital importance to reduce the risk of this complication thus placing significant burden on them as to the prevention of such issues. Signs and symptoms of DVT / PE have been discussed with the patient and they have been instructed to call the office if any these occur in the \"at home\" post op phase    Adult Onset Diabetes - The patient has paul given a very low carbohydrate diet preoperatively along with instructions to monitor their blood sugars on a regular daily basis. When  their surgery is performed  we will be monitoring the patient with sliding scale insulin and accuchecks.  Based on those values we will determine whether the patient needs a reduction of those medications postoperatively or total removal of those medications on discharge.  We will have the patient continue accuchecks postoperatively while at home also and report to me or their family physician for appropriate adjustments as needed.  The patient also understands that in the event of uncontrolled blood sugar preoperatively that we may choose to postpone their surgery. Hypertension - The patient has a clear understanding of how weight loss improves hypertension as a whole, but also they understand that there is a significant genetic component to this disease process. We will monitor the patients blood pressure while in the hospital and the plan would be to continue those medications postoperatively.  If a diuretic is being used we will stop them on discharge to prevent dehydration particularly with the sleeve gastrectomy and the gastric bypass procedures.  They will be instructed to monitor their blood pressure postoperatively while at home and notify their primary care physician in the event of any significantly high or uncharacteristic readings.     Hyperlipidemia - The patient understands that studies show that almost all patient will realize an improvement in their lipid profile with weight loss that occurs with these procedures. They however also understand that hyperlipidemia is a multifactorial disease particularly as it pertains to their genetic background and that there is no guarantee toward cure  of this issue. We will resume their medications immediately postoperatively as this tends to decrease any post operative cardiac events.  The patient will follow up with their family physician in the postoperative period with plans to repeat their lipid panel 2-3 month postoperative for potential adjustment or removal of these medications.     Signed By: Anastasia Goodwin MD     May 2, 2022

## 2022-05-04 ENCOUNTER — HOSPITAL ENCOUNTER (OUTPATIENT)
Dept: PREADMISSION TESTING | Age: 32
Discharge: HOME | End: 2022-05-04

## 2022-05-04 VITALS — BODY MASS INDEX: 35.01 KG/M2 | HEIGHT: 68 IN | WEIGHT: 231 LBS

## 2022-05-04 LAB
EST. AVERAGE GLUCOSE BLD GHB EST-MCNC: 174 MG/DL
HBA1C MFR BLD: 7.7 % (ref 4.2–5.6)

## 2022-05-04 RX ORDER — ENOXAPARIN SODIUM 100 MG/ML
40 INJECTION SUBCUTANEOUS ONCE
Status: CANCELLED | OUTPATIENT
Start: 2022-05-17 | End: 2022-05-17

## 2022-05-04 RX ORDER — CIPROFLOXACIN 2 MG/ML
400 INJECTION, SOLUTION INTRAVENOUS ONCE
Status: CANCELLED | OUTPATIENT
Start: 2022-05-17 | End: 2022-05-17

## 2022-05-04 RX ORDER — SODIUM CHLORIDE, SODIUM LACTATE, POTASSIUM CHLORIDE, CALCIUM CHLORIDE 600; 310; 30; 20 MG/100ML; MG/100ML; MG/100ML; MG/100ML
125 INJECTION, SOLUTION INTRAVENOUS CONTINUOUS
Status: CANCELLED | OUTPATIENT
Start: 2022-05-17

## 2022-05-04 RX ORDER — ACETAMINOPHEN 500 MG
1000 TABLET ORAL ONCE
Status: CANCELLED | OUTPATIENT
Start: 2022-05-17 | End: 2022-05-17

## 2022-05-04 RX ORDER — FAMOTIDINE 20 MG/50ML
20 INJECTION, SOLUTION INTRAVENOUS ONCE
Status: CANCELLED | OUTPATIENT
Start: 2022-05-17 | End: 2022-05-17

## 2022-05-04 RX ORDER — NYSTATIN 100000 [USP'U]/ML
500000 SUSPENSION ORAL
Status: CANCELLED | OUTPATIENT
Start: 2022-05-17 | End: 2022-05-18

## 2022-05-16 ENCOUNTER — ANESTHESIA EVENT (OUTPATIENT)
Dept: SURGERY | Age: 32
DRG: 403 | End: 2022-05-16
Payer: MEDICAID

## 2022-05-17 ENCOUNTER — ANESTHESIA (OUTPATIENT)
Dept: SURGERY | Age: 32
DRG: 403 | End: 2022-05-17
Payer: MEDICAID

## 2022-05-17 ENCOUNTER — HOSPITAL ENCOUNTER (INPATIENT)
Age: 32
LOS: 1 days | Discharge: HOME | DRG: 403 | End: 2022-05-18
Attending: SPECIALIST | Admitting: SPECIALIST
Payer: MEDICAID

## 2022-05-17 DIAGNOSIS — E66.01 SEVERE OBESITY WITH BODY MASS INDEX (BMI) OF 35.0 TO 35.9 AND COMORBIDITY (HCC): ICD-10-CM

## 2022-05-17 DIAGNOSIS — K21.9 GASTROESOPHAGEAL REFLUX DISEASE, UNSPECIFIED WHETHER ESOPHAGITIS PRESENT: ICD-10-CM

## 2022-05-17 LAB
ABO + RH BLD: NORMAL
BLOOD GROUP ANTIBODIES SERPL: NORMAL
GLUCOSE BLD STRIP.AUTO-MCNC: 140 MG/DL (ref 70–110)
GLUCOSE BLD STRIP.AUTO-MCNC: 165 MG/DL (ref 70–110)
GLUCOSE BLD STRIP.AUTO-MCNC: 211 MG/DL (ref 70–110)
GLUCOSE BLD STRIP.AUTO-MCNC: 224 MG/DL (ref 70–110)
HCG UR QL: NEGATIVE
SPECIMEN EXP DATE BLD: NORMAL

## 2022-05-17 PROCEDURE — 77030009851 HC PCH RTVR ENDOSC AMR -B: Performed by: SPECIALIST

## 2022-05-17 PROCEDURE — 77030002901 HC SUT DEV MCLOSE MPSA - B: Performed by: SPECIALIST

## 2022-05-17 PROCEDURE — 74011250636 HC RX REV CODE- 250/636: Performed by: SPECIALIST

## 2022-05-17 PROCEDURE — 77030041523 HC SEALNT FIBRN VITASEAL J&J -E: Performed by: SPECIALIST

## 2022-05-17 PROCEDURE — 77030040361 HC SLV COMPR DVT MDII -B: Performed by: SPECIALIST

## 2022-05-17 PROCEDURE — 77030031139 HC SUT VCRL2 J&J -A: Performed by: SPECIALIST

## 2022-05-17 PROCEDURE — 77030003580 HC NDL INSUF VERES J&J -B: Performed by: SPECIALIST

## 2022-05-17 PROCEDURE — 77030020407 HC IV BLD WRMR ST 3M -A: Performed by: ANESTHESIOLOGY

## 2022-05-17 PROCEDURE — 77030008574 HC TBNG SUC IRR STRY -B: Performed by: SPECIALIST

## 2022-05-17 PROCEDURE — 47379 UNLISTED LAPS PX LIVER: CPT | Performed by: SPECIALIST

## 2022-05-17 PROCEDURE — 88307 TISSUE EXAM BY PATHOLOGIST: CPT

## 2022-05-17 PROCEDURE — 77030002912 HC SUT ETHBND J&J -A: Performed by: SPECIALIST

## 2022-05-17 PROCEDURE — 88305 TISSUE EXAM BY PATHOLOGIST: CPT

## 2022-05-17 PROCEDURE — 77030002966 HC SUT PDS J&J -A: Performed by: SPECIALIST

## 2022-05-17 PROCEDURE — 77030002896 HC SUT CLP ABSRB J&J -B: Performed by: SPECIALIST

## 2022-05-17 PROCEDURE — 77030008602 HC TRCR ENDOSC EPATH J&J -B: Performed by: SPECIALIST

## 2022-05-17 PROCEDURE — 0DNW4ZZ RELEASE PERITONEUM, PERCUTANEOUS ENDOSCOPIC APPROACH: ICD-10-PCS | Performed by: SPECIALIST

## 2022-05-17 PROCEDURE — 74011250636 HC RX REV CODE- 250/636: Performed by: ANESTHESIOLOGY

## 2022-05-17 PROCEDURE — 43644 LAP GASTRIC BYPASS/ROUX-EN-Y: CPT | Performed by: SPECIALIST

## 2022-05-17 PROCEDURE — 77030009426 HC FCPS BIOP ENDOSC BSC -B: Performed by: SPECIALIST

## 2022-05-17 PROCEDURE — 77030009967 HC RELD STPLR ENDOSC J&J -C: Performed by: SPECIALIST

## 2022-05-17 PROCEDURE — 77030027138 HC INCENT SPIROMETER -A: Performed by: SPECIALIST

## 2022-05-17 PROCEDURE — 76210000016 HC OR PH I REC 1 TO 1.5 HR: Performed by: SPECIALIST

## 2022-05-17 PROCEDURE — 77030008603 HC TRCR ENDOSC EPATH J&J -C: Performed by: SPECIALIST

## 2022-05-17 PROCEDURE — 74011636637 HC RX REV CODE- 636/637: Performed by: SPECIALIST

## 2022-05-17 PROCEDURE — 77010033678 HC OXYGEN DAILY

## 2022-05-17 PROCEDURE — 77030012893: Performed by: SPECIALIST

## 2022-05-17 PROCEDURE — 65270000029 HC RM PRIVATE

## 2022-05-17 PROCEDURE — 77030034154 HC SHR COAG HARM ACE J&J -F: Performed by: SPECIALIST

## 2022-05-17 PROCEDURE — 0D1647A BYPASS STOMACH TO JEJUNUM WITH AUTOLOGOUS TISSUE SUBSTITUTE, PERCUTANEOUS ENDOSCOPIC APPROACH: ICD-10-PCS | Performed by: SPECIALIST

## 2022-05-17 PROCEDURE — 74011636637 HC RX REV CODE- 636/637: Performed by: ANESTHESIOLOGY

## 2022-05-17 PROCEDURE — 77030039961 HC KT CUST COLON BSC -D: Performed by: SPECIALIST

## 2022-05-17 PROCEDURE — 0FB24ZX EXCISION OF LEFT LOBE LIVER, PERCUTANEOUS ENDOSCOPIC APPROACH, DIAGNOSTIC: ICD-10-PCS | Performed by: SPECIALIST

## 2022-05-17 PROCEDURE — 77030006643: Performed by: ANESTHESIOLOGY

## 2022-05-17 PROCEDURE — 77030014008 HC SPNG HEMSTAT J&J -C: Performed by: SPECIALIST

## 2022-05-17 PROCEDURE — 76060000036 HC ANESTHESIA 2.5 TO 3 HR: Performed by: SPECIALIST

## 2022-05-17 PROCEDURE — 2709999900 HC NON-CHARGEABLE SUPPLY: Performed by: SPECIALIST

## 2022-05-17 PROCEDURE — 74011250637 HC RX REV CODE- 250/637: Performed by: SPECIALIST

## 2022-05-17 PROCEDURE — 76010000132 HC OR TIME 2.5 TO 3 HR: Performed by: SPECIALIST

## 2022-05-17 PROCEDURE — 77030002986 HC SUT PROL J&J -A: Performed by: SPECIALIST

## 2022-05-17 PROCEDURE — 77030041460 HC APL VISTASEAL J&J -B: Performed by: SPECIALIST

## 2022-05-17 PROCEDURE — 77030036732 HC RELD STPLR VASC J&J -F: Performed by: SPECIALIST

## 2022-05-17 PROCEDURE — 77030005513 HC CATH URETH FOL11 MDII -B: Performed by: SPECIALIST

## 2022-05-17 PROCEDURE — 77030011810 HC STPLR ENDOSC J&J -G: Performed by: SPECIALIST

## 2022-05-17 PROCEDURE — 74011000250 HC RX REV CODE- 250: Performed by: SPECIALIST

## 2022-05-17 PROCEDURE — 88313 SPECIAL STAINS GROUP 2: CPT

## 2022-05-17 PROCEDURE — 77030009968 HC RELD STPLR ENDOSC J&J -D: Performed by: SPECIALIST

## 2022-05-17 PROCEDURE — 0DB64ZZ EXCISION OF STOMACH, PERCUTANEOUS ENDOSCOPIC APPROACH: ICD-10-PCS | Performed by: SPECIALIST

## 2022-05-17 PROCEDURE — 36415 COLL VENOUS BLD VENIPUNCTURE: CPT

## 2022-05-17 PROCEDURE — 77030008477 HC STYL SATN SLP COVD -A: Performed by: ANESTHESIOLOGY

## 2022-05-17 PROCEDURE — 77030010030: Performed by: SPECIALIST

## 2022-05-17 PROCEDURE — 77030020782 HC GWN BAIR PAWS FLX 3M -B: Performed by: SPECIALIST

## 2022-05-17 PROCEDURE — 74011250637 HC RX REV CODE- 250/637: Performed by: ANESTHESIOLOGY

## 2022-05-17 PROCEDURE — 81025 URINE PREGNANCY TEST: CPT

## 2022-05-17 PROCEDURE — 77030016151 HC PROTCTR LNS DFOG COVD -B: Performed by: SPECIALIST

## 2022-05-17 PROCEDURE — 77030008518 HC TBNG INSUF ENDO STRY -B: Performed by: SPECIALIST

## 2022-05-17 PROCEDURE — 77030008683 HC TU ET CUF COVD -A: Performed by: ANESTHESIOLOGY

## 2022-05-17 PROCEDURE — 77030002933 HC SUT MCRYL J&J -A: Performed by: SPECIALIST

## 2022-05-17 PROCEDURE — 74011000250 HC RX REV CODE- 250: Performed by: ANESTHESIOLOGY

## 2022-05-17 PROCEDURE — 77030002916 HC SUT ETHLN J&J -A: Performed by: SPECIALIST

## 2022-05-17 PROCEDURE — 0DB78ZX EXCISION OF STOMACH, PYLORUS, VIA NATURAL OR ARTIFICIAL OPENING ENDOSCOPIC, DIAGNOSTIC: ICD-10-PCS | Performed by: SPECIALIST

## 2022-05-17 PROCEDURE — 82962 GLUCOSE BLOOD TEST: CPT

## 2022-05-17 PROCEDURE — 77030040506 HC DRN WND MDII -A: Performed by: SPECIALIST

## 2022-05-17 PROCEDURE — 86900 BLOOD TYPING SEROLOGIC ABO: CPT

## 2022-05-17 PROCEDURE — 77030010515 HC APPL ENDOCLP LIG J&J -B: Performed by: SPECIALIST

## 2022-05-17 RX ORDER — HYDROMORPHONE HYDROCHLORIDE 1 MG/ML
0.5 INJECTION, SOLUTION INTRAMUSCULAR; INTRAVENOUS; SUBCUTANEOUS
Status: DISCONTINUED | OUTPATIENT
Start: 2022-05-17 | End: 2022-05-17 | Stop reason: HOSPADM

## 2022-05-17 RX ORDER — ACETAMINOPHEN 500 MG
1000 TABLET ORAL ONCE
Status: COMPLETED | OUTPATIENT
Start: 2022-05-17 | End: 2022-05-17

## 2022-05-17 RX ORDER — ENOXAPARIN SODIUM 100 MG/ML
40 INJECTION SUBCUTANEOUS ONCE
Status: COMPLETED | OUTPATIENT
Start: 2022-05-17 | End: 2022-05-17

## 2022-05-17 RX ORDER — SODIUM CHLORIDE 0.9 % (FLUSH) 0.9 %
5-40 SYRINGE (ML) INJECTION EVERY 8 HOURS
Status: DISCONTINUED | OUTPATIENT
Start: 2022-05-17 | End: 2022-05-17 | Stop reason: HOSPADM

## 2022-05-17 RX ORDER — HYDROMORPHONE HYDROCHLORIDE 2 MG/ML
INJECTION, SOLUTION INTRAMUSCULAR; INTRAVENOUS; SUBCUTANEOUS AS NEEDED
Status: DISCONTINUED | OUTPATIENT
Start: 2022-05-17 | End: 2022-05-17 | Stop reason: HOSPADM

## 2022-05-17 RX ORDER — ROCURONIUM BROMIDE 10 MG/ML
INJECTION, SOLUTION INTRAVENOUS AS NEEDED
Status: DISCONTINUED | OUTPATIENT
Start: 2022-05-17 | End: 2022-05-17 | Stop reason: HOSPADM

## 2022-05-17 RX ORDER — BUPIVACAINE HYDROCHLORIDE 2.5 MG/ML
INJECTION, SOLUTION EPIDURAL; INFILTRATION; INTRACAUDAL AS NEEDED
Status: DISCONTINUED | OUTPATIENT
Start: 2022-05-17 | End: 2022-05-17 | Stop reason: HOSPADM

## 2022-05-17 RX ORDER — SODIUM CHLORIDE, SODIUM LACTATE, POTASSIUM CHLORIDE, CALCIUM CHLORIDE 600; 310; 30; 20 MG/100ML; MG/100ML; MG/100ML; MG/100ML
1000 INJECTION, SOLUTION INTRAVENOUS CONTINUOUS
Status: DISCONTINUED | OUTPATIENT
Start: 2022-05-17 | End: 2022-05-17 | Stop reason: HOSPADM

## 2022-05-17 RX ORDER — ALBUTEROL SULFATE 90 UG/1
AEROSOL, METERED RESPIRATORY (INHALATION) AS NEEDED
Status: DISCONTINUED | OUTPATIENT
Start: 2022-05-17 | End: 2022-05-17 | Stop reason: HOSPADM

## 2022-05-17 RX ORDER — SODIUM CHLORIDE 0.9 % (FLUSH) 0.9 %
5-40 SYRINGE (ML) INJECTION AS NEEDED
Status: DISCONTINUED | OUTPATIENT
Start: 2022-05-17 | End: 2022-05-17 | Stop reason: HOSPADM

## 2022-05-17 RX ORDER — MIDAZOLAM HYDROCHLORIDE 1 MG/ML
INJECTION, SOLUTION INTRAMUSCULAR; INTRAVENOUS AS NEEDED
Status: DISCONTINUED | OUTPATIENT
Start: 2022-05-17 | End: 2022-05-17 | Stop reason: HOSPADM

## 2022-05-17 RX ORDER — NALOXONE HYDROCHLORIDE 0.4 MG/ML
0.1 INJECTION, SOLUTION INTRAMUSCULAR; INTRAVENOUS; SUBCUTANEOUS AS NEEDED
Status: DISCONTINUED | OUTPATIENT
Start: 2022-05-17 | End: 2022-05-17 | Stop reason: HOSPADM

## 2022-05-17 RX ORDER — ENOXAPARIN SODIUM 100 MG/ML
40 INJECTION SUBCUTANEOUS EVERY 12 HOURS
Status: DISCONTINUED | OUTPATIENT
Start: 2022-05-17 | End: 2022-05-18 | Stop reason: HOSPADM

## 2022-05-17 RX ORDER — INSULIN LISPRO 100 [IU]/ML
INJECTION, SOLUTION INTRAVENOUS; SUBCUTANEOUS EVERY 6 HOURS
Status: DISCONTINUED | OUTPATIENT
Start: 2022-05-17 | End: 2022-05-18 | Stop reason: HOSPADM

## 2022-05-17 RX ORDER — CIPROFLOXACIN 2 MG/ML
400 INJECTION, SOLUTION INTRAVENOUS ONCE
Status: COMPLETED | OUTPATIENT
Start: 2022-05-17 | End: 2022-05-17

## 2022-05-17 RX ORDER — FAMOTIDINE 20 MG/50ML
20 INJECTION, SOLUTION INTRAVENOUS ONCE
Status: DISCONTINUED | OUTPATIENT
Start: 2022-05-17 | End: 2022-05-17

## 2022-05-17 RX ORDER — MORPHINE SULFATE 10 MG/ML
6 INJECTION, SOLUTION INTRAMUSCULAR; INTRAVENOUS
Status: DISCONTINUED | OUTPATIENT
Start: 2022-05-17 | End: 2022-05-18

## 2022-05-17 RX ORDER — LIDOCAINE HYDROCHLORIDE 20 MG/ML
INJECTION, SOLUTION EPIDURAL; INFILTRATION; INTRACAUDAL; PERINEURAL AS NEEDED
Status: DISCONTINUED | OUTPATIENT
Start: 2022-05-17 | End: 2022-05-17 | Stop reason: HOSPADM

## 2022-05-17 RX ORDER — DIPHENHYDRAMINE HYDROCHLORIDE 50 MG/ML
25 INJECTION, SOLUTION INTRAMUSCULAR; INTRAVENOUS
Status: DISCONTINUED | OUTPATIENT
Start: 2022-05-17 | End: 2022-05-18 | Stop reason: HOSPADM

## 2022-05-17 RX ORDER — ONDANSETRON 2 MG/ML
4 INJECTION INTRAMUSCULAR; INTRAVENOUS ONCE
Status: COMPLETED | OUTPATIENT
Start: 2022-05-17 | End: 2022-05-17

## 2022-05-17 RX ORDER — PROPOFOL 10 MG/ML
INJECTION, EMULSION INTRAVENOUS AS NEEDED
Status: DISCONTINUED | OUTPATIENT
Start: 2022-05-17 | End: 2022-05-17 | Stop reason: HOSPADM

## 2022-05-17 RX ORDER — FENTANYL CITRATE 50 UG/ML
25 INJECTION, SOLUTION INTRAMUSCULAR; INTRAVENOUS AS NEEDED
Status: DISCONTINUED | OUTPATIENT
Start: 2022-05-17 | End: 2022-05-17 | Stop reason: HOSPADM

## 2022-05-17 RX ORDER — FLUMAZENIL 0.1 MG/ML
0.2 INJECTION INTRAVENOUS
Status: DISCONTINUED | OUTPATIENT
Start: 2022-05-17 | End: 2022-05-17 | Stop reason: HOSPADM

## 2022-05-17 RX ORDER — FENTANYL CITRATE 50 UG/ML
INJECTION, SOLUTION INTRAMUSCULAR; INTRAVENOUS AS NEEDED
Status: DISCONTINUED | OUTPATIENT
Start: 2022-05-17 | End: 2022-05-17 | Stop reason: HOSPADM

## 2022-05-17 RX ORDER — NYSTATIN 100000 [USP'U]/ML
500000 SUSPENSION ORAL
Status: COMPLETED | OUTPATIENT
Start: 2022-05-17 | End: 2022-05-17

## 2022-05-17 RX ORDER — INSULIN LISPRO 100 [IU]/ML
INJECTION, SOLUTION INTRAVENOUS; SUBCUTANEOUS ONCE
Status: COMPLETED | OUTPATIENT
Start: 2022-05-17 | End: 2022-05-17

## 2022-05-17 RX ORDER — DEXTROSE MONOHYDRATE 100 MG/ML
0-250 INJECTION, SOLUTION INTRAVENOUS AS NEEDED
Status: DISCONTINUED | OUTPATIENT
Start: 2022-05-17 | End: 2022-05-17 | Stop reason: HOSPADM

## 2022-05-17 RX ORDER — GLYCOPYRROLATE 0.2 MG/ML
INJECTION INTRAMUSCULAR; INTRAVENOUS AS NEEDED
Status: DISCONTINUED | OUTPATIENT
Start: 2022-05-17 | End: 2022-05-17 | Stop reason: HOSPADM

## 2022-05-17 RX ORDER — CIPROFLOXACIN 2 MG/ML
400 INJECTION, SOLUTION INTRAVENOUS EVERY 12 HOURS
Status: COMPLETED | OUTPATIENT
Start: 2022-05-17 | End: 2022-05-17

## 2022-05-17 RX ORDER — KETAMINE HYDROCHLORIDE 10 MG/ML
INJECTION, SOLUTION INTRAMUSCULAR; INTRAVENOUS AS NEEDED
Status: DISCONTINUED | OUTPATIENT
Start: 2022-05-17 | End: 2022-05-17 | Stop reason: HOSPADM

## 2022-05-17 RX ORDER — FAMOTIDINE 10 MG/ML
20 INJECTION INTRAVENOUS ONCE
Status: COMPLETED | OUTPATIENT
Start: 2022-05-17 | End: 2022-05-17

## 2022-05-17 RX ORDER — KETOROLAC TROMETHAMINE 30 MG/ML
15 INJECTION, SOLUTION INTRAMUSCULAR; INTRAVENOUS EVERY 6 HOURS
Status: DISCONTINUED | OUTPATIENT
Start: 2022-05-17 | End: 2022-05-18 | Stop reason: HOSPADM

## 2022-05-17 RX ORDER — NALOXONE HYDROCHLORIDE 0.4 MG/ML
0.4 INJECTION, SOLUTION INTRAMUSCULAR; INTRAVENOUS; SUBCUTANEOUS AS NEEDED
Status: DISCONTINUED | OUTPATIENT
Start: 2022-05-17 | End: 2022-05-18 | Stop reason: HOSPADM

## 2022-05-17 RX ORDER — MAGNESIUM SULFATE 100 %
4 CRYSTALS MISCELLANEOUS AS NEEDED
Status: DISCONTINUED | OUTPATIENT
Start: 2022-05-17 | End: 2022-05-17 | Stop reason: HOSPADM

## 2022-05-17 RX ORDER — METOCLOPRAMIDE HYDROCHLORIDE 5 MG/ML
10 INJECTION INTRAMUSCULAR; INTRAVENOUS EVERY 6 HOURS
Status: DISCONTINUED | OUTPATIENT
Start: 2022-05-17 | End: 2022-05-18 | Stop reason: HOSPADM

## 2022-05-17 RX ORDER — SODIUM CHLORIDE, SODIUM LACTATE, POTASSIUM CHLORIDE, CALCIUM CHLORIDE 600; 310; 30; 20 MG/100ML; MG/100ML; MG/100ML; MG/100ML
150 INJECTION, SOLUTION INTRAVENOUS CONTINUOUS
Status: DISCONTINUED | OUTPATIENT
Start: 2022-05-17 | End: 2022-05-18 | Stop reason: HOSPADM

## 2022-05-17 RX ORDER — ONDANSETRON 2 MG/ML
INJECTION INTRAMUSCULAR; INTRAVENOUS AS NEEDED
Status: DISCONTINUED | OUTPATIENT
Start: 2022-05-17 | End: 2022-05-17 | Stop reason: HOSPADM

## 2022-05-17 RX ORDER — SODIUM CHLORIDE, SODIUM LACTATE, POTASSIUM CHLORIDE, CALCIUM CHLORIDE 600; 310; 30; 20 MG/100ML; MG/100ML; MG/100ML; MG/100ML
125 INJECTION, SOLUTION INTRAVENOUS CONTINUOUS
Status: DISCONTINUED | OUTPATIENT
Start: 2022-05-17 | End: 2022-05-17

## 2022-05-17 RX ORDER — METOCLOPRAMIDE HYDROCHLORIDE 5 MG/ML
INJECTION INTRAMUSCULAR; INTRAVENOUS AS NEEDED
Status: DISCONTINUED | OUTPATIENT
Start: 2022-05-17 | End: 2022-05-17 | Stop reason: HOSPADM

## 2022-05-17 RX ORDER — SUCCINYLCHOLINE CHLORIDE 100 MG/5ML
SYRINGE (ML) INTRAVENOUS AS NEEDED
Status: DISCONTINUED | OUTPATIENT
Start: 2022-05-17 | End: 2022-05-17 | Stop reason: HOSPADM

## 2022-05-17 RX ORDER — ONDANSETRON 2 MG/ML
4 INJECTION INTRAMUSCULAR; INTRAVENOUS
Status: DISCONTINUED | OUTPATIENT
Start: 2022-05-17 | End: 2022-05-18 | Stop reason: HOSPADM

## 2022-05-17 RX ADMIN — INSULIN LISPRO 6 UNITS: 100 INJECTION, SOLUTION INTRAVENOUS; SUBCUTANEOUS at 09:56

## 2022-05-17 RX ADMIN — KETOROLAC TROMETHAMINE 15 MG: 30 INJECTION, SOLUTION INTRAMUSCULAR at 18:15

## 2022-05-17 RX ADMIN — METOCLOPRAMIDE 10 MG: 5 INJECTION, SOLUTION INTRAMUSCULAR; INTRAVENOUS at 09:12

## 2022-05-17 RX ADMIN — Medication 2 UNITS: at 18:00

## 2022-05-17 RX ADMIN — HYDROMORPHONE HYDROCHLORIDE 0.25 MG: 2 INJECTION, SOLUTION INTRAMUSCULAR; INTRAVENOUS; SUBCUTANEOUS at 08:23

## 2022-05-17 RX ADMIN — FENTANYL CITRATE 25 MCG: 50 INJECTION, SOLUTION INTRAMUSCULAR; INTRAVENOUS at 09:57

## 2022-05-17 RX ADMIN — KETAMINE HYDROCHLORIDE 10 MG: 10 INJECTION, SOLUTION INTRAMUSCULAR; INTRAVENOUS at 07:44

## 2022-05-17 RX ADMIN — MORPHINE SULFATE 6 MG: 10 INJECTION INTRAVENOUS at 15:24

## 2022-05-17 RX ADMIN — ROCURONIUM BROMIDE 30 MG: 10 INJECTION, SOLUTION INTRAVENOUS at 07:27

## 2022-05-17 RX ADMIN — HYDROMORPHONE HYDROCHLORIDE 0.25 MG: 2 INJECTION, SOLUTION INTRAMUSCULAR; INTRAVENOUS; SUBCUTANEOUS at 08:07

## 2022-05-17 RX ADMIN — ENOXAPARIN SODIUM 40 MG: 100 INJECTION SUBCUTANEOUS at 18:16

## 2022-05-17 RX ADMIN — MORPHINE SULFATE 6 MG: 10 INJECTION INTRAVENOUS at 21:23

## 2022-05-17 RX ADMIN — METOCLOPRAMIDE HYDROCHLORIDE 10 MG: 5 INJECTION INTRAMUSCULAR; INTRAVENOUS at 12:15

## 2022-05-17 RX ADMIN — ONDANSETRON 4 MG: 2 INJECTION INTRAMUSCULAR; INTRAVENOUS at 15:28

## 2022-05-17 RX ADMIN — ROCURONIUM BROMIDE 10 MG: 10 INJECTION, SOLUTION INTRAVENOUS at 07:03

## 2022-05-17 RX ADMIN — KETAMINE HYDROCHLORIDE 20 MG: 10 INJECTION, SOLUTION INTRAMUSCULAR; INTRAVENOUS at 07:36

## 2022-05-17 RX ADMIN — HYDROMORPHONE HYDROCHLORIDE 0.5 MG: 1 INJECTION, SOLUTION INTRAMUSCULAR; INTRAVENOUS; SUBCUTANEOUS at 10:34

## 2022-05-17 RX ADMIN — CIPROFLOXACIN 400 MG: 2 INJECTION, SOLUTION INTRAVENOUS at 18:14

## 2022-05-17 RX ADMIN — ROCURONIUM BROMIDE 10 MG: 10 INJECTION, SOLUTION INTRAVENOUS at 08:33

## 2022-05-17 RX ADMIN — CIPROFLOXACIN 400 MG: 2 INJECTION, SOLUTION INTRAVENOUS at 07:07

## 2022-05-17 RX ADMIN — GLYCOPYRROLATE 0.2 MG: 0.2 INJECTION INTRAMUSCULAR; INTRAVENOUS at 06:56

## 2022-05-17 RX ADMIN — SUGAMMADEX 213 MG: 100 INJECTION, SOLUTION INTRAVENOUS at 09:20

## 2022-05-17 RX ADMIN — Medication 140 MG: at 07:04

## 2022-05-17 RX ADMIN — LIDOCAINE HYDROCHLORIDE 80 MG: 20 INJECTION, SOLUTION INTRAVENOUS at 07:04

## 2022-05-17 RX ADMIN — SODIUM CHLORIDE, SODIUM LACTATE, POTASSIUM CHLORIDE, AND CALCIUM CHLORIDE 125 ML/HR: 600; 310; 30; 20 INJECTION, SOLUTION INTRAVENOUS at 06:33

## 2022-05-17 RX ADMIN — KETOROLAC TROMETHAMINE 15 MG: 30 INJECTION, SOLUTION INTRAMUSCULAR at 12:14

## 2022-05-17 RX ADMIN — ENOXAPARIN SODIUM 40 MG: 100 INJECTION SUBCUTANEOUS at 06:27

## 2022-05-17 RX ADMIN — Medication 4 UNITS: at 12:00

## 2022-05-17 RX ADMIN — HYDROMORPHONE HYDROCHLORIDE 0.25 MG: 2 INJECTION, SOLUTION INTRAMUSCULAR; INTRAVENOUS; SUBCUTANEOUS at 07:26

## 2022-05-17 RX ADMIN — FAMOTIDINE 20 MG: 10 INJECTION, SOLUTION INTRAVENOUS at 06:33

## 2022-05-17 RX ADMIN — MORPHINE SULFATE 6 MG: 10 INJECTION INTRAVENOUS at 18:14

## 2022-05-17 RX ADMIN — SODIUM CHLORIDE, SODIUM LACTATE, POTASSIUM CHLORIDE, AND CALCIUM CHLORIDE: 600; 310; 30; 20 INJECTION, SOLUTION INTRAVENOUS at 08:00

## 2022-05-17 RX ADMIN — ROCURONIUM BROMIDE 10 MG: 10 INJECTION, SOLUTION INTRAVENOUS at 07:18

## 2022-05-17 RX ADMIN — ONDANSETRON HYDROCHLORIDE 4 MG: 2 INJECTION INTRAMUSCULAR; INTRAVENOUS at 06:56

## 2022-05-17 RX ADMIN — FENTANYL CITRATE 100 MCG: 50 INJECTION, SOLUTION INTRAMUSCULAR; INTRAVENOUS at 06:56

## 2022-05-17 RX ADMIN — NYSTATIN 500000 UNITS: 100000 SUSPENSION ORAL at 06:26

## 2022-05-17 RX ADMIN — KETAMINE HYDROCHLORIDE 10 MG: 10 INJECTION, SOLUTION INTRAMUSCULAR; INTRAVENOUS at 07:21

## 2022-05-17 RX ADMIN — ALBUTEROL SULFATE 2 PUFF: 90 AEROSOL, METERED RESPIRATORY (INHALATION) at 06:57

## 2022-05-17 RX ADMIN — KETAMINE HYDROCHLORIDE 10 MG: 10 INJECTION, SOLUTION INTRAMUSCULAR; INTRAVENOUS at 07:04

## 2022-05-17 RX ADMIN — ACETAMINOPHEN 1000 MG: 500 TABLET ORAL at 06:27

## 2022-05-17 RX ADMIN — MIDAZOLAM 2 MG: 1 INJECTION INTRAMUSCULAR; INTRAVENOUS at 06:56

## 2022-05-17 RX ADMIN — METOCLOPRAMIDE HYDROCHLORIDE 10 MG: 5 INJECTION INTRAMUSCULAR; INTRAVENOUS at 18:14

## 2022-05-17 RX ADMIN — ONDANSETRON 4 MG: 2 INJECTION INTRAMUSCULAR; INTRAVENOUS at 10:21

## 2022-05-17 RX ADMIN — PROPOFOL 200 MG: 10 INJECTION, EMULSION INTRAVENOUS at 07:04

## 2022-05-17 RX ADMIN — HYDROMORPHONE HYDROCHLORIDE 0.25 MG: 2 INJECTION, SOLUTION INTRAMUSCULAR; INTRAVENOUS; SUBCUTANEOUS at 07:37

## 2022-05-17 RX ADMIN — SODIUM CHLORIDE, POTASSIUM CHLORIDE, SODIUM LACTATE AND CALCIUM CHLORIDE 150 ML/HR: 600; 310; 30; 20 INJECTION, SOLUTION INTRAVENOUS at 12:05

## 2022-05-17 RX ADMIN — FENTANYL CITRATE 25 MCG: 50 INJECTION, SOLUTION INTRAMUSCULAR; INTRAVENOUS at 10:07

## 2022-05-17 NOTE — PROGRESS NOTES
Transition of Care (YONG) Plan:          Pt admitted for an elective surgical procedure - patient had laparoscopic conversion from gastric sleeve to gastric bypass, lysis of adhesions greater than 30 minutes, partial gastrectomy, wedge liver biopsy and intraoperative endoscopy with biopsy. Care manager rounded and friend was at bedside, patient very sound asleep; [facility] is [facility]. Please encourage ambulation. No transition of care needs identified at this time. Anticipate pt will be medically stable for discharge within the next 24-48 hours with physician follow up. CM available to assist as needed. YONG Transportation:   How is patient being transported at discharge? Family/Friend      When? Once cleared by physician     Is transport scheduled? N/A      Follow-up appointment and transportation:   PCP/Specialist?  See AVS for Appointment         Who is transporting to the follow-up appointment? Self/Family/Friend      Is transport for follow up appointment scheduled? N/A    Communication plan (with patient/family): Who is being called? Patient or Next of Kin? Responsible party? Patient      What number(s) is to be used? See Facesheet      What service provider is calling for [facility] services? When are they calling? Readmission Risk?   (Green/Low; Yellow/Moderate; Red/High):  [employer] here to complete [address] including selection of the Healthcare Decision Maker Relationship (ie \"Primary\")    Care Management Interventions  Mode of Transport at Discharge: Self  Transition of Care Consult (CM Consult): Discharge Planning  Support Systems: Parent(s)  Confirm Follow Up Transport: Family  The Plan for Transition of Care is Related to the Following Treatment Goals : laparoscopic conversion from gastric sleeve to gastric bypass  Discharge Location  Patient Expects to be Discharged to[de-identified] Home with family assistance

## 2022-05-17 NOTE — ANESTHESIA PREPROCEDURE EVALUATION
Anesthetic History   No history of anesthetic complications            Review of Systems / Medical History  Patient summary reviewed, nursing notes reviewed and pertinent labs reviewed    Pulmonary            Asthma        Neuro/Psych         Psychiatric history     Cardiovascular                  Exercise tolerance: >4 METS     GI/Hepatic/Renal     GERD      PUD    Comments: Food related gerd Endo/Other    Diabetes    Morbid obesity     Other Findings            Physical Exam    Airway  Mallampati: II  TM Distance: 4 - 6 cm  Neck ROM: normal range of motion   Mouth opening: Normal     Cardiovascular  Regular rate and rhythm,  S1 and S2 normal,  no murmur, click, rub, or gallop  Rhythm: regular  Rate: normal         Dental  No notable dental hx       Pulmonary  Breath sounds clear to auscultation               Abdominal  GI exam deferred       Other Findings            Anesthetic Plan    ASA: 3  Anesthesia type: general          Induction: Intravenous  Anesthetic plan and risks discussed with: Patient      Discussed with patient to use barrier method for contraception for one week due to use of reversal agent.   I had her use albuterol preop prophylactically,

## 2022-05-17 NOTE — PROGRESS NOTES
1045  1045  TRANSFER - IN REPORT:    Verbal report received from Aarti Pavon on Ken Tovar  being received from PACU for routine post - op      Report consisted of patients Situation, Background, Assessment and   Recommendations(SBAR). Information from the following report(s) SBAR, Kardex, OR Summary, Procedure Summary, Intake/Output, MAR and Recent Results was reviewed with the receiving nurse. Opportunity for questions and clarification was provided. [facility] Assessment complete at this time. Pt alert and oriented x4. Lungs clear on 2 Liters nasal cannula. 18G IV to R. Forearm dressing clean, dry, & intact. Stated pain 7/10. SCD's/ Mansoor hose in place. Lopez catheter in place draining. Five trochar sites w/ steri strip dressing to abdomen that is clean, dry, & intact. Incentive spirometer at bedside. GREGORIO Drain in place. Patient taught how to use breathing tool w/ proper demonstration. Instructed to use 10x Q1. Verbalized understanding. Last BM 05/14/2022. Yuliet Holland Patient oriented to room and call bell. Call bell within reach. Bed in lowest position. [address] (Friend) on patient transfer of care to orthopedics floor room 206.    1524  PRN 6 mg IV Morphine given for 9/10 pain to he abdomen. 1528  PRN 4 mg  Zofran given for nausea. 1814  PRN 6 mg IV Morphine given for 8/10 pain to the abdomen. SHIFT SUMMARY  Patient alert and oriented x4. Lopez catheter in place draining. Alberto chatman drain in place draining. Moderate complaints of pain treated per MAR. SCD's and Mansoor hose in place. Sufficient use of the incentive spirometer. Encouraged to get OOB and ambulate in the halls to relieve gas pain. Verbal and Bedside change of shift report given to Bridger Conley RN by Paulino Avina RN. Opportunity for questions were provided.

## 2022-05-17 NOTE — PERIOP NOTES
TRANSFER - OUT REPORT:    Verbal report given to [facility] CB BLANTON RN (name) on Alli Fisher  being transferred to 2 S (unit) for routine progression of care       Report consisted of patients Situation, Background, Assessment and   Recommendations(SBAR). Information from the following report(s) OR Summary, Procedure Summary, Intake/Output and MAR was reviewed with the receiving nurse. Lines:   Peripheral IV 05/17/22 Posterior;Right Forearm (Active)   Site Assessment Clean, dry, & intact 05/17/22 1010   Phlebitis Assessment 0 05/17/22 1010   Infiltration Assessment 0 05/17/22 1010   Dressing Status Clean, dry, & intact 05/17/22 1010   Dressing Type Transparent;Tape 05/17/22 1010   Hub Color/Line Status Infusing 05/17/22 1010   Alcohol Cap Used No 05/17/22 0632      Date 05/16/22 0700 - 05/17/22 0659(Not Admitted) 05/17/22 0700 - 05/18/22 0659   Shift 8819-9213 8311-1870 24 Hour Total 2678-2163 6308-7105 24 Hour Total   INTAKE   I.V.    3500  3500     I.V.    500  500     Volume (lactated Ringers infusion)    3000  3000   Shift Total(mL/kg)    3500(32.8)  3500(32.8)   OUTPUT   Urine    525  525     Urine Output    100  100     Urine Output (mL) (Urinary Catheter 05/17/22 Lopez)    425  425   Drains    55  55     Output (ml) (Alberto-Lombardo Drain 05/17/22 Left Abdomen)    55  55   Blood    10  10     Estimated Blood Loss    10  10   Shift Total(mL/kg)    590(5.5)  590(5.5)   NET    2910  2910   Weight (kg)  106.7 106.7 106.7 106.7 106.7       Opportunity for questions and clarification was provided.       Patient transported with:   O2 @ 2 liters  Registered Nurse

## 2022-05-17 NOTE — ANESTHESIA POSTPROCEDURE EVALUATION
Procedure(s):  LAPAROSCOPIC CONVERSION FROM GASTRIC SLEEVE TO GASTRIC BYPASS, LYSIS OF ADHESIONS, PARTIAL GASTRECTOMY, WEDGE LIVER BIOPSY AND INTRAOPERATIVE ENDOSCOPY WITH BIOPSY. general    Anesthesia Post Evaluation        Comments: Post-Anesthesia Evaluation and Assessment    Cardiovascular Function/Vital Signs  /74   Pulse 100   Temp 36.4 °C (97.6 °F)   Resp 13   Ht 5' 7.75\" (1.721 m)   Wt 106.7 kg (235 lb 4.8 oz)   SpO2 99%   BMI 36.04 kg/m²     Patient is status post Procedure(s):  LAPAROSCOPIC CONVERSION FROM GASTRIC SLEEVE TO GASTRIC BYPASS, LYSIS OF ADHESIONS, PARTIAL GASTRECTOMY, WEDGE LIVER BIOPSY AND INTRAOPERATIVE ENDOSCOPY WITH BIOPSY. Nausea/Vomiting: Controlled. Postoperative hydration reviewed and adequate. Pain:  Pain Scale 1: FLACC (05/17/22 1040)  Pain Intensity 1: 0 (05/17/22 1040)   Managed. Neurological Status:   Neuro (WDL): Exceptions to WDL (05/17/22 0933)   At baseline. Mental Status and Level of Consciousness: Arousable. Pulmonary Status:   O2 Device: Nasal cannula (05/17/22 1001)   Adequate oxygenation and airway patent. Complications related to anesthesia: None    Post-anesthesia assessment completed. No concerns. Patient has met all discharge requirements. Signed By: Noris Velázquez MD    May 17, 2022                   INITIAL Post-op Vital signs:   Vitals Value Taken Time   /84 05/17/22 1042   Temp 36.4 °C (97.6 °F) 05/17/22 0933   Pulse 101 05/17/22 1044   Resp 14 05/17/22 1044   SpO2 99 % 05/17/22 1044   Vitals shown include unvalidated device data.

## 2022-05-17 NOTE — INTERVAL H&P NOTE
Update History & Physical    The Patient's History and Physical of May 2,   2022 was reviewed with the patient and I examined the patient. There was no change. The surgical site was confirmed by the patient and me. Plan:  The risk, benefits, expected outcome, and alternative to the recommended procedure have been discussed with the patient. Patient understands and wants to proceed with the procedure.     Electronically signed by Tommy Baldwin MD on 5/17/2022 at 6:45 AM

## 2022-05-17 NOTE — OP NOTES
OPERATIVE REPORT         Patient:Jonelle Prather   : 1990  Medical Record Number:[id number]    Pre-operative Diagnosis:  GERD, ABDOMINAL PAIN, MORBID OBESITY, POST BARIATRIC SURGERY, FATTY LIVER  Post-operative Diagnosis: GERD, ABDOMINAL PAIN, MORBID OBESITY, POST BARIATRIC SURGERY, FATTY LIVER  Procedure: Procedure(s): 1. LAPAROSCOPIC CONVERSION FROM GASTRIC SLEEVE TO GASTRIC BYPASS  2. LYSIS OF ADHESIONS GREATER THAN 30 MINUTES  3. PARTIAL GASTRECTOMY  4.WEDGE LIVER BIOPSY  5. INTRAOPERATIVE ENDOSCOPY WITH BIOPSY  Location: [facility]  Surgeon: Caleb Wheeler MD  Assistant:  Leilani Cueva [facility]  - (there are no qualified interns, residents, or any other qualified house   physicians available to assist with this surgery) performed retraction of various structures,  assisted in   creation of the gastric sleeve, fired stapling devices, obtained hemostasis along staple lines via hemoclips,       Anesthesia: General       Specimens: 1. Gastric Sleeve Resection                       2. Liver Wedge Biopsy                       3. Endoscopy biopsy    EBL: less than 5cc  Additional Findings: None  Complications: None      STATEMENT OF MEDICAL NECESSITY:  The patient is a 26 yo patient who had a sleeve gastrectomy performed in 2018. They initially did well with surgery, but has had some weight regain overtime. In addition, they have developed severe intractable reflux disease. They have been on maximal PPI therapy and upper GI suggested that they had a normal gastric sleeve with reflux episodes noted. Due to the intractable nature of their reflux and the fact that they are severely obese, I discussed with them conversion to the gastric bypass procedure, and they did wished to proceed. They did understand this is a conversion operation which carries additional risks, and  were still comfortable with proceeding with surgery.      PROCEDURE:  The patient was brought to the operating room, placed on the table in supine position at which time general anesthesia was administered without any difficulty. Her abdomen was then prepped and draped in sterile fashion. Using a 15-blade a 1-cm incision was made just left to the umbilicus. Veress needle approach was used to gain access to the peritoneal cavity which was then insufflated. The Kaylen Pettit was then placed at that site. Then, four additional trocars were placed in the usual U-shaped configuration. On entering the abdomen, the patient was noted to have extensive adhesive disease consistent with their prior surgery. I began an extensive adhesiolysis of the entire left upper quadrant region including the left subhepatic space. This included exposing the sleeve anterior all the way to the level of diaphragm. I then mobilized the lateral part of the sleeve using Harmonic scalpel such that I could finally visualize her entire anatomy. This massive adhesiolysis took 30 minutes to achieve, just to be able to assess them for conversion. The calibration tube was then placed in the proximal stomach and the balloon was inflated to 15 mL of saline solution. I then pulled it back towards the gastroesophageal junction, then I dissected along the lesser curvature of the stomach entering her retrogastric space. It was at this juncture that I knew I would be able to convert the sleeve; therefore, I converted from a sleeve to a gastric bypass. Therefore, I decompressed the calibration tube and removed it from the operative field. At this juncture, I then turned my attention towards the small bowel portion of the operation. We looked at the ligament of Treitz below the transverse mesocolon. I then measured an area approximately 40 cm distal to the ligament of Treitz, and I transected the small bowel using the Endo CALIN stapler with white reloads.   I then undercut the mesentery of the small bowel using two firings of the Endo CALIN stapler, which allowed for excellent mobilization to the upper abdomen. Once I had achieved this, I then measured off a 150 cm James limb bypass and placed it in a side-to-side fashion with the afferent limb. Stay sutures were then placed and two enterotomies were then created in the two limbs of the bowel and the stapling device was then placed intraluminally. It was closed and fired creating the anastomosis. With this anastomosis being hemostatic, the free margin of the enterotomy was then reapproximated using 2-0 Ethibond suture. These sutures were then held up to facilitate closure using the stapling device. The mesenteric defect at that site was then closed using running 2-0 Ethibond suture. I then divided the omentum in the midline, and I brought the James limb over the transverse colon, and it reached effortlessly to the upper abdomen under no tension at all. I then addressed the sleeve and in the distal part of sleeve, I created a lateral gastrotomy, then I placed the cap of a 21 ILS stapler transabdominally. I guided through the lateral gastrotomy, then guided it out through the anterior gastric pouch in the area I had marked previously using a Flamingo grasper and Prolene suture attached to the cap. After retrieving the cap, a pursestring suture was then placed at the base and tied securely. Once this was achieved, I then created the new gastric pouch by using [place] 60 stapler with green reloads. I fired one transversely along the base of the planned pouch, then I passed a 34-Upper sorbian bougie into the pouch, then I used 2 vertical firings to resect a portion of the lateral sleeve wall, which was dilated and I then resected this and submitted it to Pathology for permanent section. All areas were hemostatic. I then closed the lateral gastrotomy using a combination of 2-0 Vicryl suture in a running fashion and 2-0 Ethibond suture in a Lembert fashion.  With all areas hemostatic, I then brought the James limb in an antecolic-antegastric fashion. It reached nicely to the level of pouch under no tension at all. I then opened up the free end using Harmonic scalpel. I guided the circular stapling device transabdominally through the left lower quadrant incision. I guided it through the enterotomy, then I deployed the spear  through the antimesenteric border of the bowel. It was then attached to the cap, closed, and fired creating the circular anastomosis. With two good tissue rings noted within the stapling device, the free margins of the James limb was then trimmed free using the Endo CALIN stapler with white reloads. I then oversewed the anastomosis using 2-0 Ethibond suture. I then went to the head of the table where I performed endoscopy on the patient. The patient's oropharynx was normal and distal esophagus was normal.  Anastomosis was nonbleeding. The pouch and James limb were totally viable. I did biopsy the lateral wall of the pouch and submitted it to Pathology for permanent section for H Pylori. At this juncture, all areas were hemostatic. I went back towards the abdominal portion of the operation where I checked all areas for hemostasis again. I then placed Vistaseal along all staple lines and Surgicel along all staple lines. With everything, at this point, having been completed and all areas hemostatic, I then removed the liver retractor and due to an abnormality of nodularity of the liver and possibility of steatohepatitis being present, I did biopsy the left lobe of the liver and submitted it to Pathology for permanent section. I then placed a GREGORIO drain in the upper abdomen. I closed the left lower quadrant trocar site using a transabdominal 0-PDS suture along the fascia and all skin incisions were then closed with 4-0 subcuticular Monocryl. Steri-Strips and sterile dressings were applied. The patient tolerated the procedure well.         Brianda Turcios MD

## 2022-05-17 NOTE — NURSE NAVIGATOR
Patient's friend at bedside. Patient sleeping throughout visit. No signs of pain nor nausea. Vitals:   Visit Vitals  BP (!) 147/90   Pulse (!) 102   Temp 97.5 °F (36.4 °C)   Resp 14   Ht 5' 7.75\" (1.721 m)   Wt 106.7 kg (235 lb 4.8 oz)   SpO2 100%   BMI 36.04 kg/m²     General: Alert & oriented to person, place, time, and situation  Pulmonary: Lungs clear to auscultation in all fields. Cardiac: Regular rate and rhythm  Abdomen: Appropriate tenderness; soft; non-distended; hypo-active bowel sounds  Lap sites: Within normal limits  GREGORIO drain: Small amount of serosanguinous drainage  Lopez catheter: 400 cc clear, yellow urine  SCD's: In place and operating WNL    Plan:  -Continue medications for symptom management  -Encourage ambulation  -SCD use when in bed  -IS 10 times an hour  -NPO  -UGI in AM; bariatric full liquid diet  if UGI within normal limits  -Lopez removed in AM    Plan was discussed with patient, as well as IS teaching provided. Patient verbalized understanding to plan and education. Patient completed IS x3 during this visit with no issues or concerns noted by this RN. She reached 750 mL.

## 2022-05-17 NOTE — PERIOP NOTES
Reviewed PTA medication list with patient/caregiver and patient/caregiver denies any additional medications. Patient admits to having a responsible adult care for them at home for at least 24 hours after surgery. Patient encouraged to use gown warming system and informed that using said warming gown to regulate body temperature prior to a procedure has been shown to help reduce the risks of blood clots and infection. Patient's pharmacy of choice verified and documented in PTA medication section. Dual skin assessment & fall risk band verification completed with Nadine Martinez RN. Urine pregnancy results negative and verified with Santos Preston RN.

## 2022-05-18 ENCOUNTER — APPOINTMENT (OUTPATIENT)
Dept: GENERAL RADIOLOGY | Age: 32
DRG: 403 | End: 2022-05-18
Attending: SPECIALIST
Payer: MEDICAID

## 2022-05-18 VITALS
TEMPERATURE: 98.6 F | DIASTOLIC BLOOD PRESSURE: 81 MMHG | HEART RATE: 116 BPM | OXYGEN SATURATION: 96 % | HEIGHT: 68 IN | WEIGHT: 235.3 LBS | RESPIRATION RATE: 20 BRPM | SYSTOLIC BLOOD PRESSURE: 130 MMHG | BODY MASS INDEX: 35.66 KG/M2

## 2022-05-18 LAB
ANION GAP SERPL CALC-SCNC: 3 MMOL/L (ref 3–18)
BASOPHILS # BLD: 0 K/UL (ref 0–0.1)
BASOPHILS # BLD: 0 K/UL (ref 0–0.1)
BASOPHILS NFR BLD: 0 % (ref 0–2)
BASOPHILS NFR BLD: 0 % (ref 0–2)
BUN SERPL-MCNC: 6 MG/DL (ref 7–18)
BUN/CREAT SERPL: 7 (ref 12–20)
CALCIUM SERPL-MCNC: 8.5 MG/DL (ref 8.5–10.1)
CHLORIDE SERPL-SCNC: 103 MMOL/L (ref 100–111)
CO2 SERPL-SCNC: 30 MMOL/L (ref 21–32)
CREAT SERPL-MCNC: 0.82 MG/DL (ref 0.6–1.3)
DIFFERENTIAL METHOD BLD: ABNORMAL
DIFFERENTIAL METHOD BLD: ABNORMAL
EOSINOPHIL # BLD: 0 K/UL (ref 0–0.4)
EOSINOPHIL # BLD: 0 K/UL (ref 0–0.4)
EOSINOPHIL NFR BLD: 0 % (ref 0–5)
EOSINOPHIL NFR BLD: 0 % (ref 0–5)
ERYTHROCYTE [DISTWIDTH] IN BLOOD BY AUTOMATED COUNT: 12.5 % (ref 11.6–14.5)
ERYTHROCYTE [DISTWIDTH] IN BLOOD BY AUTOMATED COUNT: 12.6 % (ref 11.6–14.5)
GLUCOSE BLD STRIP.AUTO-MCNC: 135 MG/DL (ref 70–110)
GLUCOSE BLD STRIP.AUTO-MCNC: 144 MG/DL (ref 70–110)
GLUCOSE BLD STRIP.AUTO-MCNC: 153 MG/DL (ref 70–110)
GLUCOSE SERPL-MCNC: 157 MG/DL (ref 74–99)
HCT VFR BLD AUTO: 38.1 % (ref 35–45)
HCT VFR BLD AUTO: 38.6 % (ref 35–45)
HGB BLD-MCNC: 12.9 G/DL (ref 12–16)
HGB BLD-MCNC: 13 G/DL (ref 12–16)
IMM GRANULOCYTES # BLD AUTO: 0.1 K/UL (ref 0–0.04)
IMM GRANULOCYTES # BLD AUTO: 0.1 K/UL (ref 0–0.04)
IMM GRANULOCYTES NFR BLD AUTO: 0 % (ref 0–0.5)
IMM GRANULOCYTES NFR BLD AUTO: 1 % (ref 0–0.5)
LYMPHOCYTES # BLD: 2.3 K/UL (ref 0.9–3.6)
LYMPHOCYTES # BLD: 2.5 K/UL (ref 0.9–3.6)
LYMPHOCYTES NFR BLD: 17 % (ref 21–52)
LYMPHOCYTES NFR BLD: 18 % (ref 21–52)
MCH RBC QN AUTO: 30.2 PG (ref 24–34)
MCH RBC QN AUTO: 30.8 PG (ref 24–34)
MCHC RBC AUTO-ENTMCNC: 33.7 G/DL (ref 31–37)
MCHC RBC AUTO-ENTMCNC: 33.9 G/DL (ref 31–37)
MCV RBC AUTO: 89.8 FL (ref 78–100)
MCV RBC AUTO: 90.9 FL (ref 78–100)
MONOCYTES # BLD: 1.1 K/UL (ref 0.05–1.2)
MONOCYTES # BLD: 1.1 K/UL (ref 0.05–1.2)
MONOCYTES NFR BLD: 8 % (ref 3–10)
MONOCYTES NFR BLD: 9 % (ref 3–10)
NEUTS SEG # BLD: 10.2 K/UL (ref 1.8–8)
NEUTS SEG # BLD: 9.8 K/UL (ref 1.8–8)
NEUTS SEG NFR BLD: 74 % (ref 40–73)
NEUTS SEG NFR BLD: 74 % (ref 40–73)
NRBC # BLD: 0 K/UL (ref 0–0.01)
NRBC # BLD: 0 K/UL (ref 0–0.01)
NRBC BLD-RTO: 0 PER 100 WBC
NRBC BLD-RTO: 0 PER 100 WBC
PLATELET # BLD AUTO: 365 K/UL (ref 135–420)
PLATELET # BLD AUTO: 373 K/UL (ref 135–420)
PMV BLD AUTO: 9 FL (ref 9.2–11.8)
PMV BLD AUTO: 9.5 FL (ref 9.2–11.8)
POTASSIUM SERPL-SCNC: 3.8 MMOL/L (ref 3.5–5.5)
RBC # BLD AUTO: 4.19 M/UL (ref 4.2–5.3)
RBC # BLD AUTO: 4.3 M/UL (ref 4.2–5.3)
SODIUM SERPL-SCNC: 136 MMOL/L (ref 136–145)
WBC # BLD AUTO: 13.3 K/UL (ref 4.6–13.2)
WBC # BLD AUTO: 13.8 K/UL (ref 4.6–13.2)

## 2022-05-18 PROCEDURE — 80048 BASIC METABOLIC PNL TOTAL CA: CPT

## 2022-05-18 PROCEDURE — 74011000636 HC RX REV CODE- 636: Performed by: SPECIALIST

## 2022-05-18 PROCEDURE — 74011250636 HC RX REV CODE- 250/636: Performed by: SPECIALIST

## 2022-05-18 PROCEDURE — 36415 COLL VENOUS BLD VENIPUNCTURE: CPT

## 2022-05-18 PROCEDURE — 74011250637 HC RX REV CODE- 250/637: Performed by: SPECIALIST

## 2022-05-18 PROCEDURE — 82962 GLUCOSE BLOOD TEST: CPT

## 2022-05-18 PROCEDURE — 85025 COMPLETE CBC W/AUTO DIFF WBC: CPT

## 2022-05-18 PROCEDURE — 74240 X-RAY XM UPR GI TRC 1CNTRST: CPT

## 2022-05-18 PROCEDURE — 74011636637 HC RX REV CODE- 636/637: Performed by: SPECIALIST

## 2022-05-18 RX ORDER — MODAFINIL 100 MG/1
200 TABLET ORAL DAILY
Status: COMPLETED | OUTPATIENT
Start: 2022-05-18 | End: 2022-05-18

## 2022-05-18 RX ORDER — OXYCODONE AND ACETAMINOPHEN 5; 325 MG/1; MG/1
1 TABLET ORAL
Status: DISCONTINUED | OUTPATIENT
Start: 2022-05-18 | End: 2022-05-18 | Stop reason: HOSPADM

## 2022-05-18 RX ORDER — ENOXAPARIN SODIUM 100 MG/ML
40 INJECTION SUBCUTANEOUS EVERY 12 HOURS
Qty: 20 EACH | Refills: 0 | Status: SHIPPED | OUTPATIENT
Start: 2022-05-18 | End: 2022-05-28

## 2022-05-18 RX ADMIN — IOHEXOL 25 ML: 240 INJECTION, SOLUTION INTRATHECAL; INTRAVASCULAR; INTRAVENOUS; ORAL at 07:43

## 2022-05-18 RX ADMIN — MORPHINE SULFATE 6 MG: 10 INJECTION INTRAVENOUS at 00:08

## 2022-05-18 RX ADMIN — ENOXAPARIN SODIUM 40 MG: 100 INJECTION SUBCUTANEOUS at 06:10

## 2022-05-18 RX ADMIN — METOCLOPRAMIDE HYDROCHLORIDE 10 MG: 5 INJECTION INTRAMUSCULAR; INTRAVENOUS at 12:18

## 2022-05-18 RX ADMIN — METOCLOPRAMIDE HYDROCHLORIDE 10 MG: 5 INJECTION INTRAMUSCULAR; INTRAVENOUS at 00:08

## 2022-05-18 RX ADMIN — Medication 2 UNITS: at 12:18

## 2022-05-18 RX ADMIN — KETOROLAC TROMETHAMINE 15 MG: 30 INJECTION, SOLUTION INTRAMUSCULAR at 00:08

## 2022-05-18 RX ADMIN — KETOROLAC TROMETHAMINE 15 MG: 30 INJECTION, SOLUTION INTRAMUSCULAR at 06:10

## 2022-05-18 RX ADMIN — MODAFINIL 200 MG: 100 TABLET ORAL at 12:28

## 2022-05-18 RX ADMIN — MORPHINE SULFATE 6 MG: 10 INJECTION INTRAVENOUS at 04:21

## 2022-05-18 RX ADMIN — KETOROLAC TROMETHAMINE 15 MG: 30 INJECTION, SOLUTION INTRAMUSCULAR at 12:18

## 2022-05-18 RX ADMIN — OXYCODONE AND ACETAMINOPHEN 1 TABLET: 5; 325 TABLET ORAL at 10:39

## 2022-05-18 RX ADMIN — METOCLOPRAMIDE HYDROCHLORIDE 10 MG: 5 INJECTION INTRAMUSCULAR; INTRAVENOUS at 06:11

## 2022-05-18 NOTE — DISCHARGE INSTRUCTIONS
[facility]  Chayo Conway. Roberta Garzon M.D., F.A.C.S.  [address], [address] [facility]  Office: [phone]    Fax:  [phone]    Discharge Instruction for Gastric Bypass / Sleeve Gastrectomy Patients    Diet:   Continue with the liquid diet until you are seen in the office. Make sure you sip fluids all day. Aim for  Gms of protein every day. Activity:   Walking is encouraged. Rest when you are tired.  You may shower.  You may climb stairs. Take your time.  No lifting more than 10-15 lbs.  If you feel discomfort during an activity, rest.   Do not drive for 1 week. Wound Care:   Clean incisions with soap and water when in the shower. Pat dry.  Leave steri-strips on until they fall off.  A small amount of drainage may be present from the incisions. Contact the office if you notice an increase in drainage, an odor, increased redness, or fever > 100.5. Medications:   You will receive a prescription for pain medication at the time of discharge.  For upset stomach you may take over the counter medications such as Maalox, Mylanta, Pepcid, Zantac, or Tagamet.  You may take Tylenol   Non-aspirin based arthritis medications may be resumed after the first month.  Take a childrens or adult chewable multivitamin.  Milk of Magnesia will help with constipation.  It is fine to take your usual home medications. Blood pressure medications should be continued after surgery. Diabetic medications can frequently be reduced very quickly after surgery. Diabetics should continue to monitor blood sugars frequently after surgery and contact the prescribing physician for any questions. Follow-Up Appointment:   If you do not already have a follow-up appointment scheduled, contact the office in the next few days to obtain one. It is usually scheduled for 10-14 days after you surgery date. Office phone number:  [phone].         REV  09/2010

## 2022-05-18 NOTE — DISCHARGE SUMMARY
Discharge Summary    Patient: Leatha Blackmon               Sex: female          DOA: 5/17/2022         YOB: 1990      Age:  27 y.o.        LOS:  LOS: 1 day                Admit Date: 5/17/2022    Discharge Date: 5/18/2022    Admission Diagnoses: Severe obesity with body mass index (BMI) of 35.0 to 35.9 and comorbidity ([facility] [E66.01, Z68.35];GERD (gastroesophageal reflux disease) [K21.9]    Discharge Diagnoses:    Problem List as of 5/18/2022 Date Reviewed: 5/17/2022          Codes Class Noted - Resolved    Severe obesity with body mass index (BMI) of 35.0 to 35.9 and comorbidity ([facility] ICD-10-CM: E66.01, Z68.35  ICD-9-CM: 278.01, V85.35  5/17/2022 - Present        Uses birth control ICD-10-CM: Z78.9  ICD-9-CM: V49.89  Unknown - Present    Overview Signed 5/2/2022  2:51 PM by BHUMIKA Jaeger     IUD             Severe obesity (BMI 35.0-35.9 with comorbidity) ([facility] ICD-10-CM: E66.01, Z68.35  ICD-9-CM: 278.01, V85.35  Unknown - Present        Narcolepsy ICD-10-CM: Y75.539  ICD-9-CM: 347.00  Unknown - Present        HSV-1 infection ICD-10-CM: B00.9  ICD-9-CM: 054.9  12/15/2020 - Present        Elevated prolactin level ICD-10-CM: R79.89  ICD-9-CM: 790.99  10/23/2018 - Present    Overview Addendum 3/4/2019  9:43 AM by Elvie Katz MD     28.1, repeat level 24.2  2/28/19 37.6             GERD (gastroesophageal reflux disease) ICD-10-CM: K21.9  ICD-9-CM: 530.81  7/31/2018 - Present        Intestinal malabsorption ICD-10-CM: K90.9  ICD-9-CM: 579.9  4/17/2018 - Present        S/P laparoscopic sleeve gastrectomy ICD-10-CM: B26.17  ICD-9-CM: V45.86  4/17/2018 - Present        IUD (intrauterine device) in place ICD-10-CM: Z97.5  ICD-9-CM: V45.51  2/8/2018 - Present    Overview Signed 2/8/2018 11:21 AM by Ivan vieira 02/15/17             FH: ovarian cancer ICD-10-CM: Z80.41  ICD-9-CM: V16.41  2/8/2018 - Present        Asthma ICD-10-CM: J45.909  ICD-9-CM: 493.90  Unknown - Present        Controlled type 2 diabetes mellitus without complication, without long-term current use of insulin (HCC) ICD-10-CM: E11.9  ICD-9-CM: 250.00  Unknown - Present        Hyperlipemia ICD-10-CM: E78.5  ICD-9-CM: 272.4  Unknown - Present        RESOLVED: Marijuana user ICD-10-CM: F12.90  ICD-9-CM: 305.20  1/6/2021 - 5/2/2022        RESOLVED: BMI 36.0-36.9,adult ICD-10-CM: X09.36  ICD-9-CM: V85.36  5/18/2018 - 7/30/2018        RESOLVED: Morbid obesity ([facility] ICD-10-CM: E66.01  ICD-9-CM: 278.01  Unknown - 11/18/2021        RESOLVED: Morbid obesity with BMI of 40.0-44.9, adult ([facility] ICD-10-CM: E66.01, Z68.41  ICD-9-CM: 278.01, V85.41  Unknown - 5/18/2018        RESOLVED: Gastroparesis ICD-10-CM: K31.84  ICD-9-CM: 536.3  Unknown - 5/2/2022        RESOLVED: Acute bronchitis ICD-10-CM: J20.9  ICD-9-CM: 466.0  5/2/2013 - 7/30/2018        RESOLVED: Asthma with exacerbation ICD-10-CM: J45.901  ICD-9-CM: 493.92  5/2/2013 - 7/30/2018        RESOLVED: Pleurisy ICD-10-CM: R09.1  ICD-9-CM: 511.0  5/2/2013 - 7/30/2018              Discharge Condition: Good    Hospital Course: The patient underwent  laparoscopic gastric bypass surgery  on 5/17/2022. The patient tolerated the procedure well. Vital signs remained stable and the patient was transferred to  3rd floor surgical unit without complications. The patient remained stable throughout the first night post operatively with stable vital signs and adequate urine output and pain control. Pain was controlled with Dilaudid IV and IV Tylenol . The patient on the first morning post operative was transferred to the radiology suite where they underwent a gastrograffin UGI study which showed no evidence of a leak or stricture. The drain was discontinued on POD # 1 and the patient was started on a bariatric liquid diet with protein shakes. The patient progressed throughout the day and was ambulating well and tolerating their diet.  They were therefore discharged home with instructions to notify me with any issues that may arise. Significant Diagnostic Studies:   Recent Labs     05/18/22  0830 05/18/22 0228   HGB 13.0 12.9     Recent Labs     05/18/22  0830 05/18/22 0228   HCT 38.6 38.1       Current Discharge Medication List      CONTINUE these medications which have NOT CHANGED    Details   pantoprazole (PROTONIX) 40 mg tablet TAKE 1 TABLET BY MOUTH EVERY DAY  Qty: 30 Tablet, Refills: 2      modafiniL (PROVIGIL) 200 mg tablet Every 3 hours while awake      ALPRAZolam (XANAX) 0.5 mg tablet Take 0.5 mg by mouth as needed. B.infantis-B.ani-B.long-B.bifi (PROBIOTIC 4X) 10-15 mg TbEC Take  by mouth.      enoxaparin (LOVENOX) 40 mg/0.4 mL 0.4 mL by SubCUTAneous route every twelve (12) hours every twelve (12) hours for 10 days. Qty: 20 Each, Refills: 0  Start date: 5/18/2022, End date: 5/28/2022      ondansetron (ZOFRAN ODT) 8 mg disintegrating tablet Take 1 Tablet by mouth every eight (8) hours as needed for Nausea or Vomiting. Qty: 30 Tablet, Refills: 0      Blood-Glucose Meter monitoring kit 1 Units by Other route. EPINEPHrine (EPIPEN) 0.3 mg/0.3 mL injection 0.3 mg by IntraMUSCular route once as needed. glucose blood VI test strips (ASCENSIA AUTODISC VI, ONE TOUCH ULTRA TEST VI) strip 50 Each. diclofenac (VOLTAREN) 1 % gel APPLY 2 GRAMS AA Q 8 H PRN P  Refills: 0      Lancets misc 1 Each.      levonorgestrel (MIRENA) 20 mcg/24 hr (5 years) IUD 1 Device. albuterol (PROVENTIL HFA, VENTOLIN HFA, PROAIR HFA) 90 mcg/actuation inhaler Take 2 Puffs by inhalation every four (4) hours as needed.          STOP taking these medications       baclofen (LIORESAL) 10 mg tablet Comments:   Reason for Stopping:         bisacodyL (Dulcolax, bisacodyl,) 5 mg EC tablet Comments:   Reason for Stopping:         polyethylene glycol (MIRALAX) 17 gram/dose powder Comments:   Reason for Stopping:         PNV Comb #2-Iron-FA-Omega 3 29-1-400 mg cmpk Comments:   Reason for Stopping:         ergocalciferol (ERGOCALCIFEROL) 1,250 mcg (50,000 unit) capsule Comments:   Reason for Stopping:         dulaglutide (Trulicity) 0.56 IJ/1.3 mL sub-q pen Comments:   Reason for Stopping:         butalbital-acetaminophen-caffeine (FIORICET, ESGIC) -40 mg per tablet Comments:   Reason for Stopping:         calcium-cholecalciferol, d3, (CALCIUM 600 + D) 600-125 mg-unit tab Comments:   Reason for Stopping:         Biotin 2,500 mcg cap Comments:   Reason for Stopping:               Activity: activity as tolerated with no heavy lifting of greater than 20 pounds. No anti- inflammatory medications. Use stool softeners at home as needed while taking pain medications since they are constipating. Diet: Bariatric liquid diet    Wound Care: Keep wound clean and dry, Reinforce dressing PRN and ice to area for comfort. Do not get wound wet for 2 days.     Follow-up: 14 days with Dr Mateusz Luna M.D

## 2022-05-18 NOTE — PROGRESS NOTES
Verbal and Bedside change of shift report given to Elliot Donnelly RN by Ricardo Truong RN. Opportunity for questions were provided. 7882  Assumed care of patient. Assessment complete at this time. Pt alert and oriented x4. Lungs clear on room air. 18 G IV to R. Forearm dressing clean, dry, & intact. Stated pain 6/10. SCD's/ Mansoor hose in place. Five trochar sites w/ steri strips dressing to abdomen that is clean, dry, & intact. GREGORIO Drain in place. Incentive spirometer at bedside. Patient taught how to use breathing tool w/ proper demonstration. Instructed to use 10x Q1. Verbalized understanding. Last BM 05/14/2022. Patient oriented to room and call bell. Call bell within reach. Bed in lowest position. 3659  Patient off the floor to upper GI can    1039  PRN 5 mg Oral Percocet given for 7/10 pain    1320  Alberto chatman drain removed. Patient tolerated. Covered w/ gauze and dressing. Discharge instructions reviewed w/ patient and family. Verbal understanding of instructions. Opportunity for questions were provided.

## 2022-05-18 NOTE — ROUTINE PROCESS
Bedside and Verbal shift change report given to [facility], RN by Doris Husain. Report included the following information SBAR, Kardex, OR Summary, Intake/Output and MAR.

## 2022-05-18 NOTE — PROGRESS NOTES
1922 - Bedside report received from Chema, [facility]. Patient in bed. Pain 3/10.     2030 - Patient in bed at this time. IV to R FA  intact and patent. Sequential compression device bilaterally. TEDs to BLE. Dressing to abd lap sites and GREGORIO site CDI. GREGORIO with tan milky drg. Lopez with cl. Yellow urine. + CMS. Pt A & O x 4. LS clear, on 2 L NC. Abdomen soft, NT and ND. + BS to all 4 quadrants. Denies nausea. Pain 6/10. Call light within reach. Pt does not want to ambulate now, will walk just before the next due time for morphine, wants shot soon after ambulating. 2125-Pt ambulated the hallways, tima well. 0110-Dr HILTON made aware of the elevated temp and pulse. Order obtained for a CBC. System downtime on, Order put in by . 0320-CBC results reported to Dr HILTON, no new orders. 0700-Lopez removed pr orders. Pt tima. Well. Pt made aware to call for BR assistance and to measure urine, hat placed on the toilet. Pt also reports that she has narcolepsy, can get drowsy at times but easily arouses.

## 2022-05-18 NOTE — PROGRESS NOTES
Bariatric Surgery                POD #1    Visit Vitals  /81 (BP 1 Location: Left upper arm, BP Patient Position: At rest;Supine)   Pulse (!) 116   Temp 98.6 °F (37 °C)   Resp 20   Ht 5' 7.75\" (1.721 m)   Wt 106.7 kg (235 lb 4.8 oz)   SpO2 96%   BMI 36.04 kg/m²     Patient has minimal complaints of pain, minimal nausea noted     Exam:  Appears well in no distress  Lungs- clear bilaterally  Abd - soft, incisions look good without erythema           GREGORIO with minimal serosanguinous output  Extremities- no new edema or swelling    UGI - no obstrustion or leak, slow emptying of gastric pouch    Data Review:    Labs: Results:       Chemistry Recent Labs     05/18/22  0830   *      K 3.8      CO2 30   BUN 6*   CREA 0.82   CA 8.5   AGAP 3   BUCR 7*      CBC w/Diff Recent Labs     05/18/22  0830 05/18/22  0228   WBC 13.3* 13.8*   RBC 4.30 4.19*   HGB 13.0 12.9   HCT 38.6 38.1    365   GRANS 74* 74*   LYMPH 17* 18*   EOS 0 0      Coagulation No results for input(s): PTP, INR, APTT, INREXT in the last 72 hours. Liver Enzymes No results for input(s): TP, ALB, TBIL, AP in the last 72 hours. No lab exists for component: SGOT, GPT, DBIL       Assessment/Plan: S/P  laparoscopic gastric bypass surgery -the patient has had tachycardia of unclear etiology. She has had a stable hemoglobin but yesterday and today. Her upper GI study today suggests she has some slow emptying of her gastric pouch possibly consistent with some edema and or minimal clot at the anastomosis. We will go and place her on some Reglan to see if she feels better from the standpoint of her overall nausea and we will start a diet on her today. 1.Start bariatric diet and protein shakes  2. D/C IV pain meds and start PO pain meds  3. D/C GREGORIO drain  4. Likley PM D/C if  Cont ok and tolerate PO

## 2022-05-18 NOTE — NURSE NAVIGATOR
Patient awaiting delivery of bariatric tray. Vitals:   Blood pressure 130/81, pulse (!) 116, temperature 98.6 °F (37 °C), resp. rate 20, height 5' 7.75\" (1.721 m), weight 106.7 kg (235 lb 4.8 oz), SpO2 96 %. Patient's baseline tachycardic. RN encourage IS usage, as she is only reaching 750-1,000 mL. Dr. Leisa Perez aware. Output: reviewed, and patient verified urinating bloody urine. Patient has not had a menses in \"possibly a year. \"  RN reported to Dr. Leisa Perez. Pulmonary: Clear in all lobes  Cardiac: Regular rate and rhythm  Abdomen: Bowel sounds hypo-active x4. Lap sites without erythema, swelling,  and/or drainage. GREGORIO drain with a small amount of serosanguinous drainage. SCD's: Positioned and operating WNL    Patient with expected pain, but is being managed and is currently tolerable. No nausea and/or vomiting. Patient has been ambulating the halls. Patient has not had the opportunity to swallow pills. Post-op diet progression discussed with patient. Patient to be discharged on a bariatric full liquid diet. Patient verbalized understanding of liquid diet for next two weeks until first post-op visit. Goal of four ounces per hour with one ounce being protein was clearly understood. Medications were discussed (i.e., pain- Percocet, Tylenol, not to use aspirin or ibuprofen based products, as well as steroids). Constipation was discussed and ways to alleviate it were discussed. Education completed on IS use and to ambulate every hour when at home. Patient completed a return demonstration on IS with no issues or concerns from this RN. Lovenox and Percocet filled and in the home. Lovenox and Percocet education provided, and patient verbalized understanding. Patient has chewable multi-vitamin, probiotic, and Prilosec in the home; they were reviewed. Ketosis was also reviewed. Patient given a report card to record intake and a handout to support bedside education.   All questions were answered by this RN, and patient verbalized understanding to all education provided. Goals for discharge were discussed, and patient verbalized understanding. Post-op follow-up appt. alexis scheduled.

## 2022-05-20 ENCOUNTER — TELEPHONE (OUTPATIENT)
Dept: SURGERY | Age: 32
End: 2022-05-20

## 2022-05-20 NOTE — TELEPHONE ENCOUNTER
This RN spoke with patient post-operatively. Hydration: Patient has hydrated with 67 ounces as of yesterday. Nausea and/or vomitting: None    Pain: Currently managed with Percocet at night and/or Tylenol during the day    Lovenox injections: Administering every 12 hours, rotating sites. RN reminded patient to complete all injections, in which patient verbalized understanding. Lap sites: No erythema, drainage, and/or swelling    GREGORIO drain site: No drainage; dressing being changed daily    BM: None to date, but is passing flatus. Ambulation: Patient is walking throughout house every hour. IS: Patient continues to use 10x's per hour while awake. She has reached 1,000 mL. RN re-educated her on the importance of reaching 2,000 mL, and she verbalized understanding. Temperature: 98.7 degrees    Medications: (confirmed currently taking)   *Multi-vitamin: yes   *Probiotic: yes   *Protonix: yes    Questions: Patient continues with bloody urine. RN reminded her, as per Dr. Jorge Rosario, it is due to the fluctuation of hormones, and to give it a little bit of time for her body to adjust to having the surgery. She verbalized understanding. This RN reminded the patient to contact the office with any questions and/or concerns. Patient verbalized understanding. Patient's two week post-op visit is scheduled and was confirmed.

## 2022-05-23 ENCOUNTER — TELEPHONE (OUTPATIENT)
Dept: SURGERY | Age: 32
End: 2022-05-23

## 2022-05-23 ENCOUNTER — HOSPITAL ENCOUNTER (OUTPATIENT)
Dept: LAB | Age: 32
Discharge: HOME | End: 2022-05-23

## 2022-05-23 DIAGNOSIS — N02.9 IDIOPATHIC HEMATURIA, UNSPECIFIED WHETHER GLOMERULAR MORPHOLOGIC CHANGES PRESENT: ICD-10-CM

## 2022-05-23 DIAGNOSIS — M54.50 ACUTE BILATERAL LOW BACK PAIN WITHOUT SCIATICA: Primary | ICD-10-CM

## 2022-05-23 LAB
APPEARANCE UR: ABNORMAL
BACTERIA URNS QL MICRO: ABNORMAL /HPF
BILIRUB UR QL: ABNORMAL
COLOR UR: ABNORMAL
EPITH CASTS URNS QL MICRO: ABNORMAL /LPF (ref 0–5)
GLUCOSE UR STRIP.AUTO-MCNC: NEGATIVE MG/DL
HGB UR QL STRIP: ABNORMAL
KETONES UR QL STRIP.AUTO: 80 MG/DL
LEUKOCYTE ESTERASE UR QL STRIP.AUTO: ABNORMAL
MUCOUS THREADS URNS QL MICRO: ABNORMAL /LPF
NITRITE UR QL STRIP.AUTO: NEGATIVE
PH UR STRIP: 6 [PH] (ref 5–8)
PROT UR STRIP-MCNC: ABNORMAL MG/DL
RBC #/AREA URNS HPF: ABNORMAL /HPF (ref 0–5)
SENTARA SPECIMEN COL,SENBCF: NORMAL
SP GR UR REFRACTOMETRY: 1.03 (ref 1–1.03)
UROBILINOGEN UR QL STRIP.AUTO: 4 EU/DL (ref 0.2–1)
WBC URNS QL MICRO: ABNORMAL /HPF (ref 0–5)

## 2022-05-23 PROCEDURE — 99001 SPECIMEN HANDLING PT-LAB: CPT

## 2022-05-23 PROCEDURE — 81001 URINALYSIS AUTO W/SCOPE: CPT

## 2022-05-24 LAB — RESULT: ABNORMAL

## 2022-05-31 ENCOUNTER — HOSPITAL ENCOUNTER (OUTPATIENT)
Dept: BARIATRICS/WEIGHT MGMT | Age: 32
Discharge: HOME | End: 2022-05-31

## 2022-05-31 NOTE — PROGRESS NOTES
Spoke with Ken Tovar today. Pt is approx. 2 weeks S/P conversion of sleeve gastrectomy to LGBP with DAGO > 30 min. . Tolerating liquid diet very well. Protein shake intake: Nectar 1/d (23 g/svg), Ready Protein Water 1/d (20 g/svg), Chobani Complete Yogurt Drink (25 g/svg). Fluid intake: Crystal light, Vitamin Water Zero - >64 oz/d. Foods being consumed include SF popcicle, bone broth, SF pudding, SF jell-O. No complaints. No readmits/surgeries. Wt: 214 lbs. (Pt stated, per home scale)  Pre-Op Wt: 238 lbs. EBW: 75 lbs. Physical Activity:  Walking dogs 2-3x/d and up moving every hour    Supplements:  [x] [place] Complete Chewable MVI BID  [x] Probiotic QD  [x] Prilosec QD      Pt given diet education over the phone. Reviewed diet progression for weeks 3-4. Patient appears to have a good understanding of the diet progression, appropriate food choices, and dietary/exercise habits for successful weight loss and adequate nutrition after surgery. Reviewed pp. 32-46 of the patient education book. Discussed: post-op diet progression, including soft/pureed high protein, low-fat, low-sugar food recommendations; proper food group choices;  consumption of food and liquids, and consumption of adequate no-sugar, no caffeine, no carbonation liquids. We reviewed appropriate food choices, meal adaptations (use of smaller dishes/utensils, eating slowly and chewing sufficiently for digestion), cooking techniques, and eating behavior modifications. Discussed intake regimen with 3 meals and 2-3 protein supplements per day. Additionally, intake timing - to include consuming a meal or snack every 3-4 hrs, the 30:30 rule, and having a meal within 2 hours of waking were discussed. Reinforced the importance of continued vitamin & probiotic consumption, adequate fluid intake with goal of 64 fl. oz./d, and adequate protein intake with goal of  g/d. Stressed the importance of getting 30 min.  of physical activity each day, as tolerated, with restricted lifting, to improve post-op weight loss results and bowel function. Pt voiced understanding through repeating the information back to me. Patient's nutrition-related questions answered. Reminded pt that I would be calling them again at 4 wk post-op. Pt provided with RD contact information and encouraged to contact for any nutrition-related questions or concerns.     Rosalia Ribera, BLADIMIR  5/31/2022

## 2022-06-01 ENCOUNTER — OFFICE VISIT (OUTPATIENT)
Dept: SURGERY | Age: 32
End: 2022-06-01
Payer: MEDICAID

## 2022-06-01 VITALS
BODY MASS INDEX: 32.8 KG/M2 | HEIGHT: 68 IN | OXYGEN SATURATION: 100 % | WEIGHT: 216.4 LBS | DIASTOLIC BLOOD PRESSURE: 69 MMHG | HEART RATE: 79 BPM | TEMPERATURE: 98.7 F | SYSTOLIC BLOOD PRESSURE: 118 MMHG

## 2022-06-01 DIAGNOSIS — Z09 POSTOPERATIVE EXAMINATION: Primary | ICD-10-CM

## 2022-06-01 PROBLEM — Z98.84 S/P GASTRIC BYPASS: Status: ACTIVE | Noted: 2022-06-01

## 2022-06-01 PROCEDURE — 99024 POSTOP FOLLOW-UP VISIT: CPT | Performed by: NURSE PRACTITIONER

## 2022-06-01 RX ORDER — URSODIOL 500 MG/1
500 TABLET, FILM COATED ORAL DAILY
Qty: 30 TABLET | Refills: 4 | Status: SHIPPED | OUTPATIENT
Start: 2022-06-01 | End: 2022-07-01

## 2022-06-01 NOTE — PROGRESS NOTES
Subjective:      Emil Quinteros is a 27 y.o. female is now 2 weeks status post conversion laparoscopic sleeve gastrectomy to gastric bypass with DAGO > 30 minutes. Doing well overall. She has lost a total of 22 pounds since surgery. Body mass index is 33.15 kg/m². Currently on a liquid diet without difficulty. Taking in 64+ oz fluid daily. Sources of protein include protein shakes. No structured exercise, but increasing activity. Bowel movements are regular. The patient is not having any pain. . The patient is compliant with multivitamins. Surgery related complications: NA.  Liver bx report reviewed with patient. Stomach bx report reviewed with patient. Weight Loss Metrics 6/1/2022 5/17/2022 5/4/2022 5/2/2022 1/20/2022 12/14/2021 12/10/2021   Today's Wt 216 lb 6.4 oz 235 lb 4.8 oz 231 lb 237 lb 8 oz 237 lb 236 lb 3.2 oz 236 lb 1.6 oz   BMI 33.15 kg/m2 36.04 kg/m2 35.38 kg/m2 36.11 kg/m2 36.04 kg/m2 35.91 kg/m2 35.9 kg/m2          Comorbidities:    Hypertension: not applicable  Diabetes: resolved after sleeve  Obstructive Sleep Apnea: not applicable  Hyperlipidemia: improved , no medications prior to surgery  Stress Urinary Incontinence: not applicable  Gastroesophageal Reflux: improved, on PPI  Weight related arthropathy:not applicable    Denies diagnosis of COVID-19 since surgery.      Patient Active Problem List   Diagnosis Code    Controlled type 2 diabetes mellitus without complication, without long-term current use of insulin ([facility] [address] E11.9    Hyperlipemia E78.5    Asthma J45.909    IUD (intrauterine device) in place Z80.11    FH: ovarian cancer Z80.41    Intestinal malabsorption K90.9    S/P laparoscopic sleeve gastrectomy Z98.84    GERD (gastroesophageal reflux disease) K21.9    Elevated prolactin level R79.89    HSV-1 infection B00.9    Severe obesity (BMI 35.0-35.9 with comorbidity) ([facility]) E66.01, Z68.35    Narcolepsy G47.419    Uses birth control Z78.9    Severe obesity with body mass index (BMI) of 35.0 to 35.9 and comorbidity (HCC) E66.01, Z68.35    S/P gastric bypass Z98.84        Past Medical History:   Diagnosis Date    Arthritis     Asthma     Chronic pain     Diabetes (HCC)     Type ll    GERD (gastroesophageal reflux disease)     HSV-1 infection     Hyperlipemia     Intestinal malabsorption     Migraine     Narcolepsy     Ovarian cyst     Psychiatric disorder     anxiety, PTSD    PUD (peptic ulcer disease)     Hx of    Severe obesity (BMI 35.0-35.9 with comorbidity) ([facility] [address]     Status post laparoscopic sleeve gastrectomy 2018    Jennifer Hawley Uses birth control     IUD       Past Surgical History:   Procedure Laterality Date    HX BILATERAL SALPINGECTOMY      HX  SECTION      X 2    HX GASTRIC BYPASS  2022    Dr Sandra Rodrigez, conversion from sleeve    HX GYN Bilateral     Salpingectlomy    AL LAP, SARA RESTRICT PROC, LONGITUDINAL GASTRECTOMY      3/2018       Current Outpatient Medications   Medication Sig Dispense Refill    multivitamin with iron (FLINTSTONES) chewable tablet Take 1 Tablet by mouth daily.  ursodioL (LEVON Forte) 500 mg tablet Take 1 Tablet by mouth daily for 30 days. 30 Tablet 4    ondansetron (ZOFRAN ODT) 8 mg disintegrating tablet Take 1 Tablet by mouth every eight (8) hours as needed for Nausea or Vomiting. 30 Tablet 0    pantoprazole (PROTONIX) 40 mg tablet TAKE 1 TABLET BY MOUTH EVERY DAY 30 Tablet 2    modafiniL (PROVIGIL) 200 mg tablet Every 3 hours while awake      ALPRAZolam (XANAX) 0.5 mg tablet Take 0.5 mg by mouth as needed.  levonorgestrel (MIRENA) 20 mcg/24 hr (5 years) IUD 1 Device.  B.infantis-B.ani-B.long-B.bifi (PROBIOTIC 4X) 10-15 mg TbEC Take  by mouth.  albuterol (PROVENTIL HFA, VENTOLIN HFA, PROAIR HFA) 90 mcg/actuation inhaler Take 2 Puffs by inhalation every four (4) hours as needed.  Blood-Glucose Meter monitoring kit 1 Units by Other route.  (Patient not taking: Reported on 6/1/2022)      EPINEPHrine (EPIPEN) 0.3 mg/0.3 mL injection 0.3 mg by IntraMUSCular route once as needed. (Patient not taking: Reported on 5/17/2022)      glucose blood VI test strips (ASCENSIA AUTODISC VI, ONE TOUCH ULTRA TEST VI) strip 50 Each. (Patient not taking: Reported on 6/1/2022)      diclofenac (VOLTAREN) 1 % gel APPLY 2 GRAMS AA Q 8 H PRN P (Patient not taking: Reported on 5/4/2022)  0    Lancets misc 1 Each. (Patient not taking: Reported on 6/1/2022)         Allergies   Allergen Reactions    Latex Rash, Itching and Swelling    Doxycycline Swelling     Throat swelling    Mushroom Anaphylaxis     Other reaction(s): anaphylaxis/angioedema    Amoxicillin Rash    Dog Dander Hives     When patient went for allergy testing she tested positive for dogs and cats.     Erythromycin Rash    Lactose Nausea and Vomiting     intolerance  Other reaction(s): gi distress  intolerance    Metformin Seizures     seizure    Mold Other (comments)    Penicillins Rash and Itching     Other reaction(s): unknown    Rocephin [Ceftriaxone] Rash    Sulfa (Sulfonamide Antibiotics) Swelling, Itching and Unknown (comments)       Review of Symptoms:       General - No history or complaints of unexpected fever or chills  Head/Neck - No history or complaints of headache or dizziness  Cardiac - No history or complaints of chest pain, palpitations, or shortness of breath  Pulmonary - No history or complaints of shortness of breath or productive cough  Gastrointestinal - as noted above  Genitourinary - No history or complaints of hematuria/dysuria or renal lithiasis  Musculoskeletal - No history or complaints of joint  muscular weakness  Hematologic - No history of any bleeding episodes  Neurologic - No history or complaints of  migraine headaches or neurologic symptoms        Objective:     Visit Vitals  /69 (BP 1 Location: Left upper arm, BP Patient Position: Sitting, BP Cuff Size: Large adult)   Pulse 79   Temp 98.7 °F (37.1 °C)   Ht 5' 7.75\" (1.721 m)   Wt 98.2 kg (216 lb 6.4 oz)   SpO2 100%   BMI 33.15 kg/m²       General:  alert, cooperative, no distress, appears stated age   Chest: lungs clear to auscultation, breath sounds equal and symmetric, no rhonchi, rales or wheezes, no accessory muscle use   Cor:   Regular rate and rhythm, S1S2 present or without murmur or extra heart sounds   Abdomen: soft, bowel sounds active, non-tender, no masses or organomegaly   Incisions:   healing well, no drainage, no erythema, no hernia, no seroma, no swelling, no dehiscence, incision well approximated     A:  Gastric cardia, biopsy        No specific pathologic Normality.        No dysplasia or malignancy identified. B:  Liver, wedge biopsy        Mild steatosis (approximately 5%).      No bridging fibrosis or cirrhosis identified. C:  Stomach, partial gastrectomy        Focal serosal adhesions.        No dysplasia or malignancy identified. Assessment:   History of Morbid obesity, status post laparoscopic gastric bypass surgery. Doing well postoperatively. Plan:     1. Increase activity to the goal of 30 minutes daily  2. Advance diet to soft solid phase. Reminded to measure portions, continue high protein, low carbohydrate diet. Reminded to eat regularly, to eat slowly & not to drink with meals. Refer to the handbook given in class. 3. Continue multivitamin   4. Continue current medications and follow up with PCP for management of regimen. 5. Encouraged to attend support group   6. I have discussed this plan with patient and they verbalized understanding  7. Follow up in 2 weeks or sooner if patient has questions, concerns or worsening of condition, if unable to reach our office, patient should report to the ED. 8. Ms. Carter Regalado has a reminder for a \"due or due soon\" health maintenance.  I have asked that she contact her primary care provider for a follow-up on this health maintenance.

## 2022-06-17 ENCOUNTER — HOSPITAL ENCOUNTER (OUTPATIENT)
Dept: BARIATRICS/WEIGHT MGMT | Age: 32
Discharge: HOME | End: 2022-06-17

## 2022-06-17 VITALS — HEIGHT: 68 IN | WEIGHT: 210 LBS | BODY MASS INDEX: 31.83 KG/M2

## 2022-06-17 NOTE — PROGRESS NOTES
Patient is a 27 y.o. female approx. 1 mo S/P conversion laparoscopic sleeve gastrectomy to gastric bypass with DAGO > 30 minutes. Visit Vitals  Ht 5' 7.75\" (1.721 m)   Wt 95.3 kg (210 lb)   BMI 32.17 kg/m²       Pt current weight stated, visit was virtual.     Pre-op Wt: 238 lbs  EBW: 75 lbs    Pt has lost 28 lbs since surgery on 5/17/22. Pt has lost 37% EBW. Pt is currently on a soft food diet without difficulty. Patient is hydrating with protein shakes (2/d), protein water (1d), water, Vitamin Water Zero - >64 ounces, daily. Patient is tolerating the following sources of protein:  Ground chicken, meatballs, shrimp, crab, flounder. Reports eating 4-5 meals/d, meals take approximately 30-45 min to complete, and portion sizes are approximately 1-2 oz. Patient is currently anirudh, walking,  for exercise, 60min/day  7x/wk. Patient [] has  [x] has not had any readmissions, reoperations, complications, ED visits, nor [facility] during her first post-op month. Pt [x]is []is not taking her  Protonix. Supplements:  [x] [employer] Complete MVI  BID  [x] Probiotic      Reviewed the following with patient:  1. Advance diet to solid phase. Reminded to measure portions, continue high protein, low carbohydrate diet. Reminded to eat regularly, to eat slowly & not to drink with meals. Refer to the handbook and video. 2. Continue multi-vitamin with iron and add the following supplements  (as listed in handbook):  o Calcium citrate: 1,500 mg/d, taken in doses of 500 mg TID, 2 hours apart from MVI doses  o Vitamin B-complex: one a day  o Vitamin B12: 1,000 mcg daily taken sublingually  o Vitamin D3: 3,000 IU per day  3. Can stop probiotic, if desired, or needed for economical reasons. 4. Continue cardio exercise and add resistance exercises. Discussed with patient. Minimum of 30 minutes of exercise daily, five days a week. 5. Encouraged to attend monthly support group.   6. Start 500mg Lizeth Forte today, stop after 6 months out from surgery. 7. Continue current medications and follow up with PCP for management of regimen. I have discussed this plan with patient, and they verbalized understanding. One-month nutrition video to include the above mentioned education and link for support group e-mailed to patient. RD contact information provided for nutrition-related questions. Follow-up with provider at three-month appointment or sooner if patient has questions, concerns, or worsening of condition. If unable to reach our office, patient should report to the ED.      Samir Castano RD

## 2022-09-09 ENCOUNTER — OFFICE VISIT (OUTPATIENT)
Dept: SURGERY | Age: 32
End: 2022-09-09
Payer: MEDICAID

## 2022-09-09 VITALS
DIASTOLIC BLOOD PRESSURE: 70 MMHG | BODY MASS INDEX: 30.92 KG/M2 | HEART RATE: 83 BPM | SYSTOLIC BLOOD PRESSURE: 120 MMHG | HEIGHT: 68 IN | WEIGHT: 204 LBS | TEMPERATURE: 97.1 F

## 2022-09-09 DIAGNOSIS — K21.9 GASTROESOPHAGEAL REFLUX DISEASE, UNSPECIFIED WHETHER ESOPHAGITIS PRESENT: ICD-10-CM

## 2022-09-09 DIAGNOSIS — Z98.84 S/P GASTRIC BYPASS: ICD-10-CM

## 2022-09-09 DIAGNOSIS — K90.9 INTESTINAL MALABSORPTION, UNSPECIFIED TYPE: Primary | ICD-10-CM

## 2022-09-09 PROBLEM — E66.01 SEVERE OBESITY WITH BODY MASS INDEX (BMI) OF 35.0 TO 35.9 AND COMORBIDITY (HCC): Status: RESOLVED | Noted: 2022-05-17 | Resolved: 2022-09-09

## 2022-09-09 PROCEDURE — 99214 OFFICE O/P EST MOD 30 MIN: CPT | Performed by: NURSE PRACTITIONER

## 2022-09-09 RX ORDER — URSODIOL 500 MG/1
500 TABLET, FILM COATED ORAL DAILY
COMMUNITY
Start: 2022-08-06

## 2022-09-09 RX ORDER — ERGOCALCIFEROL 1.25 MG/1
50000 CAPSULE ORAL
COMMUNITY

## 2022-09-09 NOTE — PATIENT INSTRUCTIONS
[employer] or [employer] Geena  80-90g protein  800-1000 calories   Keep carbs below 50g       You need to get more aggressive with your bowel regimen. Constipation is very common for several reasons including pain medications, protein shakes, decreased intake, etc.    The medications for constipation on this list are available over-the-counter at most drug or grocery stores. GET THINGS MOVING   TAKE 1 capful or packet of Miralax (polyethylene glycol) mixed with at least 8 ounces of water or juice 2 times daily, AND / OR   TAKE 1 tablet of Senna-S (sennosides / docusate) 2 times daily. Once you are regular, you may adjust as needed (for example, stop Senna-S and continue Miralax). If you don't have a BM for a total of 3 days, move to Step 2.     2. KEEP THINGS MOVING   INCREASE Senna-S to 2 tablets 2 times daily, AND CONTINUE Miralax, taking 1 capful or packet mixed with at least 8 ounces of water or juice 2 times daily   Once you are regular, you may adjust as needed. If you don't have a BM for a total of of 5 days, begin Step 3.     3. REALLY GET THINGS MOVING   ADD 1 dose (30 ml), of Milk of Magnesia (magnesium hydroxide). If you are able to have a BM return to Step 2 until you are done using opioids or you have constipation or diarrhea. If you don't have a BM within 8 hours,     ADD 1 tablet (10 mg) of Dulcolax (bisacodyl) OR 1 rectal suppository. If you are able to have a bowel movement return to Step 2. If you don't have a BM,     TAKE another dose of Milk of Magnesia and 1 tablet of Dulcolax. If you are able to have a BM return to Step 2. If you don't have a BM or have continued symptoms, move to Step 4.     4. REALLY, REALLY GET THINGS MOVING   TAKE ½ to 1 bottle of magnesium citrate   Once you finally have a BM, return to Step 2   If you don't have a bowel movement while you are using opioids or symptoms of constipation continue, call the office.      Aj Han, AMANDA-BC Patient Instructions      Remember hydration goals - minimum of 64 ounces of liquids per day (dehydration is the number one reason for hospital readmission). Continue to monitor carbohydrate and protein intake you need a minimum of  Grams of protein daily- remember to keep your total carbohydrates to 50 grams or less per day for best results. Continue to work towards exercise goals - 60-90 minutes, 5 times a week minimum of deliberate, aerobic exercise is the ultimate goal with strength training 2 times each week. Refer to [employer] for  information. Remember to take vitamins as directed. Attend support group the 2nd Thursday of each month. 6.  Constipation: Milk of Magnesia is for immediate relief only. Miralax is to be used every day if constipation is a chronic problem. 7.  Diarrhea: patients will occasionally develop lactose intolerance after surgery. Check to see if your protein shake has whey in it. If it does try a protein powder or drink that does not have whey and stop all yogurts, cheeses and milks to see if the diarrhea goes away. 8.  If you have had labs drawn. We will only call you if you have abnormal results. Otherwise you can access the lab results in \"[employer]\". You will only need the access code the first time you sign on. 9.  Call us at [phone] or email us through [facility]" with questions,     concerns or worsening of condition, we have someone on call 24 hours a day. If you are unable to reach our office, you are to go to your Primary Care Physician or the Emergency Department.      NOTE TO GASTRIC BYPASS PATIENTS:  (SAME APPLIES TO GASTRIC SLEEVE PATIENTS FOR FIRST TWO MONTHS)  Remember that for the rest of your life, you are not able to take the following:  - NSAIDs (ibuprofen, goody powder, BC powder, Motrin, Advil, Mobic, Voltaren, Excedrin, etc.)  - Steroid pills or injections  - Smoke (cigarettes or recreational drugs)  - Alcohol  Use of any of the above may cause ulcers in your stomach which may perforate causing a medical emergency and surgery. Speak to our medical staff if another medical provider requires you to take steroids or NSAIDs. Supplement Resource Guide    Importance of Protein:   Maintains lean body mass, produces antibodies to fight off infections, heals wounds, minimizes hair loss, helps to give you energy, helps with satiety, and keeping you full between meals. Importance of Calcium:  Needed for healthy bones and teeth, normal blood clotting, and nervous system functioning, higher risk of osteoporosis and bone disease with non-compliance. Importance of Multivitamins: Many functions. Supply you with extra nutrients that you may be missing from food. May lead to iron deficiency anemia, weakness, fatigue, and many other symptoms with non-compliance. Importance of B Vitamins:  Important for red blood cell formation, metabolism, energy, and helps to maintain a healthy nervous system. Protein Supplement  Find one you like now. Use immediately after surgery. Look for:  35-50g protein each day from your protein supplement once you reach the progression diet. 0-3 g fat per serving  0-3 g sugar per serving    Protein drinks should be split in separate dosages. Recommend: Lifelong  1 year + Calcium Supplement:     Start taking within a month after surgery. Look for: Calcium Citrate Plus D (1500 mg per day)  Recommend: Citracal     .            Avoid chocolate chewable calcium. Can use chewable bariatric or [employer] brand or similar chewable. The body cannot absorb more than 500-600 mg of calcium at a time. Take for Life Multi-vitamin Supplement:      Start immediately after surgery: any complete chewable, such as: [employer]s Complete chewables. Avoid [employer] sours or gummies.   They lack iron and other important nutrients and also have added sugar.    Continue with chewable vitamin or change to adult complete multivitamin one month after surgery. Menstruating women can take a prenatal vitamin. Make sure has at least 18 mg iron and 987-034 mcg folic acid   Vitamin B01, B Complex Vitamin, and Biotin  Start taking within a month after surgery. Vitamin B12:  1000 mcg of Vitamin B12 three times weekly    Must take sublingually (meaning you take it under your tongue) or in a liquid drop form for easy absorption. B Complex Vitamin: Take a pill or liquid drop form once daily. Biotin: This vitamin can help prevent hair loss.     Recommend 5mg   (5000 mcg) a day  Biotin is Optional

## 2022-09-09 NOTE — PROGRESS NOTES
Subjective:      Matthias Ratliff is a 27 y.o. female is now 3.75 months status post  conversion laparoscopic sleeve gastrectomy to gastric bypass . Doing well overall. She has lost a total of 34 pounds since surgery. Body mass index is 31.25 kg/m². Has lost 36% of EBW. Currently on a solid food diet without difficulty, reports  occ reflux and denies vomiting, abdominal pain, difficulty swallowing, nausea and reflux. The patients diet choices have been reviewed today and counseling was given. Fluid intake: good      Protein intake: 1 shake + food; beef jerky, chicken      Meals/day: 3-4     Was exercising regularly until got sick with flu last month, then decreased. Bowel movements are constipated. The patient is not having any pain. . The patient is compliant with multivitamins, calcium, Vit D and B12 supplements.      Weight Loss Metrics 9/9/2022 6/17/2022 6/1/2022 5/17/2022 5/4/2022 5/2/2022 1/20/2022   Today's Wt 204 lb 210 lb 216 lb 6.4 oz 235 lb 4.8 oz 231 lb 237 lb 8 oz 237 lb   BMI 31.25 kg/m2 32.17 kg/m2 33.15 kg/m2 36.04 kg/m2 35.38 kg/m2 36.11 kg/m2 36.04 kg/m2          Comorbidities:    Hypertension: not applicable  Diabetes: resolved after sleeve  Obstructive Sleep Apnea: not applicable  Hyperlipidemia: improved , no medications prior to surgery  Stress Urinary Incontinence: not applicable  Gastroesophageal Reflux:unchanged, on PPI  Weight related arthropathy:not applicable     Patient Active Problem List   Diagnosis Code    Controlled type 2 diabetes mellitus without complication, without long-term current use of insulin (HCC) E11.9    Hyperlipemia E78.5    Asthma J45.909    IUD (intrauterine device) in place Z97.5    FH: ovarian cancer Z80.41    Intestinal malabsorption K90.9    S/P laparoscopic sleeve gastrectomy Z98.84    GERD (gastroesophageal reflux disease) K21.9    Elevated prolactin level R79.89    HSV-1 infection B00.9    Severe obesity (BMI 35.0-35.9 with comorbidity) ([address] E66.01, Z68.35    Narcolepsy G47.419    Uses birth control Z78.9    Severe obesity with body mass index (BMI) of 35.0 to 35.9 and comorbidity ([facility]) E66.01, Z68.35    S/P gastric bypass Z98.84        Past Medical History:   Diagnosis Date    Arthritis     Asthma     Chronic pain     Diabetes (HCC)     Type ll    GERD (gastroesophageal reflux disease)     HSV-1 infection     Hyperlipemia     Intestinal malabsorption     Migraine     Narcolepsy     Ovarian cyst     Psychiatric disorder     anxiety, PTSD    PUD (peptic ulcer disease)     Hx of    Severe obesity (BMI 35.0-35.9 with comorbidity) ([facility] [address]     Status post laparoscopic sleeve gastrectomy 2018    Eli    Uses birth control     IUD       Past Surgical History:   Procedure Laterality Date    HX BILATERAL SALPINGECTOMY      HX  SECTION      X 2    HX GASTRIC BYPASS  2022    Dr Jeffry Stout, conversion from sleeve    HX GYN Bilateral     Salpingectlomy    [place] LAP, SARA RESTRICT PROC, LONGITUDINAL GASTRECTOMY      3/2018       Current Outpatient Medications   Medication Sig Dispense Refill    ergocalciferol (ERGOCALCIFEROL) 1,250 mcg (50,000 unit) capsule Take 50,000 Units by mouth. ursodioL (ACTIGALL) 500 mg tablet Take 500 mg by mouth daily. ondansetron (ZOFRAN ODT) 8 mg disintegrating tablet Take 1 Tablet by mouth every eight (8) hours as needed for Nausea or Vomiting. 30 Tablet 0    pantoprazole (PROTONIX) 40 mg tablet TAKE 1 TABLET BY MOUTH EVERY DAY 30 Tablet 2    modafiniL (PROVIGIL) 200 mg tablet Every 3 hours while awake      ALPRAZolam (XANAX) 0.5 mg tablet Take 0.5 mg by mouth as needed. levonorgestrel (MIRENA) 20 mcg/24 hr (5 years) IUD 1 Device. B.animalis,bifid,infantis,long 10-15 mg TbEC Take  by mouth. albuterol (PROVENTIL HFA, VENTOLIN HFA, PROAIR HFA) 90 mcg/actuation inhaler Take 2 Puffs by inhalation every four (4) hours as needed.          Allergies   Allergen Reactions    Latex Rash, Itching and Swelling    Doxycycline Swelling     Throat swelling    Mushroom Anaphylaxis     Other reaction(s): anaphylaxis/angioedema    Amoxicillin Rash    Dog Dander Hives     When patient went for allergy testing she tested positive for dogs and cats.     Erythromycin Rash    Lactose Nausea and Vomiting     intolerance  Other reaction(s): gi distress  intolerance    Metformin Seizures     seizure    Mold Other (comments)    Penicillins Rash and Itching     Other reaction(s): unknown    Rocephin [Ceftriaxone] Rash    Sulfa (Sulfonamide Antibiotics) Swelling, Itching and Unknown (comments)       Review of Symptoms:       General - No history or complaints of unexpected fever or chills  Head/Neck - No history or complaints of headache or dizziness  Cardiac - No history or complaints of chest pain, palpitations, or shortness of breath  Pulmonary - No history or complaints of shortness of breath or productive cough  Gastrointestinal - as noted above  Genitourinary - No history or complaints of hematuria/dysuria or renal lithiasis  Musculoskeletal - No history or complaints of joint  muscular weakness  Hematologic - No history of any bleeding episodes  Neurologic - No history or complaints of  migraine headaches or neurologic symptoms        Objective:     Visit Vitals  /70 (BP 1 Location: Right upper arm, BP Patient Position: Sitting, BP Cuff Size: Adult long)   Pulse 83   Temp 97.1 °F (36.2 °C)   Ht 5' 7.75\" (1.721 m)   Wt 92.5 kg (204 lb)   BMI 31.25 kg/m²       Physical Examination:     General appearance:  alert, cooperative, no distress, appears stated age   Mental status   alert, oriented to person, place, and time   Neck  supple, no significant adenopathy     Lymphatics  no palpable lymphadenopathy, no hepatosplenomegaly   Chest  clear to auscultation, no wheezes, rales or rhonchi, symmetric air entry   Heart  normal rate, regular rhythm, normal S1, S2, no murmurs, rubs, clicks or gallops    Abdomen: soft, nontender, nondistended, no masses or organomegaly   Incision:  Well healed, no hernias      Neurological  alert, oriented, normal speech, no focal findings or movement disorder noted   Musculoskeletal no joint tenderness, deformity or swelling   Extremities peripheral pulses normal, no pedal edema, no clubbing or cyanosis   Skin normal coloration and turgor, no rashes, no suspicious skin lesions noted        Assessment and Plan:   Intestinal malabsorption  continue required Vitamins: B12, B complex, D, iron, calcium, multivitamin  S/p laparoscopic bariatric surgery,laparoscopic gastric bypass surgery , history of morbid obesity. Reviewed weight loss progress and is doing well. Do recommend using food tracking marsha to ensure adequate protein and caloric intake. Aim for 80-90 g protein daily and 800-1000 calories. Keep daily carbs below 50g. Continue to increase exercise. Sleep goal is 7-9 hours each night. Patient education given on the effects of sleep deprivation on weight control. Discussed patients weight loss goals and dietary choices in relation to goals. Reminded to measure portions, continue high protein, low carbohydrate diet. Reminded to eat regularly, to eat slowly & not to drink with meals. Continue cardio exercise and add resistance exercises. 60-90 minutes of aerobic activity 5 days a week and strength training 2 days each week. Encouraged to attend support group   Required fluid intake is >64oz daily of decaffeinated sugar free beverages. 3. Constipation - add Miralax BID, can use milk of magnesia 1-2x/week  4. GERD - continue PPI    Patient to complete labs before next visit. Lab slip given today. I have discussed this plan with patient and they verbalized understanding    30 minutes spent with patient. Follow up in 3 months or sooner if patient has questions, concerns or worsening of condition, if unable to reach our office, patient should report to the ED.     Ms. Michelle Torres has a reminder for a \"due or due soon\" health maintenance. I have asked that she contact her primary care provider for a follow-up on this health maintenance.

## 2023-01-10 RX ORDER — PANTOPRAZOLE SODIUM 40 MG/1
TABLET, DELAYED RELEASE ORAL
Qty: 30 TABLET | Refills: 2 | Status: SHIPPED | OUTPATIENT
Start: 2023-01-10

## 2023-01-12 ENCOUNTER — VIRTUAL VISIT (OUTPATIENT)
Dept: SURGERY | Age: 33
End: 2023-01-12
Payer: MEDICAID

## 2023-01-12 VITALS — WEIGHT: 185 LBS | HEIGHT: 68 IN | BODY MASS INDEX: 28.04 KG/M2

## 2023-01-12 DIAGNOSIS — Z98.84 S/P GASTRIC BYPASS: ICD-10-CM

## 2023-01-12 DIAGNOSIS — K21.9 GASTROESOPHAGEAL REFLUX DISEASE, UNSPECIFIED WHETHER ESOPHAGITIS PRESENT: ICD-10-CM

## 2023-01-12 DIAGNOSIS — K90.9 INTESTINAL MALABSORPTION, UNSPECIFIED TYPE: Primary | ICD-10-CM

## 2023-01-12 RX ORDER — MAGNESIUM 200 MG
1000 TABLET ORAL DAILY
COMMUNITY

## 2023-01-12 RX ORDER — IBUPROFEN 200 MG
CAPSULE ORAL DAILY
COMMUNITY

## 2023-01-12 RX ORDER — MISOPROSTOL 200 UG/1
200 TABLET ORAL 4 TIMES DAILY
Qty: 56 TABLET | Refills: 0 | Status: SHIPPED | OUTPATIENT
Start: 2023-01-12

## 2023-01-12 NOTE — PROGRESS NOTES
Subjective:     Donaldo Davis  is a 27 y.o. female who presents for follow-up about 8 months following  conversion laparoscopic sleeve gastrectomy to gastric bypass  . She has lost a total of 53 pounds since surgery. Body mass index is 28.34 kg/m². . EBWL is (56%). The patient presents today to assess their progress toward their goal of weight loss and to address any issues that may be present. Today the patient and I have reviewed their diet and how appropriate their food choices are. The following issues have been identified - none from a surgical standpoint. Surgery related complication: NA       She reports no real issues and denies vomiting, abdominal pain, difficulty swallowing, nausea and reflux. The patients diet choices have been reviewed today and counseling was given. Fluid intake: good      Protein intake: less shakes (maybe 2x/week); chicken, shrimp, beef, flounder      Meals/day: 4-6    Taking vitamins as recommended. Patients pain score:0/10      The patient's exercise level: moderately active. Changes in her medical history and medications have been reviewed.  Dealing with bronchitis for past 2 weeks, given Medrol dosepak and wanting to start, has not yet    Comorbidities:    Hypertension: not applicable  Diabetes: resolved after sleeve  Obstructive Sleep Apnea: not applicable  Hyperlipidemia: improved , no medications prior to surgery  Stress Urinary Incontinence: not applicable  Gastroesophageal Reflux:improved, on PPI  Weight related arthropathy:not applicable      Patient Active Problem List   Diagnosis Code    Controlled type 2 diabetes mellitus without complication, without long-term current use of insulin ([facility]) E11.9    Hyperlipemia E78.5    Asthma J45.909    IUD (intrauterine device) in place Z97.5    FH: ovarian cancer Z80.41    Intestinal malabsorption K90.9    S/P laparoscopic sleeve gastrectomy Z98.84    GERD (gastroesophageal reflux disease) K21.9    Elevated prolactin level R79.89    HSV-1 infection B00.9    Narcolepsy G47.419    Uses birth control Z78.9    S/P gastric bypass Z98.84     Past Medical History:   Diagnosis Date    Arthritis     Asthma     Chronic pain     Diabetes (HCC)     Type ll    GERD (gastroesophageal reflux disease)     HSV-1 infection     Hyperlipemia     Intestinal malabsorption     Migraine     Narcolepsy     Ovarian cyst     Psychiatric disorder     anxiety, PTSD    PUD (peptic ulcer disease)     Hx of    S/P laparoscopic sleeve gastrectomy 2018    Severe obesity (BMI 35.0-35.9 with comorbidity) ([facility] [address]     Status post laparoscopic sleeve gastrectomy 2018    Eli    Uses birth control     IUD     Past Surgical History:   Procedure Laterality Date    HX BILATERAL SALPINGECTOMY      HX  SECTION      X 2    HX GASTRIC BYPASS  2022    Dr Ada Nettles, conversion from sleeve    HX GYN Bilateral     Salpingectlomy    [place] LAP, SARA RESTRICT PROC, LONGITUDINAL GASTRECTOMY      3/2018     Current Outpatient Medications   Medication Sig Dispense Refill    PNV Comb #2-Iron-FA-Omega 3 29-1-400 mg cmpk Take  by mouth.      calcium citrate 200 mg (950 mg) tablet Take  by mouth daily. cyanocobalamin (VITAMIN B-12) 1,000 mcg sublingual tablet Take 1,000 mcg by mouth daily. pantoprazole (PROTONIX) 40 mg tablet TAKE 1 TABLET BY MOUTH EVERY DAY 30 Tablet 2    ergocalciferol (ERGOCALCIFEROL) 1,250 mcg (50,000 unit) capsule Take 50,000 Units by mouth.      modafiniL (PROVIGIL) 200 mg tablet Every 3 hours while awake      ALPRAZolam (XANAX) 0.5 mg tablet Take 0.5 mg by mouth as needed. levonorgestrel (MIRENA) 20 mcg/24 hr (5 years) IUD 1 Device. B.animalis,bifid,infantis,long 10-15 mg TbEC Take  by mouth. albuterol (PROVENTIL HFA, VENTOLIN HFA, PROAIR HFA) 90 mcg/actuation inhaler Take 2 Puffs by inhalation every four (4) hours as needed. ursodioL (ACTIGALL) 500 mg tablet Take 500 mg by mouth daily.  (Patient not taking: Reported on 1/12/2023)      ondansetron (ZOFRAN ODT) 8 mg disintegrating tablet Take 1 Tablet by mouth every eight (8) hours as needed for Nausea or Vomiting. (Patient not taking: Reported on 1/12/2023) 30 Tablet 0        Review of Symptoms:       General - No history or complaints of unexpected fever or chills  Head/Neck - No history or complaints of headache or dizziness  Cardiac - No history or complaints of chest pain, palpitations, or shortness of breath  Pulmonary - No history or complaints of shortness of breath or productive cough  Gastrointestinal - as noted above  Genitourinary - No history or complaints of hematuria/dysuria or renal lithiasis  Musculoskeletal - No history or complaints of joint  muscular weakness  Hematologic - No history of any bleeding episodes  Neurologic - No history or complaints of  migraine headaches or neurologic symptoms                     Objective:     Visit Vitals   5' 7.75\" (1.721 m)   Wt 83.9 kg (185 lb)   BMI 28.34 kg/m²        Physical Exam:      General appearance - well appearing and in no distress  Mental status - alert, oriented to person, place, and time  Pulmonary - normal respiratory effort  Abdomen - no obvious distention  Neurological - normal speech, no focal findings or movement disorder noted  Extremities - normal movement  Musculoskeletal - moving extremities without difficulty  Skin - no rashes, no suspicious skin lesions noted        Assessment and Plan:   Intestinal malabsorption  continue required Vitamins: B12, B complex, D, iron, calcium, multivitamin  S/p laparoscopic bariatric surgery,laparoscopic gastric bypass surgery , history of morbid obesity. Reviewed weight loss progress and is doing well. Has achieved BMI of 28. Recommend using food tracking marsha to ensure adequate protein and caloric intake. Aim for 80-90 g protein daily and 800-1000 calories. Continue to increase exercise. Sleep goal is 7-9 hours each night.  Patient education given on the effects of sleep deprivation on weight control. Discussed patients weight loss goals and dietary choices in relation to goals. Reminded to measure portions, continue high protein, low carbohydrate diet. Reminded to eat regularly, to eat slowly & not to drink with meals. Continue cardio exercise and add resistance exercises. 60-90 minutes of aerobic activity 5 days a week and strength training 2 days each week. Encouraged to attend support group   Required fluid intake is >64oz daily of decaffeinated sugar free beverages. 3. GERD - continue PPI  4. Steroid exposure - sent cytotec, aware of risk for ulcer/perforation with steroid use in gastric bypass, discussed signs and symptoms. Is on PPI as well. Patient to complete labs before next visit. Lab slip given today. I  have discussed this plan with patient and they verbalized understanding    30 minutes spent with patient. Follow up in 4 months or sooner if patient has questions, concerns or worsening of condition, if unable to reach our office, patient should report to the ED. Ms. Pranav Mcqueen has a reminder for a \"due or due soon\" health maintenance. I have asked that she contact her primary care provider for a follow-up on this health maintenance. Lucy Morrow, was evaluated through a synchronous (real-time) audio-video encounter. The patient (or guardian if applicable) is aware that this is a billable service, which includes applicable co-pays. This Virtual Visit was conducted with patient's (and/or legal guardian's) consent. The visit was conducted pursuant to the emergency declaration under the [address] [place] [address] authority and the [employer] Act. Patient identification was verified, and a caregiver was present when appropriate.   The patient was located at: Home: [address] [address]  The provider was located at: Facility (Appt Department): [address], [address]

## 2023-01-12 NOTE — PATIENT INSTRUCTIONS
Patient Instructions      Remember hydration goals - minimum of 64 ounces of liquids per day (dehydration is the number one reason for hospital readmission). Continue to monitor carbohydrate and protein intake you need a minimum of  Grams of protein daily- remember to keep your total carbohydrates to 50 grams or less per day for best results. Continue to work towards exercise goals - 60-90 minutes, 5 times a week minimum of deliberate, aerobic exercise is the ultimate goal with strength training 2 times each week. Refer to [employer] for  information. Remember to take vitamins as directed. Attend support group the 2nd Thursday of each month. 6.  Constipation: Milk of Magnesia is for immediate relief only. Miralax is to be used every day if constipation is a chronic problem. 7.  Diarrhea: patients will occasionally develop lactose intolerance after surgery. Check to see if your protein shake has whey in it. If it does try a protein powder or drink that does not have whey and stop all yogurts, cheeses and milks to see if the diarrhea goes away. 8.  If you have had labs drawn. We will only call you if you have abnormal results. Otherwise you can access the lab results in \"[employer]\". You will only need the access code the first time you sign on. 9.  Call us at [phone] or email us through [email or web address] with questions,     concerns or worsening of condition, we have someone on call 24 hours a day. If you are unable to reach our office, you are to go to your Primary Care Physician or the Emergency Department.      NOTE TO GASTRIC BYPASS PATIENTS:  (SAME APPLIES TO GASTRIC SLEEVE PATIENTS FOR FIRST TWO MONTHS)  Remember that for the rest of your life, you are not able to take the following:  - NSAIDs (ibuprofen, goody powder, BC powder, Motrin, Advil, Mobic, Voltaren, Excedrin, etc.)  - Steroid pills or injections  - Smoke (cigarettes or recreational drugs)  - Alcohol  Use of any of the above may cause ulcers in your stomach which may perforate causing a medical emergency and surgery. Speak to our medical staff if another medical provider requires you to take steroids or NSAIDs. Supplement Resource Guide    Importance of Protein:   Maintains lean body mass, produces antibodies to fight off infections, heals wounds, minimizes hair loss, helps to give you energy, helps with satiety, and keeping you full between meals. Importance of Calcium:  Needed for healthy bones and teeth, normal blood clotting, and nervous system functioning, higher risk of osteoporosis and bone disease with non-compliance. Importance of Multivitamins: Many functions. Supply you with extra nutrients that you may be missing from food. May lead to iron deficiency anemia, weakness, fatigue, and many other symptoms with non-compliance. Importance of B Vitamins:  Important for red blood cell formation, metabolism, energy, and helps to maintain a healthy nervous system. Protein Supplement  Find one you like now. Use immediately after surgery. Look for:  35-50g protein each day from your protein supplement once you reach the progression diet. 0-3 g fat per serving  0-3 g sugar per serving    Protein drinks should be split in separate dosages. Recommend: Lifelong  1 year + Calcium Supplement:     Start taking within a month after surgery. Look for: Calcium Citrate Plus D (1500 mg per day)  Recommend: Citracal     .            Avoid chocolate chewable calcium. Can use chewable bariatric or [employer] brand or similar chewable. The body cannot absorb more than 500-600 mg of calcium at a time. Take for Life Multi-vitamin Supplement:      Start immediately after surgery: any complete chewable, such as: [place]s Complete chewables. Avoid [place] sours or gummies.   They lack iron and other important nutrients and also have added sugar. Continue with chewable vitamin or change to adult complete multivitamin one month after surgery. Menstruating women can take a prenatal vitamin. Make sure has at least 18 mg iron and 958-083 mcg folic acid   Vitamin Y35, B Complex Vitamin, and Biotin  Start taking within a month after surgery. Vitamin B12:  1000 mcg of Vitamin B12 three times weekly    Must take sublingually (meaning you take it under your tongue) or in a liquid drop form for easy absorption. B Complex Vitamin: Take a pill or liquid drop form once daily. Biotin: This vitamin can help prevent hair loss. Recommend 5mg   (5000 mcg) a day  Biotin is Optional           Learning About Being Physically Active  What is physical activity? Being physically active means doing any kind of activity that gets your body moving. The types of physical activity that can help you get fit and stay healthy include:  Aerobic or \"cardio\" activities. These make your heart beat faster and make you breathe harder, such as brisk walking, riding a bike, or running. They strengthen your heart and lungs and build up your endurance. Strength training activities. These make your muscles work against, or \"resist,\" something. Examples include lifting weights or doing push-ups. These activities help tone and strengthen your muscles and bones. Stretches. These let you move your joints and muscles through their full range of motion. Stretching helps you be more flexible. Reaching a balance between these three types of physical activity is important because each one contributes to your overall fitness. What are the benefits of being active? Being active is one of the best things you can do for your health. It helps you to:  Feel stronger and have more energy to do all the things you like to do. Focus better at school or work. Feel, think, and sleep better. Reach and stay at a healthy weight. Lose fat and build lean muscle.   Lower your risk for serious health problems, including diabetes, heart attack, high blood pressure, and some cancers. Keep your heart, lungs, bones, muscles, and joints strong and healthy. How can you make being active part of your life? Start slowly. Make it your long-term goal to get at least 30 minutes of exercise on most days of the week. Walking is a good choice. You also may want to do other activities, such as running, swimming, cycling, or playing tennis or team sports. Pick activities that you like--ones that make your heart beat faster, your muscles stronger, and your muscles and joints more flexible. If you find more than one thing you like doing, do them all. You don't have to do the same thing every day. Get your heart pumping every day. Any activity that makes your heart beat faster and keeps it at that rate for a while counts. Here are some great ways to get your heart beating faster:  Go for a brisk walk, run, or bike ride. Go for a hike or swim. Go in-line skating. Play a game of touch football, basketball, or soccer. Ride a bike. Play tennis or racquetball. Climb stairs. Even some household chores can be aerobic--just do them at a faster pace. Vacuuming, raking or mowing the lawn, sweeping the garage, and washing and waxing the car all can help get your heart rate up. Strengthen your muscles during the week. You don't have to lift heavy weights or grow big, bulky muscles to get stronger. Doing a few simple activities that make your muscles work against, or \"resist,\" something can help you get stronger. For example, you can:  Do push-ups or sit-ups, which use your own body weight as resistance. Lift weights or dumbbells or use stretch bands at home or in a gym or community center. Stretch your muscles often. Stretching will help you as you become more active. It can help you stay flexible, loosen tight muscles, and avoid injury.  It can also help improve your balance and posture and can be a great way to relax. Be sure to stretch the muscles you'll be using when you work out. It's best to warm your muscles slightly before you stretch them. Walk or do some other light aerobic activity for a few minutes, and then start stretching. When you stretch your muscles:  Do it slowly. Stretching is not about going fast or making sudden movements. Don't push or bounce during a stretch. Hold each stretch for at least 15 to 30 seconds, if you can. You should feel a stretch in the muscle, but not pain. Breathe out as you do the stretch. Then breathe in as you hold the stretch. Don't hold your breath. If you're worried about how more activity might affect your health, have a checkup before you start. Follow any special advice your doctor gives you for getting a smart start. Where can you learn more? Go to http://www.gray.com/  Enter [id number] in the search box to learn more about \"Learning About Being Physically Active. \"  Current as of: January 26, 2022               Content Version: 13.4  © 2006-2022 [employer]. Care instructions adapted under license by [employer] (which disclaims liability or warranty for this information). If you have questions about a medical condition or this instruction, always ask your healthcare professional. [address] any warranty or liability for your use of this information.

## 2023-01-29 ENCOUNTER — APPOINTMENT (OUTPATIENT)
Dept: GENERAL RADIOLOGY | Age: 33
End: 2023-01-29
Attending: PHYSICIAN ASSISTANT
Payer: MEDICAID

## 2023-01-29 ENCOUNTER — HOSPITAL ENCOUNTER (OUTPATIENT)
Age: 33
Setting detail: OBSERVATION
Discharge: HOME | End: 2023-01-31
Attending: EMERGENCY MEDICINE | Admitting: FAMILY MEDICINE
Payer: MEDICAID

## 2023-01-29 ENCOUNTER — APPOINTMENT (OUTPATIENT)
Dept: CT IMAGING | Age: 33
End: 2023-01-29
Attending: PHYSICIAN ASSISTANT
Payer: MEDICAID

## 2023-01-29 ENCOUNTER — APPOINTMENT (OUTPATIENT)
Dept: MRI IMAGING | Age: 33
End: 2023-01-29
Attending: PHYSICIAN ASSISTANT
Payer: MEDICAID

## 2023-01-29 DIAGNOSIS — N20.0 KIDNEY STONE: ICD-10-CM

## 2023-01-29 DIAGNOSIS — R29.898 LEFT LEG WEAKNESS: Primary | ICD-10-CM

## 2023-01-29 DIAGNOSIS — R29.898 LEFT ARM WEAKNESS: ICD-10-CM

## 2023-01-29 DIAGNOSIS — R26.9 GAIT DISTURBANCE: ICD-10-CM

## 2023-01-29 DIAGNOSIS — N21.0 BLADDER STONE: ICD-10-CM

## 2023-01-29 DIAGNOSIS — G47.411 CATAPLEXY AND NARCOLEPSY: ICD-10-CM

## 2023-01-29 PROBLEM — R26.2 UNABLE TO AMBULATE: Status: ACTIVE | Noted: 2023-01-29

## 2023-01-29 LAB
ALBUMIN SERPL-MCNC: 3.7 G/DL (ref 3.4–5)
ALBUMIN/GLOB SERPL: 0.9 (ref 0.8–1.7)
ALP SERPL-CCNC: 121 U/L (ref 45–117)
ALT SERPL-CCNC: 24 U/L (ref 13–56)
AMPHET UR QL SCN: NEGATIVE
ANION GAP SERPL CALC-SCNC: 9 MMOL/L (ref 3–18)
APPEARANCE UR: CLEAR
AST SERPL-CCNC: 12 U/L (ref 10–38)
ATRIAL RATE: 102 BPM
BACTERIA URNS QL MICRO: ABNORMAL /HPF
BARBITURATES UR QL SCN: NEGATIVE
BASOPHILS # BLD: 0 K/UL (ref 0–0.1)
BASOPHILS NFR BLD: 0 % (ref 0–2)
BENZODIAZ UR QL: NEGATIVE
BILIRUB SERPL-MCNC: 0.6 MG/DL (ref 0.2–1)
BILIRUB UR QL: NEGATIVE
BUN SERPL-MCNC: 12 MG/DL (ref 7–18)
BUN/CREAT SERPL: 13 (ref 12–20)
CALCIUM SERPL-MCNC: 9.1 MG/DL (ref 8.5–10.1)
CALCULATED P AXIS, ECG09: 58 DEGREES
CALCULATED R AXIS, ECG10: 37 DEGREES
CALCULATED T AXIS, ECG11: 49 DEGREES
CANNABINOIDS UR QL SCN: NEGATIVE
CHLORIDE SERPL-SCNC: 105 MMOL/L (ref 100–111)
CO2 SERPL-SCNC: 24 MMOL/L (ref 21–32)
COCAINE UR QL SCN: NEGATIVE
COLOR UR: YELLOW
CREAT SERPL-MCNC: 0.89 MG/DL (ref 0.6–1.3)
DIAGNOSIS, 93000: NORMAL
DIFFERENTIAL METHOD BLD: ABNORMAL
EOSINOPHIL # BLD: 0.1 K/UL (ref 0–0.4)
EOSINOPHIL NFR BLD: 1 % (ref 0–5)
EPITH CASTS URNS QL MICRO: ABNORMAL /LPF (ref 0–5)
ERYTHROCYTE [DISTWIDTH] IN BLOOD BY AUTOMATED COUNT: 13 % (ref 11.6–14.5)
FLUAV RNA SPEC QL NAA+PROBE: NOT DETECTED
FLUBV RNA SPEC QL NAA+PROBE: NOT DETECTED
GLOBULIN SER CALC-MCNC: 4.1 G/DL (ref 2–4)
GLUCOSE BLD STRIP.AUTO-MCNC: 176 MG/DL (ref 70–110)
GLUCOSE BLD STRIP.AUTO-MCNC: 192 MG/DL (ref 70–110)
GLUCOSE SERPL-MCNC: 166 MG/DL (ref 74–99)
GLUCOSE UR STRIP.AUTO-MCNC: NEGATIVE MG/DL
HCG SERPL QL: NEGATIVE
HCT VFR BLD AUTO: 43 % (ref 35–45)
HDSCOM,HDSCOM: ABNORMAL
HGB BLD-MCNC: 14.9 G/DL (ref 12–16)
HGB UR QL STRIP: ABNORMAL
IMM GRANULOCYTES # BLD AUTO: 0 K/UL (ref 0–0.04)
IMM GRANULOCYTES NFR BLD AUTO: 0 % (ref 0–0.5)
INR PPP: 1 (ref 0.8–1.2)
KETONES UR QL STRIP.AUTO: NEGATIVE MG/DL
LEUKOCYTE ESTERASE UR QL STRIP.AUTO: ABNORMAL
LIPASE SERPL-CCNC: 80 U/L (ref 73–393)
LYMPHOCYTES # BLD: 3.3 K/UL (ref 0.9–3.6)
LYMPHOCYTES NFR BLD: 32 % (ref 21–52)
MCH RBC QN AUTO: 32.1 PG (ref 24–34)
MCHC RBC AUTO-ENTMCNC: 34.7 G/DL (ref 31–37)
MCV RBC AUTO: 92.7 FL (ref 78–100)
METHADONE UR QL: NEGATIVE
MONOCYTES # BLD: 0.6 K/UL (ref 0.05–1.2)
MONOCYTES NFR BLD: 6 % (ref 3–10)
NEUTS SEG # BLD: 6.1 K/UL (ref 1.8–8)
NEUTS SEG NFR BLD: 60 % (ref 40–73)
NITRITE UR QL STRIP.AUTO: NEGATIVE
NRBC # BLD: 0 K/UL (ref 0–0.01)
NRBC BLD-RTO: 0 PER 100 WBC
OPIATES UR QL: POSITIVE
P-R INTERVAL, ECG05: 142 MS
PCP UR QL: NEGATIVE
PH UR STRIP: 7.5 (ref 5–8)
PLATELET # BLD AUTO: 343 K/UL (ref 135–420)
PMV BLD AUTO: 9 FL (ref 9.2–11.8)
POTASSIUM SERPL-SCNC: 4 MMOL/L (ref 3.5–5.5)
PROT SERPL-MCNC: 7.8 G/DL (ref 6.4–8.2)
PROT UR STRIP-MCNC: NEGATIVE MG/DL
PROTHROMBIN TIME: 13.2 SEC (ref 11.5–15.2)
Q-T INTERVAL, ECG07: 356 MS
QRS DURATION, ECG06: 88 MS
QTC CALCULATION (BEZET), ECG08: 463 MS
RBC # BLD AUTO: 4.64 M/UL (ref 4.2–5.3)
RBC #/AREA URNS HPF: ABNORMAL /HPF (ref 0–5)
SARS-COV-2, COV2: NOT DETECTED
SODIUM SERPL-SCNC: 138 MMOL/L (ref 136–145)
SP GR UR REFRACTOMETRY: 1.01 (ref 1–1.03)
TROPONIN-HIGH SENSITIVITY: <3 NG/L (ref 0–54)
UROBILINOGEN UR QL STRIP.AUTO: 1 EU/DL (ref 0.2–1)
VENTRICULAR RATE, ECG03: 102 BPM
WBC # BLD AUTO: 10.2 K/UL (ref 4.6–13.2)
WBC URNS QL MICRO: ABNORMAL /HPF (ref 0–5)

## 2023-01-29 PROCEDURE — 82962 GLUCOSE BLOOD TEST: CPT

## 2023-01-29 PROCEDURE — 83690 ASSAY OF LIPASE: CPT

## 2023-01-29 PROCEDURE — 74011000250 HC RX REV CODE- 250: Performed by: FAMILY MEDICINE

## 2023-01-29 PROCEDURE — 96375 TX/PRO/DX INJ NEW DRUG ADDON: CPT

## 2023-01-29 PROCEDURE — 80053 COMPREHEN METABOLIC PANEL: CPT

## 2023-01-29 PROCEDURE — 74011636637 HC RX REV CODE- 636/637: Performed by: FAMILY MEDICINE

## 2023-01-29 PROCEDURE — 93005 ELECTROCARDIOGRAM TRACING: CPT

## 2023-01-29 PROCEDURE — 72148 MRI LUMBAR SPINE W/O DYE: CPT

## 2023-01-29 PROCEDURE — 70450 CT HEAD/BRAIN W/O DYE: CPT

## 2023-01-29 PROCEDURE — 87636 SARSCOV2 & INF A&B AMP PRB: CPT

## 2023-01-29 PROCEDURE — 96361 HYDRATE IV INFUSION ADD-ON: CPT

## 2023-01-29 PROCEDURE — 74011250637 HC RX REV CODE- 250/637: Performed by: PHYSICIAN ASSISTANT

## 2023-01-29 PROCEDURE — G0378 HOSPITAL OBSERVATION PER HR: HCPCS

## 2023-01-29 PROCEDURE — 85610 PROTHROMBIN TIME: CPT

## 2023-01-29 PROCEDURE — 84484 ASSAY OF TROPONIN QUANT: CPT

## 2023-01-29 PROCEDURE — 99285 EMERGENCY DEPT VISIT HI MDM: CPT

## 2023-01-29 PROCEDURE — 85025 COMPLETE CBC W/AUTO DIFF WBC: CPT

## 2023-01-29 PROCEDURE — 74176 CT ABD & PELVIS W/O CONTRAST: CPT

## 2023-01-29 PROCEDURE — 96374 THER/PROPH/DIAG INJ IV PUSH: CPT

## 2023-01-29 PROCEDURE — 81001 URINALYSIS AUTO W/SCOPE: CPT

## 2023-01-29 PROCEDURE — 71045 X-RAY EXAM CHEST 1 VIEW: CPT

## 2023-01-29 PROCEDURE — 72131 CT LUMBAR SPINE W/O DYE: CPT

## 2023-01-29 PROCEDURE — 74011250636 HC RX REV CODE- 250/636: Performed by: PHYSICIAN ASSISTANT

## 2023-01-29 PROCEDURE — 82607 VITAMIN B-12: CPT

## 2023-01-29 PROCEDURE — 84703 CHORIONIC GONADOTROPIN ASSAY: CPT

## 2023-01-29 PROCEDURE — 70551 MRI BRAIN STEM W/O DYE: CPT

## 2023-01-29 PROCEDURE — 80307 DRUG TEST PRSMV CHEM ANLYZR: CPT

## 2023-01-29 RX ORDER — ONDANSETRON 2 MG/ML
4 INJECTION INTRAMUSCULAR; INTRAVENOUS
Status: DISCONTINUED | OUTPATIENT
Start: 2023-01-29 | End: 2023-01-31 | Stop reason: HOSPADM

## 2023-01-29 RX ORDER — POLYETHYLENE GLYCOL 3350 17 G/17G
17 POWDER, FOR SOLUTION ORAL DAILY PRN
Status: DISCONTINUED | OUTPATIENT
Start: 2023-01-29 | End: 2023-01-31 | Stop reason: HOSPADM

## 2023-01-29 RX ORDER — ENOXAPARIN SODIUM 100 MG/ML
40 INJECTION SUBCUTANEOUS DAILY
Status: DISCONTINUED | OUTPATIENT
Start: 2023-01-30 | End: 2023-01-31 | Stop reason: HOSPADM

## 2023-01-29 RX ORDER — IBUPROFEN 200 MG
16 TABLET ORAL AS NEEDED
Status: DISCONTINUED | OUTPATIENT
Start: 2023-01-29 | End: 2023-01-31 | Stop reason: HOSPADM

## 2023-01-29 RX ORDER — SODIUM CHLORIDE 0.9 % (FLUSH) 0.9 %
5-40 SYRINGE (ML) INJECTION AS NEEDED
Status: DISCONTINUED | OUTPATIENT
Start: 2023-01-29 | End: 2023-01-31 | Stop reason: HOSPADM

## 2023-01-29 RX ORDER — SODIUM CHLORIDE 0.9 % (FLUSH) 0.9 %
5-40 SYRINGE (ML) INJECTION EVERY 8 HOURS
Status: DISCONTINUED | OUTPATIENT
Start: 2023-01-29 | End: 2023-01-31 | Stop reason: HOSPADM

## 2023-01-29 RX ORDER — DEXTROSE MONOHYDRATE 100 MG/ML
0-250 INJECTION, SOLUTION INTRAVENOUS AS NEEDED
Status: DISCONTINUED | OUTPATIENT
Start: 2023-01-29 | End: 2023-01-31 | Stop reason: HOSPADM

## 2023-01-29 RX ORDER — ONDANSETRON 2 MG/ML
4 INJECTION INTRAMUSCULAR; INTRAVENOUS
Status: COMPLETED | OUTPATIENT
Start: 2023-01-29 | End: 2023-01-29

## 2023-01-29 RX ORDER — INSULIN LISPRO 100 [IU]/ML
INJECTION, SOLUTION INTRAVENOUS; SUBCUTANEOUS
Status: DISCONTINUED | OUTPATIENT
Start: 2023-01-29 | End: 2023-01-31 | Stop reason: HOSPADM

## 2023-01-29 RX ORDER — HYDROMORPHONE HYDROCHLORIDE 1 MG/ML
1 INJECTION, SOLUTION INTRAMUSCULAR; INTRAVENOUS; SUBCUTANEOUS
Status: COMPLETED | OUTPATIENT
Start: 2023-01-29 | End: 2023-01-29

## 2023-01-29 RX ORDER — PANTOPRAZOLE SODIUM 40 MG/1
40 TABLET, DELAYED RELEASE ORAL DAILY
Status: DISCONTINUED | OUTPATIENT
Start: 2023-01-30 | End: 2023-01-31 | Stop reason: HOSPADM

## 2023-01-29 RX ORDER — ACETAMINOPHEN 650 MG/1
650 SUPPOSITORY RECTAL
Status: DISCONTINUED | OUTPATIENT
Start: 2023-01-29 | End: 2023-01-31 | Stop reason: HOSPADM

## 2023-01-29 RX ORDER — ONDANSETRON 4 MG/1
4 TABLET, ORALLY DISINTEGRATING ORAL
Status: DISCONTINUED | OUTPATIENT
Start: 2023-01-29 | End: 2023-01-31 | Stop reason: HOSPADM

## 2023-01-29 RX ORDER — ACETAMINOPHEN 325 MG/1
650 TABLET ORAL
Status: DISCONTINUED | OUTPATIENT
Start: 2023-01-29 | End: 2023-01-31 | Stop reason: HOSPADM

## 2023-01-29 RX ORDER — DIAZEPAM 5 MG/1
5 TABLET ORAL ONCE
Status: COMPLETED | OUTPATIENT
Start: 2023-01-29 | End: 2023-01-29

## 2023-01-29 RX ADMIN — Medication 2 UNITS: at 22:34

## 2023-01-29 RX ADMIN — SODIUM CHLORIDE 1000 ML: 9 INJECTION, SOLUTION INTRAVENOUS at 14:00

## 2023-01-29 RX ADMIN — ONDANSETRON 4 MG: 2 INJECTION INTRAMUSCULAR; INTRAVENOUS at 13:59

## 2023-01-29 RX ADMIN — SODIUM CHLORIDE, PRESERVATIVE FREE 10 ML: 5 INJECTION INTRAVENOUS at 22:34

## 2023-01-29 RX ADMIN — DIAZEPAM 5 MG: 5 TABLET ORAL at 17:06

## 2023-01-29 RX ADMIN — HYDROMORPHONE HYDROCHLORIDE 1 MG: 1 INJECTION, SOLUTION INTRAMUSCULAR; INTRAVENOUS; SUBCUTANEOUS at 13:59

## 2023-01-29 NOTE — ED PROVIDER NOTES
[facility] EMERGENCY DEPT  EMERGENCY DEPARTMENT ENCOUNTER       Pt Name: Kannan Dozier  MRN: [id number]  [place] 1990  Date of evaluation: 1/29/2023  Provider: [place] West PA-C   PCP: Lia Saldivar MD  Note Started: 1:32 PM 1/29/23     CHIEF COMPLAINT       Chief Complaint   Patient presents with    Fatigue    Vomiting        HISTORY OF PRESENT ILLNESS: 1 or more elements      History From: Patient and patient's partner  HPI Limitations : Other (patient in distress secondary to pain)     Kannan Dozier is a 27 y.o. female who presents with a chief complaint of lower back pain. Rated the pain as severe and woke her up in the middle of the night. She has associated generalized weakness and her partner at bedside states that she was \"going in and out of consciousness\" however according to the patient she has a history of narcolepsy and has not been taking her medication. The patient also complains that she cannot move her left lower extremity. She does report having a fall onto her coccyx 4 days ago during [address] She was seen in a different emergency department the following day of her fall complaining of left shoulder pain and inability to move her left arm. At that time, she did have an MRI of the cervical spine as well as a left shoulder x-ray. She was placed in an arm sling and provided with pain medication. She also reports that she has not been taking the pain medication. She specifically denies bowel or urinary incontinence. Associated nausea and vomiting that started today. She denies fever. Patient's partner at bedside who states that she had speech difficulty that started more than 24 hours ago, lasted about 9 hours and then subsided. He describes it as difficulty finding certain words. Nursing Notes were all reviewed and agreed with or any disagreements were addressed in the HPI.      REVIEW OF SYSTEMS      Review of Systems   Constitutional:  Negative for activity change, chills and fever. HENT:  Negative for congestion, ear pain, rhinorrhea, sneezing and sore throat. Eyes:  Negative for pain and visual disturbance. Respiratory:  Negative for cough and shortness of breath. Cardiovascular:  Negative for chest pain. Gastrointestinal:  Positive for nausea and vomiting. Negative for abdominal pain and diarrhea. Genitourinary:  Positive for pelvic pain. Negative for dysuria and hematuria. Musculoskeletal:  Positive for arthralgias, back pain and myalgias. Negative for gait problem. Skin:  Negative for rash. Neurological:  Positive for speech difficulty, weakness and numbness. Negative for dizziness and headaches. Psychiatric/Behavioral:  The patient is not nervous/anxious. All other systems reviewed and are negative. Positives and Pertinent negatives as per HPI.     PAST HISTORY     Past Medical History:  Past Medical History:   Diagnosis Date    Arthritis     Asthma     Chronic pain     Diabetes (HCC)     Type ll    GERD (gastroesophageal reflux disease)     HSV-1 infection     Hyperlipemia     Intestinal malabsorption     Migraine     Narcolepsy     Ovarian cyst     Psychiatric disorder     anxiety, PTSD    PUD (peptic ulcer disease)     Hx of    S/P laparoscopic sleeve gastrectomy 2018    Severe obesity (BMI 35.0-35.9 with comorbidity) ([facility] [address]     Status post laparoscopic sleeve gastrectomy 2018    Eli    Uses birth control     IUD       Past Surgical History:  Past Surgical History:   Procedure Laterality Date    HX BILATERAL SALPINGECTOMY      HX  SECTION      X 2    HX GASTRIC BYPASS  2022    Dr Elsy Talbert, conversion from sleeve    HX GYN Bilateral     Salpingectlomy    ND LAP, SARA RESTRICT PROC, LONGITUDINAL GASTRECTOMY      3/2018       Family History:  Family History   Problem Relation Age of Onset    Migraines Mother     Seizures Mother     Other Father         graves ds,     Sickle Cell Trait Brother     Other Brother Asthma Sister     Other Sister         autism    Other Daughter         cp, epilepsy    Asthma Daughter        Social History:  Social History     Tobacco Use    Smoking status: Never    Smokeless tobacco: Never   Vaping Use    Vaping Use: Never used   Substance Use Topics    Alcohol use: No    Drug use: No     Comment: H/O in the past       Allergies: Allergies   Allergen Reactions    Latex Rash, Itching and Swelling    Doxycycline Swelling     Throat swelling    Mushroom Anaphylaxis     Other reaction(s): anaphylaxis/angioedema    Amoxicillin Rash    Dog Dander Hives     When patient went for allergy testing she tested positive for dogs and cats. Erythromycin Rash    Lactose Nausea and Vomiting     intolerance  Other reaction(s): gi distress  intolerance    Metformin Seizures     seizure    Mold Other (comments)    Penicillins Rash and Itching     Other reaction(s): unknown    Rocephin [Ceftriaxone] Rash    Sulfa (Sulfonamide Antibiotics) Swelling, Itching and Unknown (comments)       CURRENT MEDICATIONS      Previous Medications    ALBUTEROL (PROVENTIL HFA, VENTOLIN HFA, PROAIR HFA) 90 MCG/ACTUATION INHALER    Take 2 Puffs by inhalation every four (4) hours as needed. ALPRAZOLAM (XANAX) 0.5 MG TABLET    Take 0.5 mg by mouth as needed. B.ANIMALIS,BIFID,INFANTIS,LONG 10-15 MG TBEC    Take  by mouth. CALCIUM CITRATE 200 MG (950 MG) TABLET    Take  by mouth daily. CYANOCOBALAMIN (VITAMIN B-12) 1,000 MCG SUBLINGUAL TABLET    Take 1,000 mcg by mouth daily. ERGOCALCIFEROL (ERGOCALCIFEROL) 1,250 MCG (50,000 UNIT) CAPSULE    Take 50,000 Units by mouth. LEVONORGESTREL (MIRENA) 20 MCG/24 HR (5 YEARS) IUD    1 Device. MISOPROSTOL (CYTOTEC) 200 MCG TABLET    Take 1 Tablet by mouth four (4) times daily.  Indications: stomach ulcer from aspirin/ibuprofen-like drugs prevention    MODAFINIL (PROVIGIL) 200 MG TABLET    Every 3 hours while awake    PANTOPRAZOLE (PROTONIX) 40 MG TABLET    TAKE 1 TABLET BY MOUTH EVERY DAY    PNV COMB #2-IRON-FA-OMEGA 3 29-1-400 MG CMPK    Take  by mouth. SCREENINGS               No data recorded         PHYSICAL EXAM      ED Triage Vitals [01/29/23 1256]   ED Encounter Vitals Group      BP       Pulse (Heart Rate) (!) 116      Resp Rate 16      Temp 98.8 °F (37.1 °C)      Temp src       O2 Sat (%) 100 %      Weight 195 lb      Height 5' 7\"        Physical Exam  Vitals and nursing note reviewed. Constitutional:       Appearance: Normal appearance. She is well-developed. She is not ill-appearing or diaphoretic. Comments: Hyperventilating, writhing around on stretcher, uncomfortable appearing, no respiratory distress   HENT:      Head: Normocephalic and atraumatic. Nose: Nose normal.      Mouth/Throat:      Mouth: Mucous membranes are moist.      Pharynx: Oropharynx is clear. Eyes:      Extraocular Movements: Extraocular movements intact. Pupils: Pupils are equal, round, and reactive to light. Cardiovascular:      Rate and Rhythm: Regular rhythm. Tachycardia present. Pulses: Normal pulses. Heart sounds: Normal heart sounds. Pulmonary:      Effort: Pulmonary effort is normal. No respiratory distress. Breath sounds: Normal breath sounds. Abdominal:      General: Abdomen is flat. Bowel sounds are normal.      Palpations: Abdomen is soft. Tenderness: There is abdominal tenderness. Musculoskeletal:      Right shoulder: Normal.      Left shoulder: Tenderness present. No swelling or deformity. Decreased range of motion. Decreased strength. Normal pulse. Cervical back: Normal, normal range of motion and neck supple. Thoracic back: Normal.      Lumbar back: Tenderness present. No signs of trauma. Decreased range of motion. Comments: Negative SLR on the right   Skin:     General: Skin is warm and dry. Capillary Refill: Capillary refill takes less than 2 seconds. Neurological:      General: No focal deficit present. Mental Status: She is alert and oriented to person, place, and time. GCS: GCS eye subscore is 4. GCS verbal subscore is 5. GCS motor subscore is 6. Cranial Nerves: Cranial nerves 2-12 are intact. Sensory: Sensation is intact. Motor: Weakness present. No seizure activity. Comments: +stuttering speech, Left LE 2/5 strength, RLE 5/5 strength, RUE 5/5 strength, LUE without weakness but +stiffness and painful ROM with wrist drop        DIAGNOSTIC RESULTS   LABS:     Recent Results (from the past 12 hour(s))   CBC WITH AUTOMATED DIFF    Collection Time: 01/29/23  1:02 PM   Result Value Ref Range    WBC 10.2 4.6 - 13.2 K/uL    RBC 4.64 4.20 - 5.30 M/uL    HGB 14.9 12.0 - 16.0 g/dL    HCT 43.0 35.0 - 45.0 %    MCV 92.7 78.0 - 100.0 FL    MCH 32.1 24.0 - 34.0 PG    MCHC 34.7 31.0 - 37.0 g/dL    RDW 13.0 11.6 - 14.5 %    PLATELET 287 981 - 258 K/uL    MPV 9.0 (L) 9.2 - 11.8 FL    NRBC 0.0 0  WBC    ABSOLUTE NRBC 0.00 0.00 - 0.01 K/uL    NEUTROPHILS 60 40 - 73 %    LYMPHOCYTES 32 21 - 52 %    MONOCYTES 6 3 - 10 %    EOSINOPHILS 1 0 - 5 %    BASOPHILS 0 0 - 2 %    IMMATURE GRANULOCYTES 0 0.0 - 0.5 %    ABS. NEUTROPHILS 6.1 1.8 - 8.0 K/UL    ABS. LYMPHOCYTES 3.3 0.9 - 3.6 K/UL    ABS. MONOCYTES 0.6 0.05 - 1.2 K/UL    ABS. EOSINOPHILS 0.1 0.0 - 0.4 K/UL    ABS. BASOPHILS 0.0 0.0 - 0.1 K/UL    ABS. IMM. GRANS. 0.0 0.00 - 0.04 K/UL    DF AUTOMATED     METABOLIC PANEL, COMPREHENSIVE    Collection Time: 01/29/23  1:02 PM   Result Value Ref Range    Sodium 138 136 - 145 mmol/L    Potassium 4.0 3.5 - 5.5 mmol/L    Chloride 105 100 - 111 mmol/L    CO2 24 21 - 32 mmol/L    Anion gap 9 3.0 - 18 mmol/L    Glucose 166 (H) 74 - 99 mg/dL    BUN 12 7.0 - 18 MG/DL    Creatinine 0.89 0.6 - 1.3 MG/DL    BUN/Creatinine ratio 13 12 - 20      eGFR >60 >60 ml/min/1.73m2    Calcium 9.1 8.5 - 10.1 MG/DL    Bilirubin, total 0.6 0.2 - 1.0 MG/DL    ALT (SGPT) 24 13 - 56 U/L    AST (SGOT) 12 10 - 38 U/L    Alk.  phosphatase 121 (H) 45 - 117 U/L    Protein, total 7.8 6.4 - 8.2 g/dL    Albumin 3.7 3.4 - 5.0 g/dL    Globulin 4.1 (H) 2.0 - 4.0 g/dL    A-G Ratio 0.9 0.8 - 1.7     TROPONIN-HIGH SENSITIVITY    Collection Time: 01/29/23  1:02 PM   Result Value Ref Range    Troponin-High Sensitivity <3 0 - 54 ng/L   LIPASE    Collection Time: 01/29/23  1:02 PM   Result Value Ref Range    Lipase 80 73 - 393 U/L   HCG QL SERUM    Collection Time: 01/29/23  1:02 PM   Result Value Ref Range    HCG, Ql. Negative NEG     PROTHROMBIN TIME + INR    Collection Time: 01/29/23  1:02 PM   Result Value Ref Range    Prothrombin time 13.2 11.5 - 15.2 sec    INR 1.0 0.8 - 1.2     EKG, 12 LEAD, INITIAL    Collection Time: 01/29/23  1:02 PM   Result Value Ref Range    Ventricular Rate 102 BPM    Atrial Rate 102 BPM    P-R Interval 142 ms    QRS Duration 88 ms    Q-T Interval 356 ms    QTC Calculation (Bezet) 463 ms    Calculated P Axis 58 degrees    Calculated R Axis 37 degrees    Calculated T Axis 49 degrees    Diagnosis       Poor data quality, interpretation may be adversely affected  Sinus tachycardia  Nonspecific ST abnormality  Abnormal ECG  Confirmed by Ingrid Aguiar MD, [facility] ([id number]) on 1/29/2023 2:00:44 PM     GLUCOSE, POC    Collection Time: 01/29/23  1:03 PM   Result Value Ref Range    Glucose (POC) 176 (H) 70 - 110 mg/dL   URINALYSIS W/ RFLX MICROSCOPIC    Collection Time: 01/29/23  3:03 PM   Result Value Ref Range    Color YELLOW      Appearance CLEAR      Specific gravity 1.011 1.005 - 1.030      pH (UA) 7.5 5.0 - 8.0      Protein Negative NEG mg/dL    Glucose Negative NEG mg/dL    Ketone Negative NEG mg/dL    Bilirubin Negative NEG      Blood LARGE (A) NEG      Urobilinogen 1.0 0.2 - 1.0 EU/dL    Nitrites Negative NEG      Leukocyte Esterase MODERATE (A) NEG     URINE MICROSCOPIC ONLY    Collection Time: 01/29/23  3:03 PM   Result Value Ref Range    WBC 0 to 3 0 - 5 /hpf    RBC 21 to 35 0 - 5 /hpf    Epithelial cells 2+ 0 - 5 /lpf    Bacteria FEW (A) NEG /hospitals        EKG: When ordered, EKG's are interpreted by the Emergency Department Physician in the absence of a cardiologist.  Please see their note for interpretation of EKG. EKG: Sinus tachycardia at 102 bpm, , no STEMI, wandering baseline, interpreted by me     RADIOLOGY:  Non-plain film images such as CT, Ultrasound and MRI are read by the radiologist. Plain radiographic images are visualized and preliminarily interpreted by the ED Provider with the below findings:       Interpretation per the Radiologist below, if available at the time of this note:     MRI BRAIN WO CONT    Result Date: 1/29/2023  MRI of the brain without contrast INDICATION: Left-sided paralysis. TECHNIQUE: Noncontrast MRI of the brain was performed. COMPARISON: Head CT performed the same day. FINDINGS: Ventricles are normal in size. There is no midline shift, mass effect, edema, or hemorrhage. There is no evidence of diffusion restriction. No significant white matter disease. The paranasal sinuses and orbits are unremarkable. Unremarkable MRI of the brain. MRI LUMB SPINE WO CONT    Result Date: 1/29/2023  Comparison: Lumbar spine CT dated 1/29/2023 TECHNIQUE: Noncontrast MRI of the lumbar spine was performed. HISTORY: Left leg weakness. FINDINGS: Vertebral body heights and alignment are within normal limits. Marrow signal is normal with some mild degenerative marrow changes at L1. There are 5 lumbar-type vertebral bodies. The conus terminates at the L1 level. Distal spinal cord signal is normal. There is mild disc bulge at L5-S1 without significant canal stenosis. There is minimal right foraminal narrowing. Visualized soft tissues are unremarkable. No acute pathology. Minimal disc bulge at L5-S1 with minimal right foraminal narrowing. CT HEAD WO CONT    Result Date: 1/29/2023  Indication:  speech difficulty, leg weakness Comparison: None Findings: 5 mm axial images were obtained from the skull base through the vertex. CT dose reduction was achieved through the use of a standardized protocol tailored for this examination and automatic exposure control for dose modulation. The ventricles and cortical sulci are appropriate in size and configuration. There is no evidence of intracranial hemorrhage, mass, mass effect, or acute infarct. No extra-axial fluid collections are seen. The visualized paranasal sinuses and mastoid air cells are clear. The orbital structures are unremarkable. No osseous abnormalities are seen. Normal head CT. CT SPINE LUMB WO CONT    Result Date: 1/29/2023  INDICATION: low back pain and left leg weakness COMPARISON: None. TECHNIQUE:   Noncontrast axial CT imaging of the lumbar spine was performed. Coronal and sagittal reconstructions were obtained. CT dose reduction was achieved through use of a standardized protocol tailored for this examination and automatic exposure control for dose modulation. FINDINGS: Normal alignment of lumbar spine. Vertebral body heights are maintained with no evidence of acute fracture. No evidence of subluxation. Small inferior endplate Schmorl's node formation at L3. No significant loss of disc height, with mild bulging of disc at L3-4, L4-5, L5-S1. No significant spinal canal or foraminal stenosis. Paraspinal soft tissues are unremarkable., However there is right hydroureteronephrosis partly visualized. Punctate calculi in the right kidney, partly visualized. 1. Right hydroureteronephrosis and several punctate right renal calculi partly visualized. If clinically relevant this would be better assessed with abdomen pelvis CT without contrast. 2. Very mild degenerative changes in the lumbar spine without significant spinal canal or foraminal stenosis at any level. CT ABD PELV WO CONT    Result Date: 1/29/2023  INDICATION: pelvic pain, right hydronephrosis COMPARISON: None TECHNIQUE: Noncontrast thin axial images were obtained through the abdomen and pelvis.  Coronal and sagittal reconstructions were generated. CT dose reduction was achieved through use of a standardized protocol tailored for this examination and automatic exposure control for dose modulation. NOTE:  The absence of IV contrast reduces the sensitivity for evaluation of visceral organs and vasculature including presence of mass lesions, hemodynamically significant stenoses, dissections, mucosal abnormalities etc. FINDINGS: LUNG BASES: No abnormality. LIVER: No mass or biliary dilatation. GALLBLADDER: Unremarkable. SPLEEN: No enlargement or lesion. PANCREAS: No mass or ductal dilatation. ADRENALS: No mass. KIDNEYS: Right hydroureteronephrosis and several punctate right renal calculi. There are several calcifications in the right pelvis near the right ureter, measuring 2-3 mm. No left-sided hydronephrosis or nephrolithiasis. GI TRACT:  Remote sleeve gastrectomy. No bowel obstruction. PERITONEUM: No free air or free fluid. APPENDIX: Unremarkable. RETROPERITONEUM: No aortic aneurysm. LYMPH NODES:  None enlarged. ADDITIONAL COMMENTS: N/A. URINARY BLADDER: 2 mm calculus in the midline dependent portion of the bladder. REPRODUCTIVE ORGANS: IUD within the uterus. LYMPH NODES:  None enlarged. FREE FLUID:  Small amount likely physiologic. BONES: No destructive bone lesion. ADDITIONAL COMMENTS: N/A.     1. Right hydroureteronephrosis, with several punctate right renal calculi. While there are several small calcifications in the right pelvis these are likely phleboliths. There is a 2 mm calculus in the midline dependent portion of the bladder. 2. Remote sleeve gastrectomy.         PROCEDURES   Unless otherwise noted below, none  Procedures     CRITICAL CARE TIME       EMERGENCY DEPARTMENT COURSE and DIFFERENTIAL DIAGNOSIS/MDM   Vitals:    Vitals:    01/29/23 1643 01/29/23 1837 01/29/23 1937 01/29/23 1944   BP: 128/71 114/66 118/63    Pulse: 95 100 (!) 105 95   Resp: 16 14 13 18   Temp:       SpO2: 100% 100% 99% 100%   Weight: Height:            Patient was given the following medications:  Medications   sodium chloride (NS) flush 5-40 mL (has no administration in time range)   sodium chloride (NS) flush 5-40 mL (has no administration in time range)   acetaminophen (TYLENOL) tablet 650 mg (has no administration in time range)     Or   acetaminophen (TYLENOL) suppository 650 mg (has no administration in time range)   polyethylene glycol (MIRALAX) packet 17 g (has no administration in time range)   ondansetron (ZOFRAN ODT) tablet 4 mg (has no administration in time range)     Or   ondansetron (ZOFRAN) injection 4 mg (has no administration in time range)   enoxaparin (LOVENOX) injection 40 mg (has no administration in time range)   sodium chloride 0.9 % bolus infusion 1,000 mL (0 mL IntraVENous IV Completed 1/29/23 1837)   HYDROmorphone (DILAUDID) injection 1 mg (1 mg IntraVENous Given 1/29/23 1359)   ondansetron (ZOFRAN) injection 4 mg (4 mg IntraVENous Given 1/29/23 1359)   diazePAM (VALIUM) tablet 5 mg (5 mg Oral Given 1/29/23 1706)       CONSULTS: (Who and What was discussed)  IP CONSULT TO NEUROLOGY  IP CONSULT TO HOSPITALIST    Consult Note:  6:56 PM  Romana Scarlet, PA-C spoke with Dr. Chandrakant Wilkes,  Specialty: Neurology  Discussed pt's hx, disposition, and available diagnostic and imaging results. Reviewed care plans. Dr. Chandrakant Wilkes agrees that patient has no evidence of acute stroke or severe spinal stenosis that would be causing any sort of extremity paralysis or weakness. Agrees that patient should try to get up and walk. Agrees that patient needs outpatient follow-up with neurology for EMG and further evaluation of subjective weakness. Agrees that this may be related to conversion disorder. Consult Note:  8:01 PM  Romana Scarlet, PA-C spoke with Dr. Chacho Fitch,  Specialty: Hospitalist  Discussed pt's hx, disposition, and available diagnostic and imaging results. Reviewed care plans.  Agrees with observation for gait disturbance from supposed cataplexy and PT/OT eval in the morning. Requesting UDS and rapid covid test.    Chronic Conditions: Asthma, PTSD, migraine, laparoscopic gastric sleeve, narcolepsy, diabetes, arthritis, chronic pain    Social Determinants affecting Dx or Tx: None    Records Reviewed (source and summary): Prior medical records, Previous Radiology studies, Previous Laboratory studies, and Nursing notes    CC/HPI Summary, DDx, ED Course, and Reassessment: 73-year-old female is presenting to the emergency department with a chief complaint of severe low back pain and inability to move her left leg with a remote history of speech difficulty which has since resolved. Code stroke was not activated due to symptom onset more than 24 hours ago. The left upper extremity weakness began several days ago when she was seen in a different emergency department for that and diagnosed with injury of the brachial plexus. Today's work-up will include a CT scan of her lumbar spine, CT scan of her head, lab work as she is also complaining of pelvic pain, IV fluids for hydration, pain medication with IV antiemetics as she is also vomiting. Discussed case with the ED attending at patient presentation. Differentials include TIA, spinal stenosis, cauda equina, conversion disorder, acute exacerbation of chronic pain, stroke. ED Course as of 01/29/23 2001   Sun Jan 29, 2023   1526 3:26 PM  The left arm is now relaxed at the patient's bedside however she is only able to move the left index finger and reports decreased sensation of the left upper extremity. She has pain with passive range of motion of the left upper extremity. The right lower extremity has 5 out of 5 strength. The left lower extremity still has 2 out of 5 strength however no pain with passive range of motion. [EC]   [id number] 3:33 PM  Discussed case with ED attending and reviewed MRI of cervical spine from 3 days ago at which time the left lower extremity was not involved.   ED attending agrees with MRI brain without contrast and MRI lumbar spine without contrast. [EC]   1659 4:59 PM  Patient requesting medication prior to MRI for claustrophobia, oral Valium ordered. [EC]   1912 7:12 PM   Discussed plan with patient of gait test after normal MRI of brain and lumbar spine. Patient then states that she does have occasional episodes of cataplexy with her narcolepsy. She is agreeable to follow-up with neurology. [EC]   1940 7:41 PM  Patient unable to stand with walker. She is unable to ambulate to the bathroom. States that her legs are too weak to walk and she is unable to use left arm. Discussed with ED Attending who advises to admit to observation with PT/OT eval in the morning. [EC]      ED Course User Index  [EC] Radha Kuhn PA-C       Disposition Considerations (Tests not done, Shared Decision Making, Pt Expectation of Test or Tx.): MRI brain and MRI of lumbar spine without evidence of stroke or severe spinal stenosis respectively. Discussed case with ED attending as well as neurology. This could be related to the patient's apparent transient cataplexy as there is no other explanation for patient's focal weakness. Patient will be admitted to observation for PT evaluation in the morning as she was unable to ambulate with a walker in the emergency department and it is a safety concern for her to be sent home in this state. Lab work-up is otherwise unremarkable. Incidental finding of kidney stone on the right side which could be contributing to her back pain. FINAL IMPRESSION     1. Left leg weakness    2. Left arm weakness    3. Kidney stone    4. Bladder stone    5. Gait disturbance    6. Cataplexy and narcolepsy          DISPOSITION/PLAN   Admitted    Admit Note: Pt is being admitted by Dr. Hansa Frost. The results of their tests and reason(s) for their admission have been discussed with pt and/or available family.  They convey agreement and understanding for the need to be admitted and for the admission diagnosis. Shared Not Shared MACIEL: I have seen and evaluated the patient in conjunction with my supervising physician, Dr. Alejandra Burch. I am the Primary Clinician of Record. Romel Gong PA-C (electronically signed)    (Please note that parts of this dictation were completed with voice recognition software. Quite often unanticipated grammatical, syntax, homophones, and other interpretive errors are inadvertently transcribed by the computer software. Please disregards these errors.  Please excuse any errors that have escaped final proofreading.)

## 2023-01-29 NOTE — ED TRIAGE NOTES
Patient wheelchaired to ED states that she is now unable to walk, fatigue and nausea.      Left arm injury due to roller durby on Wednesday

## 2023-01-30 PROBLEM — Z91.199 HISTORY OF NONCOMPLIANCE WITH MEDICAL TREATMENT: Status: ACTIVE | Noted: 2023-01-30

## 2023-01-30 PROBLEM — Z63.79 STRESSFUL LIFE EVENT AFFECTING FAMILY: Status: ACTIVE | Noted: 2023-01-30

## 2023-01-30 PROBLEM — M79.602 LEFT ARM PAIN: Status: ACTIVE | Noted: 2023-01-30

## 2023-01-30 PROBLEM — G89.29 CHRONIC PAIN: Status: ACTIVE | Noted: 2023-01-30

## 2023-01-30 LAB
ALBUMIN SERPL-MCNC: 3.1 G/DL (ref 3.4–5)
ALBUMIN/GLOB SERPL: 0.9 (ref 0.8–1.7)
ALP SERPL-CCNC: 101 U/L (ref 45–117)
ALT SERPL-CCNC: 21 U/L (ref 13–56)
ANION GAP SERPL CALC-SCNC: 6 MMOL/L (ref 3–18)
AST SERPL-CCNC: 14 U/L (ref 10–38)
BILIRUB SERPL-MCNC: 0.6 MG/DL (ref 0.2–1)
BUN SERPL-MCNC: 11 MG/DL (ref 7–18)
BUN/CREAT SERPL: 17 (ref 12–20)
CALCIUM SERPL-MCNC: 8.7 MG/DL (ref 8.5–10.1)
CHLORIDE SERPL-SCNC: 107 MMOL/L (ref 100–111)
CO2 SERPL-SCNC: 26 MMOL/L (ref 21–32)
CREAT SERPL-MCNC: 0.64 MG/DL (ref 0.6–1.3)
ERYTHROCYTE [DISTWIDTH] IN BLOOD BY AUTOMATED COUNT: 12.8 % (ref 11.6–14.5)
FOLATE SERPL-MCNC: 8.3 NG/ML (ref 3.1–17.5)
GLOBULIN SER CALC-MCNC: 3.5 G/DL (ref 2–4)
GLUCOSE BLD STRIP.AUTO-MCNC: 103 MG/DL (ref 70–110)
GLUCOSE BLD STRIP.AUTO-MCNC: 119 MG/DL (ref 70–110)
GLUCOSE BLD STRIP.AUTO-MCNC: 86 MG/DL (ref 70–110)
GLUCOSE BLD STRIP.AUTO-MCNC: 96 MG/DL (ref 70–110)
GLUCOSE SERPL-MCNC: 109 MG/DL (ref 74–99)
HCT VFR BLD AUTO: 39 % (ref 35–45)
HGB BLD-MCNC: 13.1 G/DL (ref 12–16)
MCH RBC QN AUTO: 31.3 PG (ref 24–34)
MCHC RBC AUTO-ENTMCNC: 33.6 G/DL (ref 31–37)
MCV RBC AUTO: 93.1 FL (ref 78–100)
NRBC # BLD: 0 K/UL (ref 0–0.01)
NRBC BLD-RTO: 0 PER 100 WBC
PLATELET # BLD AUTO: 337 K/UL (ref 135–420)
PMV BLD AUTO: 9.2 FL (ref 9.2–11.8)
POTASSIUM SERPL-SCNC: 4 MMOL/L (ref 3.5–5.5)
PROT SERPL-MCNC: 6.6 G/DL (ref 6.4–8.2)
RBC # BLD AUTO: 4.19 M/UL (ref 4.2–5.3)
SODIUM SERPL-SCNC: 139 MMOL/L (ref 136–145)
VIT B12 SERPL-MCNC: 305 PG/ML (ref 211–911)
WBC # BLD AUTO: 10 K/UL (ref 4.6–13.2)

## 2023-01-30 PROCEDURE — 74011250636 HC RX REV CODE- 250/636: Performed by: FAMILY MEDICINE

## 2023-01-30 PROCEDURE — 74011000250 HC RX REV CODE- 250: Performed by: FAMILY MEDICINE

## 2023-01-30 PROCEDURE — 97167 OT EVAL HIGH COMPLEX 60 MIN: CPT

## 2023-01-30 PROCEDURE — 97110 THERAPEUTIC EXERCISES: CPT

## 2023-01-30 PROCEDURE — 97162 PT EVAL MOD COMPLEX 30 MIN: CPT

## 2023-01-30 PROCEDURE — G0378 HOSPITAL OBSERVATION PER HR: HCPCS

## 2023-01-30 PROCEDURE — 80053 COMPREHEN METABOLIC PANEL: CPT

## 2023-01-30 PROCEDURE — 82962 GLUCOSE BLOOD TEST: CPT

## 2023-01-30 PROCEDURE — 74011250637 HC RX REV CODE- 250/637: Performed by: FAMILY MEDICINE

## 2023-01-30 PROCEDURE — 85027 COMPLETE CBC AUTOMATED: CPT

## 2023-01-30 PROCEDURE — 36415 COLL VENOUS BLD VENIPUNCTURE: CPT

## 2023-01-30 RX ORDER — LANOLIN ALCOHOL/MO/W.PET/CERES
500 CREAM (GRAM) TOPICAL DAILY
Status: DISCONTINUED | OUTPATIENT
Start: 2023-01-30 | End: 2023-01-31 | Stop reason: HOSPADM

## 2023-01-30 RX ORDER — FOLIC ACID 1 MG/1
1 TABLET ORAL DAILY
Status: DISCONTINUED | OUTPATIENT
Start: 2023-01-30 | End: 2023-01-31 | Stop reason: HOSPADM

## 2023-01-30 RX ADMIN — SODIUM CHLORIDE, PRESERVATIVE FREE 10 ML: 5 INJECTION INTRAVENOUS at 06:43

## 2023-01-30 RX ADMIN — CYANOCOBALAMIN TAB 500 MCG 500 MCG: 500 TAB at 08:39

## 2023-01-30 RX ADMIN — ENOXAPARIN SODIUM 40 MG: 100 INJECTION SUBCUTANEOUS at 08:39

## 2023-01-30 RX ADMIN — PANTOPRAZOLE SODIUM 40 MG: 40 TABLET, DELAYED RELEASE ORAL at 08:39

## 2023-01-30 RX ADMIN — SODIUM CHLORIDE, PRESERVATIVE FREE 10 ML: 5 INJECTION INTRAVENOUS at 14:00

## 2023-01-30 RX ADMIN — ACETAMINOPHEN 650 MG: 325 TABLET ORAL at 01:06

## 2023-01-30 RX ADMIN — FOLIC ACID 1 MG: 1 TABLET ORAL at 08:39

## 2023-01-30 NOTE — ED NOTES
Attempted to ambulate patient with walker per request of provider. Pt stated that she felt weak and could not stand up without assistance. After standing with the help of RN and significant other at the bedside, pt was unsteady and stated that she could not take a step and sat back into the stretcher. Provider notified of unsuccessful attempt.

## 2023-01-30 NOTE — PROGRESS NOTES
Hospitalist Progress Note-critical care note     Patient: Sindy Olivo MRN: [id number]  CSN: [id number]    YOB: 1990  Age: 27 y.o. Sex: female    DOA: 1/29/2023 LOS:  LOS: 0 days            Chief complaint: left arm weakness unable to ambulate , gastric bypass     Assessment/Plan         Hospital Problems  Date Reviewed: 1/29/2023            Codes Class Noted POA    Left arm pain ICD-10-CM: M79.602  ICD-9-CM: 729.5  1/30/2023 Yes        Chronic pain ICD-10-CM: G89.29  ICD-9-CM: 338.29  1/30/2023 Yes        History of noncompliance with medical treatment ICD-10-CM: Z91.199  ICD-9-CM: V15.81  1/30/2023 Yes        Stressful life event affecting family ICD-10-CM: Z63.79  ICD-9-CM: V61.09  1/30/2023 Yes        * (Principal) Unable to ambulate ICD-10-CM: R26.2  ICD-9-CM: 719.7  1/29/2023 Yes        Left leg weakness ICD-10-CM: R29.898  ICD-9-CM: 729.89  1/29/2023 Yes        S/P gastric bypass ICD-10-CM: Z98.84  ICD-9-CM: V45.86  6/1/2022 Yes        Controlled type 2 diabetes mellitus without complication, without long-term current use of insulin (HCC) ICD-10-CM: E11.9  ICD-9-CM: 250.00  Unknown Yes            26 y/o female with narcolepsy/cataplexy, s/p gastric bypass, and chronic pain is admitted for inability to ambulate with left leg weakness and arm pain. She does have a history of somatoform disorder and significant home stress due to two children with disabilities so this may be a conversion phenomenon. Extensive imaging has been unremarkable and her mechanism of injury is not consistent with her clinical presentation.      Left leg weakness-Left arm weakness  Spine, brain imaging reviewed  Unable to explained the symptoms   Neuro consulted    --Occupational therapy consult and PT consult      S/p gastric bypass  --Continue MVI  --Follow up B12/folate lab  Start B12/folate     Chronic pain  Avoid narcotic medications especially since also on benzodiazepines and modafinil as an outpatient Medically noncompliant-has missed multiple PCP appointments for follow up   he should be following up with a sleep medicine physician for weekly cataplexy flares  no driving a vehicle with frequency of ongoing cataplexy      Narcolepsy/cataplexy--she reports being on modafinil every 3 hours which is 8 times the recommended daily dose. When confirmed with pharmacy she was only given a 30 day supply stating take 200 mg three times daily while awake on Nov 21. This was prescribed by Dr. Tex Petty of sleep medicine in [place]. --Hold modafinil for now as she has been off this for at least a month based on pharmacy report  --Needs to follow up with sleep medicine      Subjective Feel better now,  She can  her bilateral legs while lying on bed, report -still not moving her left arm -she  hold her stuff animal with left hand       Boyfriend was  at the bedside. All questions have been answered. 45 total min's spent on patient care including >50% on counseling/coordinating care. Discussed the above assessments. also discussed labs, medications and hospital course      Disposition :tbd,   Review of systems:    General: No fevers or chills. Cardiovascular: No chest pain or pressure. No palpitations. Pulmonary: No shortness of breath. Gastrointestinal: No nausea, vomiting. Vital signs/Intake and Output:  Visit Vitals  /69   Pulse 73   Temp 97.7 °F (36.5 °C)   Resp 18   Ht 5' 7\" (1.702 m)   Wt 88.5 kg (195 lb)   SpO2 97%   Breastfeeding No   BMI 30.54 kg/m²     Current Shift:  No intake/output data recorded. Last three shifts:  01/28 1901 - 01/30 0700  In: 1000 [I.V.:1000]  Out: -     Physical Exam:  General: WD, WN. Alert, cooperative, no acute distress    HEENT: NC, Atraumatic. PERRLA, anicteric sclerae. Lungs: CTA Bilaterally. No Wheezing/Rhonchi/Rales. Heart:  Regular  rhythm,  No murmur, No Rubs, No Gallops  Abdomen: Soft, Non distended, Non tender.   +Bowel sounds,   Extremities: No c/c/e  Psych:   Not anxious or agitated. Neurologic:  No acute neurological deficit except  not moving left arm           Labs: Results:       Chemistry Recent Labs     01/30/23  0547 01/29/23  1302   * 166*    138   K 4.0 4.0    105   CO2 26 24   BUN 11 12   CREA 0.64 0.89   CA 8.7 9.1   AGAP 6 9   BUCR 17 13    121*   TP 6.6 7.8   ALB 3.1* 3.7   GLOB 3.5 4.1*   AGRAT 0.9 0.9      CBC w/Diff Recent Labs     01/30/23  0547 01/29/23  1302   WBC 10.0 10.2   RBC 4.19* 4.64   HGB 13.1 14.9   HCT 39.0 43.0    343   GRANS  --  60   LYMPH  --  32   EOS  --  1      Cardiac Enzymes No results for input(s): CPK, CKND1, MAXIMILIANO in the last 72 hours. No lab exists for component: CKRMB, TROIP   Coagulation Recent Labs     01/29/23  1302   PTP 13.2   INR 1.0       Lipid Panel No results found for: CHOL, CHOLPOCT, CHOLX, CHLST, CHOLV, 247823, HDL, HDLP, LDL, LDLC, DLDLP, 774356, VLDLC, VLDL, TGLX, TRIGL, TRIGP, TGLPOCT, CHHD, CHHDX   BNP No results for input(s): BNPP in the last 72 hours. Liver Enzymes Recent Labs     01/30/23  0547   TP 6.6   ALB 3.1*         Thyroid Studies Lab Results   Component Value Date/Time    TSH 0.88 01/13/2021 02:23 PM        Procedures/imaging: see electronic medical records for all procedures/Xrays and details which were not copied into this note but were reviewed prior to creation of Plan    MRI BRAIN WO CONT    Result Date: 1/29/2023  MRI of the brain without contrast INDICATION: Left-sided paralysis. TECHNIQUE: Noncontrast MRI of the brain was performed. COMPARISON: Head CT performed the same day. FINDINGS: Ventricles are normal in size. There is no midline shift, mass effect, edema, or hemorrhage. There is no evidence of diffusion restriction. No significant white matter disease. The paranasal sinuses and orbits are unremarkable. Unremarkable MRI of the brain.     MRI LUMB SPINE WO CONT    Result Date: 1/29/2023  Comparison: Lumbar spine CT dated 1/29/2023 TECHNIQUE: Noncontrast MRI of the lumbar spine was performed. HISTORY: Left leg weakness. FINDINGS: Vertebral body heights and alignment are within normal limits. Marrow signal is normal with some mild degenerative marrow changes at L1. There are 5 lumbar-type vertebral bodies. The conus terminates at the L1 level. Distal spinal cord signal is normal. There is mild disc bulge at L5-S1 without significant canal stenosis. There is minimal right foraminal narrowing. Visualized soft tissues are unremarkable. No acute pathology. Minimal disc bulge at L5-S1 with minimal right foraminal narrowing. CT HEAD WO CONT    Result Date: 1/29/2023  Indication:  speech difficulty, leg weakness Comparison: None Findings: 5 mm axial images were obtained from the skull base through the vertex. CT dose reduction was achieved through the use of a standardized protocol tailored for this examination and automatic exposure control for dose modulation. The ventricles and cortical sulci are appropriate in size and configuration. There is no evidence of intracranial hemorrhage, mass, mass effect, or acute infarct. No extra-axial fluid collections are seen. The visualized paranasal sinuses and mastoid air cells are clear. The orbital structures are unremarkable. No osseous abnormalities are seen. Normal head CT. CT SPINE LUMB WO CONT    Result Date: 1/29/2023  INDICATION: low back pain and left leg weakness COMPARISON: None. TECHNIQUE:   Noncontrast axial CT imaging of the lumbar spine was performed. Coronal and sagittal reconstructions were obtained. CT dose reduction was achieved through use of a standardized protocol tailored for this examination and automatic exposure control for dose modulation. FINDINGS: Normal alignment of lumbar spine. Vertebral body heights are maintained with no evidence of acute fracture. No evidence of subluxation. Small inferior endplate Schmorl's node formation at L3.  No significant loss of disc height, with mild bulging of disc at L3-4, L4-5, L5-S1. No significant spinal canal or foraminal stenosis. Paraspinal soft tissues are unremarkable., However there is right hydroureteronephrosis partly visualized. Punctate calculi in the right kidney, partly visualized. 1. Right hydroureteronephrosis and several punctate right renal calculi partly visualized. If clinically relevant this would be better assessed with abdomen pelvis CT without contrast. 2. Very mild degenerative changes in the lumbar spine without significant spinal canal or foraminal stenosis at any level. CT ABD PELV WO CONT    Result Date: 1/29/2023  INDICATION: pelvic pain, right hydronephrosis COMPARISON: None TECHNIQUE: Noncontrast thin axial images were obtained through the abdomen and pelvis. Coronal and sagittal reconstructions were generated. CT dose reduction was achieved through use of a standardized protocol tailored for this examination and automatic exposure control for dose modulation. NOTE:  The absence of IV contrast reduces the sensitivity for evaluation of visceral organs and vasculature including presence of mass lesions, hemodynamically significant stenoses, dissections, mucosal abnormalities etc. FINDINGS: LUNG BASES: No abnormality. LIVER: No mass or biliary dilatation. GALLBLADDER: Unremarkable. SPLEEN: No enlargement or lesion. PANCREAS: No mass or ductal dilatation. ADRENALS: No mass. KIDNEYS: Right hydroureteronephrosis and several punctate right renal calculi. There are several calcifications in the right pelvis near the right ureter, measuring 2-3 mm. No left-sided hydronephrosis or nephrolithiasis. GI TRACT:  Remote sleeve gastrectomy. No bowel obstruction. PERITONEUM: No free air or free fluid. APPENDIX: Unremarkable. RETROPERITONEUM: No aortic aneurysm. LYMPH NODES:  None enlarged. ADDITIONAL COMMENTS: N/A. URINARY BLADDER: 2 mm calculus in the midline dependent portion of the bladder.  REPRODUCTIVE ORGANS: IUD within the uterus. LYMPH NODES:  None enlarged. FREE FLUID:  Small amount likely physiologic. BONES: No destructive bone lesion. ADDITIONAL COMMENTS: N/A.     1. Right hydroureteronephrosis, with several punctate right renal calculi. While there are several small calcifications in the right pelvis these are likely phleboliths. There is a 2 mm calculus in the midline dependent portion of the bladder. 2. Remote sleeve gastrectomy.        Nakita Miller MD

## 2023-01-30 NOTE — PROGRESS NOTES
Problem: Pressure Injury - Risk of  Goal: *Prevention of pressure injury  Description: Document Kapil Scale and appropriate interventions in the flowsheet. Outcome: Progressing Towards Goal  Note: Pressure Injury Interventions:  Sensory Interventions: Assess changes in LOC, Keep linens dry and wrinkle-free, Minimize linen layers, Pressure redistribution bed/mattress (bed type)         Activity Interventions: Pressure redistribution bed/mattress(bed type), PT/OT evaluation    Mobility Interventions: Pressure redistribution bed/mattress (bed type), PT/OT evaluation    Nutrition Interventions: Document food/fluid/supplement intake                     Problem: Falls - Risk of  Goal: *Absence of Falls  Description: Document Carla Fall Risk and appropriate interventions in the flowsheet.   Outcome: Progressing Towards Goal  Note: Fall Risk Interventions:  Mobility Interventions: Assess mobility with egress test, Bed/chair exit alarm, Communicate number of staff needed for ambulation/transfer, Patient to call before getting OOB, PT Consult for mobility concerns, OT consult for ADLs, PT Consult for assist device competence         Medication Interventions: Assess postural VS orthostatic hypotension, Bed/chair exit alarm, Evaluate medications/consider consulting pharmacy, Patient to call before getting OOB    Elimination Interventions: Bed/chair exit alarm, Call light in reach, Patient to call for help with toileting needs    History of Falls Interventions: Bed/chair exit alarm, Consult care management for discharge planning, Investigate reason for fall

## 2023-01-30 NOTE — PROGRESS NOTES
D/C PLan: Home with [facility] and fiance to drive when medically appropriate    Care Management    Reason for Admission: unable to ambulate    Chart reviewed. Per H&P: Leonardo Mora is a 27 y.o. female with narcolepsy/cataplexy, s/p gastric bypass, and chronic pain presents to the ED with her significant other for inability to move her left arm and left leg following a roller derby injury 4 days ago. She states she fell backward and since then has had numbness and inability to move her left arm. She was seen by [facility] ED yesterday and given oxycodone and prednisone. She reports just finishing a short course of prednisone for bronchitis prior to this. She has not started the prednisone as Dr. Janice Prado needs to prescribe her cytotec to take it given her recent gastric sleeve surgery in May 2022. She has lost 70 lbs total. She is taking a prenatal vitamin but no additional vitamins. Last night, she had lower back pain and was coming to the ED for this. Her significant other states that when they were walking across the parking lot, she stated that her left leg was numb and that she couldn't walk anymore. She was brought in a wheelchair. She does not feel like she can go home due to this. She states she has weekly cataplexy episodes while taking modafinil 200 mg every 3 hours but she drives and has never had an episode while driving. She denies fever but has had some nausea/vomiting earlier in the week that has resolved. She has two children with significant disabilities and life at home is chronically stressful.     Prior to admission patient was living: alone     Prior to admission patient was using the following DME:  nebulizer                                Plan for utilizing home health: home with [facility] per therapy recommendation           PCP: First and Last name: Tawny Montilla MD    Name of Practice:    Are you a current patient: Yes/No: yes    Approximate date of last visit: Aril 2022   Can you participate in a virtual visit with your PCP:                     Current Advanced Directive/Advance Care Plan: Full Code    Healthcare Decision Maker:   Primary Decision Maker: Samy Jacobo - Mother - [phone]  Click here to complete [address] including selection of the [address] Relationship (ie \"Primary\")                         Transition of Care Plan: Discharge home with [facility] and family to drive    CM met with patient at bedside with ruperto Reece in the room. Patient verified facesheet, PCP, that she lives alone and is independent in ADLs at baseline, has a nebulizer but denies any other DME, and will have a ride available at discharge. CM offered [facility] for [facility] per therapy recommendations. Noted therapy recommendation. CM Met with pt/family and explained CM role and to discuss the plan of care. Pt and family are in agreement with therapy recommendations. Offered freedom of choice for [facility]. Provided a list of area agencies/facilities to refer to to assist family with making a determiniation. Patient chose [facility], [facility] and [facility] . CMS referral placed. Care Management Interventions  PCP Verified by CM: Yes  Last Visit to PCP: 04/01/22  Palliative Care Criteria Met (RRAT>21 & CHF Dx)?: No  Mode of Transport at Discharge:  Other (see comment) (family to drive)  Transition of Care Consult (CM Consult): Home Health  Discharge Durable Medical Equipment: No  Health Maintenance Reviewed: Yes  Physical Therapy Consult: Yes  Occupational Therapy Consult: Yes  Support Systems: Spouse/Significant Other  Confirm Follow Up Transport: Family  The Plan for Transition of Care is Related to the Following Treatment Goals : home with [facility] and fiance to drive  The Patient and/or Patient Representative was Provided with a Choice of Provider and Agrees with the Discharge Plan?: Yes  Freedom of Choice List was Provided with Basic Dialogue that Supports the Patient's Individualized Plan of Care/Goals, Treatment Preferences and Shares the Quality Data Associated with the Providers?: Yes  Discharge Location  Patient Expects to be Discharged to[de-identified] Home with home health

## 2023-01-30 NOTE — PROGRESS NOTES
2345  Bedside and verbal shift change report given to Ronny Cueva RN (on coming nurse) by Gregorio Fernández RN (off going nurse). Report included the following information SBAR, Kardex, Intake/Output and MAR.    0106  Pt complains constant pain from her shoulder to lower back. Pt states that she cannot take PO meds due to her gastric bypass. Offered Tylenol suppository and pt refused. Administered Tylenol 650 mg PO.    0425  Pt complains pain in her IV site. Pt requesting to change her IV site several times. New IV inserted. 0732  Bedside and verbal shift change report given by DAWSON Alexandre (off going nurse) to Nabeel Strong RN(on coming nurse). Report included the following information SBAR, Kardex, Intake/Output and MAR.

## 2023-01-30 NOTE — PROGRESS NOTES
Oral and Written notification given to patient and/or caregiver informing them that they are currently an Outpatient receiving care in our facility. Outpatient services include Observation Services under [facility] and [facility] requirements.

## 2023-01-30 NOTE — PROGRESS NOTES
01/30/23 1154   Consult Information   Reason for Consult Advance medical directive consult; Initial/Spiritual assessment, patient floor   Callback Yes   Time In 1130   Time Out 1140   Total Time (in minutes) 10   Pastoral Interventions   Patient Interventions Advance medical directive consult;Bridging;Coping skills reviewed/reinforced; Iconic (affirming the presence of God/Higher Power); Initial/Spiritual assessment, patient floor;Prayer (assurance of); Spiritual materials provided (comment)   Family/Friend(s) Bridging;Coping skills reviewed/reinforced; Iconic (affirming the presence of God/Higher Power); Initial Assessment   Follow up Date 01/30/23  (IV,SA,AMD-FF)   Dashboard Data Collection   Critical care assessment within 60 hours of arrival Yes    conducted an initial consultation and Spiritual Assessment for Betsy Araujo, who is a 27 y.o.,female. Patients Primary Language is: [place]. According to the patients EMR Gnosticism Affiliation is: No Oriental orthodox. The reason the Patient came to the hospital is:   Patient Active Problem List    Diagnosis Date Noted    Left arm pain 01/30/2023    Chronic pain 01/30/2023    History of noncompliance with medical treatment 01/30/2023    Stressful life event affecting family 01/30/2023    Unable to ambulate 01/29/2023    Left leg weakness 01/29/2023    S/P gastric bypass 06/01/2022    Uses birth control     Narcolepsy     HSV-1 infection 12/15/2020    Elevated prolactin level 10/23/2018    GERD (gastroesophageal reflux disease) 07/31/2018    Intestinal malabsorption 04/17/2018    IUD (intrauterine device) in place 02/08/2018    FH: ovarian cancer 02/08/2018    Asthma     Controlled type 2 diabetes mellitus without complication, without long-term current use of insulin ([facility] [address]     Hyperlipemia         The  provided the following Interventions:  Initiated a relationship of care and support. Provided information about Spiritual Care Services.   Addressed patient's ACP, and provided education and form. She did not want to complete it today. Offered assurance of prayer on patient's behalf.  provided devotional material for patient  Chart reviewed. Assessment:  Patient does not have any Mu-ism/cultural needs that will affect patients preferences in health care. Plan:  Chaplains will continue to follow and will provide pastoral care on an as needed/requested basis.  recommends bedside caregivers page  on duty if patient shows signs of acute spiritual or emotional distress. Rev.  Ronel Young, Certified Respecting [address] [place] [address] Consultant  [facility]  [phone]

## 2023-01-30 NOTE — PROGRESS NOTES
Problem: Aspiration - Risk of  Goal: *Absence of aspiration  Outcome: Progressing Towards Goal     Problem: Pressure Injury - Risk of  Goal: *Prevention of pressure injury  Description: Document Kapil Scale and appropriate interventions in the flowsheet. Outcome: Progressing Towards Goal  Note: Pressure Injury Interventions:  Sensory Interventions: Assess changes in LOC, Minimize linen layers         Activity Interventions: Pressure redistribution bed/mattress(bed type), PT/OT evaluation    Mobility Interventions: Pressure redistribution bed/mattress (bed type), PT/OT evaluation    Nutrition Interventions: Document food/fluid/supplement intake                     Problem: Falls - Risk of  Goal: *Absence of Falls  Description: Document Carla Fall Risk and appropriate interventions in the flowsheet.   Outcome: Progressing Towards Goal  Note: Fall Risk Interventions:  Mobility Interventions: Assess mobility with egress test, Bed/chair exit alarm, Communicate number of staff needed for ambulation/transfer, Patient to call before getting OOB, PT Consult for mobility concerns, OT consult for ADLs, PT Consult for assist device competence         Medication Interventions: Assess postural VS orthostatic hypotension, Bed/chair exit alarm, Evaluate medications/consider consulting pharmacy, Patient to call before getting OOB    Elimination Interventions: Bed/chair exit alarm, Call light in reach, Patient to call for help with toileting needs    History of Falls Interventions: Bed/chair exit alarm, Consult care management for discharge planning, Investigate reason for fall

## 2023-01-30 NOTE — ROUTINE PROCESS
TRANSFER - IN REPORT:    Verbal report received from Jake Mo RN(name) on [address]  being received from ED(unit) for routine progression of care      Report consisted of patients Situation, Background, Assessment and   Recommendations(SBAR). Information from the following report(s) SBAR, Kardex, ED Summary, Intake/Output, MAR, and Recent Results was reviewed with the receiving nurse. Opportunity for questions and clarification was provided. Assessment completed upon patients arrival to unit and care assumed. 2125 Pt received in room via stretcher, A/O x4, no distress    2350 Verbal shift change report given to Jeff Whitman RN (oncoming nurse) by Esvin Glez RN   (offgoing nurse). Report included the following information SBAR, Kardex, Intake/Output, MAR, Recent Results, and Med Rec Status.

## 2023-01-30 NOTE — PROGRESS NOTES
OCCUPATIONAL THERAPY EVALUATION    Patient: Lissa Gan (89 y.o. female)  Date: 1/30/2023  Primary Diagnosis: Left leg weakness [R29.898]       Precautions:   Fall  PLOF: Pt was fully independent in all self care tasks, roller blading, driving, etc.    ASSESSMENT :  Based on the objective data described below, the patient presents with decreased ability to perform self care, mobilize let upper extremity, decreased pain and decreased motor recruitment. Pt presents supine in bed, left arm in extension, states she is able to extend her index finger but that is all, has high levels of pain with attempt at active and passive motion. Swelling noted through forearm and pain along dorsal forearm along radial nerve distribution. Minimal motor recruitment with active motion through flexors and extensors at all levels. Patient will benefit from skilled intervention to address the above impairments.   Patient's rehabilitation potential is considered to be Good  Factors which may influence rehabilitation potential include: ***  []             None noted  []             Mental ability/status  []             Medical condition  []             Home/family situation and support systems  []             Safety awareness  []             Pain tolerance/management  []             Other:      PLAN :  Recommendations and Planned Interventions: ***  []               Self Care Training                  []      Therapeutic Activities  []               Functional Mobility Training   []      Cognitive Retraining  []               Therapeutic Exercises           []      Endurance Activities  []               Balance Training                    []      Neuromuscular Re-Education  []               Visual/Perceptual Training     []      Home Safety Training  []               Patient Education                   []      Family Training/Education  []               Other (comment):    Frequency/Duration: Patient will be followed by occupational therapy {FREQUENCY:49831::1-2 times per day/4-7 days per week} to address goals. Discharge Recommendations: {AFTER DUAD:[id number]}  Further Equipment Recommendations for Discharge: { :78092::N/A}    AMPAC: ***    This AMPAC score should be considered in conjunction with interdisciplinary team recommendations to determine the most appropriate discharge setting. Patient's social support, diagnosis, medical stability, and prior level of function should also be taken into consideration.      SUBJECTIVE:   Patient stated ***.    OBJECTIVE DATA SUMMARY:     Past Medical History:   Diagnosis Date    Arthritis     Asthma     Chronic pain     Diabetes (HCC)     Type ll    GERD (gastroesophageal reflux disease)     HSV-1 infection     Hyperlipemia     Intestinal malabsorption     Migraine     Narcolepsy     Ovarian cyst     Psychiatric disorder     anxiety, PTSD    PUD (peptic ulcer disease)     Hx of    S/P laparoscopic sleeve gastrectomy 2018    Severe obesity (BMI 35.0-35.9 with comorbidity) ([facility] [address]     Status post laparoscopic sleeve gastrectomy 2018    Eli    Uses birth control     IUD     Past Surgical History:   Procedure Laterality Date    HX BILATERAL SALPINGECTOMY      HX  SECTION      X 2    HX GASTRIC BYPASS  2022    Dr Rajeev Tejeda, conversion from sleeve    HX GYN Bilateral 2020    Salpingectlomy    [place] [address] RSTRICTIV PX LONGITUDINAL GASTRECTOMY      3/2018     Barriers to Learning/Limitations: None  Compensate with: visual, verbal, tactile, kinesthetic cues/model    Home Situation:   Home Situation  Home Environment: Private residence  # Steps to Enter: 4  One/Two Story Residence: Two story  # of Interior Steps: 16  Lift Chair Available: No  Living Alone: No  Support Systems: Spouse/Significant Other  Patient Expects to be Discharged to[de-identified] Home with home health  Current DME Used/Available at Home: None  Tub or Shower Type: Tub/Shower combination  [x]  Right hand dominant   []  Left hand dominant    Cognitive/Behavioral Status:  Neurologic State: Alert; Appropriate for age  Orientation Level: Oriented X4  Cognition: Appropriate decision making  Safety/Judgement: Fall prevention    Skin: intact  Edema: noted through LUE       Coordination: BUE  Coordination: Within functional limits  Fine Motor Skills-Upper: Right Intact; Left Impaired    Gross Motor Skills-Upper: Right Intact; Left Impaired    Balance:  Sitting: Intact  Sitting - Static: Good (unsupported)  Sitting - Dynamic: Good (unsupported)  Standing: Impaired; With support  Standing - Static: Fair  Standing - Dynamic : Fair    Strength: BUE    Strength: Generally decreased, functional    Tone & Sensation: BUE    Tone: Normal  Sensation: Intact       Range of Motion: BUE    AROM: Generally decreased, functional       Functional Mobility and Transfers for ADLs:  Bed Mobility:     Supine to Sit: Moderate assistance  Sit to Supine: Moderate assistance     Transfers:  Sit to Stand: Minimum assistance  Stand to Sit: Minimum assistance                                 ADL Assessment:   Feeding: Contact guard assistance    Oral Facial Hygiene/Grooming: Moderate assistance    Bathing: Moderate assistance    Upper Body Dressing: Maximum assistance    Lower Body Dressing: Maximum assistance    Toileting: Maximum assistance                ADL Intervention:       Lower Body Dressing Assistance  Dressing Assistance: Maximum assistance  Underpants: Maximum assistance  Pants With Elastic Waist: Maximum assistance  Socks: Maximum assistance         Cognitive Retraining  Executive Functions: Regulating behavior;Managing time  Safety/Judgement: Fall prevention    Pain:  Pain level pre-treatment: 8/10   Pain level post-treatment:7 /10   Pain Intervention(s): Medication (see MAR);  Rest, Ice, Repositioning   Response to intervention: Nurse notified, See doc flow    Activity Tolerance:   ***  Please refer to the flowsheet for vital signs taken during this treatment. After treatment:   [x] Patient left in no apparent distress sitting up in chair  [x] Patient left in no apparent distress in bed  [] Call bell left within reach  [] Nursing notified  [] Caregiver present  [] Bed alarm activated    COMMUNICATION/EDUCATION:   [x] Role of Occupational Therapy in the acute care setting  [x] Home safety education was provided and the patient/caregiver indicated understanding. [x] Patient/family have participated as able in goal setting and plan of care. [x] Patient/family agree to work toward stated goals and plan of care. [] Patient understands intent and goals of therapy, but is neutral about his/her participation. [] Patient is unable to participate in goal setting and plan of care. Thank you for this referral.  Loreto Wiggins OTCADY, OTR/L, CHT   Time Calculation: 41 mins    Eval Complexity: History: HIGH Complexity : Extensive review of history including physical, cognitive and psychosocial history ; Examination: HIGH Complexity : 5 or more performance deficits relating to physical, cognitive , or psychosocial skils that result in activity limitations and / or participation restrictions; Decision Making:HIGH Complexity : Patient presents with comorbidities that affect occupational performance. Signifigant modification of tasks or assistance (eg, physical or verbal) with assessment (s) is necessary to enable patient to complete evaluation     [facility] AM-PAC® Daily Activity Inpatient Short Form (6-Clicks)*    How much HELP from another person does the patient currently need    (If the patient hasn't done an activity recently, how much help from another person do you think he/she would need if he/she tried?)   Total (Total A or Dep)   A Lot  (Mod to Max A)   A Little (Sup or Min A)   None (Mod I to I)   Putting on and taking off regular lower body clothing? [x] 1 [] 2 [] 3 [] 4   2. Bathing (including washing, rinsing,      drying)?     [x] 1 [] 2 [] 3 [] 4 3. Toileting, which includes using toilet, bedpan or urinal?   [x] 1 [] 2 [] 3 [] 4   4. Putting on and taking off regular upper body clothing? [x] 1 [] 2 [] 3 [] 4   5. Taking care of personal grooming such as brushing teeth? [] 1 [x] 2 [] 3 [] 4   6. Eating meals? [] 1 [] 2 [] 3 [x] 4     Based on an AM-PAC score of **/24 and their current ADL deficits; it is recommended that the patient have 5-7 sessions per week of Occupational Therapy at d/c to increase the patient's independence. Currently, this patient demonstrates the potential endurance, and/or tolerance for 3 hours of therapy each day at d/c. Based on an AM-PAC score of **/24 and their current ADL deficits; it is recommended that the patient have 3-5 sessions per week of Occupational Therapy at d/c to increase the patient's independence. Based on an AM-PAC score of **/24 and their current ADL deficits; it is recommended that the patient have 2-3 sessions per week of Occupational Therapy at d/c to increase the patient's independence. At this time and based on an AM-PAC score of **/24, no further OT is recommended upon discharge due to (i.e. patient at baseline functional statusetc). Recommend patient returns to prior setting with prior services.

## 2023-01-30 NOTE — PROGRESS NOTES
Pt stated she had to leave tonight due to lack of childcare for her special needs child tomorrow morning. Explained to pt that we were waiting on approval for [facility] and discharge from the doctor. Spoke with MD Katharina Myers and he stated it was not appropriate for the pt to be discharged at this time so she would be leaving AMA. Informed pt that she would be leaving AMA if she left tonight. Pt stated that she will speak with her mother for childcare until 1 PM tomorrow if possible. Pt will call to leave if unable to obtain childcare for tomorrow.

## 2023-01-30 NOTE — PROGRESS NOTES
Problem: Mobility Impaired (Adult and Pediatric)  Goal: *Acute Goals and Plan of Care  Description: Physical Therapy Goals   Initiated 1/30/2023 and to be accomplished within 7 day(s)  1. Patient will move from supine <> sit with S in prep for out of bed activity and change of position. 2.  Patient will perform sit<> stand with S with LRAD in prep for transfers/ambulation. 3.  Patient will transfer from bed <> chair with S with LRAD for time up in chair for completion of ADL activity. 4.  Patient will ambulate 50 feet orbetter with S/LRAD for improved functional mobility at discharge. 5.  Patient will ascend/descend 3-5 stairs with handrail(s) with minimal assistance/contact guard assist for home re-entry as needed. Outcome: Progressing Towards Goal         PHYSICAL THERAPY EVALUATION    Patient: Lissa Gan (13 y.o. female)  Date: 1/30/2023  Primary Diagnosis: Left leg weakness [R29.898]  Precautions:   Fall  PLOF: independent w/o AD    ASSESSMENT :  Based on the objective data described below, the patient is seen on 3N unit. Upon entering the room, the pt was found seated EOB with LUE in sling, OT leaving room, and spouse present. During assessment, pt is noted with generalized decreased strength on the LLE, and gait abnormalities as documented below. During gait trial, pt required CGA/QC with lateral steps at EOB. Pt required CGA/QC for transfer from bed to chair. Special considerations should be taken for the LUE in a sling. Prior to leaving, pt was left with all needs in reach, returned to a chair, spouse present and nurse Jo Ann Lindo notified of pt condition post encounter. Pt would benefit from skilled therapy services to address listed deficits and to improve activity tolerance in order to safely return to home environment or other discharge arrangements. Will try to see Pt in PM. Consider home with home PT for discharge arrangements.     Pt Education: Role of physical therapy in acute care setting, fall prevention and safety/technique during functional mobility tasks    Patient's rehabilitation potential is considered to be Good  Factors which may influence rehabilitation potential include:   []         None noted  []         Mental ability/status  [x]         Medical condition  []         Home/family situation and support systems  []         Safety awareness  []         Pain tolerance/management  []         Other:        PLAN :  Recommendations and Planned Interventions:   []           Bed Mobility Training             []    Neuromuscular Re-Education  [x]           Transfer Training                   []    Orthotic/Prosthetic Training  [x]           Gait Training                          []    Modalities  [x]           Therapeutic Exercises           []    Edema Management/Control  [x]           Therapeutic Activities            []    Family Training/Education  [x]           Patient Education  []           Other (comment):    Frequency/Duration: Patient will be followed by physical therapy 1-2 times per day/4-7 days per week to address goals. Discharge Recommendations: Home Physical Therapy  Further Equipment Recommendations for Discharge: quad cane    AMPAC: 19/20    This AMPAC score should be considered in conjunction with interdisciplinary team recommendations to determine the most appropriate discharge setting. Patient's social support, diagnosis, medical stability, and prior level of function should also be taken into consideration. SUBJECTIVE:   Patient stated I can't feel my left arm at all.     OBJECTIVE DATA SUMMARY:     Past Medical History:   Diagnosis Date    Arthritis     Asthma     Chronic pain     Diabetes (HCC)     Type ll    GERD (gastroesophageal reflux disease)     HSV-1 infection     Hyperlipemia     Intestinal malabsorption     Migraine     Narcolepsy     Ovarian cyst     Psychiatric disorder     anxiety, PTSD    PUD (peptic ulcer disease)     Hx of    S/P laparoscopic sleeve gastrectomy 2018    Severe obesity (BMI 35.0-35.9 with comorbidity) ([facility]     Status post laparoscopic sleeve gastrectomy 2018    Eli    Uses birth control     IUD     Past Surgical History:   Procedure Laterality Date    HX BILATERAL SALPINGECTOMY      HX  SECTION      X 2    HX GASTRIC BYPASS  2022    Dr Efrain Wei, conversion from sleeve    HX GYN Bilateral     Salpingectlomy    [facility] [address] [place] [address] RSTRICTIV [address] LONGITUDINAL GASTRECTOMY      3/2018     Barriers to Learning/Limitations: None  Compensate with: N/A  Home Situation:  Home Situation  Home Environment: Private residence  # Steps to Enter: 2  Rails to Enter: No  Wheelchair Ramp: No  One/Two Story Residence: Two story  # of Interior Steps: 12  Interior Rails: Left  Lift Chair Available: No  Living Alone: No  Support Systems: Spouse/Significant Other  Patient Expects to be Discharged to[de-identified] Home with home health  Current DME Used/Available at Home: Shower chair  Tub or Shower Type: Tub/Shower combination  Critical Behavior:  Neurologic State: Alert  Orientation Level: Oriented X4  Cognition: Appropriate decision making  Safety/Judgement: Fall prevention  Psychosocial  Patient Behaviors: Calm; Cooperative  Family  Behaviors: Calm;Supportive  Purposeful Interaction: Yes  Pt Identified Daily Priority: Clinical issues (comment)  Caritas Process: Nurture loving kindness; Teaching/learning  Caring Interventions: Reassure  Reassure: Caring rounds  Family  Behaviors: Calm;Supportive  Strength:    Strength: Generally decreased, functional  RLE: 4+/5  LLE: 4-/5  Tone & Sensation:   Tone: Normal  Sensation: Intact  Range Of Motion:  AROM: Generally decreased, functional  LUE in sling, Pt reports \"I can't feel my left am at all\"  Functional Mobility:  Bed Mobility:  Supine to Sit: Moderate assistance (per OT)  Sit to Supine:  Moderate assistance (per OT)  Transfers:  Sit to Stand: Contact guard assistance;Modified independent  Stand to Sit: Contact guard assistance;Modified independent  Stand Pivot Transfers: Contact guard assistance;Modified independent     Bed to Chair: Modified independent;Contact guard assistance  Balance:   Sitting: Intact  Sitting - Static: Good (unsupported)  Sitting - Dynamic: Good (unsupported)  Standing: With support  Standing - Static: Fair  Standing - Dynamic : Fair  Ambulation/Gait Training:  Distance (ft): 10 Feet (ft) (lateral steps at EOB)  Assistive Device: Cane, quad  Gait Description (WDL): Exceptions to WDL  Speed/Stefania: Slow  Step Length: Left shortened;Right shortened  Interventions: Safety awareness training  Pain:  Pain level pre-treatment: 6/10   Pain level post-treatment: 6/10   Pain Intervention(s) : Medication (see MAR); Rest, Ice, Repositioning  Response to intervention: Nurse notified, See doc flow  Activity Tolerance:   good  Please refer to the flowsheet for vital signs taken during this treatment. After treatment:   [x]         Patient left in no apparent distress sitting up in chair  []         Patient left in no apparent distress in bed  [x]         Call bell left within reach  [x]         Nursing notified  [x]         Caregiver present  []         Bed alarm activated  []         SCDs applied    COMMUNICATION/EDUCATION:   [x]         Role of Physical Therapy in the acute care setting. [x]         Fall prevention education was provided and the patient/caregiver indicated understanding. [x]         Patient/family have participated as able in goal setting and plan of care. [x]         Patient/family agree to work toward stated goals and plan of care. []         Patient understands intent and goals of therapy, but is neutral about his/her participation. []         Patient is unable to participate in goal setting/plan of care: ongoing with therapy staff.  []         Other:     Thank you for this referral.  Lucía Yates, SPT   Time Calculation: 18 mins      Eval Complexity: History: MEDIUM Complexity : 1-2 comorbidities / personal factors will impact the outcome/ POC Exam:MEDIUM Complexity : 3 Standardized tests and measures addressing body structure, function, activity limitation and / or participation in recreation  Presentation: MEDIUM Complexity : Evolving with changing characteristics  Clinical Decision Making:Medium Complexity    Overall Complexity:MEDIUM      [facility] AM-PAC® Basic Mobility Inpatient Short Form (6-Clicks) Version 2    How much HELP from another person does the patient currently need    (If the patient hasn't done an activity recently, how much help from another person do you think he/she would need if he/she tried?)   Total (Total A or Dep)   A Lot  (Mod to Max A)   A Little (Sup or Min A)   None (Mod I to I)   Turning from your back to your side while in a flat bed without using bedrails? [] 1 [] 2 [] 3 [x] 4   2. Moving from lying on your back to sitting on the side of a flat bed without using bedrails? [] 1 [] 2 [] 3 [x] 4   3. Moving to and from a bed to a chair (including a wheelchair)? [] 1 [] 2 [] 3 [x] 4   4. Standing up from a chair using your arms (e.g., wheelchair, or bedside chair)? [] 1 [] 2 [] 3 [x] 4   5. Walking in hospital room? [] 1 [] 2 [x] 3 [] 4   6. Climbing 3-5 steps with a railing?+   [] 1 [] 2 [] 3 [] 4   +If stair climbing cannot be assessed, skip item #6. Sum responses from items 1-5. Based on an AM-PAC score of **/24 (or 19/20 if omitting stairs) and their current functional mobility deficits, it is recommended that the patient have 2-3 sessions per week of Physical Therapy at d/c to increase the patient's independence.

## 2023-01-30 NOTE — ED NOTES
Report given to [address] Stoy, RN. Will transport patient once covid test results.

## 2023-01-30 NOTE — CONSULTS
NEUROLOGY CONSULTATION NOTE    Patient: Ernestina Key MRN: [id number]  CSN: [id number]    YOB: 1990  Age: 27 y.o. Sex: female    DOA: 2023 LOS:  LOS: 0 days        Requesting Physician: Dr. Zee Pereyra  Reason for Consultation: Left-sided weakness               HISTORY OF PRESENT ILLNESS:   Ernestina Key is a 27 y.o. female with past medical history of narcolepsy, anxiety presented to emergency room because she could not move left upper and lower limb. Patient was skating and fell down, then she complains of left arm pain that she could not move it. At emergency room in [facility] last night, she had low back pain and could not move left leg. Brain MRI and lumbar spine MRI were unremarkable. Patient is admitted for physical therapy and inpatient rehab. This morning she can move left leg. She still could not move left arm. She complains of pain and numbness when left arm is moved.   PAST MEDICAL HISTORY:  Past Medical History:   Diagnosis Date    Arthritis     Asthma     Chronic pain     Diabetes (HCC)     Type ll    GERD (gastroesophageal reflux disease)     HSV-1 infection     Hyperlipemia     Intestinal malabsorption     Migraine     Narcolepsy     Ovarian cyst     Psychiatric disorder     anxiety, PTSD    PUD (peptic ulcer disease)     Hx of    S/P laparoscopic sleeve gastrectomy 2018    Severe obesity (BMI 35.0-35.9 with comorbidity) ([facility] [address]     Status post laparoscopic sleeve gastrectomy 2018    Eli    Uses birth control     IUD     PAST SURGICAL HISTORY:  Past Surgical History:   Procedure Laterality Date    HX BILATERAL SALPINGECTOMY      HX  SECTION      X 2    HX GASTRIC BYPASS  2022    Dr Efrain Wei, conversion from sleeve    HX GYN Bilateral 2020    Salpingectlomy    [facility] [address] [facility] [address] [place] [address] LONGITUDINAL GASTRECTOMY      3/2018     FAMILY HISTORY:  Family History   Problem Relation Age of Onset    Migraines Mother     Seizures Mother Other Father         graves ds,     Sickle Cell Trait Brother     Other Brother     Asthma Sister     Other Sister         autism    Other Daughter         cp, epilepsy    Asthma Daughter      SOCIAL HISTORY:  Social History     Socioeconomic History    Marital status: LEGALLY    Tobacco Use    Smoking status: Never    Smokeless tobacco: Never   Vaping Use    Vaping Use: Never used   Substance and Sexual Activity    Alcohol use: No    Drug use: No     Comment: H/O in the past    Sexual activity: Yes     Partners: Male     Birth control/protection: I.U.D., Surgical     MEDICATIONS:  Current Facility-Administered Medications   Medication Dose Route Frequency    cyanocobalamin (VITAMIN B12) tablet 500 mcg  500 mcg Oral DAILY    folic acid (FOLVITE) tablet 1 mg  1 mg Oral DAILY    sodium chloride (NS) flush 5-40 mL  5-40 mL IntraVENous Q8H    sodium chloride (NS) flush 5-40 mL  5-40 mL IntraVENous PRN    acetaminophen (TYLENOL) tablet 650 mg  650 mg Oral Q6H PRN    Or    acetaminophen (TYLENOL) suppository 650 mg  650 mg Rectal Q6H PRN    polyethylene glycol (MIRALAX) packet 17 g  17 g Oral DAILY PRN    ondansetron (ZOFRAN ODT) tablet 4 mg  4 mg Oral Q8H PRN    Or    ondansetron (ZOFRAN) injection 4 mg  4 mg IntraVENous Q6H PRN    enoxaparin (LOVENOX) injection 40 mg  40 mg SubCUTAneous DAILY    insulin lispro (HUMALOG) injection   SubCUTAneous AC&HS    glucose chewable tablet 16 g  16 g Oral PRN    glucagon (GLUCAGEN) injection 1 mg  1 mg IntraMUSCular PRN    dextrose 10% infusion 0-250 mL  0-250 mL IntraVENous PRN    prenatal vit no.180-iron-folic 27 mg iron- 1 mg tab   Oral DAILY PRN    pantoprazole (PROTONIX) tablet 40 mg  40 mg Oral DAILY     Prior to Admission medications    Medication Sig Start Date End Date Taking? Authorizing Provider   PNV Comb #2-Iron-FA-Omega 3 29-1-400 mg cmpk Take  by mouth.    Yes Provider, Historical   cyanocobalamin (VITAMIN B-12) 1,000 mcg sublingual tablet Take 1,000 mcg by mouth daily. Yes Provider, Historical   miSOPROStoL (CYTOTEC) 200 mcg tablet Take 1 Tablet by mouth four (4) times daily. Indications: stomach ulcer from aspirin/ibuprofen-like drugs prevention 1/12/23  Yes Abhay Claros NP   pantoprazole (PROTONIX) 40 mg tablet TAKE 1 TABLET BY MOUTH EVERY DAY 1/10/23  Yes Guille Blevins MD   levonorgestrel (MIRENA) 20 mcg/24 hr (5 years) IUD 1 Device. Yes Provider, Historical   albuterol (PROVENTIL HFA, VENTOLIN HFA, PROAIR HFA) 90 mcg/actuation inhaler Take 2 Puffs by inhalation every four (4) hours as needed. Yes Provider, Historical   calcium citrate 200 mg (950 mg) tablet Take  by mouth daily. Patient not taking: Reported on 1/29/2023    Provider, Historical   ergocalciferol (ERGOCALCIFEROL) 1,250 mcg (50,000 unit) capsule Take 50,000 Units by mouth. Provider, Historical   modafiniL (PROVIGIL) 200 mg tablet Every 3 hours while awake 7/9/21   Provider, Historical   ALPRAZolam (XANAX) 0.5 mg tablet Take 0.5 mg by mouth as needed. 9/11/19   Provider, Historical   B.animalis,bifid,infantis,long 10-15 mg TbEC Take  by mouth. Provider, Historical       ALLERGIES:  Allergies   Allergen Reactions    Latex Rash, Itching and Swelling    Doxycycline Swelling     Throat swelling    Mushroom Anaphylaxis     Other reaction(s): anaphylaxis/angioedema    Amoxicillin Rash    Dog Dander Hives     When patient went for allergy testing she tested positive for dogs and cats. Erythromycin Rash    Lactose Nausea and Vomiting     intolerance  Other reaction(s): gi distress  intolerance    Metformin Seizures     seizure    Mold Other (comments)    Penicillins Rash and Itching     Other reaction(s): unknown    Rocephin [Ceftriaxone] Rash    Sulfa (Sulfonamide Antibiotics) Swelling, Itching and Unknown (comments)       Review of Systems  GENERAL: No fevers or chills. HEENT: No change in vision, earache, tinnitus, sore throat or sinus congestion. NECK: No pain or stiffness. CARDIOVASCULAR: No chest pain or pressure. No palpitations. PULMONARY: No shortness of breath, cough or wheeze. GASTROINTESTINAL: No abdominal pain, nausea, vomiting or diarrhea. GENITOURINARY: No urinary frequency, urgency, hesitancy or dysuria. MUSCULOSKELETAL: No joint or muscle pain, no back pain, no recent trauma. DERMATOLOGIC: No rash, no itching, no lesions. ENDOCRINE: No polyuria, polydipsia, no heat or cold intolerance. No recent change in weight. HEMATOLOGICAL: No anemia or easy bruising or bleeding. NEUROLOGIC: Left arm weakness and pain. PHYSICAL EXAMINATION:   Visit Vitals  /67 (BP 1 Location: Right upper arm)   Pulse 82   Temp 97.8 °F (36.6 °C)   Resp 18   Ht 5' 7\" (1.702 m)   Wt 88.5 kg (195 lb)   SpO2 100%   Breastfeeding No   BMI 30.54 kg/m²         GENERAL: Pleasant, in no apparent distress. HEENT: Moist mucous membranes, sclerae anicteric, scalp is atraumatic. CVS: Regular rate and rhythm, no murmurs or gallops. No carotid bruits. PULMONARY: Clear to auscultation bilaterally. No rales or rhonchi. No wheezing. EXTREMITIES: Normal range of motion at all sites. No deformities. ABDOMEN: Soft, nontender. SKIN: No rashes or ecchymoses. Warm and dry. NEUROLOGIC: Alert and oriented x3. Speech is fluent without any aphasia or dysarthria. Cranial nerves: Face is symmetric with symmetric smile. Facial sensation is intact. Extraocular movements are intact with no nystagmus. Visual fields are full to confrontation. PERRL. Tongue is midline. Palate elevates symmetrically. Hearing intact to speech. Sternocleidomastoid and trapezius strengths are full bilaterally. Motor: It seems left upper limb can be moved horizontally. But patient claimed it is painful to be moved. 5/5 right upper and lower limbs and 5/5 left lower limb. Sensory: Decreased light touch sensation in left upper limb. Coordination: Intact coordination with finger-nose-finger right upper limb.  Normal heel to toe bilateral lower limbs. Deep tendon reflexes: 2+ at biceps, brachioradialis, patella and ankles bilaterally. Toes are down-going bilaterally. Gait assessment: Deferred. Labs: Results:       Chemistry Recent Labs     01/30/23  0547 01/29/23  1302   * 166*    138   K 4.0 4.0    105   CO2 26 24   BUN 11 12   CREA 0.64 0.89   CA 8.7 9.1   AGAP 6 9   BUCR 17 13    121*   TP 6.6 7.8   ALB 3.1* 3.7   GLOB 3.5 4.1*   AGRAT 0.9 0.9      CBC w/Diff Recent Labs     01/30/23  0547 01/29/23  1302   WBC 10.0 10.2   RBC 4.19* 4.64   HGB 13.1 14.9   HCT 39.0 43.0    343   GRANS  --  60   LYMPH  --  32   EOS  --  1      Cardiac Enzymes No results for input(s): CPK, CKND1, MAXIMILIANO in the last 72 hours. No lab exists for component: CKRMB, TROIP   Coagulation Recent Labs     01/29/23  1302   PTP 13.2   INR 1.0       Lipid Panel No results found for: CHOL, CHOLPOCT, CHOLX, CHLST, CHOLV, 676532, HDL, HDLP, LDL, LDLC, DLDLP, 803983, VLDLC, VLDL, TGLX, TRIGL, TRIGP, TGLPOCT, CHHD, CHHDX   BNP No results for input(s): BNPP in the last 72 hours. Liver Enzymes Recent Labs     01/30/23  0547   TP 6.6   ALB 3.1*         Thyroid Studies Lab Results   Component Value Date/Time    TSH 0.88 01/13/2021 02:23 PM          Radiology:  MRI BRAIN WO CONT    Result Date: 1/29/2023  MRI of the brain without contrast INDICATION: Left-sided paralysis. TECHNIQUE: Noncontrast MRI of the brain was performed. COMPARISON: Head CT performed the same day. FINDINGS: Ventricles are normal in size. There is no midline shift, mass effect, edema, or hemorrhage. There is no evidence of diffusion restriction. No significant white matter disease. The paranasal sinuses and orbits are unremarkable. Unremarkable MRI of the brain. MRI LUMB SPINE WO CONT    Result Date: 1/29/2023  Comparison: Lumbar spine CT dated 1/29/2023 TECHNIQUE: Noncontrast MRI of the lumbar spine was performed. HISTORY: Left leg weakness. FINDINGS: Vertebral body heights and alignment are within normal limits. Marrow signal is normal with some mild degenerative marrow changes at L1. There are 5 lumbar-type vertebral bodies. The conus terminates at the L1 level. Distal spinal cord signal is normal. There is mild disc bulge at L5-S1 without significant canal stenosis. There is minimal right foraminal narrowing. Visualized soft tissues are unremarkable. No acute pathology. Minimal disc bulge at L5-S1 with minimal right foraminal narrowing. CT HEAD WO CONT    Result Date: 1/29/2023  Indication:  speech difficulty, leg weakness Comparison: None Findings: 5 mm axial images were obtained from the skull base through the vertex. CT dose reduction was achieved through the use of a standardized protocol tailored for this examination and automatic exposure control for dose modulation. The ventricles and cortical sulci are appropriate in size and configuration. There is no evidence of intracranial hemorrhage, mass, mass effect, or acute infarct. No extra-axial fluid collections are seen. The visualized paranasal sinuses and mastoid air cells are clear. The orbital structures are unremarkable. No osseous abnormalities are seen. Normal head CT. CT SPINE LUMB WO CONT    Result Date: 1/29/2023  INDICATION: low back pain and left leg weakness COMPARISON: None. TECHNIQUE:   Noncontrast axial CT imaging of the lumbar spine was performed. Coronal and sagittal reconstructions were obtained. CT dose reduction was achieved through use of a standardized protocol tailored for this examination and automatic exposure control for dose modulation. FINDINGS: Normal alignment of lumbar spine. Vertebral body heights are maintained with no evidence of acute fracture. No evidence of subluxation. Small inferior endplate Schmorl's node formation at L3. No significant loss of disc height, with mild bulging of disc at L3-4, L4-5, L5-S1.  No significant spinal canal or foraminal stenosis. Paraspinal soft tissues are unremarkable., However there is right hydroureteronephrosis partly visualized. Punctate calculi in the right kidney, partly visualized. 1. Right hydroureteronephrosis and several punctate right renal calculi partly visualized. If clinically relevant this would be better assessed with abdomen pelvis CT without contrast. 2. Very mild degenerative changes in the lumbar spine without significant spinal canal or foraminal stenosis at any level. CT ABD PELV WO CONT    Result Date: 1/29/2023  INDICATION: pelvic pain, right hydronephrosis COMPARISON: None TECHNIQUE: Noncontrast thin axial images were obtained through the abdomen and pelvis. Coronal and sagittal reconstructions were generated. CT dose reduction was achieved through use of a standardized protocol tailored for this examination and automatic exposure control for dose modulation. NOTE:  The absence of IV contrast reduces the sensitivity for evaluation of visceral organs and vasculature including presence of mass lesions, hemodynamically significant stenoses, dissections, mucosal abnormalities etc. FINDINGS: LUNG BASES: No abnormality. LIVER: No mass or biliary dilatation. GALLBLADDER: Unremarkable. SPLEEN: No enlargement or lesion. PANCREAS: No mass or ductal dilatation. ADRENALS: No mass. KIDNEYS: Right hydroureteronephrosis and several punctate right renal calculi. There are several calcifications in the right pelvis near the right ureter, measuring 2-3 mm. No left-sided hydronephrosis or nephrolithiasis. GI TRACT:  Remote sleeve gastrectomy. No bowel obstruction. PERITONEUM: No free air or free fluid. APPENDIX: Unremarkable. RETROPERITONEUM: No aortic aneurysm. LYMPH NODES:  None enlarged. ADDITIONAL COMMENTS: N/A. URINARY BLADDER: 2 mm calculus in the midline dependent portion of the bladder. REPRODUCTIVE ORGANS: IUD within the uterus. LYMPH NODES:  None enlarged.  FREE FLUID:  Small amount likely physiologic. BONES: No destructive bone lesion. ADDITIONAL COMMENTS: N/A.     1. Right hydroureteronephrosis, with several punctate right renal calculi. While there are several small calcifications in the right pelvis these are likely phleboliths. There is a 2 mm calculus in the midline dependent portion of the bladder. 2. Remote sleeve gastrectomy. ASSESSMENT/IMPRESSION:  68-year-old female presents with left upper and lower limb subjective weakness and numbness after falling down during skating. Left leg weakness has resolved. Left arm subjective weakness: Subjective weakness is due to the pain. She has high risk of conversion disorder. Brain MRI and the lumbar spine MRI are unremarkable. RECOMMENDATIONS:  Pending physical therapy evaluation. Patient is recommended to follow-up with orthopedic surgeon and outpatient neurology clinic in [facility] for further evaluation. Neurology sign off. I will follow the patient.  Please do not hesitate to return with any questions.    ------------------------------------  Renuka Cohen MD  1/30/2023  11:07 AM

## 2023-01-30 NOTE — ACP (ADVANCE CARE PLANNING)
Advance Care Planning   Advance Care Planning Inpatient Note  [facility] Department    Today's Date: 1/30/2023  Unit: THE [address] SURGICAL/ONCOLOGY    Received request from IDT member. Upon review of chart and communication with care team, patient's decision making abilities are not in question. Patient and Friends was/were present in the room during visit. Goals of ACP Conversation:  Discuss Advance Care planning documents    Health Care Decision Makers:      Primary Decision Maker: Sarah Davies - Mother - [phone]    Summary:  No Decision Maker named by patient at this time    Advance Care Planning Documents (Patient Wishes) on file:   provided form and education, patient did not want to complete form at this time.        Interventions:  Provided education on documents for clarity and greater understanding    Care Preferences Communicated:  No    Outcomes/Plan:  Teach Back Method used to verify the patient/Healthcare Decision Maker's understanding of key information in the advance directive documents    Chaplain Adonis on 1/30/2023 at 11:59 AM

## 2023-01-30 NOTE — H&P
History & Physical    Patient: David March MRN: [id number]  CSN: [id number]    YOB: 1990  Age: 27 y.o. Sex: female      DOA: 1/29/2023  Primary Care Provider:  Ramesh Escobar MD      Assessment/Plan     Patient Active Problem List   Diagnosis Code    Controlled type 2 diabetes mellitus without complication, without long-term current use of insulin ([facility] E11.9    Hyperlipemia E78.5    Asthma J45.909    IUD (intrauterine device) in place Z97.5    FH: ovarian cancer Z80.41    Intestinal malabsorption K90.9    GERD (gastroesophageal reflux disease) K21.9    Elevated prolactin level R79.89    HSV-1 infection B00.9    Narcolepsy G47.419    Uses birth control Z78.9    S/P gastric bypass Z98.84    Unable to ambulate R26.2    Left leg weakness R29.898    Left arm pain M79.602    Chronic pain G89.29    History of noncompliance with medical treatment Z91.199     28 y/o female with narcolepsy/cataplexy, s/p gastric bypass, and chronic pain is admitted for inability to ambulate with left leg weakness and arm pain. She does have a history of somatoform disorder and significant home stress due to two children with disabilities so this may be a conversion phenomenon. Extensive imaging has been unremarkable and her mechanism of injury is not consistent with her clinical presentation. Left leg weakness-refuses to move leg on exam or to walk. Imaging has been negative to suggest brachial plexus or neurogenic pathology. --PT consult  --Case management as she may need short term rehab due to concern for home safety  --I do not think oral steroids will be beneficial and may cause more harm due to recent gastric sleeve surgery    Left arm weakness--refuses to move arm on exam. Negative Lspine and brain MRI today. She had a negative CT head, Cspine MRI, CT shoulder, and brachial plexus MRI at [facility].   --Neurology consult  --Occupational therapy consult    S/p gastric bypass  --Continue MVI  --Follow up B12/folate lab  --Start B12/folate    Chronic pain  --Avoid narcotic medications especially since also on benzodiazepines and modafinil as an outpatient    Medically noncompliant-has missed multiple PCP appointments for follow up   --She should be following up with a sleep medicine physician for weekly cataplexy flares  --I am concerned about her driving a vehicle with frequency of ongoing cataplexy     Narcolepsy/cataplexy--she reports being on modafinil every 3 hours which is 8 times the recommended daily dose. When confirmed with pharmacy she was only given a 30 day supply stating take 200 mg three times daily while awake on Nov 21. This was prescribed by Dr. Malick Magaña of sleep medicine in [facility]. --Hold modafinil for now as she has been off this for at least a month based on pharmacy report  --Needs to follow up with sleep medicine    Full code    Prophylaxis: protonix PO, lovenox SQ    Estimated length of stay : 1 night    MD Massiel Appiahurnist  -----------------------------------------------------------------------------------------------------------------------------------------------------------------------------------------------------------------------  CC: reports unable to walk in ED parking lot today       HPI:     Trip Crawford is a 27 y.o. female with narcolepsy/cataplexy, s/p gastric bypass, and chronic pain presents to the ED with her significant other for inability to move her left arm and left leg following a roller derby injury 4 days ago. She states she fell backward and since then has had numbness and inability to move her left arm. She was seen by [facility] ED yesterday and given oxycodone and prednisone. She reports just finishing a short course of prednisone for bronchitis prior to this. She has not started the prednisone as Dr. Solo Whittaker needs to prescribe her cytotec to take it given her recent gastric sleeve surgery in May 2022.  She has lost 70 lbs total. She is taking a prenatal vitamin but no additional vitamins. Last night, she had lower back pain and was coming to the ED for this. Her significant other states that when they were walking across the parking lot, she stated that her left leg was numb and that she couldn't walk anymore. She was brought in a wheelchair. She does not feel like she can go home due to this. She states she has weekly cataplexy episodes while taking modafinil 200 mg every 3 hours but she drives and has never had an episode while driving. She denies fever but has had some nausea/vomiting earlier in the week that has resolved. She has two children with significant disabilities and life at home is chronically stressful.        Past Medical History:   Diagnosis Date    Arthritis     Asthma     Chronic pain     Diabetes (HCC)     Type ll    GERD (gastroesophageal reflux disease)     HSV-1 infection     Hyperlipemia     Intestinal malabsorption     Migraine     Narcolepsy     Ovarian cyst     Psychiatric disorder     anxiety, PTSD    PUD (peptic ulcer disease)     Hx of    S/P laparoscopic sleeve gastrectomy 2018    Severe obesity (BMI 35.0-35.9 with comorbidity) ([address]     Status post laparoscopic sleeve gastrectomy 2018    Eli    Uses birth control     IUD       Past Surgical History:   Procedure Laterality Date    HX BILATERAL SALPINGECTOMY      HX  SECTION      X 2    HX GASTRIC BYPASS  2022    Dr Efrain Wei, conversion from sleeve    HX GYN Bilateral 2020    Salpingectlomy    [place] [address] R[address] [facility] LONGITUDINAL GASTRECTOMY      3/2018       Family History   Problem Relation Age of Onset    Migraines Mother     Seizures Mother     Other Father         graves ds,     Sickle Cell Trait Brother     Other Brother     Asthma Sister     Other Sister         autism    Other Daughter         cp, epilepsy    Asthma Daughter        Social History     Socioeconomic History    Marital status: LEGALLY    Tobacco Use Smoking status: Never    Smokeless tobacco: Never   Vaping Use    Vaping Use: Never used   Substance and Sexual Activity    Alcohol use: No    Drug use: No     Comment: H/O in the past    Sexual activity: Yes     Partners: Male     Birth control/protection: I.U.D., Surgical       Prior to Admission medications    Medication Sig Start Date End Date Taking? Authorizing Provider   PNV Comb #2-Iron-FA-Omega 3 29-1-400 mg cmpk Take  by mouth. Yes Provider, Historical   cyanocobalamin (VITAMIN B-12) 1,000 mcg sublingual tablet Take 1,000 mcg by mouth daily. Yes Provider, Historical   miSOPROStoL (CYTOTEC) 200 mcg tablet Take 1 Tablet by mouth four (4) times daily. Indications: stomach ulcer from aspirin/ibuprofen-like drugs prevention 1/12/23  Yes Osmany Claros NP   pantoprazole (PROTONIX) 40 mg tablet TAKE 1 TABLET BY MOUTH EVERY DAY 1/10/23  Yes Ronnell Pandey MD   levonorgestrel (MIRENA) 20 mcg/24 hr (5 years) IUD 1 Device. Yes Provider, Historical   albuterol (PROVENTIL HFA, VENTOLIN HFA, PROAIR HFA) 90 mcg/actuation inhaler Take 2 Puffs by inhalation every four (4) hours as needed. Yes Provider, Historical   calcium citrate 200 mg (950 mg) tablet Take  by mouth daily. Patient not taking: Reported on 1/29/2023    Provider, Historical   ergocalciferol (ERGOCALCIFEROL) 1,250 mcg (50,000 unit) capsule Take 50,000 Units by mouth. Provider, Historical   modafiniL (PROVIGIL) 200 mg tablet Every 3 hours while awake 7/9/21   Provider, Historical   ALPRAZolam (XANAX) 0.5 mg tablet Take 0.5 mg by mouth as needed. 9/11/19   Provider, Historical   B.animalis,bifid,infantis,long 10-15 mg TbEC Take  by mouth.     Provider, Historical       Allergies   Allergen Reactions    Latex Rash, Itching and Swelling    Doxycycline Swelling     Throat swelling    Mushroom Anaphylaxis     Other reaction(s): anaphylaxis/angioedema    Amoxicillin Rash    Dog Dander Hives     When patient went for allergy testing she tested positive for dogs and cats. Erythromycin Rash    Lactose Nausea and Vomiting     intolerance  Other reaction(s): gi distress  intolerance    Metformin Seizures     seizure    Mold Other (comments)    Penicillins Rash and Itching     Other reaction(s): unknown    Rocephin [Ceftriaxone] Rash    Sulfa (Sulfonamide Antibiotics) Swelling, Itching and Unknown (comments)       Review of Systems  Gen: No fever, chills, malaise, weight loss/gain. Heent: No headache, rhinorrhea, sore throat. Heart: No chest pain, palpitations, FOREMAN, pnd, or orthopnea. Resp: No cough, hemoptysis, wheezing and shortness of breath. GI: No nausea, vomiting, diarrhea, constipation, melena or hematochezia. : No urinary obstruction, dysuria or hematuria. Derm: No rash, new skin lesion or pruritis. Musc/skeletal: no bone or joint complaints. Vasc: No edema, cyanosis or claudication. Endo: No heat/cold intolerance, no polyuria,polydipsia or polyphagia. Neuro: +left arm and leg unilateral weakness, numbness, tingling. No seizures. Heme: No easy bruising or bleeding. Physical Exam:     Physical Exam:  Visit Vitals  /70 (BP Patient Position: At rest)   Pulse 94   Temp 98.6 °F (37 °C)   Resp 18   Ht 5' 7\" (1.702 m)   Wt 88.5 kg (195 lb)   SpO2 99%   Breastfeeding No   BMI 30.54 kg/m²           Temp (24hrs), Av.7 °F (37.1 °C), Min:98.6 °F (37 °C), Max:98.8 °F (37.1 °C)    No intake/output data recorded. 701 -  190  In: 1000 [I.V.:1000]  Out: -     General:  Awake, no distress, laughing and holding hands with significant other. Head:  Normocephalic, without obvious abnormality, atraumatic. Eyes:  Conjunctivae/corneas clear, sclera anicteric. Neck: Supple, symmetrical, trachea midline. Lungs:   Clear to auscultation bilaterally. Heart:  Regular rate and rhythm, S1, S2 normal, no murmur, click, rub or gallop. Abdomen: Soft, non-tender. Bowel sounds normal. No masses,  No organomegaly. Extremities: Extremities normal, atraumatic, no cyanosis or edema. Capillary refill normal.   Pulses: 2+ and symmetric all extremities. Skin: Skin color pink, turgor normal. No rashes or lesions   Neurologic: She reports subjective numbness throughout left arm and leg. She will not move her left fingers/arm or left leg. Labs Reviewed: All lab results for the last 24 hours reviewed. Recent Results (from the past 24 hour(s))   CBC WITH AUTOMATED DIFF    Collection Time: 01/29/23  1:02 PM   Result Value Ref Range    WBC 10.2 4.6 - 13.2 K/uL    RBC 4.64 4.20 - 5.30 M/uL    HGB 14.9 12.0 - 16.0 g/dL    HCT 43.0 35.0 - 45.0 %    MCV 92.7 78.0 - 100.0 FL    MCH 32.1 24.0 - 34.0 PG    MCHC 34.7 31.0 - 37.0 g/dL    RDW 13.0 11.6 - 14.5 %    PLATELET 649 788 - 085 K/uL    MPV 9.0 (L) 9.2 - 11.8 FL    NRBC 0.0 0  WBC    ABSOLUTE NRBC 0.00 0.00 - 0.01 K/uL    NEUTROPHILS 60 40 - 73 %    LYMPHOCYTES 32 21 - 52 %    MONOCYTES 6 3 - 10 %    EOSINOPHILS 1 0 - 5 %    BASOPHILS 0 0 - 2 %    IMMATURE GRANULOCYTES 0 0.0 - 0.5 %    ABS. NEUTROPHILS 6.1 1.8 - 8.0 K/UL    ABS. LYMPHOCYTES 3.3 0.9 - 3.6 K/UL    ABS. MONOCYTES 0.6 0.05 - 1.2 K/UL    ABS. EOSINOPHILS 0.1 0.0 - 0.4 K/UL    ABS. BASOPHILS 0.0 0.0 - 0.1 K/UL    ABS. IMM. GRANS. 0.0 0.00 - 0.04 K/UL    DF AUTOMATED     METABOLIC PANEL, COMPREHENSIVE    Collection Time: 01/29/23  1:02 PM   Result Value Ref Range    Sodium 138 136 - 145 mmol/L    Potassium 4.0 3.5 - 5.5 mmol/L    Chloride 105 100 - 111 mmol/L    CO2 24 21 - 32 mmol/L    Anion gap 9 3.0 - 18 mmol/L    Glucose 166 (H) 74 - 99 mg/dL    BUN 12 7.0 - 18 MG/DL    Creatinine 0.89 0.6 - 1.3 MG/DL    BUN/Creatinine ratio 13 12 - 20      eGFR >60 >60 ml/min/1.73m2    Calcium 9.1 8.5 - 10.1 MG/DL    Bilirubin, total 0.6 0.2 - 1.0 MG/DL    ALT (SGPT) 24 13 - 56 U/L    AST (SGOT) 12 10 - 38 U/L    Alk.  phosphatase 121 (H) 45 - 117 U/L    Protein, total 7.8 6.4 - 8.2 g/dL    Albumin 3.7 3.4 - 5.0 g/dL Globulin 4.1 (H) 2.0 - 4.0 g/dL    A-G Ratio 0.9 0.8 - 1.7     TROPONIN-HIGH SENSITIVITY    Collection Time: 01/29/23  1:02 PM   Result Value Ref Range    Troponin-High Sensitivity <3 0 - 54 ng/L   LIPASE    Collection Time: 01/29/23  1:02 PM   Result Value Ref Range    Lipase 80 73 - 393 U/L   HCG QL SERUM    Collection Time: 01/29/23  1:02 PM   Result Value Ref Range    HCG, Ql. Negative NEG     PROTHROMBIN TIME + INR    Collection Time: 01/29/23  1:02 PM   Result Value Ref Range    Prothrombin time 13.2 11.5 - 15.2 sec    INR 1.0 0.8 - 1.2     EKG, 12 LEAD, INITIAL    Collection Time: 01/29/23  1:02 PM   Result Value Ref Range    Ventricular Rate 102 BPM    Atrial Rate 102 BPM    P-R Interval 142 ms    QRS Duration 88 ms    Q-T Interval 356 ms    QTC Calculation (Bezet) 463 ms    Calculated P Axis 58 degrees    Calculated R Axis 37 degrees    Calculated T Axis 49 degrees    Diagnosis       Poor data quality, interpretation may be adversely affected  Sinus tachycardia  Nonspecific ST abnormality  Abnormal ECG  Confirmed by Klever Tarango MD, [facility] ([id number]) on 1/29/2023 2:00:44 PM     GLUCOSE, POC    Collection Time: 01/29/23  1:03 PM   Result Value Ref Range    Glucose (POC) 176 (H) 70 - 110 mg/dL   URINALYSIS W/ RFLX MICROSCOPIC    Collection Time: 01/29/23  3:03 PM   Result Value Ref Range    Color YELLOW      Appearance CLEAR      Specific gravity 1.011 1.005 - 1.030      pH (UA) 7.5 5.0 - 8.0      Protein Negative NEG mg/dL    Glucose Negative NEG mg/dL    Ketone Negative NEG mg/dL    Bilirubin Negative NEG      Blood LARGE (A) NEG      Urobilinogen 1.0 0.2 - 1.0 EU/dL    Nitrites Negative NEG      Leukocyte Esterase MODERATE (A) NEG     URINE MICROSCOPIC ONLY    Collection Time: 01/29/23  3:03 PM   Result Value Ref Range    WBC 0 to 3 0 - 5 /hpf    RBC 21 to 35 0 - 5 /hpf    Epithelial cells 2+ 0 - 5 /lpf    Bacteria FEW (A) NEG /hpf   DRUG SCREEN, URINE    Collection Time: 01/29/23  3:03 PM   Result Value Ref Range BENZODIAZEPINES Negative NEG      BARBITURATES Negative NEG      THC (TH-CANNABINOL) Negative NEG      OPIATES Positive (A) NEG      PCP(PHENCYCLIDINE) Negative NEG      COCAINE Negative NEG      AMPHETAMINES Negative NEG      METHADONE Negative NEG      HDSCOM (NOTE)    COVID-19 WITH INFLUENZA A/B    Collection Time: 01/29/23  8:14 PM   Result Value Ref Range    SARS-CoV-2 by PCR Not detected NOTD      Influenza A by PCR Not detected NOTD      Influenza B by PCR Not detected NOTD     GLUCOSE, POC    Collection Time: 01/29/23  9:58 PM   Result Value Ref Range    Glucose (POC) 192 (H) 70 - 110 mg/dL     Results  CT ABD PELV WO CONT (Accession [id number]) (Order [id number])  Allergies       High: Doxycycline; Mushroom   Medium: Latex   Not Specified: Amoxicillin;  Dog Dander;  Erythromycin; Lactose;  Metformin;  Mold;  Penicillins;  Rocephin [Ceftriaxone];  Sulfa (Sulfonamide Antibiotics)     Exam Information    Status Exam Begun  Exam Ended    Final [99] 1/29/2023 15:21 1/29/2023  3:47 PM [id number]  3:47 PM     Result Information    Status: Final result (Exam End: 1/29/2023 15:47) Provider Status: Open       CT ABD PELV WO CONT: Patient Communication     Released  Not seen     Study Result    Narrative & Impression   INDICATION: pelvic pain, right hydronephrosis     COMPARISON: None     TECHNIQUE:   Noncontrast thin axial images were obtained through the abdomen and pelvis. Coronal and sagittal reconstructions were generated. CT dose reduction was  achieved through use of a standardized protocol tailored for this examination  and automatic exposure control for dose modulation. NOTE:  The absence of IV contrast reduces the sensitivity for evaluation of  visceral organs and vasculature including presence of mass lesions,  hemodynamically significant stenoses, dissections, mucosal abnormalities etc.     FINDINGS:   LUNG BASES: No abnormality. LIVER: No mass or biliary dilatation. GALLBLADDER: Unremarkable.   SPLEEN: No enlargement or lesion. PANCREAS: No mass or ductal dilatation. ADRENALS: No mass. KIDNEYS: Right hydroureteronephrosis and several punctate right renal calculi. There are several calcifications in the right pelvis near the right ureter,  measuring 2-3 mm. No left-sided hydronephrosis or nephrolithiasis. GI TRACT:  Remote sleeve gastrectomy. No bowel obstruction. PERITONEUM: No free air or free fluid. APPENDIX: Unremarkable. RETROPERITONEUM: No aortic aneurysm. LYMPH NODES:  None enlarged. ADDITIONAL COMMENTS: N/A.     URINARY BLADDER: 2 mm calculus in the midline dependent portion of the bladder. REPRODUCTIVE ORGANS: IUD within the uterus. LYMPH NODES:  None enlarged. FREE FLUID:  Small amount likely physiologic. BONES: No destructive bone lesion. ADDITIONAL COMMENTS: N/A. IMPRESSION     1. Right hydroureteronephrosis, with several punctate right renal calculi. While  there are several small calcifications in the right pelvis these are likely  phleboliths. There is a 2 mm calculus in the midline dependent portion of the  bladder. 2. Remote sleeve gastrectomy. Results  MRI BRAIN WO CONT (Accession [id number]) (Order [id number])  Allergies       High: Doxycycline; Mushroom   Medium: Latex   Not Specified: Amoxicillin;  Dog Dander;  Erythromycin; Lactose;  Metformin;  Mold;  Penicillins;  Rocephin [Ceftriaxone];  Sulfa (Sulfonamide Antibiotics)     Exam Information    Status Exam Begun  Exam Ended    Final [99] 1/29/2023 17:19 1/29/2023  6:08 PM [id number]  6:08 PM     Result Information    Status: Final result (Exam End: 1/29/2023 18:08) Provider Status: Open       MRI BRAIN WO CONT: Patient Communication     Released  Not seen     Study Result    Narrative & Impression   MRI of the brain without contrast     INDICATION: Left-sided paralysis. TECHNIQUE: Noncontrast MRI of the brain was performed. COMPARISON: Head CT performed the same day.      FINDINGS: Ventricles are normal in size. There is no midline shift, mass effect,  edema, or hemorrhage. There is no evidence of diffusion restriction. No  significant white matter disease. The paranasal sinuses and orbits are unremarkable. IMPRESSION  Unremarkable MRI of the brain. Results  MRI LUMB SPINE WO CONT (Accession [id number]) (Order [id number])  Allergies       High: Doxycycline; Mushroom   Medium: Latex   Not Specified: Amoxicillin;  Dog Dander;  Erythromycin; Lactose;  Metformin;  Mold;  Penicillins;  Rocephin [Ceftriaxone];  Sulfa (Sulfonamide Antibiotics)     Exam Information    Status Exam Begun  Exam Ended    Final [99] 1/29/2023 17:22 1/29/2023  6:08 PM [id number]  6:08 PM     Result Information    Status: Final result (Exam End: 1/29/2023 18:08) Provider Status: Open       MRI LUMB SPINE WO CONT: Patient Communication     Released  Not seen     Study Result    Narrative & Impression   Comparison: Lumbar spine CT dated 1/29/2023     TECHNIQUE: Noncontrast MRI of the lumbar spine was performed. HISTORY: Left leg weakness. FINDINGS: Vertebral body heights and alignment are within normal limits. Marrow  signal is normal with some mild degenerative marrow changes at L1. There are 5  lumbar-type vertebral bodies. The conus terminates at the L1 level. Distal  spinal cord signal is normal.     There is mild disc bulge at L5-S1 without significant canal stenosis. There is  minimal right foraminal narrowing. Visualized soft tissues are unremarkable. IMPRESSION  No acute pathology. Minimal disc bulge at L5-S1 with minimal right  foraminal narrowing.

## 2023-01-30 NOTE — ED NOTES
Verbal shift change report given to Casi Pompa RN (oncoming nurse). Report included the following information SBAR, ED Summary and MAR.

## 2023-01-31 ENCOUNTER — HOME HEALTH ADMISSION (OUTPATIENT)
Dept: HOME HEALTH SERVICES | Facility: HOME HEALTH | Age: 33
End: 2023-01-31

## 2023-01-31 VITALS
OXYGEN SATURATION: 99 % | SYSTOLIC BLOOD PRESSURE: 103 MMHG | BODY MASS INDEX: 30.61 KG/M2 | HEART RATE: 75 BPM | DIASTOLIC BLOOD PRESSURE: 67 MMHG | RESPIRATION RATE: 18 BRPM | HEIGHT: 67 IN | WEIGHT: 195 LBS | TEMPERATURE: 97.5 F

## 2023-01-31 LAB
ALBUMIN SERPL-MCNC: 3.1 G/DL (ref 3.4–5)
ALBUMIN/GLOB SERPL: 0.9 (ref 0.8–1.7)
ALP SERPL-CCNC: 107 U/L (ref 45–117)
ALT SERPL-CCNC: 21 U/L (ref 13–56)
ANION GAP SERPL CALC-SCNC: 8 MMOL/L (ref 3–18)
AST SERPL-CCNC: 12 U/L (ref 10–38)
BILIRUB SERPL-MCNC: 0.4 MG/DL (ref 0.2–1)
BUN SERPL-MCNC: 12 MG/DL (ref 7–18)
BUN/CREAT SERPL: 17 (ref 12–20)
CALCIUM SERPL-MCNC: 8.6 MG/DL (ref 8.5–10.1)
CHLORIDE SERPL-SCNC: 107 MMOL/L (ref 100–111)
CO2 SERPL-SCNC: 23 MMOL/L (ref 21–32)
CREAT SERPL-MCNC: 0.71 MG/DL (ref 0.6–1.3)
ERYTHROCYTE [DISTWIDTH] IN BLOOD BY AUTOMATED COUNT: 13.2 % (ref 11.6–14.5)
GLOBULIN SER CALC-MCNC: 3.6 G/DL (ref 2–4)
GLUCOSE BLD STRIP.AUTO-MCNC: 83 MG/DL (ref 70–110)
GLUCOSE SERPL-MCNC: 162 MG/DL (ref 74–99)
HCT VFR BLD AUTO: 36.5 % (ref 35–45)
HGB BLD-MCNC: 12.3 G/DL (ref 12–16)
MCH RBC QN AUTO: 31.7 PG (ref 24–34)
MCHC RBC AUTO-ENTMCNC: 33.7 G/DL (ref 31–37)
MCV RBC AUTO: 94.1 FL (ref 78–100)
NRBC # BLD: 0 K/UL (ref 0–0.01)
NRBC BLD-RTO: 0 PER 100 WBC
PLATELET # BLD AUTO: 314 K/UL (ref 135–420)
PMV BLD AUTO: 9.4 FL (ref 9.2–11.8)
POTASSIUM SERPL-SCNC: 3.6 MMOL/L (ref 3.5–5.5)
PROT SERPL-MCNC: 6.7 G/DL (ref 6.4–8.2)
RBC # BLD AUTO: 3.88 M/UL (ref 4.2–5.3)
SODIUM SERPL-SCNC: 138 MMOL/L (ref 136–145)
WBC # BLD AUTO: 9.1 K/UL (ref 4.6–13.2)

## 2023-01-31 PROCEDURE — 85027 COMPLETE CBC AUTOMATED: CPT

## 2023-01-31 PROCEDURE — 74011250637 HC RX REV CODE- 250/637: Performed by: FAMILY MEDICINE

## 2023-01-31 PROCEDURE — 36415 COLL VENOUS BLD VENIPUNCTURE: CPT

## 2023-01-31 PROCEDURE — 74011000250 HC RX REV CODE- 250: Performed by: FAMILY MEDICINE

## 2023-01-31 PROCEDURE — G0378 HOSPITAL OBSERVATION PER HR: HCPCS

## 2023-01-31 PROCEDURE — 80053 COMPREHEN METABOLIC PANEL: CPT

## 2023-01-31 PROCEDURE — 82962 GLUCOSE BLOOD TEST: CPT

## 2023-01-31 RX ADMIN — PANTOPRAZOLE SODIUM 40 MG: 40 TABLET, DELAYED RELEASE ORAL at 09:34

## 2023-01-31 RX ADMIN — FOLIC ACID 1 MG: 1 TABLET ORAL at 09:34

## 2023-01-31 RX ADMIN — SODIUM CHLORIDE, PRESERVATIVE FREE 10 ML: 5 INJECTION INTRAVENOUS at 06:23

## 2023-01-31 RX ADMIN — CYANOCOBALAMIN TAB 500 MCG 500 MCG: 500 TAB at 09:34

## 2023-01-31 RX ADMIN — SODIUM CHLORIDE, PRESERVATIVE FREE 10 ML: 5 INJECTION INTRAVENOUS at 00:01

## 2023-01-31 NOTE — HOSPICE
Thank you for referral cane ordered through eli EVANS [id number] [facility] RN  [address] [facility] Nurse Liaison   Office [phone]

## 2023-01-31 NOTE — PROGRESS NOTES
Went over discharge teaching with pt. Home medications, referrals, and home health were discussed with pt. Pt verbalized understanding. IV was removed with catheter intact. Pt was transported via wheelchair to main entrance.

## 2023-01-31 NOTE — PROGRESS NOTES
D/C Plan: [id number] with physician follow up    Noted plan discharge today. Noted recommendation for a quad cane. [address] Mar[address]ell Mao has been notified to assist.  Anticipate pt will transition home today with the above services. Pt's family/friend will transport pt home today. No other care transition needs have been identified at this time. Care Management Interventions  PCP Verified by CM: Yes  Last Visit to PCP: 04/01/22  Palliative Care Criteria Met (RRAT>21 & CHF Dx)?: No  Mode of Transport at Discharge:  Other (see comment) (family to drive)  Transition of Care Consult (CM Consult): Home Health  Discharge Durable Medical Equipment: No  Health Maintenance Reviewed: Yes  Physical Therapy Consult: Yes  Occupational Therapy Consult: Yes  Support Systems: Spouse/Significant Other  Confirm Follow Up Transport: Family  The Plan for Transition of Care is Related to the Following Treatment Goals : home with [place] and fiance to drive  The Patient and/or Patient Representative was Provided with a Choice of Provider and Agrees with the Discharge Plan?: Yes  Freedom of Choice List was Provided with Basic Dialogue that Supports the Patient's Individualized Plan of Care/Goals, Treatment Preferences and Shares the Quality Data Associated with the Providers?: Yes  Discharge Location  Patient Expects to be Discharged to[de-identified] Home with home health

## 2023-01-31 NOTE — PROGRESS NOTES
Problem: Falls - Risk of  Goal: *Absence of Falls  Description: Document Arabella Bustos Fall Risk and appropriate interventions in the flowsheet.   Outcome: Progressing Towards Goal  Note: Fall Risk Interventions:  Mobility Interventions: Patient to call before getting OOB, PT Consult for mobility concerns         Medication Interventions: Evaluate medications/consider consulting pharmacy, Patient to call before getting OOB    Elimination Interventions: Call light in reach, Patient to call for help with toileting needs    History of Falls Interventions: Bed/chair exit alarm, Consult care management for discharge planning, Investigate reason for fall

## 2023-01-31 NOTE — PROGRESS NOTES
Bedside and Verbal shift change report given to Ron  (oncoming nurse) by Gómez Calle RN (offgoing nurse). Report included the following information SBAR, Kardex, MAR, and Med Rec Status.

## 2023-01-31 NOTE — PROGRESS NOTES
Patient: Trip Crawford         YOB: 1990        DOA: 1/29/2023  Discharge Date: 1/31/23     Time to return work :    Per primary care physician  clearance.           Zainab Hudson MD.

## 2023-01-31 NOTE — DISCHARGE SUMMARY
Discharge Summary    Patient: Ty Monroy MRN: [id number]  CSN: [id number]    YOB: 1990  Age: 27 y.o. Sex: female    DOA: 1/29/2023 LOS:  LOS: 0 days   Discharge Date:      Primary Care Provider:  Delmer Turner MD    Admission Diagnoses: Left leg weakness [R29.898]    Discharge Diagnoses:    Hospital Problems  Date Reviewed: 1/29/2023            Codes Class Noted POA    Left arm pain ICD-10-CM: M79.602  ICD-9-CM: 729.5  1/30/2023 Yes        Chronic pain ICD-10-CM: G89.29  ICD-9-CM: 338.29  1/30/2023 Yes        History of noncompliance with medical treatment ICD-10-CM: Z91.199  ICD-9-CM: V15.81  1/30/2023 Yes        Stressful life event affecting family ICD-10-CM: Z63.79  ICD-9-CM: V61.09  1/30/2023 Yes        * (Principal) Unable to ambulate ICD-10-CM: R26.2  ICD-9-CM: 719.7  1/29/2023 Yes        Left leg weakness ICD-10-CM: R29.898  ICD-9-CM: 729.89  1/29/2023 Yes        S/P gastric bypass ICD-10-CM: Z98.84  ICD-9-CM: V45.86  6/1/2022 Yes        Controlled type 2 diabetes mellitus without complication, without long-term current use of insulin (HCC) ICD-10-CM: E11.9  ICD-9-CM: 250.00  Unknown Yes           Discharge Condition: stable     Discharge Medications:     Current Discharge Medication List        CONTINUE these medications which have NOT CHANGED    Details   PNV Comb #2-Iron-FA-Omega 3 29-1-400 mg cmpk Take  by mouth.      cyanocobalamin (VITAMIN B-12) 1,000 mcg sublingual tablet Take 1,000 mcg by mouth daily. miSOPROStoL (CYTOTEC) 200 mcg tablet Take 1 Tablet by mouth four (4) times daily. Indications: stomach ulcer from aspirin/ibuprofen-like drugs prevention  Qty: 56 Tablet, Refills: 0      pantoprazole (PROTONIX) 40 mg tablet TAKE 1 TABLET BY MOUTH EVERY DAY  Qty: 30 Tablet, Refills: 2      levonorgestrel (MIRENA) 20 mcg/24 hr (5 years) IUD 1 Device.       albuterol (PROVENTIL HFA, VENTOLIN HFA, PROAIR HFA) 90 mcg/actuation inhaler Take 2 Puffs by inhalation every four (4) hours as needed. calcium citrate 200 mg (950 mg) tablet Take  by mouth daily. ergocalciferol (ERGOCALCIFEROL) 1,250 mcg (50,000 unit) capsule Take 50,000 Units by mouth.      modafiniL (PROVIGIL) 200 mg tablet Every 3 hours while awake      ALPRAZolam (XANAX) 0.5 mg tablet Take 0.5 mg by mouth as needed. B.animalis,bifid,infantis,long 10-15 mg TbEC Take  by mouth. Procedures : none     Consults: Neurology      PHYSICAL EXAM   Visit Vitals  /62   Pulse 81   Temp 98.3 °F (36.8 °C)   Resp 17   Ht 5' 7\" (1.702 m)   Wt 88.5 kg (195 lb)   SpO2 100%   Breastfeeding No   BMI 30.54 kg/m²     General: Awake, cooperative, no acute distress    HEENT: NC, Atraumatic. PERRLA, EOMI. Anicteric sclerae. Lungs:  CTA Bilaterally. No Wheezing/Rhonchi/Rales. Heart:  Regular  rhythm,  No murmur, No Rubs, No Gallops  Abdomen: Soft, Non distended, Non tender. +Bowel sounds,   Extremities: No c/c/e  Psych:   Not anxious or agitated. Neurologic:  No acute neurological deficits except left arm -not moving                                  Admission HPI :   Joseph Roman is a 27 y.o. female with narcolepsy/cataplexy, s/p gastric bypass, and chronic pain presents to the ED with her significant other for inability to move her left arm and left leg following a roller derby injury 4 days ago. She states she fell backward and since then has had numbness and inability to move her left arm. She was seen by [facility] ED yesterday and given oxycodone and prednisone. She reports just finishing a short course of prednisone for bronchitis prior to this. She has not started the prednisone as Dr. Param Greene needs to prescribe her cytotec to take it given her recent gastric sleeve surgery in May 2022. She has lost 70 lbs total. She is taking a prenatal vitamin but no additional vitamins. Last night, she had lower back pain and was coming to the ED for this.  Her significant other states that when they were walking across the parking lot, she stated that her left leg was numb and that she couldn't walk anymore. She was brought in a wheelchair. She does not feel like she can go home due to this. She states she has weekly cataplexy episodes while taking modafinil 200 mg every 3 hours but she drives and has never had an episode while driving. She denies fever but has had some nausea/vomiting earlier in the week that has resolved. She has two children with significant disabilities and life at home is chronically stressful    Hospital Course :   26 y/o female with narcolepsy/cataplexy, s/p gastric bypass, and chronic pain is admitted for inability to ambulate with left leg weakness and arm pain. She does have a history of somatoform disorder and significant home stress due to two children with disabilities so this may be a conversion phenomenon. Extensive imaging has been unremarkable and her mechanism of injury is not consistent with her clinical presentation. Neurologist is consulted, recommend PT. She received PT and recommend home dhara. She can move her legs on discharge. Discharge planning discussed with patient, pt agrees  with the plan and no questions and concerns at this point.        Activity: Activity as tolerated    Diet: Regular Diet    Follow-up: PCP     Disposition: home with home health     Minutes spent on discharge: 45 min       Labs: Results:       Chemistry Recent Labs     01/31/23 0525 01/30/23 0547 01/29/23  1302   * 109* 166*    139 138   K 3.6 4.0 4.0    107 105   CO2 23 26 24   BUN 12 11 12   CREA 0.71 0.64 0.89   CA 8.6 8.7 9.1   AGAP 8 6 9   BUCR 17 17 13    101 121*   TP 6.7 6.6 7.8   ALB 3.1* 3.1* 3.7   GLOB 3.6 3.5 4.1*   AGRAT 0.9 0.9 0.9      CBC w/Diff Recent Labs     01/31/23 0525 01/30/23 0547 01/29/23  1302   WBC 9.1 10.0 10.2   RBC 3.88* 4.19* 4.64   HGB 12.3 13.1 14.9   HCT 36.5 39.0 43.0    337 343   GRANS  --   --  60   LYMPH  --   --  32   EOS  --   --  1 Cardiac Enzymes No results for input(s): CPK, CKND1, MAXIMILIANO in the last 72 hours. No lab exists for component: CKRMB, TROIP   Coagulation Recent Labs     01/29/23  1302   PTP 13.2   INR 1.0       Lipid Panel No results found for: CHOL, CHOLPOCT, CHOLX, CHLST, CHOLV, 482207, HDL, HDLP, LDL, LDLC, DLDLP, 733879, VLDLC, VLDL, TGLX, TRIGL, TRIGP, TGLPOCT, CHHD, CHHDX   BNP No results for input(s): BNPP in the last 72 hours. Liver Enzymes Recent Labs     01/31/23  0525   TP 6.7   ALB 3.1*         Thyroid Studies Lab Results   Component Value Date/Time    TSH 0.88 01/13/2021 02:23 PM            Significant Diagnostic Studies: MRI BRAIN WO CONT    Result Date: 1/29/2023  MRI of the brain without contrast INDICATION: Left-sided paralysis. TECHNIQUE: Noncontrast MRI of the brain was performed. COMPARISON: Head CT performed the same day. FINDINGS: Ventricles are normal in size. There is no midline shift, mass effect, edema, or hemorrhage. There is no evidence of diffusion restriction. No significant white matter disease. The paranasal sinuses and orbits are unremarkable. Unremarkable MRI of the brain. MRI LUMB SPINE WO CONT    Result Date: 1/29/2023  Comparison: Lumbar spine CT dated 1/29/2023 TECHNIQUE: Noncontrast MRI of the lumbar spine was performed. HISTORY: Left leg weakness. FINDINGS: Vertebral body heights and alignment are within normal limits. Marrow signal is normal with some mild degenerative marrow changes at L1. There are 5 lumbar-type vertebral bodies. The conus terminates at the L1 level. Distal spinal cord signal is normal. There is mild disc bulge at L5-S1 without significant canal stenosis. There is minimal right foraminal narrowing. Visualized soft tissues are unremarkable. No acute pathology. Minimal disc bulge at L5-S1 with minimal right foraminal narrowing.     CT HEAD WO CONT    Result Date: 1/29/2023  Indication:  speech difficulty, leg weakness Comparison: None Findings: 5 mm axial images were obtained from the skull base through the vertex. CT dose reduction was achieved through the use of a standardized protocol tailored for this examination and automatic exposure control for dose modulation. The ventricles and cortical sulci are appropriate in size and configuration. There is no evidence of intracranial hemorrhage, mass, mass effect, or acute infarct. No extra-axial fluid collections are seen. The visualized paranasal sinuses and mastoid air cells are clear. The orbital structures are unremarkable. No osseous abnormalities are seen. Normal head CT. CT SPINE LUMB WO CONT    Result Date: 1/29/2023  INDICATION: low back pain and left leg weakness COMPARISON: None. TECHNIQUE:   Noncontrast axial CT imaging of the lumbar spine was performed. Coronal and sagittal reconstructions were obtained. CT dose reduction was achieved through use of a standardized protocol tailored for this examination and automatic exposure control for dose modulation. FINDINGS: Normal alignment of lumbar spine. Vertebral body heights are maintained with no evidence of acute fracture. No evidence of subluxation. Small inferior endplate Schmorl's node formation at L3. No significant loss of disc height, with mild bulging of disc at L3-4, L4-5, L5-S1. No significant spinal canal or foraminal stenosis. Paraspinal soft tissues are unremarkable., However there is right hydroureteronephrosis partly visualized. Punctate calculi in the right kidney, partly visualized. 1. Right hydroureteronephrosis and several punctate right renal calculi partly visualized. If clinically relevant this would be better assessed with abdomen pelvis CT without contrast. 2. Very mild degenerative changes in the lumbar spine without significant spinal canal or foraminal stenosis at any level.     CT ABD PELV WO CONT    Result Date: 1/29/2023  INDICATION: pelvic pain, right hydronephrosis COMPARISON: None TECHNIQUE: Noncontrast thin axial images were obtained through the abdomen and pelvis. Coronal and sagittal reconstructions were generated. CT dose reduction was achieved through use of a standardized protocol tailored for this examination and automatic exposure control for dose modulation. NOTE:  The absence of IV contrast reduces the sensitivity for evaluation of visceral organs and vasculature including presence of mass lesions, hemodynamically significant stenoses, dissections, mucosal abnormalities etc. FINDINGS: LUNG BASES: No abnormality. LIVER: No mass or biliary dilatation. GALLBLADDER: Unremarkable. SPLEEN: No enlargement or lesion. PANCREAS: No mass or ductal dilatation. ADRENALS: No mass. KIDNEYS: Right hydroureteronephrosis and several punctate right renal calculi. There are several calcifications in the right pelvis near the right ureter, measuring 2-3 mm. No left-sided hydronephrosis or nephrolithiasis. GI TRACT:  Remote sleeve gastrectomy. No bowel obstruction. PERITONEUM: No free air or free fluid. APPENDIX: Unremarkable. RETROPERITONEUM: No aortic aneurysm. LYMPH NODES:  None enlarged. ADDITIONAL COMMENTS: N/A. URINARY BLADDER: 2 mm calculus in the midline dependent portion of the bladder. REPRODUCTIVE ORGANS: IUD within the uterus. LYMPH NODES:  None enlarged. FREE FLUID:  Small amount likely physiologic. BONES: No destructive bone lesion. ADDITIONAL COMMENTS: N/A.     1. Right hydroureteronephrosis, with several punctate right renal calculi. While there are several small calcifications in the right pelvis these are likely phleboliths. There is a 2 mm calculus in the midline dependent portion of the bladder. 2. Remote sleeve gastrectomy.              [facility]     CC: Elyse Nix MD

## 2023-02-01 ENCOUNTER — HOME CARE VISIT (OUTPATIENT)
Dept: HOME HEALTH SERVICES | Facility: HOME HEALTH | Age: 33
End: 2023-02-01

## 2023-02-02 NOTE — CASE COMMUNICATION
I arrived at the patient's home and she reported she wasn't sure why they had ordered home care for her. She is walking fine now with no other symptoms effecting her left leg. Her left arm remains numb with just trace movement of index, thumb, wrist flexors and shoulder elevation. Patient has pain with all PROM. We discussed that she would benefit most from outpatient PT or OT and she was in agreement. I called her PCP and left a messag e for them to fax an RX to [facility] in [place] per patient's request. She is not currently home bound and therefor not admitted to home care services this date.

## 2023-02-07 RX ORDER — PANTOPRAZOLE SODIUM 40 MG/1
TABLET, DELAYED RELEASE ORAL
Qty: 30 TABLET | Refills: 2 | Status: SHIPPED | OUTPATIENT
Start: 2023-02-07

## 2023-03-15 NOTE — PATIENT INSTRUCTIONS
Goals: 1. Continue current exercise routine, increasing as able  2. Continue increased protein (having a source at all meals and snacks)  3.  Continue using protein shake as meal replacement or snack No Vaccines Administered.

## 2023-04-07 NOTE — NURSE NAVIGATOR
[facility] [place] [place]:  Letter and email sent requesting follow up appointment. [employer] [facility]  [address]   [address], [address]                 [employer] [facility] [place]  [facility]  [facility]      Dear Patient,    Your health is our main concern. It is important for your health to have follow-up lab work and to see your surgeon at 3 months, 6 months, 9 months and annually after your weight loss surgery. Additionally, the Department of Bariatric Surgery at our hospital is a member of the Metabolic and Bariatric Surgery Accreditation and Quality Improvement Program [facility]). As a participant in this program, we gather information on the outcomes of our patients after surgery. Please call the office for a follow up appointment at [phone]. If you have moved out of the area or have changed surgeons please call us and let us know the name of your doctor. Your health and feedback are important to us. We greatly appreciate your response.        Thank you,  [employer] [facility] [place] [facility] [address] [facility]

## 2023-11-02 ENCOUNTER — APPOINTMENT (OUTPATIENT)
Facility: HOSPITAL | Age: 33
End: 2023-11-02
Payer: COMMERCIAL

## 2023-11-02 ENCOUNTER — HOSPITAL ENCOUNTER (EMERGENCY)
Facility: HOSPITAL | Age: 33
Discharge: HOME | End: 2023-11-02
Attending: EMERGENCY MEDICINE
Payer: COMMERCIAL

## 2023-11-02 VITALS
TEMPERATURE: 97.6 F | HEART RATE: 86 BPM | OXYGEN SATURATION: 99 % | RESPIRATION RATE: 20 BRPM | SYSTOLIC BLOOD PRESSURE: 112 MMHG | BODY MASS INDEX: 32.58 KG/M2 | WEIGHT: 215 LBS | DIASTOLIC BLOOD PRESSURE: 62 MMHG | HEIGHT: 68 IN

## 2023-11-02 DIAGNOSIS — N20.1 URETEROLITHIASIS: Primary | ICD-10-CM

## 2023-11-02 LAB
ALBUMIN SERPL-MCNC: 3.6 G/DL (ref 3.4–5)
ALBUMIN/GLOB SERPL: 0.8 (ref 0.8–1.7)
ALP SERPL-CCNC: 155 U/L (ref 45–117)
ALT SERPL-CCNC: 34 U/L (ref 13–56)
ANION GAP SERPL CALC-SCNC: 9 MMOL/L (ref 3–18)
APPEARANCE UR: ABNORMAL
AST SERPL-CCNC: 15 U/L (ref 10–38)
BACTERIA URNS QL MICRO: ABNORMAL /HPF
BASOPHILS # BLD: 0 K/UL (ref 0–0.1)
BASOPHILS NFR BLD: 0 % (ref 0–2)
BILIRUB SERPL-MCNC: 0.5 MG/DL (ref 0.2–1)
BILIRUB UR QL: ABNORMAL
BUN SERPL-MCNC: 11 MG/DL (ref 7–18)
BUN/CREAT SERPL: 11 (ref 12–20)
CALCIUM SERPL-MCNC: 8.9 MG/DL (ref 8.5–10.1)
CAOX CRY URNS QL MICRO: ABNORMAL
CHLORIDE SERPL-SCNC: 106 MMOL/L (ref 100–111)
CO2 SERPL-SCNC: 24 MMOL/L (ref 21–32)
COLOR UR: ABNORMAL
CREAT SERPL-MCNC: 0.97 MG/DL (ref 0.6–1.3)
DIFFERENTIAL METHOD BLD: ABNORMAL
EOSINOPHIL # BLD: 0.1 K/UL (ref 0–0.4)
EOSINOPHIL NFR BLD: 1 % (ref 0–5)
EPITH CASTS URNS QL MICRO: ABNORMAL /LPF (ref 0–5)
ERYTHROCYTE [DISTWIDTH] IN BLOOD BY AUTOMATED COUNT: 12.6 % (ref 11.6–14.5)
GLOBULIN SER CALC-MCNC: 4.4 G/DL (ref 2–4)
GLUCOSE SERPL-MCNC: 181 MG/DL (ref 74–99)
GLUCOSE UR STRIP.AUTO-MCNC: NEGATIVE MG/DL
HCG UR QL: NEGATIVE
HCT VFR BLD AUTO: 42.1 % (ref 35–45)
HGB BLD-MCNC: 14.5 G/DL (ref 12–16)
HGB UR QL STRIP: ABNORMAL
IMM GRANULOCYTES # BLD AUTO: 0 K/UL (ref 0–0.04)
IMM GRANULOCYTES NFR BLD AUTO: 0 % (ref 0–0.5)
KETONES UR QL STRIP.AUTO: NEGATIVE MG/DL
LEUKOCYTE ESTERASE UR QL STRIP.AUTO: ABNORMAL
LIPASE SERPL-CCNC: 28 U/L (ref 13–75)
LYMPHOCYTES # BLD: 3.6 K/UL (ref 0.9–3.6)
LYMPHOCYTES NFR BLD: 32 % (ref 21–52)
MCH RBC QN AUTO: 31 PG (ref 24–34)
MCHC RBC AUTO-ENTMCNC: 34.4 G/DL (ref 31–37)
MCV RBC AUTO: 90.1 FL (ref 78–100)
MONOCYTES # BLD: 0.7 K/UL (ref 0.05–1.2)
MONOCYTES NFR BLD: 6 % (ref 3–10)
NEUTS SEG # BLD: 6.9 K/UL (ref 1.8–8)
NEUTS SEG NFR BLD: 61 % (ref 40–73)
NITRITE UR QL STRIP.AUTO: NEGATIVE
NRBC # BLD: 0 K/UL (ref 0–0.01)
NRBC BLD-RTO: 0 PER 100 WBC
PH UR STRIP: 5.5 (ref 5–8)
PLATELET # BLD AUTO: 371 K/UL (ref 135–420)
PMV BLD AUTO: 8.8 FL (ref 9.2–11.8)
POTASSIUM SERPL-SCNC: 3.7 MMOL/L (ref 3.5–5.5)
PROT SERPL-MCNC: 8 G/DL (ref 6.4–8.2)
PROT UR STRIP-MCNC: 100 MG/DL
RBC # BLD AUTO: 4.67 M/UL (ref 4.2–5.3)
RBC #/AREA URNS HPF: ABNORMAL /HPF (ref 0–5)
SODIUM SERPL-SCNC: 139 MMOL/L (ref 136–145)
SP GR UR REFRACTOMETRY: 1.03 (ref 1–1.03)
UROBILINOGEN UR QL STRIP.AUTO: 1 EU/DL (ref 0.2–1)
WBC # BLD AUTO: 11.3 K/UL (ref 4.6–13.2)
WBC URNS QL MICRO: ABNORMAL /HPF (ref 0–5)

## 2023-11-02 PROCEDURE — 6360000002 HC RX W HCPCS: Performed by: EMERGENCY MEDICINE

## 2023-11-02 PROCEDURE — 87086 URINE CULTURE/COLONY COUNT: CPT

## 2023-11-02 PROCEDURE — 81001 URINALYSIS AUTO W/SCOPE: CPT

## 2023-11-02 PROCEDURE — 6370000000 HC RX 637 (ALT 250 FOR IP): Performed by: EMERGENCY MEDICINE

## 2023-11-02 PROCEDURE — 74176 CT ABD & PELVIS W/O CONTRAST: CPT

## 2023-11-02 PROCEDURE — 99284 EMERGENCY DEPT VISIT MOD MDM: CPT

## 2023-11-02 PROCEDURE — 83690 ASSAY OF LIPASE: CPT

## 2023-11-02 PROCEDURE — 85025 COMPLETE CBC W/AUTO DIFF WBC: CPT

## 2023-11-02 PROCEDURE — 80053 COMPREHEN METABOLIC PANEL: CPT

## 2023-11-02 PROCEDURE — 96374 THER/PROPH/DIAG INJ IV PUSH: CPT

## 2023-11-02 PROCEDURE — 81025 URINE PREGNANCY TEST: CPT

## 2023-11-02 PROCEDURE — 96375 TX/PRO/DX INJ NEW DRUG ADDON: CPT

## 2023-11-02 PROCEDURE — 2580000003 HC RX 258: Performed by: EMERGENCY MEDICINE

## 2023-11-02 RX ORDER — 0.9 % SODIUM CHLORIDE 0.9 %
500 INTRAVENOUS SOLUTION INTRAVENOUS ONCE
Status: DISCONTINUED | OUTPATIENT
Start: 2023-11-02 | End: 2023-11-02

## 2023-11-02 RX ORDER — MORPHINE SULFATE 4 MG/ML
4 INJECTION, SOLUTION INTRAMUSCULAR; INTRAVENOUS
Status: COMPLETED | OUTPATIENT
Start: 2023-11-02 | End: 2023-11-02

## 2023-11-02 RX ORDER — 0.9 % SODIUM CHLORIDE 0.9 %
1000 INTRAVENOUS SOLUTION INTRAVENOUS ONCE
Status: COMPLETED | OUTPATIENT
Start: 2023-11-02 | End: 2023-11-02

## 2023-11-02 RX ORDER — DICYCLOMINE HCL 20 MG
20 TABLET ORAL
Status: COMPLETED | OUTPATIENT
Start: 2023-11-02 | End: 2023-11-02

## 2023-11-02 RX ORDER — OXYCODONE HYDROCHLORIDE AND ACETAMINOPHEN 5; 325 MG/1; MG/1
1 TABLET ORAL EVERY 6 HOURS PRN
Qty: 12 TABLET | Refills: 0 | Status: SHIPPED | OUTPATIENT
Start: 2023-11-02 | End: 2023-11-05

## 2023-11-02 RX ORDER — KETOROLAC TROMETHAMINE 15 MG/ML
30 INJECTION, SOLUTION INTRAMUSCULAR; INTRAVENOUS
Status: COMPLETED | OUTPATIENT
Start: 2023-11-02 | End: 2023-11-02

## 2023-11-02 RX ORDER — CIPROFLOXACIN 500 MG/1
500 TABLET, FILM COATED ORAL 2 TIMES DAILY
Qty: 14 TABLET | Refills: 0 | Status: SHIPPED | OUTPATIENT
Start: 2023-11-02 | End: 2023-11-09

## 2023-11-02 RX ORDER — NAPROXEN 500 MG/1
500 TABLET ORAL 2 TIMES DAILY
Qty: 60 TABLET | Refills: 0 | Status: SHIPPED | OUTPATIENT
Start: 2023-11-02

## 2023-11-02 RX ORDER — ONDANSETRON 4 MG/1
4 TABLET, FILM COATED ORAL EVERY 8 HOURS PRN
Qty: 30 TABLET | Refills: 0 | Status: SHIPPED | OUTPATIENT
Start: 2023-11-02

## 2023-11-02 RX ORDER — ONDANSETRON 2 MG/ML
4 INJECTION INTRAMUSCULAR; INTRAVENOUS
Status: COMPLETED | OUTPATIENT
Start: 2023-11-02 | End: 2023-11-02

## 2023-11-02 RX ADMIN — ONDANSETRON 4 MG: 2 INJECTION INTRAMUSCULAR; INTRAVENOUS at 07:23

## 2023-11-02 RX ADMIN — SODIUM CHLORIDE 1000 ML: 9 INJECTION, SOLUTION INTRAVENOUS at 07:23

## 2023-11-02 RX ADMIN — MORPHINE SULFATE 4 MG: 4 INJECTION INTRAVENOUS at 09:13

## 2023-11-02 RX ADMIN — DICYCLOMINE HYDROCHLORIDE 20 MG: 20 TABLET ORAL at 07:27

## 2023-11-02 RX ADMIN — KETOROLAC TROMETHAMINE 30 MG: 15 INJECTION, SOLUTION INTRAMUSCULAR; INTRAVENOUS at 07:20

## 2023-11-02 ASSESSMENT — PAIN DESCRIPTION - ORIENTATION
ORIENTATION: RIGHT;LEFT
ORIENTATION: RIGHT;LEFT;LOWER

## 2023-11-02 ASSESSMENT — PAIN DESCRIPTION - LOCATION
LOCATION: ABDOMEN;FLANK;PELVIS
LOCATION: FLANK;ABDOMEN
LOCATION: ABDOMEN;FLANK

## 2023-11-02 ASSESSMENT — PAIN DESCRIPTION - PAIN TYPE: TYPE: ACUTE PAIN

## 2023-11-02 ASSESSMENT — PAIN SCALES - GENERAL
PAINLEVEL_OUTOF10: 8
PAINLEVEL_OUTOF10: 10
PAINLEVEL_OUTOF10: 9

## 2023-11-02 ASSESSMENT — PAIN DESCRIPTION - DESCRIPTORS
DESCRIPTORS: SHARP;SHOOTING
DESCRIPTORS: SHARP;STABBING

## 2023-11-02 ASSESSMENT — PAIN - FUNCTIONAL ASSESSMENT: PAIN_FUNCTIONAL_ASSESSMENT: 0-10

## 2023-11-02 NOTE — DISCHARGE INSTRUCTIONS
It was a pleasure taking care of you at [facility] Emergency Department today. We know that when you come to [facility], you are entrusting us with your health, comfort, and safety. Our physicians and nurses honor that trust, and we truly appreciate the opportunity to care for you and your loved ones. We also value your feedback. If you receive a survey about your Emergency Department experience today, please fill it out. We care about our patients' feedback, and we listen to what you have to say. Thank you!

## 2023-11-02 NOTE — ED PROVIDER NOTES
symptoms worsen. PATIENT REFERRED TO:  Colt De Santiago MD  [address] [facility] [phone]    In 3 days  follow-up appointment    [facility]  [facility] [facility] [address]  [phone]  In 2 days  If symptoms worsen, to include any fevers, worsening pain, worsening nausea or vomiting or any new concerns. DISCHARGE MEDICATIONS:     Medication List        START taking these medications      ciprofloxacin 500 MG tablet  Commonly known as: Cipro  Take 1 tablet by mouth 2 times daily for 7 days     naproxen 500 MG tablet  Commonly known as: Naprosyn  Take 1 tablet by mouth 2 times daily     ondansetron 4 MG tablet  Commonly known as: ZOFRAN  Take 1 tablet by mouth every 8 hours as needed for Nausea or Vomiting     oxyCODONE-acetaminophen 5-325 MG per tablet  Commonly known as: Percocet  Take 1 tablet by mouth every 6 hours as needed for Pain for up to 3 days. Intended supply: 3 days.  Take lowest dose possible to manage pain Max Daily Amount: 4 tablets            ASK your doctor about these medications      albuterol sulfate  (90 Base) MCG/ACT inhaler  Commonly known as: PROVENTIL;VENTOLIN;PROAIR     ALPRAZolam 0.5 MG tablet  Commonly known as: XANAX     ergocalciferol 1.25 MG (58108 UT) capsule  Commonly known as: ERGOCALCIFEROL     levonorgestrel IUD 52 mg  Commonly known as: MIRENA     modafinil 200 MG tablet  Commonly known as: PROVIGIL     ondansetron 8 MG Tbdp disintegrating tablet  Commonly known as: ZOFRAN-ODT     pantoprazole 40 MG tablet  Commonly known as: PROTONIX     ursodiol 500 MG tablet  Commonly known as: ACTIGALL               Where to Get Your Medications        These medications were sent to [address] [facility], [address]  [address]      Phone: [phone]   ciprofloxacin 500 MG tablet  naproxen 500 MG tablet  ondansetron 4 MG

## 2023-11-02 NOTE — ED NOTES
Paperwork read with patient making note of where to  prescriptions     IV taken out. Armband removed and disposed of in shredder. Patient has no further questions at this time. Will discharge from system.         Brina Dempsey RN  11/02/23 1007

## 2023-11-02 NOTE — ED TRIAGE NOTES
Abnormal urination x1 week with nausea/vomiting, bilateral flank pain, and abdominal/pelvic pain starting at 0400 today, states \"feels like my ovarians are on fire\". HX kidney stones. States slight discomfort with urination and bright bloody stools. Patient Aox4, ambulatory, in no acute distress.     States had half thc gummy at 1600 to help with symptoms, unknown milligram

## 2023-11-03 LAB
BACTERIA SPEC CULT: NORMAL
SERVICE CMNT-IMP: NORMAL

## 2023-11-04 VITALS
SYSTOLIC BLOOD PRESSURE: 148 MMHG | RESPIRATION RATE: 18 BRPM | DIASTOLIC BLOOD PRESSURE: 86 MMHG | HEART RATE: 106 BPM | BODY MASS INDEX: 32.58 KG/M2 | WEIGHT: 215 LBS | TEMPERATURE: 97.8 F | OXYGEN SATURATION: 98 % | HEIGHT: 68 IN

## 2023-11-04 PROCEDURE — 99284 EMERGENCY DEPT VISIT MOD MDM: CPT

## 2023-11-04 ASSESSMENT — PAIN DESCRIPTION - LOCATION: LOCATION: FLANK;ABDOMEN

## 2023-11-04 ASSESSMENT — PAIN SCALES - GENERAL: PAINLEVEL_OUTOF10: 10

## 2023-11-04 ASSESSMENT — PAIN DESCRIPTION - ORIENTATION: ORIENTATION: RIGHT

## 2023-11-04 ASSESSMENT — PAIN - FUNCTIONAL ASSESSMENT: PAIN_FUNCTIONAL_ASSESSMENT: 0-10

## 2023-11-05 ENCOUNTER — HOSPITAL ENCOUNTER (EMERGENCY)
Facility: HOSPITAL | Age: 33
Discharge: HOME | End: 2023-11-05
Attending: STUDENT IN AN ORGANIZED HEALTH CARE EDUCATION/TRAINING PROGRAM
Payer: COMMERCIAL

## 2023-11-05 DIAGNOSIS — R10.9 FLANK PAIN: ICD-10-CM

## 2023-11-05 DIAGNOSIS — N20.0 KIDNEY STONE: Primary | ICD-10-CM

## 2023-11-05 DIAGNOSIS — N30.00 ACUTE CYSTITIS WITHOUT HEMATURIA: ICD-10-CM

## 2023-11-05 DIAGNOSIS — R11.0 NAUSEA: ICD-10-CM

## 2023-11-05 LAB
ALBUMIN SERPL-MCNC: 3.6 G/DL (ref 3.4–5)
ALBUMIN/GLOB SERPL: 0.9 (ref 0.8–1.7)
ALP SERPL-CCNC: 142 U/L (ref 45–117)
ALT SERPL-CCNC: 26 U/L (ref 13–56)
ANION GAP SERPL CALC-SCNC: 9 MMOL/L (ref 3–18)
APPEARANCE UR: CLEAR
AST SERPL-CCNC: 12 U/L (ref 10–38)
BACTERIA URNS QL MICRO: ABNORMAL /HPF
BASOPHILS # BLD: 0 K/UL (ref 0–0.1)
BASOPHILS NFR BLD: 0 % (ref 0–2)
BILIRUB SERPL-MCNC: 0.6 MG/DL (ref 0.2–1)
BILIRUB UR QL: NEGATIVE
BUN SERPL-MCNC: 14 MG/DL (ref 7–18)
BUN/CREAT SERPL: 11 (ref 12–20)
CALCIUM SERPL-MCNC: 9.1 MG/DL (ref 8.5–10.1)
CHLORIDE SERPL-SCNC: 106 MMOL/L (ref 100–111)
CO2 SERPL-SCNC: 23 MMOL/L (ref 21–32)
COLOR UR: YELLOW
CREAT SERPL-MCNC: 1.31 MG/DL (ref 0.6–1.3)
DIFFERENTIAL METHOD BLD: ABNORMAL
EOSINOPHIL # BLD: 0.1 K/UL (ref 0–0.4)
EOSINOPHIL NFR BLD: 1 % (ref 0–5)
EPITH CASTS URNS QL MICRO: ABNORMAL /LPF (ref 0–5)
ERYTHROCYTE [DISTWIDTH] IN BLOOD BY AUTOMATED COUNT: 12.3 % (ref 11.6–14.5)
GLOBULIN SER CALC-MCNC: 4.2 G/DL (ref 2–4)
GLUCOSE SERPL-MCNC: 128 MG/DL (ref 74–99)
GLUCOSE UR STRIP.AUTO-MCNC: NEGATIVE MG/DL
HCG SERPL QL: NEGATIVE
HCT VFR BLD AUTO: 39.7 % (ref 35–45)
HGB BLD-MCNC: 13.8 G/DL (ref 12–16)
HGB UR QL STRIP: ABNORMAL
IMM GRANULOCYTES # BLD AUTO: 0.1 K/UL (ref 0–0.04)
IMM GRANULOCYTES NFR BLD AUTO: 0 % (ref 0–0.5)
KETONES UR QL STRIP.AUTO: ABNORMAL MG/DL
LEUKOCYTE ESTERASE UR QL STRIP.AUTO: ABNORMAL
LYMPHOCYTES # BLD: 2.3 K/UL (ref 0.9–3.6)
LYMPHOCYTES NFR BLD: 15 % (ref 21–52)
MCH RBC QN AUTO: 31.2 PG (ref 24–34)
MCHC RBC AUTO-ENTMCNC: 34.8 G/DL (ref 31–37)
MCV RBC AUTO: 89.6 FL (ref 78–100)
MONOCYTES # BLD: 1.2 K/UL (ref 0.05–1.2)
MONOCYTES NFR BLD: 8 % (ref 3–10)
NEUTS SEG # BLD: 11.7 K/UL (ref 1.8–8)
NEUTS SEG NFR BLD: 76 % (ref 40–73)
NITRITE UR QL STRIP.AUTO: NEGATIVE
NRBC # BLD: 0 K/UL (ref 0–0.01)
NRBC BLD-RTO: 0 PER 100 WBC
PH UR STRIP: 6.5 (ref 5–8)
PLATELET # BLD AUTO: 367 K/UL (ref 135–420)
PMV BLD AUTO: 9 FL (ref 9.2–11.8)
POTASSIUM SERPL-SCNC: 3.9 MMOL/L (ref 3.5–5.5)
PROT SERPL-MCNC: 7.8 G/DL (ref 6.4–8.2)
PROT UR STRIP-MCNC: NEGATIVE MG/DL
RBC # BLD AUTO: 4.43 M/UL (ref 4.2–5.3)
RBC #/AREA URNS HPF: ABNORMAL /HPF (ref 0–5)
SODIUM SERPL-SCNC: 138 MMOL/L (ref 136–145)
SP GR UR REFRACTOMETRY: 1.02 (ref 1–1.03)
UROBILINOGEN UR QL STRIP.AUTO: 1 EU/DL (ref 0.2–1)
WBC # BLD AUTO: 15.4 K/UL (ref 4.6–13.2)
WBC URNS QL MICRO: ABNORMAL /HPF (ref 0–5)

## 2023-11-05 PROCEDURE — 81001 URINALYSIS AUTO W/SCOPE: CPT

## 2023-11-05 PROCEDURE — 6360000002 HC RX W HCPCS: Performed by: STUDENT IN AN ORGANIZED HEALTH CARE EDUCATION/TRAINING PROGRAM

## 2023-11-05 PROCEDURE — 96374 THER/PROPH/DIAG INJ IV PUSH: CPT

## 2023-11-05 PROCEDURE — 85025 COMPLETE CBC W/AUTO DIFF WBC: CPT

## 2023-11-05 PROCEDURE — 80053 COMPREHEN METABOLIC PANEL: CPT

## 2023-11-05 PROCEDURE — 96376 TX/PRO/DX INJ SAME DRUG ADON: CPT

## 2023-11-05 PROCEDURE — 2580000003 HC RX 258: Performed by: STUDENT IN AN ORGANIZED HEALTH CARE EDUCATION/TRAINING PROGRAM

## 2023-11-05 PROCEDURE — 96361 HYDRATE IV INFUSION ADD-ON: CPT

## 2023-11-05 PROCEDURE — 84703 CHORIONIC GONADOTROPIN ASSAY: CPT

## 2023-11-05 PROCEDURE — 96375 TX/PRO/DX INJ NEW DRUG ADDON: CPT

## 2023-11-05 RX ORDER — ONDANSETRON 2 MG/ML
4 INJECTION INTRAMUSCULAR; INTRAVENOUS
Status: COMPLETED | OUTPATIENT
Start: 2023-11-05 | End: 2023-11-05

## 2023-11-05 RX ORDER — PROCHLORPERAZINE MALEATE 10 MG
10 TABLET ORAL EVERY 6 HOURS PRN
Qty: 28 TABLET | Refills: 0 | Status: SHIPPED | OUTPATIENT
Start: 2023-11-05 | End: 2023-11-12

## 2023-11-05 RX ORDER — MORPHINE SULFATE 10 MG/ML
8 INJECTION, SOLUTION INTRAMUSCULAR; INTRAVENOUS
Status: COMPLETED | OUTPATIENT
Start: 2023-11-05 | End: 2023-11-05

## 2023-11-05 RX ORDER — TAMSULOSIN HYDROCHLORIDE 0.4 MG/1
0.4 CAPSULE ORAL DAILY
Qty: 4 CAPSULE | Refills: 0 | Status: SHIPPED | OUTPATIENT
Start: 2023-11-05 | End: 2023-11-09

## 2023-11-05 RX ORDER — MORPHINE SULFATE 4 MG/ML
4 INJECTION, SOLUTION INTRAMUSCULAR; INTRAVENOUS
Status: COMPLETED | OUTPATIENT
Start: 2023-11-05 | End: 2023-11-05

## 2023-11-05 RX ORDER — 0.9 % SODIUM CHLORIDE 0.9 %
1000 INTRAVENOUS SOLUTION INTRAVENOUS ONCE
Status: COMPLETED | OUTPATIENT
Start: 2023-11-05 | End: 2023-11-05

## 2023-11-05 RX ADMIN — MORPHINE SULFATE 4 MG: 4 INJECTION INTRAVENOUS at 03:07

## 2023-11-05 RX ADMIN — ONDANSETRON 4 MG: 2 INJECTION INTRAMUSCULAR; INTRAVENOUS at 01:56

## 2023-11-05 RX ADMIN — SODIUM CHLORIDE 1000 ML: 9 INJECTION, SOLUTION INTRAVENOUS at 01:53

## 2023-11-05 RX ADMIN — MORPHINE SULFATE 8 MG: 10 INJECTION INTRAVENOUS at 01:56

## 2023-11-05 ASSESSMENT — ENCOUNTER SYMPTOMS
BACK PAIN: 0
CONSTIPATION: 0
DIARRHEA: 0
NAUSEA: 1
SHORTNESS OF BREATH: 0
VOMITING: 1
ABDOMINAL PAIN: 0

## 2023-11-05 NOTE — ED NOTES
EMERGENCY DEPARTMENT HISTORY AND PHYSICAL EXAM    3:05 AM      Date: 11/5/2023  Patient Name: Miranda Barnett    History of Presenting Illness     Chief Complaint   Patient presents with    Flank Pain    Abdominal Pain       History From: Patient  HPI  38-year-old female with history of ureteral lithiasis, HSV-1, ovarian cyst, diabetes, GERD, gastric bypass surgery who presents with right flank pain. Patient states that symptoms have been worsening for the past 2 days. Patient was seen on 11/2/2023 for ureterolithiasis. Denies any new medications, recent sick contacts, or any other changes. Pain is located in the right flank and she is down to the inguinal area, moderate in severity, intermittent. When assessing ROS she is endorsing nausea, vomiting, however no fevers, chest pain, dysuria, changes in bowel movement, or any other changes. Nursing Notes were all reviewed and agreed with or any disagreements were addressed in the HPI. PCP: Deepa Ho MD    Current Facility-Administered Medications   Medication Dose Route Frequency Provider Last Rate Last Admin    morphine sulfate (PF) injection 4 mg  4 mg IntraVENous NOW Abhay Nina MD         Current Outpatient Medications   Medication Sig Dispense Refill    prochlorperazine (COMPAZINE) 10 MG tablet Take 1 tablet by mouth every 6 hours as needed (nause and vomiting) 28 tablet 0    tamsulosin (FLOMAX) 0.4 MG capsule Take 1 capsule by mouth daily for 4 days 4 capsule 0    oxyCODONE-acetaminophen (PERCOCET) 5-325 MG per tablet Take 1 tablet by mouth every 6 hours as needed for Pain for up to 3 days. Intended supply: 3 days.  Take lowest dose possible to manage pain Max Daily Amount: 4 tablets 12 tablet 0    naproxen (NAPROSYN) 500 MG tablet Take 1 tablet by mouth 2 times daily 60 tablet 0    ondansetron (ZOFRAN) 4 MG tablet Take 1 tablet by mouth every 8 hours as needed for Nausea or Vomiting 30 tablet 0    ciprofloxacin (CIPRO) 500 MG tablet Take 1 [facility]

## 2023-11-05 NOTE — DISCHARGE INSTRUCTIONS
You were evaluated for kidney stone , urinary tract infection and nausea and vomiting . Based on your work-up it was deemed that she was stable for discharge. Please  your medication of compazine, flomax which was prescribed to you. Please make sure that you continue to take your Percocet, take your ciprofloxacin. Please follow-up with urology in outpatient setting in the next 2 to 3 days. Please follow-up with your primary care physician if you have any further concerns and go over your work-up. If you experience any chest pain, shortness of breath, worsening abdominal pain, vomiting blood, worsening headache, seizures, or any worsening of your symptoms please return to the emergency department immediately.   If you have any pending results or any further questions please contact the emergency department at [phone]

## 2023-11-05 NOTE — ED TRIAGE NOTES
Pt presents ambulatory to triage    Pt c/o R flank pain and abdominal pain. Pt state she was here 3 days ago and was prescribed oxycodone but all it does it make her itch    Pt states pain is still not controlled. Pt states she has N/V and loss of appetite.

## 2024-02-08 NOTE — PROGRESS NOTES
Unable to reach    Attempt made to reach the patient by telephone. Left HIPPA compliant message requesting a return call. Will re-attempt to reach the patient at a later time.
abdominal pain

## (undated) DEVICE — RESERVOIR,SUCTION,100CC,SILICONE: Brand: MEDLINE

## (undated) DEVICE — SEALANT TISS 10 CC FOR HUM FIBRIN VISTASEAL

## (undated) DEVICE — RELOAD STPL H4.1X2MM DIA60MM THCK TISS GRN 6 ROW PWR GST B

## (undated) DEVICE — TROCAR LAP L100MM DIA5MM BLDELSS W/ STBL SL ENDOPATH XCEL

## (undated) DEVICE — SUT MONOCRYL PLUS UD 4-0 --

## (undated) DEVICE — SOL IRRIGATION INJ NACL 0.9% 500ML BTL

## (undated) DEVICE — SHEAR HARMONIC 5MMX45CM -- ACE 7+

## (undated) DEVICE — NEEDLE INSUF L150MM DIA2MM DISP FOR PNEUMOPERI ENDOPATH

## (undated) DEVICE — TOTAL TRAY, 16FR 10ML SIL FOLEY, URN: Brand: MEDLINE

## (undated) DEVICE — DISPOSABLE SUCTION/IRRIGATOR TUBE SET WITH TIP: Brand: AHTO

## (undated) DEVICE — ENDO CARRY-ON PROCEDURE KIT INCLUDES ENZYMATIC SPONGE, GAUZE, BIOHAZARD LABEL, TRAY, LUBRICANT, DIRTY SCOPE LABEL, WATER LABEL, TRAY, DRAWSTRING PAD, AND DEFENDO 4-PIECE KIT.: Brand: ENDO CARRY-ON PROCEDURE KIT

## (undated) DEVICE — SUTURE PDS II SZ 0 L27IN ABSRB VLT L26MM CT-2 1/2 CIR Z334H

## (undated) DEVICE — DRAIN SURG 15FR L3/16IN SIL RND 3/4 FLUT 3/16IN TRCR

## (undated) DEVICE — Device

## (undated) DEVICE — TISSUE RETRIEVAL SYSTEM: Brand: INZII RETRIEVAL SYSTEM

## (undated) DEVICE — STRIP SKIN CLSR W1XL5IN NYL REINF CURAD

## (undated) DEVICE — SYR IRR CATH TIP LR ADPT 70ML -- CONVERT TO ITEM 363120

## (undated) DEVICE — PREP SKN PREVAIL 40ML APPL --

## (undated) DEVICE — SUT PROL 2-0 30IN CT2 BLU --

## (undated) DEVICE — SUTURE VCRL SZ 2-0 L27IN ABSRB VLT L26MM SH 1/2 CIR J317H

## (undated) DEVICE — CUTTER ENDOSCP L340MM LIN ARTC SGL STROKE FIRING ENDOPATH

## (undated) DEVICE — SEALANT HEMSTAT 5ML HUM FIBRIN THROM 2 VI APPL DEV EVICEL

## (undated) DEVICE — STERILE POLYISOPRENE POWDER-FREE SURGICAL GLOVES: Brand: PROTEXIS

## (undated) DEVICE — MEDI-VAC NON-CONDUCTIVE SUCTION TUBING: Brand: CARDINAL HEALTH

## (undated) DEVICE — GOWN ISOLATN REG BLU POLY UNISX W/ THMB LOOP

## (undated) DEVICE — TROCAR ENDOSCP L100MM DIA12MM STBL SL BLDELSS ENDOPATH XCEL

## (undated) DEVICE — STAPLER INT L37CM STPL 21MM CIR ENDOSCP CRV INTLUMN B FRM

## (undated) DEVICE — [HIGH FLOW INSUFFLATOR,  DO NOT USE IF PACKAGE IS DAMAGED,  KEEP DRY,  KEEP AWAY FROM SUNLIGHT,  PROTECT FROM HEAT AND RADIOACTIVE SOURCES.]: Brand: PNEUMOSURE

## (undated) DEVICE — SUTURE ETHLN SZ 3-0 L30IN NONABSORBABLE BLK FSL L30MM 3/8 1671H

## (undated) DEVICE — KENDALL SCD EXPRESS SLEEVES, KNEE LENGTH, MEDIUM: Brand: KENDALL SCD

## (undated) DEVICE — TROCAR ENDOSCP BLDELSS 12X100 MM W/ HNDL STBL SL OPT TIP

## (undated) DEVICE — MAJ-1414 SINGLE USE ADPATER BIOPSY VALV: Brand: SINGLE USE ADAPTOR BIOPSY VALVE

## (undated) DEVICE — AGENT HEMSTAT W6XL9IN OXIDIZED REGENERATED CELOS ABSRB FOR

## (undated) DEVICE — TROCAR ENDOSCP L100MM DIA15MM BLDELSS STBL SL ENDOPATH XCEL

## (undated) DEVICE — STAPLER SKIN L440MM 32MM LNG 12 FIRING B FRM PWR + GRIPPING

## (undated) DEVICE — APPLIER CLP L SHFT DIA12MM 20 ROT MULT LIGACLP

## (undated) DEVICE — BARIATRIC: Brand: MEDLINE INDUSTRIES, INC.

## (undated) DEVICE — KIT SUTURING DEVICE M-CLOSE

## (undated) DEVICE — FORCEPS BX OVL CUP SERR DISP CAP L 240CM RAD JAW 4

## (undated) DEVICE — AMD ANTIMICROBIAL DRAIN SPONGES, 6 PLY, 0.2% POLYHEXAMETHYLENE BIGUANIDE HCI (PHMB): Brand: EXCILON

## (undated) DEVICE — GLOVE ORANGE PI 7 1/2   MSG9075

## (undated) DEVICE — SOLUTION LACTATED RINGERS INJECTION USP

## (undated) DEVICE — GARMENT,MEDLINE,DVT,INT,CALF,MED, GEN2: Brand: MEDLINE

## (undated) DEVICE — RELOAD STPL H1-2.5X45MM VASC THN TISS WHT 6 ROW B FRM SGL

## (undated) DEVICE — GLOVE ORANGE PI 8   MSG9080

## (undated) DEVICE — SUT ETHLN 3-0 18IN PS1 BLK --

## (undated) DEVICE — APPLICATOR LAP 35 CM 2 RIGID VISTASEAL

## (undated) DEVICE — CLIP SUT ENDOSCP F/2-0/3-0/4-0 -- LAPRA-TY

## (undated) DEVICE — DEVON™ KNEE AND BODY STRAP 60" X 3" (1.5 M X 7.6 CM): Brand: DEVON

## (undated) DEVICE — 4-PORT MANIFOLD: Brand: NEPTUNE 2

## (undated) DEVICE — RELOAD STPL L60MM H1-2.6MM MESENTERY THN TISS WHT 6 ROW

## (undated) DEVICE — SUTURE ETHIB EXCL BR GRN TAPR PT 2-0 30 X563H X563H

## (undated) DEVICE — SLEEVE TRCR L100MM DIA5MM UNIV STBL FOR BLDELSS DIL TIP

## (undated) DEVICE — SOLUTION SURG PREP 26 CC PURPREP

## (undated) DEVICE — VISUALIZATION SYSTEM: Brand: CLEARIFY

## (undated) DEVICE — TIP APPL L35CM RIG FOR SEAL EVICEL

## (undated) DEVICE — TUBING, SUCTION, 1/4" X 12', STRAIGHT: Brand: MEDLINE

## (undated) DEVICE — VISIGI 3D®  CALIBRATION SYSTEM  SIZE 36FR STD W/ BULB: Brand: BOEHRINGER® VISIGI 3D™ SLEEVE GASTRECTOMY CALIBRATION SYSTEM, SIZE 36FR W/BULB

## (undated) DEVICE — ENDOCUT SCISSOR TIP, DISPOSABLE: Brand: RENEW